# Patient Record
Sex: FEMALE | Race: WHITE | Employment: OTHER | URBAN - METROPOLITAN AREA
[De-identification: names, ages, dates, MRNs, and addresses within clinical notes are randomized per-mention and may not be internally consistent; named-entity substitution may affect disease eponyms.]

---

## 2017-02-09 PROCEDURE — 82746 ASSAY OF FOLIC ACID SERUM: CPT | Performed by: OTHER

## 2017-02-09 PROCEDURE — 82607 VITAMIN B-12: CPT | Performed by: OTHER

## 2017-02-09 PROCEDURE — 36415 COLL VENOUS BLD VENIPUNCTURE: CPT | Performed by: OTHER

## 2017-05-23 PROBLEM — K58.1 IRRITABLE BOWEL SYNDROME WITH CONSTIPATION: Status: ACTIVE | Noted: 2017-05-23

## 2017-08-25 PROBLEM — M54.16 LUMBAR RADICULITIS: Status: ACTIVE | Noted: 2017-08-25

## 2017-08-25 PROBLEM — M43.16 SPONDYLOLISTHESIS OF LUMBAR REGION: Status: ACTIVE | Noted: 2017-08-25

## 2017-08-25 PROBLEM — M48.061 LUMBAR SPINAL STENOSIS: Status: ACTIVE | Noted: 2017-08-25

## 2017-10-24 PROBLEM — M53.3 COCCYX PAIN: Status: ACTIVE | Noted: 2017-10-24

## 2017-10-24 PROBLEM — T14.90XA TRAUMA: Status: ACTIVE | Noted: 2017-10-24

## 2017-12-19 PROCEDURE — 87077 CULTURE AEROBIC IDENTIFY: CPT | Performed by: UROLOGY

## 2017-12-19 PROCEDURE — 87086 URINE CULTURE/COLONY COUNT: CPT | Performed by: UROLOGY

## 2017-12-20 PROCEDURE — 82607 VITAMIN B-12: CPT | Performed by: INTERNAL MEDICINE

## 2017-12-29 PROBLEM — G31.84 MCI (MILD COGNITIVE IMPAIRMENT) WITH MEMORY LOSS: Status: ACTIVE | Noted: 2017-12-29

## 2017-12-29 PROCEDURE — 83921 ORGANIC ACID SINGLE QUANT: CPT | Performed by: INTERNAL MEDICINE

## 2017-12-29 PROCEDURE — 36415 COLL VENOUS BLD VENIPUNCTURE: CPT | Performed by: INTERNAL MEDICINE

## 2018-02-05 PROCEDURE — 82607 VITAMIN B-12: CPT | Performed by: INTERNAL MEDICINE

## 2018-02-05 PROCEDURE — 36415 COLL VENOUS BLD VENIPUNCTURE: CPT | Performed by: INTERNAL MEDICINE

## 2018-02-12 PROBLEM — E53.8 B12 DEFICIENCY: Status: ACTIVE | Noted: 2018-02-12

## 2018-08-01 PROCEDURE — 86235 NUCLEAR ANTIGEN ANTIBODY: CPT | Performed by: OTHER

## 2018-08-01 PROCEDURE — 36415 COLL VENOUS BLD VENIPUNCTURE: CPT | Performed by: OTHER

## 2018-08-20 PROCEDURE — 86160 COMPLEMENT ANTIGEN: CPT | Performed by: INTERNAL MEDICINE

## 2018-11-29 NOTE — LETTER
[facility] 4NW-A  [address] [address]  [phone]    Blood Transfusion Consent    In the course of your treatment, it may become necessary to administer a transfusion of blood or blood components. This form provides basic information concerning this procedure and, if signed by you, authorizes its administration. By signing this form, you agree that all of your questions about the administration of blood or blood products have been answered by the ordering medical professional or designee.    Description of Procedure  Blood is introduced into one of your veins, commonly in the arm, using a sterilized disposable needle. The amount of blood transfused, and whether the transfusion will be of blood or blood components is a judgement the physician will make based on your particular needs.    Risks  The transfusion is a common procedure of low risk.  MINOR AND TEMPORARY REACTIONS ARE NOT UNCOMMON, including a slight bruise, swelling or local reaction in the area where the needle pierces your skin, or a nonserious reaction to the transfused material itself, including headache, fever or mild skin reaction, such as rash.  Serious reactions are possible, though very unlikely, and include severe allergic reaction (shock) and destruction (hemolysis) of transfused blood cells.  Infectious diseases which are known to be transmitted by blood transfusion include certain types of viral Hepatitis(liver infection from a virus), Human Immunodeficiency Virus (HIV-1,2) infection, a viral infection known to cause Acquired Immunodeficiency Syndrome (AIDS), as well as certain other bacterial, viral, and parasitic diseases. While a minimal risk of acquiring an infectious disease from transfused blood exists, in accordance with the Federal and State law, all due care has been taken in donor selection and testing to avoid transmission of disease.    Alternatives  If loss of blood poses serious threats during your  Left detailed message on patient's home phone regarding information below, patient instructed to return call with her decision of which GYN she would like. treatment, THERE IS NO EFFECTIVE ALTERNATIVE TO BLOOD TRANSFUSION. However, if you have any further questions on this matter, your provider will fully explain the alternatives to you if it has not already been done.    I, ______________________________, have read/had read to me the above. I understand the matters bearing on the decision whether or not to authorize a transfusion of blood or blood components. I have no questions which have not been answered to my full satisfaction. I hereby consent to such transfusion as my physician may deem necessary or advisable in the course of my treatment.    ______________________________________________                    ___________________________  (Signature of Patient or Responsible party in case of minor,                 (Printed Name of Patient or incompetent, or unconscious patient)              Responsible Party)    ___________________________               _____________________  (Relationship to Patient if not self)                                    (Date and Time)    __________________________                                                           ______________________              (Signature of Witness)               (Printed Name of Witness)     Language line ()    Telephone/Verbal/Video Consent    __________________________                     ____________________  (Signature of 2nd Witness           (Printed Name of 2nd  Telephone/Verbal/Video Consent)           Witness)    Patient Name: Isabella Addison     : 1940                 Printed: 2024     Medical Record #: [id number]      Rev: 2023

## 2019-01-04 PROBLEM — T14.90XA TRAUMA: Status: RESOLVED | Noted: 2017-10-24 | Resolved: 2019-01-04

## 2019-01-04 PROBLEM — R73.01 IFG (IMPAIRED FASTING GLUCOSE): Status: ACTIVE | Noted: 2019-01-04

## 2019-01-04 PROBLEM — G47.33 OSA (OBSTRUCTIVE SLEEP APNEA): Status: ACTIVE | Noted: 2019-01-04

## 2019-01-04 PROBLEM — M53.3 COCCYX PAIN: Status: RESOLVED | Noted: 2017-10-24 | Resolved: 2019-01-04

## 2019-01-04 PROBLEM — M43.16 SPONDYLOLISTHESIS OF LUMBAR REGION: Status: RESOLVED | Noted: 2017-08-25 | Resolved: 2019-01-04

## 2019-12-01 ENCOUNTER — HOSPITAL ENCOUNTER (EMERGENCY)
Facility: HOSPITAL | Age: 79
Discharge: HOME | End: 2019-12-01
Attending: EMERGENCY MEDICINE
Payer: MEDICARE

## 2019-12-01 VITALS
HEART RATE: 72 BPM | DIASTOLIC BLOOD PRESSURE: 93 MMHG | RESPIRATION RATE: 16 BRPM | HEIGHT: 64 IN | WEIGHT: 132 LBS | BODY MASS INDEX: 22.53 KG/M2 | TEMPERATURE: 98 F | OXYGEN SATURATION: 98 % | SYSTOLIC BLOOD PRESSURE: 164 MMHG

## 2019-12-01 DIAGNOSIS — H10.212 CHEMICAL CONJUNCTIVITIS OF LEFT EYE: Primary | ICD-10-CM

## 2019-12-01 PROCEDURE — 99282 EMERGENCY DEPT VISIT SF MDM: CPT

## 2019-12-01 NOTE — ED PROVIDER NOTES
Patient Seen in: [facility] Emergency Department      History   Patient presents with:   Eye Visual Problem (opthalmic)    Stated Complaint: accidentally put Frontline vet meds in eye, flushed eye for 15 mins    HPI    49-year-old woman who has mild • LUMBAR EPIDURAL N/A 7/19/2018    Performed by Hyacinth Turner MD at [address] [place] [address] N/A 12/28/2017    Performed by Hyacinth Turner MD at [address] [place] [address] N/A 9/12/2017    Performed Skin: No surrounding erythema of the periorbital area        MDM         MSDS            Disposition and Plan     Clinical Impression:  Chemical conjunctivitis of left eye  (primary encounter diagnosis)    Disposition:  Discharge  12/1/2019  6:10 pm    Andrew Lockhart

## 2019-12-02 NOTE — ED INITIAL ASSESSMENT (HPI)
Patient reports she mistook frontline plus dog medication for her eye drop, getting medication into left eye. Tearing and irritation observed to left eye. Reports vision feels normal. Flushed out eye at home pta.

## 2019-12-23 PROBLEM — J06.9 ACUTE URI: Status: ACTIVE | Noted: 2019-12-23

## 2019-12-24 ENCOUNTER — APPOINTMENT (OUTPATIENT)
Dept: GENERAL RADIOLOGY | Facility: HOSPITAL | Age: 79
DRG: 194 | End: 2019-12-24
Attending: EMERGENCY MEDICINE
Payer: MEDICARE

## 2019-12-24 ENCOUNTER — HOSPITAL ENCOUNTER (INPATIENT)
Facility: HOSPITAL | Age: 79
LOS: 3 days | Discharge: NURSING, REHAB OR LONG-TERM CARE | DRG: 194 | End: 2019-12-30
Attending: EMERGENCY MEDICINE | Admitting: HOSPITALIST
Payer: MEDICARE

## 2019-12-24 ENCOUNTER — APPOINTMENT (OUTPATIENT)
Dept: CT IMAGING | Facility: HOSPITAL | Age: 79
DRG: 194 | End: 2019-12-24
Attending: EMERGENCY MEDICINE
Payer: MEDICARE

## 2019-12-24 DIAGNOSIS — R77.8 ELEVATED TROPONIN: Primary | ICD-10-CM

## 2019-12-24 DIAGNOSIS — R40.0 SOMNOLENCE: ICD-10-CM

## 2019-12-24 DIAGNOSIS — J18.9 COMMUNITY ACQUIRED PNEUMONIA OF RIGHT UPPER LOBE OF LUNG: ICD-10-CM

## 2019-12-24 PROBLEM — R79.89 ELEVATED TROPONIN: Status: ACTIVE | Noted: 2019-12-24

## 2019-12-24 LAB
ADENOVIRUS PCR:: NEGATIVE
ALBUMIN SERPL-MCNC: 2.8 G/DL (ref 3.4–5)
ALBUMIN/GLOB SERPL: 0.7 {RATIO} (ref 1–2)
ALP LIVER SERPL-CCNC: 64 U/L (ref 55–142)
ALT SERPL-CCNC: 13 U/L (ref 13–56)
ANION GAP SERPL CALC-SCNC: 6 MMOL/L (ref 0–18)
APTT PPP: 27.6 SECONDS (ref 25.4–36.1)
AST SERPL-CCNC: 13 U/L (ref 15–37)
B PERT DNA SPEC QL NAA+PROBE: NEGATIVE
BASOPHILS # BLD AUTO: 0.02 X10(3) UL (ref 0–0.2)
BASOPHILS NFR BLD AUTO: 0.3 %
BILIRUB SERPL-MCNC: 0.5 MG/DL (ref 0.1–2)
BILIRUB UR QL STRIP.AUTO: NEGATIVE
BUN BLD-MCNC: 10 MG/DL (ref 7–18)
BUN/CREAT SERPL: 11.5 (ref 10–20)
C PNEUM DNA SPEC QL NAA+PROBE: NEGATIVE
CALCIUM BLD-MCNC: 8.5 MG/DL (ref 8.5–10.1)
CHLORIDE SERPL-SCNC: 106 MMOL/L (ref 98–112)
CO2 SERPL-SCNC: 28 MMOL/L (ref 21–32)
COLOR UR AUTO: YELLOW
CORONAVIRUS 229E PCR:: NEGATIVE
CORONAVIRUS HKU1 PCR:: NEGATIVE
CORONAVIRUS NL63 PCR:: NEGATIVE
CORONAVIRUS OC43 PCR:: NEGATIVE
CREAT BLD-MCNC: 0.87 MG/DL (ref 0.55–1.02)
DEPRECATED RDW RBC AUTO: 43.6 FL (ref 35.1–46.3)
DIGOXIN SERPL-MCNC: 1.78 NG/ML (ref 0.8–2)
EOSINOPHIL # BLD AUTO: 0.06 X10(3) UL (ref 0–0.7)
EOSINOPHIL NFR BLD AUTO: 0.9 %
ERYTHROCYTE [DISTWIDTH] IN BLOOD BY AUTOMATED COUNT: 14.3 % (ref 11–15)
FLUAV RNA SPEC QL NAA+PROBE: NEGATIVE
FLUBV RNA SPEC QL NAA+PROBE: NEGATIVE
GLOBULIN PLAS-MCNC: 4.2 G/DL (ref 2.8–4.4)
GLUCOSE BLD-MCNC: 121 MG/DL (ref 70–99)
GLUCOSE UR STRIP.AUTO-MCNC: NEGATIVE MG/DL
HCT VFR BLD AUTO: 33.4 % (ref 35–48)
HGB BLD-MCNC: 10.4 G/DL (ref 12–16)
IMM GRANULOCYTES # BLD AUTO: 0.02 X10(3) UL (ref 0–1)
IMM GRANULOCYTES NFR BLD: 0.3 %
INR BLD: 1.04 (ref 0.9–1.1)
KETONES UR STRIP.AUTO-MCNC: NEGATIVE MG/DL
LACTATE SERPL-SCNC: 1.2 MMOL/L (ref 0.4–2)
LEUKOCYTE ESTERASE UR QL STRIP.AUTO: NEGATIVE
LYMPHOCYTES # BLD AUTO: 1.18 X10(3) UL (ref 1–4)
LYMPHOCYTES NFR BLD AUTO: 18.4 %
M PROTEIN MFR SERPL ELPH: 7 G/DL (ref 6.4–8.2)
MCH RBC QN AUTO: 26.1 PG (ref 26–34)
MCHC RBC AUTO-ENTMCNC: 31.1 G/DL (ref 31–37)
MCV RBC AUTO: 83.9 FL (ref 80–100)
METAPNEUMOVIRUS PCR:: NEGATIVE
MONOCYTES # BLD AUTO: 1.07 X10(3) UL (ref 0.1–1)
MONOCYTES NFR BLD AUTO: 16.7 %
MYCOPLASMA PNEUMONIA PCR:: NEGATIVE
NEUTROPHILS # BLD AUTO: 4.06 X10 (3) UL (ref 1.5–7.7)
NEUTROPHILS # BLD AUTO: 4.06 X10(3) UL (ref 1.5–7.7)
NEUTROPHILS NFR BLD AUTO: 63.4 %
NITRITE UR QL STRIP.AUTO: NEGATIVE
OSMOLALITY SERPL CALC.SUM OF ELEC: 290 MOSM/KG (ref 275–295)
PARAINFLUENZA 1 PCR:: NEGATIVE
PARAINFLUENZA 2 PCR:: NEGATIVE
PARAINFLUENZA 3 PCR:: NEGATIVE
PARAINFLUENZA 4 PCR:: NEGATIVE
PH UR STRIP.AUTO: 6 [PH] (ref 4.5–8)
PLATELET # BLD AUTO: 160 10(3)UL (ref 150–450)
POTASSIUM SERPL-SCNC: 3.5 MMOL/L (ref 3.5–5.1)
PROT UR STRIP.AUTO-MCNC: NEGATIVE MG/DL
PSA SERPL DL<=0.01 NG/ML-MCNC: 14 SECONDS (ref 12.5–14.7)
RBC # BLD AUTO: 3.98 X10(6)UL (ref 3.8–5.3)
RBC UR QL AUTO: NEGATIVE
RHINOVIRUS/ENTERO PCR:: NEGATIVE
RSV RNA SPEC QL NAA+PROBE: NEGATIVE
SODIUM SERPL-SCNC: 140 MMOL/L (ref 136–145)
SP GR UR STRIP.AUTO: 1.01 (ref 1–1.03)
TROPONIN I SERPL-MCNC: 0.05 NG/ML (ref ?–0.04)
TROPONIN I SERPL-MCNC: 0.27 NG/ML (ref ?–0.04)
UROBILINOGEN UR STRIP.AUTO-MCNC: <2 MG/DL
WBC # BLD AUTO: 6.4 X10(3) UL (ref 4–11)

## 2019-12-24 PROCEDURE — 81003 URINALYSIS AUTO W/O SCOPE: CPT | Performed by: EMERGENCY MEDICINE

## 2019-12-24 PROCEDURE — 87633 RESP VIRUS 12-25 TARGETS: CPT | Performed by: EMERGENCY MEDICINE

## 2019-12-24 PROCEDURE — 85025 COMPLETE CBC W/AUTO DIFF WBC: CPT | Performed by: EMERGENCY MEDICINE

## 2019-12-24 PROCEDURE — S0077 INJECTION, CLINDAMYCIN PHOSP: HCPCS | Performed by: EMERGENCY MEDICINE

## 2019-12-24 PROCEDURE — 87999 UNLISTED MICROBIOLOGY PX: CPT

## 2019-12-24 PROCEDURE — 87581 M.PNEUMON DNA AMP PROBE: CPT | Performed by: EMERGENCY MEDICINE

## 2019-12-24 PROCEDURE — 93010 ELECTROCARDIOGRAM REPORT: CPT

## 2019-12-24 PROCEDURE — 71045 X-RAY EXAM CHEST 1 VIEW: CPT | Performed by: EMERGENCY MEDICINE

## 2019-12-24 PROCEDURE — 87486 CHLMYD PNEUM DNA AMP PROBE: CPT | Performed by: EMERGENCY MEDICINE

## 2019-12-24 PROCEDURE — 70450 CT HEAD/BRAIN W/O DYE: CPT | Performed by: EMERGENCY MEDICINE

## 2019-12-24 PROCEDURE — 96375 TX/PRO/DX INJ NEW DRUG ADDON: CPT

## 2019-12-24 PROCEDURE — 80162 ASSAY OF DIGOXIN TOTAL: CPT | Performed by: EMERGENCY MEDICINE

## 2019-12-24 PROCEDURE — 80053 COMPREHEN METABOLIC PANEL: CPT | Performed by: EMERGENCY MEDICINE

## 2019-12-24 PROCEDURE — 36415 COLL VENOUS BLD VENIPUNCTURE: CPT

## 2019-12-24 PROCEDURE — 96374 THER/PROPH/DIAG INJ IV PUSH: CPT

## 2019-12-24 PROCEDURE — 83605 ASSAY OF LACTIC ACID: CPT | Performed by: EMERGENCY MEDICINE

## 2019-12-24 PROCEDURE — 99285 EMERGENCY DEPT VISIT HI MDM: CPT

## 2019-12-24 PROCEDURE — 85730 THROMBOPLASTIN TIME PARTIAL: CPT | Performed by: EMERGENCY MEDICINE

## 2019-12-24 PROCEDURE — 84484 ASSAY OF TROPONIN QUANT: CPT | Performed by: EMERGENCY MEDICINE

## 2019-12-24 PROCEDURE — 87040 BLOOD CULTURE FOR BACTERIA: CPT | Performed by: EMERGENCY MEDICINE

## 2019-12-24 PROCEDURE — 85610 PROTHROMBIN TIME: CPT | Performed by: EMERGENCY MEDICINE

## 2019-12-24 PROCEDURE — 87798 DETECT AGENT NOS DNA AMP: CPT | Performed by: EMERGENCY MEDICINE

## 2019-12-24 PROCEDURE — 93005 ELECTROCARDIOGRAM TRACING: CPT

## 2019-12-24 RX ORDER — NALOXONE HYDROCHLORIDE 0.4 MG/ML
0.4 INJECTION, SOLUTION INTRAMUSCULAR; INTRAVENOUS; SUBCUTANEOUS ONCE
Status: COMPLETED | OUTPATIENT
Start: 2019-12-24 | End: 2019-12-24

## 2019-12-24 RX ORDER — NALOXONE HYDROCHLORIDE 0.4 MG/ML
INJECTION, SOLUTION INTRAMUSCULAR; INTRAVENOUS; SUBCUTANEOUS
Status: DISPENSED
Start: 2019-12-24 | End: 2019-12-25

## 2019-12-24 RX ORDER — ONDANSETRON 2 MG/ML
4 INJECTION INTRAMUSCULAR; INTRAVENOUS EVERY 4 HOURS PRN
Status: DISCONTINUED | OUTPATIENT
Start: 2019-12-24 | End: 2019-12-30

## 2019-12-24 RX ORDER — CLINDAMYCIN PHOSPHATE 600 MG/50ML
600 INJECTION INTRAVENOUS EVERY 8 HOURS
Status: DISCONTINUED | OUTPATIENT
Start: 2019-12-24 | End: 2019-12-25 | Stop reason: SDUPTHER

## 2019-12-24 NOTE — ED INITIAL ASSESSMENT (HPI)
Patient presents via EMS for progressive weakness and being slower to respond than normal per family. Patient was recently treated for pneumonia and today per EMS was febrile 100.4.  Patient states today just feels tired; no shortness of breath or other com

## 2019-12-24 NOTE — ED PROVIDER NOTES
Patient Seen in: [facility] Emergency Department      History   Patient presents with:  Fatigue  Altered Mental Status    Stated Complaint: weak and lethargic today    HPI    Patient has become increasingly lethargic and weak today.   Snoring for muc stereotactic core biopsy   • BENIGN BIOPSY RIGHT  ?     stereotactic core biopsy   • COLONOSCOPY  11/2008    RECHECK 5 YRS   • ENDOSCOPIC ULTRASOUND (EUS) N/A 5/17/2016    Performed by Doretha Washington MD at [facility] ENDOSCOPY   • ESOPHAGOGASTRODUODENOSCOPY, COLO Pulse 86   Temp 98.6 °F (37 °C) (Oral)   Resp 18   Wt 62.4 kg   SpO2 94%   BMI 23.60 kg/m²         Physical Exam    General: Elderly, sleeping soundly, snoring  Head and neck: Normocephalic, atraumatic, pupils equal round and reactive to light, oropharynx ACTIVATED - Normal   PROCALCITONIN - Normal    Narrative:     Resulted by: [id number]: CTNI,    AMMONIA, PLASMA - Normal   LACTIC ACID, PLASMA - Normal   RESPIRATORY PANEL FLU EXPANDED - Normal   CBC WITH DIFFERENTIAL WITH PLATELET    Narrative:      The follow dyspneic only appears somnolent. Case was discussed with Dr. Myrtle Moreno who recommends admission for serial enzymes but recommends against nitro or heparin given the lack of symptoms at this point.   Patient was also found to have the opacity on x-ray and w

## 2019-12-24 NOTE — ED NOTES
Spoke with daughter who states pt has been declining over the past week states it mostly started with a nonproductive cough.

## 2019-12-25 ENCOUNTER — APPOINTMENT (OUTPATIENT)
Dept: CT IMAGING | Facility: HOSPITAL | Age: 79
DRG: 194 | End: 2019-12-25
Attending: INTERNAL MEDICINE
Payer: MEDICARE

## 2019-12-25 LAB
AMMONIA PLAS-MCNC: 30 UMOL/L (ref 11–32)
ANION GAP SERPL CALC-SCNC: 5 MMOL/L (ref 0–18)
BASOPHILS # BLD AUTO: 0.02 X10(3) UL (ref 0–0.2)
BASOPHILS NFR BLD AUTO: 0.3 %
BUN BLD-MCNC: 8 MG/DL (ref 7–18)
BUN/CREAT SERPL: 12.9 (ref 10–20)
CALCIUM BLD-MCNC: 8.5 MG/DL (ref 8.5–10.1)
CHLORIDE SERPL-SCNC: 109 MMOL/L (ref 98–112)
CO2 SERPL-SCNC: 28 MMOL/L (ref 21–32)
CREAT BLD-MCNC: 0.62 MG/DL (ref 0.55–1.02)
DEPRECATED RDW RBC AUTO: 44.1 FL (ref 35.1–46.3)
EOSINOPHIL # BLD AUTO: 0.1 X10(3) UL (ref 0–0.7)
EOSINOPHIL NFR BLD AUTO: 1.7 %
ERYTHROCYTE [DISTWIDTH] IN BLOOD BY AUTOMATED COUNT: 14.4 % (ref 11–15)
GLUCOSE BLD-MCNC: 121 MG/DL (ref 70–99)
HAV IGM SER QL: 2.1 MG/DL (ref 1.6–2.6)
HCT VFR BLD AUTO: 30.3 % (ref 35–48)
HGB BLD-MCNC: 9.4 G/DL (ref 12–16)
IMM GRANULOCYTES # BLD AUTO: 0.02 X10(3) UL (ref 0–1)
IMM GRANULOCYTES NFR BLD: 0.3 %
LACTATE SERPL-SCNC: 0.7 MMOL/L (ref 0.4–2)
LYMPHOCYTES # BLD AUTO: 0.92 X10(3) UL (ref 1–4)
LYMPHOCYTES NFR BLD AUTO: 15.3 %
MCH RBC QN AUTO: 26.2 PG (ref 26–34)
MCHC RBC AUTO-ENTMCNC: 31 G/DL (ref 31–37)
MCV RBC AUTO: 84.4 FL (ref 80–100)
MONOCYTES # BLD AUTO: 0.84 X10(3) UL (ref 0.1–1)
MONOCYTES NFR BLD AUTO: 13.9 %
NEUTROPHILS # BLD AUTO: 4.13 X10 (3) UL (ref 1.5–7.7)
NEUTROPHILS # BLD AUTO: 4.13 X10(3) UL (ref 1.5–7.7)
NEUTROPHILS NFR BLD AUTO: 68.5 %
OSMOLALITY SERPL CALC.SUM OF ELEC: 294 MOSM/KG (ref 275–295)
PLATELET # BLD AUTO: 160 10(3)UL (ref 150–450)
POTASSIUM SERPL-SCNC: 3.8 MMOL/L (ref 3.5–5.1)
POTASSIUM SERPL-SCNC: 4.3 MMOL/L (ref 3.5–5.1)
PROCALCITONIN SERPL-MCNC: 0.07 NG/ML
RBC # BLD AUTO: 3.59 X10(6)UL (ref 3.8–5.3)
SODIUM SERPL-SCNC: 142 MMOL/L (ref 136–145)
TROPONIN I SERPL-MCNC: 0.6 NG/ML (ref ?–0.04)
TROPONIN I SERPL-MCNC: 0.7 NG/ML (ref ?–0.04)
WBC # BLD AUTO: 6 X10(3) UL (ref 4–11)

## 2019-12-25 PROCEDURE — 82140 ASSAY OF AMMONIA: CPT | Performed by: INTERNAL MEDICINE

## 2019-12-25 PROCEDURE — 83605 ASSAY OF LACTIC ACID: CPT | Performed by: INTERNAL MEDICINE

## 2019-12-25 PROCEDURE — 83735 ASSAY OF MAGNESIUM: CPT | Performed by: INTERNAL MEDICINE

## 2019-12-25 PROCEDURE — 84145 PROCALCITONIN (PCT): CPT | Performed by: INTERNAL MEDICINE

## 2019-12-25 PROCEDURE — 84132 ASSAY OF SERUM POTASSIUM: CPT | Performed by: INTERNAL MEDICINE

## 2019-12-25 PROCEDURE — S0077 INJECTION, CLINDAMYCIN PHOSP: HCPCS | Performed by: INTERNAL MEDICINE

## 2019-12-25 PROCEDURE — 71250 CT THORAX DX C-: CPT | Performed by: INTERNAL MEDICINE

## 2019-12-25 PROCEDURE — 80048 BASIC METABOLIC PNL TOTAL CA: CPT | Performed by: INTERNAL MEDICINE

## 2019-12-25 PROCEDURE — 84484 ASSAY OF TROPONIN QUANT: CPT | Performed by: INTERNAL MEDICINE

## 2019-12-25 PROCEDURE — 85025 COMPLETE CBC W/AUTO DIFF WBC: CPT | Performed by: INTERNAL MEDICINE

## 2019-12-25 PROCEDURE — S0077 INJECTION, CLINDAMYCIN PHOSP: HCPCS | Performed by: EMERGENCY MEDICINE

## 2019-12-25 RX ORDER — RALOXIFENE HYDROCHLORIDE 60 MG/1
60 TABLET, FILM COATED ORAL
Status: DISCONTINUED | OUTPATIENT
Start: 2019-12-25 | End: 2019-12-30

## 2019-12-25 RX ORDER — DULOXETIN HYDROCHLORIDE 30 MG/1
120 CAPSULE, DELAYED RELEASE ORAL EVERY MORNING
Status: DISCONTINUED | OUTPATIENT
Start: 2019-12-25 | End: 2019-12-30

## 2019-12-25 RX ORDER — HEPARIN SODIUM 5000 [USP'U]/ML
5000 INJECTION, SOLUTION INTRAVENOUS; SUBCUTANEOUS EVERY 8 HOURS SCHEDULED
Status: DISCONTINUED | OUTPATIENT
Start: 2019-12-25 | End: 2019-12-30

## 2019-12-25 RX ORDER — ATORVASTATIN CALCIUM 40 MG/1
40 TABLET, FILM COATED ORAL NIGHTLY
Status: DISCONTINUED | OUTPATIENT
Start: 2019-12-25 | End: 2019-12-30

## 2019-12-25 RX ORDER — POTASSIUM CHLORIDE 20 MEQ/1
40 TABLET, EXTENDED RELEASE ORAL EVERY 4 HOURS
Status: COMPLETED | OUTPATIENT
Start: 2019-12-25 | End: 2019-12-25

## 2019-12-25 RX ORDER — DIGOXIN 250 MCG
250 TABLET ORAL EVERY OTHER DAY
Status: DISCONTINUED | OUTPATIENT
Start: 2019-12-26 | End: 2019-12-30

## 2019-12-25 RX ORDER — BUSPIRONE HYDROCHLORIDE 5 MG/1
10 TABLET ORAL NIGHTLY
Status: DISCONTINUED | OUTPATIENT
Start: 2019-12-25 | End: 2019-12-30

## 2019-12-25 RX ORDER — DULOXETIN HYDROCHLORIDE 30 MG/1
60 CAPSULE, DELAYED RELEASE ORAL DAILY
Status: DISCONTINUED | OUTPATIENT
Start: 2019-12-25 | End: 2019-12-25

## 2019-12-25 RX ORDER — ESOMEPRAZOLE MAGNESIUM 20 MG/1
20 TABLET, DELAYED RELEASE ORAL DAILY
Status: DISCONTINUED | OUTPATIENT
Start: 2019-12-25 | End: 2019-12-25 | Stop reason: ALTCHOICE

## 2019-12-25 RX ORDER — PANTOPRAZOLE SODIUM 20 MG/1
20 TABLET, DELAYED RELEASE ORAL
Status: DISCONTINUED | OUTPATIENT
Start: 2019-12-25 | End: 2019-12-30

## 2019-12-25 RX ORDER — LORAZEPAM 0.5 MG/1
0.25 TABLET ORAL DAILY
Status: DISCONTINUED | OUTPATIENT
Start: 2019-12-25 | End: 2019-12-30

## 2019-12-25 RX ORDER — RIVASTIGMINE 13.3 MG/24H
1 PATCH, EXTENDED RELEASE TRANSDERMAL DAILY
Status: DISCONTINUED | OUTPATIENT
Start: 2019-12-25 | End: 2019-12-30

## 2019-12-25 RX ORDER — DIGOXIN 125 MCG
125 TABLET ORAL EVERY OTHER DAY
Status: DISCONTINUED | OUTPATIENT
Start: 2019-12-25 | End: 2019-12-30

## 2019-12-25 RX ORDER — DULOXETIN HYDROCHLORIDE 30 MG/1
60 CAPSULE, DELAYED RELEASE ORAL EVERY EVENING
Status: DISCONTINUED | OUTPATIENT
Start: 2019-12-25 | End: 2019-12-30

## 2019-12-25 RX ORDER — SODIUM CHLORIDE 9 MG/ML
INJECTION, SOLUTION INTRAVENOUS CONTINUOUS
Status: DISCONTINUED | OUTPATIENT
Start: 2019-12-25 | End: 2019-12-30

## 2019-12-25 RX ORDER — CLINDAMYCIN PHOSPHATE 600 MG/50ML
600 INJECTION INTRAVENOUS EVERY 8 HOURS
Status: DISCONTINUED | OUTPATIENT
Start: 2019-12-25 | End: 2019-12-25

## 2019-12-25 RX ORDER — MEMANTINE HYDROCHLORIDE 10 MG/1
10 TABLET ORAL 2 TIMES DAILY
Status: DISCONTINUED | OUTPATIENT
Start: 2019-12-25 | End: 2019-12-30

## 2019-12-25 RX ORDER — BUSPIRONE HYDROCHLORIDE 10 MG/1
20 TABLET ORAL DAILY
Status: DISCONTINUED | OUTPATIENT
Start: 2019-12-25 | End: 2019-12-30

## 2019-12-25 RX ORDER — AMLODIPINE BESYLATE 5 MG/1
5 TABLET ORAL
Status: DISCONTINUED | OUTPATIENT
Start: 2019-12-25 | End: 2019-12-30

## 2019-12-25 RX ORDER — CODEINE PHOSPHATE AND GUAIFENESIN 10; 100 MG/5ML; MG/5ML
5 SOLUTION ORAL EVERY 4 HOURS PRN
Status: DISCONTINUED | OUTPATIENT
Start: 2019-12-25 | End: 2019-12-30

## 2019-12-25 NOTE — H&P
.  CC: Patient presents with:  Fatigue  Altered Mental Status       PCP: Cal Iglesias MD    History of Present Illness: Patient is a 78year old female with PMH sig for anxiety, GERD, HTN who presented with weakness, lethargy.  Unable to arouse at home Performed by Jac Ibrahim MD at [facility] ENDOSCOPY   • ESOPHAGOGASTRODUODENOSCOPY, COLONOSCOPY, POSSIBLE BIOPSY, POSSIBLE POLYPECTOMY 13193, 69148 N/A 1/8/2014    Performed by Sheliah Dandy, MD at [address] PATCH ON THE SKIN DAILY, Disp: 90 patch, Rfl: 1  amLODIPine Besylate 5 MG Oral Tab, Take 1 tablet (5 mg total) by mouth once daily. , Disp: 90 tablet, Rfl: 3  atorvastatin 40 MG Oral Tab, TAKE 1 TABLET NIGHTLY, Disp: 90 tablet, Rfl: 3  Esomepraz stated age  [de-identified]- NCAT, anicteric sclera, MMM, OP clear. Hoarse voice  Lymph- no cervical LAD  CV- RRR no murmurs.  No EDUARDO  Lungs- CTAB, good respiratory effort  Abd- soft, ntnd, no organomegaly, BS+  Derm- no rashes  MSK- good muscle strength and tone, no microvascular ischemic changes. Old lacunar infarction in the left basal ganglia and subinsular cortex. No acute intracranial hemorrhage or midline shift. No mass effect. SINUSES:           Small air-fluid level in the left maxillary sinus.   Mucosal th unrevealing. Dig level, ammonia okay  - trop elevated (see below) but unlikely to be related to presenting sx directly and pt without other cardiac sx  - concern for PNA on xray although labs okay- pulm following and CT chest ordered.  Currently abx held  -

## 2019-12-25 NOTE — PROGRESS NOTES
[facility] Dosing Service  Antibiotic Dosing    Karli Holley is a 78year old female for whom pharmacy is dosing Clindamycin for treatment of  pneumonia.      Allergies: is allergic to ciprofloxacin; radiology contrast iodinated dyes; amoxil; biax

## 2019-12-25 NOTE — CONSULTS
[facility] Cardiology  Consultation Note      Adrienne Trevino Patient Status:  Observation    1940 MRN [id number]   [facility] 2NE-A Attending Tatianna Rodriguez MD   Hosp Day # 0 PCP María Elena Starks MD     Reason for consu QAM  Heparin Sodium (Porcine) 5000 UNIT/ML injection 5,000 Units, 5,000 Units, Subcutaneous, Q8H NICHO  ondansetron HCl (ZOFRAN) injection 4 mg, 4 mg, Intravenous, Q4H PRN  influenza vaccine (PF)(FLUZONE HD) high dose for 65 yrs & older inj 0.5ml, 0.5 mL, In 8/28/2017    Performed by Nahed Higginbotham MD at [address] [facility] [address] ARM Left 10/6/2014    Performed by Rick Upton MD at [address] [facility]   • OTHER LEE murmurs/rubs/gallops are appreciated; PMI is non-displaced; there is no evidence of a sternal heave  Lungs: Clear to auscultation bilaterally; no accessory muscle use is noted  Abdomen: Soft, non-tender; bowel sounds are normoactive; no hepatosplenomegaly

## 2019-12-25 NOTE — CONSULTS
Pulmonary Consult       NAME: Olivia Horner - ROOM: 1420/4097-N - MRN: [id number] - Age: 78year old - :  1940    Date of Admission: 2019  3:19 PM  Admission Diagnosis: Somnolence [R40.0]  Elevated troponin [R79.89]    History of Presen Performed by Mel Go MD at [address] [place] [address] N/A 12/28/2017    Performed by Mel Go MD at [address] [place] [address] N/A 9/12/2017    Performed by Mel Go MD at [place] [address] mouth once daily. , Disp: 90 tablet, Rfl: 3, 12/24/2019 at am  BusPIRone HCl 10 MG Oral Tab, Take 10 mg by mouth. 20 mg qam and 10 mg qhs , Disp: , Rfl: , 12/24/2019 at am  Vitamin B-12 1000 MCG Oral Tab, Take 1,000 mcg by mouth daily. , Disp: , Rfl: , 12/24 Comment:Cant remember  Pcn [Penicillins]       HIVES  Sulfa Antibiotics       HIVES    Social History    Socioeconomic History      Marital status:       Spouse name: Not on file      Number of children: Not on file      Years of education: Not on f Scheduled Medication:  • amLODIPine Besylate  5 mg Oral Daily   • atorvastatin  40 mg Oral Nightly   • busPIRone HCl  20 mg Oral Daily   • digoxin  125 mcg Oral QOD   • LORazepam  0.25 mg Oral Daily   • Raloxifene HCl  60 mg Oral Daily   • rivastig distress, appears stated age. Head:  Normocephalic, without obvious abnormality, atraumatic. Eyes:  Conjunctivae/corneas clear. Neck: Supple, symmetrical, trachea midline, no adenopathy,  no carotid bruit and no JVD.    Back:   Non tender   Lungs:   C W/ DIFFERENTIAL    Collection Time: 12/24/19  3:38 PM   Result Value Ref Range    WBC 6.4 4.0 - 11.0 x10(3) uL    RBC 3.98 3.80 - 5.30 x10(6)uL    HGB 10.4 (L) 12.0 - 16.0 g/dL    HCT 33.4 (L) 35.0 - 48.0 %    .0 150.0 - 450.0 10(3)uL    MCV 83.9 80 Coronavirus Oc43 PCR: Negative Negative    Metapneumovirus PCR: Negative Negative    Rhinovirus/Entero PCR: Negative Negative    Influenza A PCR: Negative Negative    Influenza B PCR: Negative Negative    Parainfluenza 1 PCR: Negative Negative    Parainlfu 145 mmol/L    Potassium 3.8 3.5 - 5.1 mmol/L    Chloride 109 98 - 112 mmol/L    CO2 28.0 21.0 - 32.0 mmol/L    Anion Gap 5 0 - 18 mmol/L    BUN 8 7 - 18 mg/dL    Creatinine 0.62 0.55 - 1.02 mg/dL    BUN/CREA Ratio 12.9 10.0 - 20.0    Calcium, Total 8.5 8.5 Appears to be better now.    -work up per IM  3. Elevated troponin  -per cards   4. Dementias   -per IM  5. Full code confirmed with    6.  Dispo  -will follow                  Peter Holder  12/25/2019

## 2019-12-25 NOTE — PLAN OF CARE
Pt admitted to floor from ED with elevated troponins. Pt accompanied by  who helped with admission navigator. Pt alert and oriented to self, place (knew she was in the hospital could not remember the name), and situation. Did not know the date.  Per

## 2019-12-25 NOTE — PLAN OF CARE
Pt and VS stable this shift. Denies CP/SOB up BR with asst khris well. Incontinent of bladder. Pt alert to self. Confused. Sats maintained in 90's on RA. POC updated with  and dtr at bedside.  Rounded with [facility] Cards MD. Down for CT chest per pulm MD AB

## 2019-12-26 ENCOUNTER — APPOINTMENT (OUTPATIENT)
Dept: CV DIAGNOSTICS | Facility: HOSPITAL | Age: 79
DRG: 194 | End: 2019-12-26
Attending: INTERNAL MEDICINE
Payer: MEDICARE

## 2019-12-26 ENCOUNTER — APPOINTMENT (OUTPATIENT)
Dept: MRI IMAGING | Facility: HOSPITAL | Age: 79
DRG: 194 | End: 2019-12-26
Attending: HOSPITALIST
Payer: MEDICARE

## 2019-12-26 LAB
ATRIAL RATE: 82 BPM
P AXIS: 67 DEGREES
P-R INTERVAL: 244 MS
PROCALCITONIN SERPL-MCNC: 0.07 NG/ML
Q-T INTERVAL: 350 MS
QRS DURATION: 88 MS
QTC CALCULATION (BEZET): 408 MS
R AXIS: 9 DEGREES
T AXIS: 88 DEGREES
VENTRICULAR RATE: 82 BPM

## 2019-12-26 PROCEDURE — 70551 MRI BRAIN STEM W/O DYE: CPT | Performed by: HOSPITALIST

## 2019-12-26 PROCEDURE — 93306 TTE W/DOPPLER COMPLETE: CPT | Performed by: INTERNAL MEDICINE

## 2019-12-26 PROCEDURE — 96372 THER/PROPH/DIAG INJ SC/IM: CPT

## 2019-12-26 PROCEDURE — S0077 INJECTION, CLINDAMYCIN PHOSP: HCPCS | Performed by: INTERNAL MEDICINE

## 2019-12-26 RX ORDER — CLINDAMYCIN PHOSPHATE 600 MG/50ML
600 INJECTION INTRAVENOUS EVERY 8 HOURS
Status: DISCONTINUED | OUTPATIENT
Start: 2019-12-26 | End: 2019-12-30

## 2019-12-26 RX ORDER — LEVOFLOXACIN 5 MG/ML
750 INJECTION, SOLUTION INTRAVENOUS EVERY 24 HOURS
Status: DISCONTINUED | OUTPATIENT
Start: 2019-12-26 | End: 2019-12-30

## 2019-12-26 NOTE — PROGRESS NOTES
[facility] Hospitalist note    PCP: Danyelle Craven MD    Chief Complaint:  F/u AMS    SUBJECTIVE:  Feels okay. Was still pretty somnolent this morning but then later on was much more alert. Still some cough.    Pt fell last night when she got out of bed on • digoxin  125 mcg Oral QOD   • LORazepam  0.25 mg Oral Daily   • Raloxifene HCl  60 mg Oral Daily   • rivastigmine  1 patch Transdermal Daily   • Memantine HCl  10 mg Oral BID   • Pantoprazole Sodium  20 mg Oral QAM AC   • busPIRone HCl  10 mg Oral Nigh

## 2019-12-26 NOTE — PHYSICAL THERAPY NOTE
Pt order received and chart reviewed. Pt is here with elevated troponin. Plan for MRI and echo this AM. Will follow up this PM as schedule allows otherwise will be seen tomorrow.

## 2019-12-26 NOTE — PLAN OF CARE
12/26/2019    Pt confused. VSS. Pt to go for MRI but was postponed for the morning of 12/26. Spoke with MRI tech and agreed that pt would not be able to lie still for the duration of the MRI d/t her AMS. Pt has Ativan due in the am. Will give prior to MRI.

## 2019-12-26 NOTE — PROGRESS NOTES
Pulmonary Progress Note     Assessment / Plan:  1. Abnormal CT Chest - RUL infiltrate with air-bronchograms c/w pneumonia, though no leukocytosis and initial PCT negative.  does state she has a cough.   - call out to primary regarding multiple abx cl

## 2019-12-26 NOTE — PLAN OF CARE
Assumed pt care around 0730. Pt denies CP, SOB, dizziness, or lightheadedness. Pt alert to self, drowsy but easily arousable, forgetful. Per pt  pt has short term memory loss at baseline, but says it is worse now.  Call light within reach, bed alarm

## 2019-12-26 NOTE — PROGRESS NOTES
[facility] Cardiology Progress Note        Juvenal Esteban Patient Status:  Observation    1940 MRN [id number]   [facility] 2NE-A Attending Omari Elizabeth MD   Hosp Day # 0 PCP Michael Ag MD     Subject deficits. Neck: supple. JVP normal  Cardiac: Regular rhythm, S1, S2 normal,  rub or gallop. AoEM. Lungs: CTA  Abdomen: Soft, non-tender. Extremities: No edema  Neurologic: no focal deficits  Skin: Warm and dry.      Telemetry: [facility]    EKG:      Echo:

## 2019-12-27 PROBLEM — J18.9 PNA (PNEUMONIA): Status: ACTIVE | Noted: 2019-12-27

## 2019-12-27 PROCEDURE — 97162 PT EVAL MOD COMPLEX 30 MIN: CPT

## 2019-12-27 PROCEDURE — S0077 INJECTION, CLINDAMYCIN PHOSP: HCPCS | Performed by: INTERNAL MEDICINE

## 2019-12-27 PROCEDURE — 36415 COLL VENOUS BLD VENIPUNCTURE: CPT

## 2019-12-27 PROCEDURE — 97116 GAIT TRAINING THERAPY: CPT

## 2019-12-27 NOTE — PLAN OF CARE
Patient sitting up in chair, denies CP, SOB and dizziness. Patient alert to self only, forgetful and confused at times.  NSR on cardiac monitor; lung diminished bilaterally, RA, no cough noted; edema noted to BLE; patient ambulates with x1 assist and walker

## 2019-12-27 NOTE — PROGRESS NOTES
[facility] Hospitalist Progress Note     PCP: Shirley Waldron MD    SUBJECTIVE:  No CP, SOB, or N/V. Pt continues to have mild confusion presently.       OBJECTIVE:  Temp:  [97.7 °F (36.5 °C)-98.9 °F (37.2 °C)] 98.4 °F (36.9 °C)  Pulse:  [80-97] 87  Resp:  [1 Oral Daily   • Raloxifene HCl  60 mg Oral Daily   • rivastigmine  1 patch Transdermal Daily   • Memantine HCl  10 mg Oral BID   • Pantoprazole Sodium  20 mg Oral QAM AC   • busPIRone HCl  10 mg Oral Nightly   • digoxin  250 mcg Oral QOD   • DULoxetine HCl care:  35 minutes    Thank Andrew Mcmullen [facility] Hospitalist  Pager: [phone]  Answering Service: [phone]

## 2019-12-27 NOTE — PHYSICAL THERAPY NOTE
PHYSICAL THERAPY EVALUATION - INPATIENT     Room Number: 2606/2606-A  Evaluation Date: 12/27/2019  Type of Evaluation: Initial  Physician Order: PT Eval and Treat    Presenting Problem: weakness/AMS, pneumonia  Reason for Therapy: Mobility Dysfunctio • HYSTERECTOMY     • LUMBAR EPIDURAL N/A 7/19/2018    Performed by Ricky Adorno MD at [address] [place] [address] N/A 12/28/2017    Performed by Ricky Adorno MD at [address] [place] [address] N/A 9/ disoriented to situation  · Memory:  impaired working memory, decreased recall of recent events, decreased long term memory and decreased short term memory  · Following Commands:  follows one step commands with increased time and follows one step commands Score (AM-PAC Scale): 40.78   CMS Modifier (G-Code): CK    FUNCTIONAL ABILITY STATUS  Gait Assessment   Gait Assistance:  Moderate assistance  Distance (ft): 100  Assistive Device: Rolling walker  Pattern: (difficulty with initiation and navigation) decreased insight into impairments. Functional outcome measures completed include AMPAC. Based on this evaluation, patient's clinical presentation is evolving and overall the evaluation complexity is considered moderate.  These impairments and comorbidities

## 2019-12-27 NOTE — PROGRESS NOTES
[facility] Cardiology Progress Note        Víctor Ham Patient Status:  Observation    1940 MRN [id number]   [facility] 2NE-A Attending Mk Mazariegos MD   Hosp Day # 0 PCP Daksha Jett MD     Subject (108-142)/(60-95) 129/95    General: No apparent distress  HEENT: No focal deficits. Neck: supple. JVP normal  Cardiac: Regular rhythm, S1, S2 normal,  rub or gallop. AoEM. Lungs: CTA  Abdomen: Soft, non-tender.    Extremities: No edema  Neurologic: no f

## 2019-12-27 NOTE — CM/SW NOTE
SW spoke w/the pt's family member Esposito Boards regarding pt. Provided them w/a ADRIAN list. They are not certain this is what they want at this time. Pt in bathroom. Will follow up regarding options.

## 2019-12-27 NOTE — PLAN OF CARE
Patient alert to self only, very forgetful. Daughter at bedside. Bed alarm on. SR on tele. Lungs diminished. Denies any pain. Voids. Up with standby assist. Continue IV abx. Patient resting in bed, will continue to monitor.

## 2019-12-28 LAB
ANION GAP SERPL CALC-SCNC: 3 MMOL/L (ref 0–18)
BASOPHILS # BLD AUTO: 0.02 X10(3) UL (ref 0–0.2)
BASOPHILS NFR BLD AUTO: 0.4 %
BUN BLD-MCNC: 8 MG/DL (ref 7–18)
BUN/CREAT SERPL: 11 (ref 10–20)
CALCIUM BLD-MCNC: 9.1 MG/DL (ref 8.5–10.1)
CHLORIDE SERPL-SCNC: 108 MMOL/L (ref 98–112)
CO2 SERPL-SCNC: 30 MMOL/L (ref 21–32)
CREAT BLD-MCNC: 0.73 MG/DL (ref 0.55–1.02)
DEPRECATED RDW RBC AUTO: 44.4 FL (ref 35.1–46.3)
EOSINOPHIL # BLD AUTO: 0.21 X10(3) UL (ref 0–0.7)
EOSINOPHIL NFR BLD AUTO: 3.9 %
ERYTHROCYTE [DISTWIDTH] IN BLOOD BY AUTOMATED COUNT: 14.1 % (ref 11–15)
GLUCOSE BLD-MCNC: 111 MG/DL (ref 70–99)
HCT VFR BLD AUTO: 30.6 % (ref 35–48)
HGB BLD-MCNC: 9.3 G/DL (ref 12–16)
IMM GRANULOCYTES # BLD AUTO: 0.08 X10(3) UL (ref 0–1)
IMM GRANULOCYTES NFR BLD: 1.5 %
LYMPHOCYTES # BLD AUTO: 1.59 X10(3) UL (ref 1–4)
LYMPHOCYTES NFR BLD AUTO: 29.2 %
MCH RBC QN AUTO: 26 PG (ref 26–34)
MCHC RBC AUTO-ENTMCNC: 30.4 G/DL (ref 31–37)
MCV RBC AUTO: 85.5 FL (ref 80–100)
MONOCYTES # BLD AUTO: 0.54 X10(3) UL (ref 0.1–1)
MONOCYTES NFR BLD AUTO: 9.9 %
NEUTROPHILS # BLD AUTO: 3.01 X10 (3) UL (ref 1.5–7.7)
NEUTROPHILS # BLD AUTO: 3.01 X10(3) UL (ref 1.5–7.7)
NEUTROPHILS NFR BLD AUTO: 55.1 %
OSMOLALITY SERPL CALC.SUM OF ELEC: 291 MOSM/KG (ref 275–295)
PLATELET # BLD AUTO: 218 10(3)UL (ref 150–450)
POTASSIUM SERPL-SCNC: 3.6 MMOL/L (ref 3.5–5.1)
POTASSIUM SERPL-SCNC: 4.2 MMOL/L (ref 3.5–5.1)
RBC # BLD AUTO: 3.58 X10(6)UL (ref 3.8–5.3)
SODIUM SERPL-SCNC: 141 MMOL/L (ref 136–145)
WBC # BLD AUTO: 5.5 X10(3) UL (ref 4–11)

## 2019-12-28 PROCEDURE — S0077 INJECTION, CLINDAMYCIN PHOSP: HCPCS | Performed by: INTERNAL MEDICINE

## 2019-12-28 PROCEDURE — 80048 BASIC METABOLIC PNL TOTAL CA: CPT | Performed by: HOSPITALIST

## 2019-12-28 PROCEDURE — 85025 COMPLETE CBC W/AUTO DIFF WBC: CPT | Performed by: HOSPITALIST

## 2019-12-28 PROCEDURE — 84132 ASSAY OF SERUM POTASSIUM: CPT | Performed by: HOSPITALIST

## 2019-12-28 RX ORDER — POTASSIUM CHLORIDE 20 MEQ/1
40 TABLET, EXTENDED RELEASE ORAL EVERY 4 HOURS
Status: COMPLETED | OUTPATIENT
Start: 2019-12-28 | End: 2019-12-28

## 2019-12-28 NOTE — PROGRESS NOTES
[facility] Hospitalist Progress Note     PCP: eRbeca Higginbotham MD    SUBJECTIVE:  Per RN, pt was awake during night with agitation but now is very drowsy and sleeping. States she is very tired.       OBJECTIVE:  Temp:  [97.6 °F (36.4 °C)-98.6 °F (37 °C)] 97.7 Transdermal Daily   • Memantine HCl  10 mg Oral BID   • Pantoprazole Sodium  20 mg Oral QAM AC   • busPIRone HCl  10 mg Oral Nightly   • digoxin  250 mcg Oral QOD   • DULoxetine HCl  60 mg Oral QPM   • DULoxetine HCl  120 mg Oral QAM   • Heparin Sodium (Po were discussed with patient and/or family by bedside.     Total Time spent with patient and coordinating care:  25 minutes    Thank Gus White [facility] Hospitalist  Pager: [phone]  Answering Service: [phone]

## 2019-12-28 NOTE — PLAN OF CARE
Assumed care of pt. At [address]. Pt. Sitting up in bed comfortably. Pleasantly confused. AO to self only. Disoriented to time, situation, and place r/t hx of alzheimer's. Needs frequent reorientation. Bed and Chair alarm to prevent falls. HIGH FALL RISK.   Sa

## 2019-12-28 NOTE — PLAN OF CARE
Confused -baseline  dependent to staff w/ activities of daily living   Incontinent keep clean and dry  purewick use while patient is in bed  Fall precautions patient does not learn how to use call light  Bed and chair alarms in use  frquent checks  Spo2 st

## 2019-12-28 NOTE — PLAN OF CARE
Increase activity as tolerated  Problem: Impaired Functional Mobility  Goal: Achieve highest/safest level of mobility/gait  Description  Interventions:  - Assess patient's functional ability and stability  - Promote increasing activity/tolerance for mobili

## 2019-12-28 NOTE — PROGRESS NOTES
[facility] [address] [place] [address] Cardiology Progress Note        Raul Edgar Patient Status:  Observation    1940 MRN [id number]   [facility] 2NE-A Attending Kisha Hdz MD   Hosp Day # 1 PCP Daisy Son MD     Subject °F (36.4 °C)-98.6 °F (37 °C)] 98 °F (36.7 °C)  Pulse:  [] 74  Resp:  [16-20] 16  BP: (120-147)/(66-94) 131/73    General: No apparent distress  HEENT: No focal deficits. Neck: supple.  JVP normal  Cardiac: Regular rhythm, S1, S2 normal,  rub or villa

## 2019-12-28 NOTE — PROGRESS NOTES
[address] [facility] Patient Status:  Inpatient    1940 MRN [id number]   [facility] 2NE-A Attending Pierce [place],    Hosp Day # 1 PCP Rebeca Higginbotham MD     SUBJECTIVE: Cough is improving. Pt denies SOB.       O vaccine (PF)(FLUZONE HD) high dose for 65 yrs & older inj 0.5ml, 0.5 mL, Intramuscular, Prior to discharge      Physical Exam:                          General: alert, cooperative, in NAD                          HEENT: oropharynx clear without erythema or

## 2019-12-29 LAB
ANION GAP SERPL CALC-SCNC: 5 MMOL/L (ref 0–18)
BASOPHILS # BLD AUTO: 0.08 X10(3) UL (ref 0–0.2)
BASOPHILS NFR BLD AUTO: 1.2 %
BUN BLD-MCNC: 8 MG/DL (ref 7–18)
BUN/CREAT SERPL: 10.4 (ref 10–20)
CALCIUM BLD-MCNC: 8.9 MG/DL (ref 8.5–10.1)
CHLORIDE SERPL-SCNC: 107 MMOL/L (ref 98–112)
CO2 SERPL-SCNC: 27 MMOL/L (ref 21–32)
CREAT BLD-MCNC: 0.77 MG/DL (ref 0.55–1.02)
DEPRECATED RDW RBC AUTO: 43.4 FL (ref 35.1–46.3)
EOSINOPHIL # BLD AUTO: 0.22 X10(3) UL (ref 0–0.7)
EOSINOPHIL NFR BLD AUTO: 3.4 %
ERYTHROCYTE [DISTWIDTH] IN BLOOD BY AUTOMATED COUNT: 14.4 % (ref 11–15)
GLUCOSE BLD-MCNC: 120 MG/DL (ref 70–99)
HCT VFR BLD AUTO: 31.4 % (ref 35–48)
HGB BLD-MCNC: 9.9 G/DL (ref 12–16)
IMM GRANULOCYTES # BLD AUTO: 0.18 X10(3) UL (ref 0–1)
IMM GRANULOCYTES NFR BLD: 2.8 %
LYMPHOCYTES # BLD AUTO: 2.35 X10(3) UL (ref 1–4)
LYMPHOCYTES NFR BLD AUTO: 36.4 %
MCH RBC QN AUTO: 26.1 PG (ref 26–34)
MCHC RBC AUTO-ENTMCNC: 31.5 G/DL (ref 31–37)
MCV RBC AUTO: 82.6 FL (ref 80–100)
MONOCYTES # BLD AUTO: 0.5 X10(3) UL (ref 0.1–1)
MONOCYTES NFR BLD AUTO: 7.7 %
NEUTROPHILS # BLD AUTO: 3.13 X10 (3) UL (ref 1.5–7.7)
NEUTROPHILS # BLD AUTO: 3.13 X10(3) UL (ref 1.5–7.7)
NEUTROPHILS NFR BLD AUTO: 48.5 %
OSMOLALITY SERPL CALC.SUM OF ELEC: 288 MOSM/KG (ref 275–295)
PLATELET # BLD AUTO: 236 10(3)UL (ref 150–450)
POTASSIUM SERPL-SCNC: 4.1 MMOL/L (ref 3.5–5.1)
RBC # BLD AUTO: 3.8 X10(6)UL (ref 3.8–5.3)
SODIUM SERPL-SCNC: 139 MMOL/L (ref 136–145)
WBC # BLD AUTO: 6.5 X10(3) UL (ref 4–11)

## 2019-12-29 PROCEDURE — 85025 COMPLETE CBC W/AUTO DIFF WBC: CPT | Performed by: HOSPITALIST

## 2019-12-29 PROCEDURE — 80048 BASIC METABOLIC PNL TOTAL CA: CPT | Performed by: HOSPITALIST

## 2019-12-29 PROCEDURE — 97535 SELF CARE MNGMENT TRAINING: CPT

## 2019-12-29 PROCEDURE — S0077 INJECTION, CLINDAMYCIN PHOSP: HCPCS | Performed by: INTERNAL MEDICINE

## 2019-12-29 PROCEDURE — 97166 OT EVAL MOD COMPLEX 45 MIN: CPT

## 2019-12-29 NOTE — PROGRESS NOTES
[id number] [facility] Patient Status:  Inpatient    1940 MRN [id number]   [facility] 2NE-A Attending Arely Brown, DO   Hosp Day # 2 PCP Radha Riley MD     SUBJECTIVE: Pt denies respiratory complaints.      OBJE influenza vaccine (PF)(FLUZONE HD) high dose for 65 yrs & older inj 0.5ml, 0.5 mL, Intramuscular, Prior to discharge      Physical Exam:                          General: alert, cooperative, in NAD                          HEENT: oropharynx clear without e

## 2019-12-29 NOTE — CM/SW NOTE
Pts  was provided with a list of SARs. He will try to tour a couple tonight and have some choices by tomorrow AM. He is aware the pt may be ready to dc tomorrow. / to remain available for support and/or discharge planning.

## 2019-12-29 NOTE — PROGRESS NOTES
[facility] Hospitalist Progress Note     PCP: Shilpi Quan MD    SUBJECTIVE:  No CP, SOB, or N/V. Has sundowning at night.     OBJECTIVE:  Temp:  [97.6 °F (36.4 °C)-98 °F (36.7 °C)] 97.8 °F (36.6 °C)  Pulse:  [70-87] 80  Resp:  [15-18] 16  BP: (103-151)/(6 Besylate  5 mg Oral Daily   • atorvastatin  40 mg Oral Nightly   • busPIRone HCl  20 mg Oral Daily   • digoxin  125 mcg Oral QOD   • LORazepam  0.25 mg Oral Daily   • Raloxifene HCl  60 mg Oral Daily   • rivastigmine  1 patch Transdermal Daily   • Memantin generalized anxiety- on buspirone, ativan     8) Anemia - hgb was slowly downtrending. Will repeat cbc now      sq heparin, SCDs  Dispo: ADRIAN planning. Patient's  states that no ADRIAN list was ever provided to them.   They are waiting for a list.    Q

## 2019-12-29 NOTE — OCCUPATIONAL THERAPY NOTE
OCCUPATIONAL THERAPY EVALUATION - INPATIENT     Room Number: 2606/2606-A  Evaluation Date: 12/29/2019  Type of Evaluation: Initial  Presenting Problem: weakness, fever, AMS, elevated troponin, falls    Physician Order: IP Consult to Occupational Therapy  R POSSIBLE POLYPECTOMY [id number], [id number] N/A 1/8/2014    Performed by Justin Thompson MD at [id number]   • HYSTERECTOMY     • LUMBAR EPIDURAL N/A 7/19/2018    Performed by Mel Go MD at [address] [place] [address] 1 fell    VISION  Current Vision: wears glasses only for reading    PERCEPTION  Left Right Discrimination:   intact    SENSATION  Light touch:  intact    Communication: Pt able to communicate needs and wants    Behavioral/Emotional/Social: Pt relaxed and  RW management, mod cueing ), toileting (standard height toilet, use of grab bar, pt did own pericare and clothing manage with min assist for balance), grooming (CGA for hand hygiene in stand), LB Dress (don/doffed socks seated with figure 4 method at sup l Complexity  Occupational Profile/Medical History MODERATE - Expanded review of history including review of medical or therapy record   Specific performance deficits impacting engagement in ADL/IADL MODERATE  3 - 5 performance deficits   Client Assessment/P

## 2019-12-29 NOTE — PLAN OF CARE
Deny pain  deny dyspnea  increase activity iv antibiotics  Fall precautions  dependent to staff w/ adls  Problem: RESPIRATORY - ADULT  Goal: Achieves optimal ventilation and oxygenation  Description  INTERVENTIONS:  - Assess for changes in respiratory stat

## 2019-12-29 NOTE — PLAN OF CARE
Assumed care of pt. At [address]. Pt. Sitting up in bed comfortably with  in the room. Pleasantly confused. AO to self only. Disoriented to time, situation, and place r/t hx of alzheimer's. Needs frequent reorientation.  Bed and Chair alarm to prevent f in mentation and behavior  - Position to facilitate oxygenation and minimize respiratory effort  - Oxygen supplementation based on oxygen saturation or ABGs  - Provide Smoking Cessation handout, if applicable  - Encourage broncho-pulmonary hygiene includin

## 2019-12-30 VITALS
TEMPERATURE: 97 F | RESPIRATION RATE: 16 BRPM | WEIGHT: 137.5 LBS | DIASTOLIC BLOOD PRESSURE: 74 MMHG | BODY MASS INDEX: 24 KG/M2 | OXYGEN SATURATION: 96 % | HEART RATE: 78 BPM | SYSTOLIC BLOOD PRESSURE: 131 MMHG

## 2019-12-30 LAB
ANION GAP SERPL CALC-SCNC: 5 MMOL/L (ref 0–18)
BASOPHILS # BLD AUTO: 0.03 X10(3) UL (ref 0–0.2)
BASOPHILS NFR BLD AUTO: 0.5 %
BUN BLD-MCNC: 10 MG/DL (ref 7–18)
BUN/CREAT SERPL: 11.6 (ref 10–20)
CALCIUM BLD-MCNC: 9 MG/DL (ref 8.5–10.1)
CHLORIDE SERPL-SCNC: 106 MMOL/L (ref 98–112)
CO2 SERPL-SCNC: 28 MMOL/L (ref 21–32)
CREAT BLD-MCNC: 0.86 MG/DL (ref 0.55–1.02)
DEPRECATED RDW RBC AUTO: 44.5 FL (ref 35.1–46.3)
EOSINOPHIL # BLD AUTO: 0.11 X10(3) UL (ref 0–0.7)
EOSINOPHIL NFR BLD AUTO: 1.8 %
ERYTHROCYTE [DISTWIDTH] IN BLOOD BY AUTOMATED COUNT: 14.2 % (ref 11–15)
GLUCOSE BLD-MCNC: 161 MG/DL (ref 70–99)
HCT VFR BLD AUTO: 32.4 % (ref 35–48)
HGB BLD-MCNC: 9.7 G/DL (ref 12–16)
IMM GRANULOCYTES # BLD AUTO: 0.23 X10(3) UL (ref 0–1)
IMM GRANULOCYTES NFR BLD: 3.8 %
LYMPHOCYTES # BLD AUTO: 1.35 X10(3) UL (ref 1–4)
LYMPHOCYTES NFR BLD AUTO: 22.2 %
MCH RBC QN AUTO: 25.7 PG (ref 26–34)
MCHC RBC AUTO-ENTMCNC: 29.9 G/DL (ref 31–37)
MCV RBC AUTO: 85.7 FL (ref 80–100)
MONOCYTES # BLD AUTO: 0.29 X10(3) UL (ref 0.1–1)
MONOCYTES NFR BLD AUTO: 4.8 %
NEUTROPHILS # BLD AUTO: 4.08 X10 (3) UL (ref 1.5–7.7)
NEUTROPHILS # BLD AUTO: 4.08 X10(3) UL (ref 1.5–7.7)
NEUTROPHILS NFR BLD AUTO: 66.9 %
OSMOLALITY SERPL CALC.SUM OF ELEC: 291 MOSM/KG (ref 275–295)
PLATELET # BLD AUTO: 245 10(3)UL (ref 150–450)
POTASSIUM SERPL-SCNC: 4.3 MMOL/L (ref 3.5–5.1)
RBC # BLD AUTO: 3.78 X10(6)UL (ref 3.8–5.3)
SODIUM SERPL-SCNC: 139 MMOL/L (ref 136–145)
WBC # BLD AUTO: 6.1 X10(3) UL (ref 4–11)

## 2019-12-30 PROCEDURE — 85025 COMPLETE CBC W/AUTO DIFF WBC: CPT | Performed by: HOSPITALIST

## 2019-12-30 PROCEDURE — S0077 INJECTION, CLINDAMYCIN PHOSP: HCPCS | Performed by: INTERNAL MEDICINE

## 2019-12-30 PROCEDURE — 80048 BASIC METABOLIC PNL TOTAL CA: CPT | Performed by: HOSPITALIST

## 2019-12-30 RX ORDER — MEMANTINE HYDROCHLORIDE 28 MG/1
28 CAPSULE, EXTENDED RELEASE ORAL 2 TIMES DAILY
Qty: 90 CAPSULE | Refills: 3 | Status: SHIPPED | COMMUNITY
Start: 2019-12-30 | End: 2019-12-31

## 2019-12-30 RX ORDER — CLINDAMYCIN HYDROCHLORIDE 300 MG/1
600 CAPSULE ORAL 3 TIMES DAILY
Qty: 12 CAPSULE | Refills: 0 | Status: SHIPPED | OUTPATIENT
Start: 2019-12-30 | End: 2020-01-01

## 2019-12-30 NOTE — PLAN OF CARE
Received patient, alert, confused and [place] with bilateral hearing aids on. Denied pain, denied SOB. Assisted with toileting, voiding without complaints. Discussed POC. Due meds given. Safety measures reinforced, call light within reach, bed alarm on.  Needs signs/symptoms of CO2 retention  Outcome: Progressing     Problem: Impaired Activities of Daily Living  Goal: Achieve highest/safest level of independence in self care  Description  Interventions:  - Assess ability and encourage patient to participate in A

## 2019-12-30 NOTE — CM/SW NOTE
Case discussed in rounds, Pt is accepted to   [address] [place] [address] for ADRIAN, SW will coordinate transfer at d/c.    SW met with pt and her family at bedside to confirm d/c to [address] [place] [address] today, pt has been assigned to room 110. Discussed transport options.  Pt's  pl

## 2019-12-30 NOTE — PLAN OF CARE
Alert, oriented to self. Calm, cooperative. Up with contact guard assist. NSR-ST on cardiac monitor, HR up to 110's. Pt is on digoxin. Adequate saturation on RA. Lung sounds clear, diminished. Denies sob, chest discomfort, n/v.  Denies pain. Voiding wi and oxygenation  Description  INTERVENTIONS:  - Assess for changes in respiratory status  - Assess for changes in mentation and behavior  - Position to facilitate oxygenation and minimize respiratory effort  - Oxygen supplementation based on oxygen saturat

## 2019-12-30 NOTE — DISCHARGE PLANNING
Discharge     Report given to RN at [facility]  S printed for [facility]. Patient cleared for discharge by all services. Discharge education and handout provided to patient and patient's . Medications reviewed. All questions answered.  Patient a

## 2019-12-30 NOTE — PLAN OF CARE
Problem: Patient/Family Goals  Goal: Patient/Family Long Term Goal  Description  Patient's Long Term Goal: \"Get rid of pneumonia\"    Interventions:  - Take prescribed medications, go to follow up appointments  - See additional Care Plan goals for speci

## 2019-12-30 NOTE — CM/SW NOTE
Got a call this morning from bedside RN, that family has chosen [facility] for ADRIAN choice. DON requested. [facility] referral sent now. Will follow.      [facility] 61 [phone]     Yasemin Bonilla, RN, MSN, APN, CTL   Clinical Trans

## 2019-12-30 NOTE — PROGRESS NOTES
[address] [facility] Patient Status:  Inpatient    1940 MRN [id number]   [facility] 2NE-A Attending Jose Cordova, DO   Hosp Day # 3 PCP Galina Gtz MD     SUBJECTIVE: No complaints today.      OBJECTIVE:  BP 14 Intravenous, Q4H PRN  •  influenza vaccine (PF)(FLUZONE HD) high dose for 65 yrs & older inj 0.5ml, 0.5 mL, Intramuscular, Prior to discharge      Physical Exam:                          General: alert, cooperative, in NAD                          HEENT: o MD

## 2019-12-30 NOTE — DISCHARGE SUMMARY
General Medicine Discharge Summary     Patient ID:  Jemma Redman  78year old  1/19/1940    Admit date: 12/24/2019    Discharge date and time: 12/30/19    Attending Physician: Jose Cordova DO and delirium seen in the hospital  -high risk of falls with her memory impairment, recent fall history as well as pna/generalized weakness.  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 Dictated by: Marsha Matos MD on 12/25/2019 at 14:34     Approved by: Marsha Matos MD on 12/25/2019 at 14:41          Ct Brain Or Head (69646)    Result Date: 12/24/2019  PROCEDURE:  CT BRAIN OR HEAD (47444)  COMPARISON:  None.   IND Brain (cpt=70551)    Result Date: 12/26/2019  PROCEDURE:  MRI BRAIN (CPT=70551)  COMPARISON:  [facility] , CT, CT BRAIN OR HEAD (75148), 12/24/2019, 17:17.   INDICATIONS:  Possible stroke  TECHNIQUE:  MRI of the brain was performed with multi-planar T1, T2-weig HISTORY: (As transcribed by Technologist)   Weak and lethargic today. FINDINGS:  There is a wedge-shaped airspace opacity within the inferior aspect of the right upper lobe, concerning for pneumonia. The remainder of the lungs are clear.   Cardiomediast tabs QAM & 1 Q stewart     BIOTIN OR  Take  by mouth daily. LORazepam (ATIVAN) 0.5 MG Oral Tab  Take 0.25 mg by mouth daily.      Cholecalciferol (VITAMIN D3) 2000 UNIT Oral Cap  2 CAPSULE DAILY    Loteprednol Etabonate 0.2 % Ophthalmic Suspension  1 drop as

## 2019-12-31 ENCOUNTER — INITIAL APN SNF VISIT (OUTPATIENT)
Dept: INTERNAL MEDICINE CLINIC | Age: 79
End: 2019-12-31

## 2019-12-31 VITALS
SYSTOLIC BLOOD PRESSURE: 151 MMHG | OXYGEN SATURATION: 95 % | TEMPERATURE: 99 F | DIASTOLIC BLOOD PRESSURE: 86 MMHG | RESPIRATION RATE: 16 BRPM | HEART RATE: 72 BPM

## 2019-12-31 DIAGNOSIS — G47.33 OSA (OBSTRUCTIVE SLEEP APNEA): ICD-10-CM

## 2019-12-31 DIAGNOSIS — N39.0 RECURRENT UTI: ICD-10-CM

## 2019-12-31 DIAGNOSIS — K21.00 GASTROESOPHAGEAL REFLUX DISEASE WITH ESOPHAGITIS: ICD-10-CM

## 2019-12-31 DIAGNOSIS — M81.0 AGE-RELATED OSTEOPOROSIS WITHOUT CURRENT PATHOLOGICAL FRACTURE: ICD-10-CM

## 2019-12-31 DIAGNOSIS — B37.0 ORAL CANDIDIASIS: ICD-10-CM

## 2019-12-31 DIAGNOSIS — I10 ESSENTIAL HYPERTENSION: ICD-10-CM

## 2019-12-31 DIAGNOSIS — F41.9 ANXIETY AND DEPRESSION: ICD-10-CM

## 2019-12-31 DIAGNOSIS — E78.00 PURE HYPERCHOLESTEROLEMIA: ICD-10-CM

## 2019-12-31 DIAGNOSIS — D64.9 ANEMIA, UNSPECIFIED TYPE: ICD-10-CM

## 2019-12-31 DIAGNOSIS — I47.1 PAROXYSMAL SUPRAVENTRICULAR TACHYCARDIA (HCC): ICD-10-CM

## 2019-12-31 DIAGNOSIS — R53.1 WEAKNESS GENERALIZED: ICD-10-CM

## 2019-12-31 DIAGNOSIS — E53.8 B12 DEFICIENCY: ICD-10-CM

## 2019-12-31 DIAGNOSIS — R73.01 IFG (IMPAIRED FASTING GLUCOSE): ICD-10-CM

## 2019-12-31 DIAGNOSIS — E03.9 ACQUIRED HYPOTHYROIDISM: ICD-10-CM

## 2019-12-31 DIAGNOSIS — E55.9 VITAMIN D DEFICIENCY: ICD-10-CM

## 2019-12-31 DIAGNOSIS — M48.062 SPINAL STENOSIS OF LUMBAR REGION WITH NEUROGENIC CLAUDICATION: ICD-10-CM

## 2019-12-31 DIAGNOSIS — F03.90 DEMENTIA WITHOUT BEHAVIORAL DISTURBANCE, UNSPECIFIED DEMENTIA TYPE (HCC): ICD-10-CM

## 2019-12-31 DIAGNOSIS — Z78.9 IMPAIRED MOBILITY AND ADLS: ICD-10-CM

## 2019-12-31 DIAGNOSIS — Z74.09 IMPAIRED MOBILITY AND ADLS: ICD-10-CM

## 2019-12-31 DIAGNOSIS — J18.9 PNEUMONIA, UNSPECIFIED ORGANISM: Primary | ICD-10-CM

## 2019-12-31 DIAGNOSIS — F32.A ANXIETY AND DEPRESSION: ICD-10-CM

## 2019-12-31 DIAGNOSIS — K58.1 IRRITABLE BOWEL SYNDROME WITH CONSTIPATION: ICD-10-CM

## 2019-12-31 PROCEDURE — 99310 SBSQ NF CARE HIGH MDM 45: CPT | Performed by: NURSE PRACTITIONER

## 2019-12-31 RX ORDER — OMEPRAZOLE 20 MG/1
20 CAPSULE, DELAYED RELEASE ORAL
COMMUNITY
End: 2020-09-14 | Stop reason: ALTCHOICE

## 2019-12-31 RX ORDER — MEMANTINE HYDROCHLORIDE 10 MG/1
10 TABLET ORAL 2 TIMES DAILY
COMMUNITY
End: 2020-08-24

## 2019-12-31 NOTE — CM/SW NOTE
12/31/19 0900   Discharge disposition   Expected discharge disposition Skilled Nurs   Name of [address] [address]   Discharge transportation Private car

## 2019-12-31 NOTE — PROGRESS NOTES
Kd Sandoval  : 1940  Age 78year old  female patient is admitted to Facility: [address] [place] [address] for   ADRIAN after R[facility] [address] date:    19  Discharge date to ADRIAN:    19  ELOS:    12-14 days  Anticipate • COLONOSCOPY  11/2008    RECHECK 5 YRS   • ENDOSCOPIC ULTRASOUND (EUS) N/A 5/17/2016    Performed by Young Murrell MD at [facility] ENDOSCOPY   • ESOPHAGOGASTRODUODENOSCOPY, COLONOSCOPY, POSSIBLE BIOPSY, POSSIBLE POLYPECTOMY [address] [employer] [facility], [address] N/A 1/8/2014    Perf remember  Doxycycline Calcium         Comment:Cant remember  Pcn [Penicillins]       HIVES  Sulfa Antibiotics       HIVES    CODE STATUS:  Full Code    ADVANCED CARE PLANNING TEAM: None      CURRENT MEDICATIONS   Current Outpatient Medications   Medication [address] PL[address] 1 PATCH ON THE SKIN DAILY 90 patch 1   • Fluocinolone Acetonide (DERMOTIC) 0.01 % Otic Oil Place 5 drops in ear(s) 2 (two) times daily.  (Patient not taking: Reported on 12/31/2019 ) 1 Bottle 6       VITALS:  /86   Pulse 72   Temp 98.6 °F air  CARDIOVASCULAR: S1, S2 normal, RRR; no S3, no S4; , no click, no murmur  ABDOMEN:  normal active BS+, soft, nondistended; no organomegaly, no masses; no bruits; nontender, no guarding, no rebound tenderness.   :Deferred  LYMPHATIC:no lymphedema  MUSC HS  5. EF 55-60%    GERD/IBS  1. Esomeprazole changed to omeprazole 20 mg BID per local formulary  2. Monitor Mg prn  3. Colace 100 mg BID  4. Florajen 3 daily  5. Miralax 17 gm daily prn    IFG  1. CMP weekly    Anemia  1. CBC weekly  2.  Vitamin B 12 1,00

## 2020-01-09 PROBLEM — J06.9 ACUTE URI: Status: RESOLVED | Noted: 2019-12-23 | Resolved: 2020-01-09

## 2020-01-09 NOTE — CDS QUERY
Pneumonia Specificity  CLINICAL DOCUMENTATION CLARIFICATION FORM    Clinical information (provided below) indicates pneumonia.  For accurate ICD-10-CM code assignment to reflect severity of illness and risk of mortality,   PLEASE (X) ALL DIAGNOSES THAT APPL

## 2020-03-02 PROBLEM — D50.9 IRON DEFICIENCY ANEMIA, UNSPECIFIED IRON DEFICIENCY ANEMIA TYPE: Status: ACTIVE | Noted: 2020-03-02

## 2020-03-02 PROBLEM — K22.719 BARRETT'S ESOPHAGUS WITH DYSPLASIA: Status: RESOLVED | Noted: 2020-03-02 | Resolved: 2020-03-02

## 2020-03-02 PROBLEM — K22.70 BARRETT'S ESOPHAGUS DETERMINED BY ENDOSCOPY: Status: ACTIVE | Noted: 2020-03-02

## 2020-03-02 PROBLEM — R40.0 SOMNOLENCE: Status: RESOLVED | Noted: 2019-12-24 | Resolved: 2020-03-02

## 2020-03-02 PROBLEM — K22.719 BARRETT'S ESOPHAGUS WITH DYSPLASIA: Status: ACTIVE | Noted: 2020-03-02

## 2020-03-02 PROBLEM — R79.89 ELEVATED TROPONIN: Status: RESOLVED | Noted: 2019-12-24 | Resolved: 2020-03-02

## 2020-03-02 PROBLEM — R73.01 IFG (IMPAIRED FASTING GLUCOSE): Status: RESOLVED | Noted: 2019-01-04 | Resolved: 2020-03-02

## 2020-03-02 PROBLEM — J18.9 PNA (PNEUMONIA): Status: RESOLVED | Noted: 2019-12-27 | Resolved: 2020-03-02

## 2020-03-02 PROBLEM — M35.00 SICCA, UNSPECIFIED TYPE (HCC): Status: ACTIVE | Noted: 2020-03-02

## 2020-03-02 PROBLEM — M54.16 LUMBAR RADICULITIS: Status: RESOLVED | Noted: 2017-08-25 | Resolved: 2020-03-02

## 2020-03-02 PROBLEM — R77.8 ELEVATED TROPONIN: Status: RESOLVED | Noted: 2019-12-24 | Resolved: 2020-03-02

## 2020-03-02 PROBLEM — D69.6 THROMBOCYTOPENIA (HCC): Status: ACTIVE | Noted: 2020-03-02

## 2020-03-02 PROBLEM — D69.6 THROMBOCYTOPENIA: Status: ACTIVE | Noted: 2020-03-02

## 2020-09-14 PROBLEM — G31.84 MCI (MILD COGNITIVE IMPAIRMENT) WITH MEMORY LOSS: Status: RESOLVED | Noted: 2017-12-29 | Resolved: 2020-09-14

## 2020-09-14 PROBLEM — G30.1 LATE ONSET ALZHEIMER'S DISEASE WITHOUT BEHAVIORAL DISTURBANCE (HCC): Status: ACTIVE | Noted: 2020-09-14

## 2020-09-14 PROBLEM — F02.80 LATE ONSET ALZHEIMER'S DISEASE WITHOUT BEHAVIORAL DISTURBANCE (HCC): Status: ACTIVE | Noted: 2020-09-14

## 2020-10-20 PROBLEM — Z78.9 CLOSE CONTACT WITHIN LAST 14 DAYS WITH PATIENT WITH RESPIRATORY SYMPTOMS: Status: ACTIVE | Noted: 2020-10-20

## 2021-04-19 PROBLEM — Z00.00 ROUTINE GENERAL MEDICAL EXAMINATION AT A HEALTH CARE FACILITY: Status: ACTIVE | Noted: 2021-04-19

## 2021-05-26 PROBLEM — G89.29 CHRONIC RIGHT-SIDED LOW BACK PAIN WITH RIGHT-SIDED SCIATICA: Status: ACTIVE | Noted: 2021-05-26

## 2021-05-26 PROBLEM — M54.41 CHRONIC RIGHT-SIDED LOW BACK PAIN WITH RIGHT-SIDED SCIATICA: Status: ACTIVE | Noted: 2021-05-26

## 2021-07-19 ENCOUNTER — ANESTHESIA EVENT (OUTPATIENT)
Dept: ENDOSCOPY | Facility: HOSPITAL | Age: 81
End: 2021-07-19
Payer: MEDICARE

## 2021-07-19 ENCOUNTER — ANESTHESIA (OUTPATIENT)
Dept: ENDOSCOPY | Facility: HOSPITAL | Age: 81
End: 2021-07-19
Payer: MEDICARE

## 2021-07-19 ENCOUNTER — HOSPITAL ENCOUNTER (OUTPATIENT)
Facility: HOSPITAL | Age: 81
Setting detail: HOSPITAL OUTPATIENT SURGERY
Discharge: HOME | End: 2021-07-19
Attending: INTERNAL MEDICINE | Admitting: INTERNAL MEDICINE
Payer: MEDICARE

## 2021-07-19 VITALS
BODY MASS INDEX: 26.05 KG/M2 | DIASTOLIC BLOOD PRESSURE: 101 MMHG | SYSTOLIC BLOOD PRESSURE: 143 MMHG | WEIGHT: 147 LBS | HEIGHT: 63 IN | OXYGEN SATURATION: 97 % | HEART RATE: 70 BPM | RESPIRATION RATE: 14 BRPM

## 2021-07-19 DIAGNOSIS — K22.70 BARRETT'S ESOPHAGUS WITHOUT DYSPLASIA: ICD-10-CM

## 2021-07-19 DIAGNOSIS — D50.9 IRON DEFICIENCY ANEMIA, UNSPECIFIED IRON DEFICIENCY ANEMIA TYPE: ICD-10-CM

## 2021-07-19 PROCEDURE — 88312 SPECIAL STAINS GROUP 1: CPT | Performed by: INTERNAL MEDICINE

## 2021-07-19 PROCEDURE — 0D598ZZ DESTRUCTION OF DUODENUM, VIA NATURAL OR ARTIFICIAL OPENING ENDOSCOPIC: ICD-10-PCS | Performed by: INTERNAL MEDICINE

## 2021-07-19 PROCEDURE — 0DB68ZX EXCISION OF STOMACH, VIA NATURAL OR ARTIFICIAL OPENING ENDOSCOPIC, DIAGNOSTIC: ICD-10-PCS | Performed by: INTERNAL MEDICINE

## 2021-07-19 PROCEDURE — 88305 TISSUE EXAM BY PATHOLOGIST: CPT | Performed by: INTERNAL MEDICINE

## 2021-07-19 PROCEDURE — 0DJD8ZZ INSPECTION OF LOWER INTESTINAL TRACT, VIA NATURAL OR ARTIFICIAL OPENING ENDOSCOPIC: ICD-10-PCS | Performed by: INTERNAL MEDICINE

## 2021-07-19 PROCEDURE — 36415 COLL VENOUS BLD VENIPUNCTURE: CPT | Performed by: INTERNAL MEDICINE

## 2021-07-19 PROCEDURE — 0DB48ZX EXCISION OF ESOPHAGOGASTRIC JUNCTION, VIA NATURAL OR ARTIFICIAL OPENING ENDOSCOPIC, DIAGNOSTIC: ICD-10-PCS | Performed by: INTERNAL MEDICINE

## 2021-07-19 PROCEDURE — 0DB98ZX EXCISION OF DUODENUM, VIA NATURAL OR ARTIFICIAL OPENING ENDOSCOPIC, DIAGNOSTIC: ICD-10-PCS | Performed by: INTERNAL MEDICINE

## 2021-07-19 RX ORDER — SODIUM CHLORIDE, SODIUM LACTATE, POTASSIUM CHLORIDE, CALCIUM CHLORIDE 600; 310; 30; 20 MG/100ML; MG/100ML; MG/100ML; MG/100ML
INJECTION, SOLUTION INTRAVENOUS CONTINUOUS
Status: DISCONTINUED | OUTPATIENT
Start: 2021-07-19 | End: 2021-07-19

## 2021-07-19 RX ORDER — HYDROCODONE BITARTRATE AND ACETAMINOPHEN 5; 325 MG/1; MG/1
2 TABLET ORAL AS NEEDED
Status: DISCONTINUED | OUTPATIENT
Start: 2021-07-19 | End: 2021-07-19

## 2021-07-19 RX ORDER — PROCHLORPERAZINE EDISYLATE 5 MG/ML
5 INJECTION INTRAMUSCULAR; INTRAVENOUS ONCE AS NEEDED
Status: DISCONTINUED | OUTPATIENT
Start: 2021-07-19 | End: 2021-07-19

## 2021-07-19 RX ORDER — ONDANSETRON 2 MG/ML
4 INJECTION INTRAMUSCULAR; INTRAVENOUS ONCE AS NEEDED
Status: DISCONTINUED | OUTPATIENT
Start: 2021-07-19 | End: 2021-07-19

## 2021-07-19 RX ORDER — MORPHINE SULFATE 10 MG/ML
6 INJECTION, SOLUTION INTRAMUSCULAR; INTRAVENOUS EVERY 10 MIN PRN
Status: DISCONTINUED | OUTPATIENT
Start: 2021-07-19 | End: 2021-07-19

## 2021-07-19 RX ORDER — MORPHINE SULFATE 4 MG/ML
4 INJECTION, SOLUTION INTRAMUSCULAR; INTRAVENOUS EVERY 10 MIN PRN
Status: DISCONTINUED | OUTPATIENT
Start: 2021-07-19 | End: 2021-07-19

## 2021-07-19 RX ORDER — HYDROMORPHONE HYDROCHLORIDE 1 MG/ML
0.4 INJECTION, SOLUTION INTRAMUSCULAR; INTRAVENOUS; SUBCUTANEOUS EVERY 5 MIN PRN
Status: DISCONTINUED | OUTPATIENT
Start: 2021-07-19 | End: 2021-07-19

## 2021-07-19 RX ORDER — HYDROCODONE BITARTRATE AND ACETAMINOPHEN 5; 325 MG/1; MG/1
1 TABLET ORAL AS NEEDED
Status: DISCONTINUED | OUTPATIENT
Start: 2021-07-19 | End: 2021-07-19

## 2021-07-19 RX ORDER — MORPHINE SULFATE 4 MG/ML
2 INJECTION, SOLUTION INTRAMUSCULAR; INTRAVENOUS EVERY 10 MIN PRN
Status: DISCONTINUED | OUTPATIENT
Start: 2021-07-19 | End: 2021-07-19

## 2021-07-19 RX ORDER — HALOPERIDOL 5 MG/ML
0.25 INJECTION INTRAMUSCULAR ONCE AS NEEDED
Status: DISCONTINUED | OUTPATIENT
Start: 2021-07-19 | End: 2021-07-19

## 2021-07-19 RX ORDER — NALOXONE HYDROCHLORIDE 0.4 MG/ML
80 INJECTION, SOLUTION INTRAMUSCULAR; INTRAVENOUS; SUBCUTANEOUS AS NEEDED
Status: DISCONTINUED | OUTPATIENT
Start: 2021-07-19 | End: 2021-07-19

## 2021-07-19 RX ORDER — HYDROMORPHONE HYDROCHLORIDE 1 MG/ML
0.2 INJECTION, SOLUTION INTRAMUSCULAR; INTRAVENOUS; SUBCUTANEOUS EVERY 5 MIN PRN
Status: DISCONTINUED | OUTPATIENT
Start: 2021-07-19 | End: 2021-07-19

## 2021-07-19 RX ORDER — MELATONIN
325
COMMUNITY

## 2021-07-19 RX ORDER — HYDROMORPHONE HYDROCHLORIDE 1 MG/ML
0.6 INJECTION, SOLUTION INTRAMUSCULAR; INTRAVENOUS; SUBCUTANEOUS EVERY 5 MIN PRN
Status: DISCONTINUED | OUTPATIENT
Start: 2021-07-19 | End: 2021-07-19

## 2021-07-19 RX ORDER — LIDOCAINE HYDROCHLORIDE 10 MG/ML
INJECTION, SOLUTION EPIDURAL; INFILTRATION; INTRACAUDAL; PERINEURAL AS NEEDED
Status: DISCONTINUED | OUTPATIENT
Start: 2021-07-19 | End: 2021-07-19 | Stop reason: SURG

## 2021-07-19 RX ADMIN — SODIUM CHLORIDE, SODIUM LACTATE, POTASSIUM CHLORIDE, CALCIUM CHLORIDE: 600; 310; 30; 20 INJECTION, SOLUTION INTRAVENOUS at 11:26:00

## 2021-07-19 RX ADMIN — LIDOCAINE HYDROCHLORIDE 25 MG: 10 INJECTION, SOLUTION EPIDURAL; INFILTRATION; INTRACAUDAL; PERINEURAL at 11:29:00

## 2021-07-19 NOTE — H&P
History & Physical Examination    Patient Name: Nydia Castro  MRN: [id number]  [facility]: [id number]  YOB: 1940    Diagnosis: iron def anemia; history of Huerta's esophagus.      Present Illness: iron def anemia    ferrous sulfate 325 (63 F 1200  LORazepam (ATIVAN) 0.5 MG Oral Tab, Take 0.25 mg by mouth daily.  , Disp: , Rfl: , 7/17/2021 at 1200  Cholecalciferol (VITAMIN D3) 2000 UNIT Oral Cap, 2 CAPSULE DAILY, Disp: , Rfl: , 7/14/2021 at 1200  Flaxseed-Bernice Prim-Bilberry (TEARS AGAIN HYDRATE O BIOPSY LEFT  ?    stereotactic core biopsy   • BENIGN BIOPSY RIGHT  ?     stereotactic core biopsy   • COLONOSCOPY  11/2008    RECHECK 5 YRS   • COLONOSCOPY,BIOPSY  1/8/2014    Procedure: ESOPHAGOGASTRODUODENOSCOPY, COLONOSCOPY, POSSIBLE BIOPSY, POSSIBLE PO [address] [facility]     Family History   Problem Relation Age of Onset   • Heart Disorder Father    • Other (Other) Mother         ENCEPHALITIS   • Hypertension Brother    • Diabetes Brother      Social History    Tobacco Use      Smoking status:  Form

## 2021-07-19 NOTE — PROGRESS NOTES
Here are the biopsy/pathology findings from your recent EGD (Upper  Endoscopy): negative/normal.      If you need any further assistance, please feel free to call [phone]. Thank you for letting us care for you.      Sincerely,     Vielka Miguel MD  Du

## 2021-07-19 NOTE — OPERATIVE REPORT
ENDOSCOPY OPERATIVE REPORT  Patient Name: Wilbur Harrell  Medical Record #: [id number]  YOB: 1940  Date of Procedure: 7/19/2021    Preoperative Diagnosis: anemia with component of iron deficiency. History of Huerta's esophagus.    Posto procedures well.    Aronchick Bowel Prep:  Score:  1 = Excellent (>95% mucosa seen)   2 = Good (clear liquid up to 25% of mucosa, 90% mucosa seen)   3 = fair (semisolid stool not suctioned, but 90% mucosa seen)   4 = poor (semisolid stool not suctioned, <90

## 2021-07-19 NOTE — ANESTHESIA POSTPROCEDURE EVALUATION
Patient: Nydia Castro    Procedure Summary     Date: 07/19/21 Room / Location: [address] [place] [address] ENDOSCOPY 05 / [address] [place] [address] ENDOSCOPY    Anesthesia Start: 1869 Anesthesia Stop: 5340    Procedures:       ESOPHAGOGASTRODUODENOSCOPY (EGD) (N/A )      COLONOSCOPY (N/A ) Elvira

## 2021-07-19 NOTE — ANESTHESIA PREPROCEDURE EVALUATION
Anesthesia PreOp Note    HPI:     Karli Holley is a 78year old female who presents for preoperative consultation requested by: Duncan Victor MD    Date of Surgery: 7/19/2021    Procedure(s):  ESOPHAGOGASTRODUODENOSCOPY (EGD): COLONOSCOPY  COLONOSCO Date Noted: 07/23/2007      Other abnormal glucose         Date Noted: 07/23/2007      Esophageal reflux         Date Noted: 07/23/2007      Dysthymic disorder         Date Noted: 07/23/2007        Past Medical History:   Diagnosis Date   • ANXIETY PHAN'S   • OTHER SURGICAL HISTORY N/A 5/17/2016    Procedure: ENDOSCOPIC ULTRASOUND (EUS);   Surgeon: Margarita Holstein, MD;  Location: [address] [place] [address]   • PATIENT DOCUMENTED NOT TO HAVE EXPERIENCED ANY OF THE FOLLOWING EVENTS  1/8/2014    Procedure: Kadie Basket Tab EC, Take 1 tablet by mouth 2 (two) times daily before meals. (OTC), Disp: , Rfl: , 7/17/2021 at 1200  Methenamine Hippurate 1 g Oral Tab, Take 1 tablet (1 g total) by mouth daily. , Disp: 90 tablet, Rfl: 3, 7/17/2021 at 2100  RALOXIFENE HCL 60 MG Oral T [Penicillins]       HIVES  Sulfa Antibiotics       HIVES    Family History   Problem Relation Age of Onset   • Heart Disorder Father    • Other (Other) Mother         ENCEPHALITIS   • Hypertension Brother    • Diabetes Brother      Social History    Socioe reviewed.   Lab Results   Component Value Date    WBC 4.40 06/15/2021    RBC 4.11 06/15/2021    HGB 10.0 (L) 06/15/2021    HCT 33.1 (L) 06/15/2021    MCV 80.5 (L) 06/15/2021    MCH 24.3 (L) 06/15/2021    MCHC 30.2 (L) 06/15/2021    RDW 16.9 (H) 06/15/2021 attacks,  depression,       GI/Hepatic/Renal    (+) GERD,     Endo/Other - negative ROS   Abdominal                Anesthesia Plan:   ASA:  3  Plan:   General  Post-op Pain Management: IV analgesics and Oral pain medication  Plan Comments: I have discussed

## 2021-08-19 PROBLEM — R73.03 PREDIABETES: Status: ACTIVE | Noted: 2021-08-19

## 2022-02-18 PROBLEM — E11.9 WELL CONTROLLED DIABETES MELLITUS (HCC): Status: ACTIVE | Noted: 2022-02-18

## 2022-04-20 ENCOUNTER — HOSPITAL ENCOUNTER (INPATIENT)
Facility: HOSPITAL | Age: 82
LOS: 2 days | Discharge: HOME | End: 2022-04-22
Attending: EMERGENCY MEDICINE | Admitting: EMERGENCY MEDICINE
Payer: MEDICARE

## 2022-04-20 ENCOUNTER — APPOINTMENT (OUTPATIENT)
Dept: INTERVENTIONAL RADIOLOGY/VASCULAR | Facility: HOSPITAL | Age: 82
End: 2022-04-20
Attending: INTERNAL MEDICINE
Payer: MEDICARE

## 2022-04-20 ENCOUNTER — APPOINTMENT (OUTPATIENT)
Dept: GENERAL RADIOLOGY | Facility: HOSPITAL | Age: 82
End: 2022-04-20
Attending: EMERGENCY MEDICINE
Payer: MEDICARE

## 2022-04-20 DIAGNOSIS — I21.4 NSTEMI (NON-ST ELEVATED MYOCARDIAL INFARCTION) (HCC): Primary | ICD-10-CM

## 2022-04-20 LAB
ALBUMIN SERPL-MCNC: 3.5 G/DL (ref 3.4–5)
ALBUMIN/GLOB SERPL: 0.9 {RATIO} (ref 1–2)
ALP LIVER SERPL-CCNC: 67 U/L
ALT SERPL-CCNC: 15 U/L
ANION GAP SERPL CALC-SCNC: 4 MMOL/L (ref 0–18)
APTT PPP: 25.3 SECONDS (ref 23.3–35.6)
AST SERPL-CCNC: 18 U/L (ref 15–37)
BILIRUB SERPL-MCNC: 0.3 MG/DL (ref 0.1–2)
BUN BLD-MCNC: 18 MG/DL (ref 7–18)
CALCIUM BLD-MCNC: 9.5 MG/DL (ref 8.5–10.1)
CHLORIDE SERPL-SCNC: 110 MMOL/L (ref 98–112)
CHOLEST SERPL-MCNC: 116 MG/DL (ref ?–200)
CO2 SERPL-SCNC: 28 MMOL/L (ref 21–32)
CREAT BLD-MCNC: 1.05 MG/DL
GLOBULIN PLAS-MCNC: 3.7 G/DL (ref 2.8–4.4)
GLUCOSE BLD-MCNC: 125 MG/DL (ref 70–99)
GLUCOSE BLD-MCNC: 130 MG/DL (ref 70–99)
HDLC SERPL-MCNC: 45 MG/DL (ref 40–59)
LDLC SERPL CALC-MCNC: 47 MG/DL (ref ?–100)
NONHDLC SERPL-MCNC: 71 MG/DL (ref ?–130)
OSMOLALITY SERPL CALC.SUM OF ELEC: 298 MOSM/KG (ref 275–295)
POTASSIUM SERPL-SCNC: 3.7 MMOL/L (ref 3.5–5.1)
PROT SERPL-MCNC: 7.2 G/DL (ref 6.4–8.2)
SARS-COV-2 RNA RESP QL NAA+PROBE: NOT DETECTED
SODIUM SERPL-SCNC: 142 MMOL/L (ref 136–145)
TRIGL SERPL-MCNC: 141 MG/DL (ref 30–149)
TROPONIN I HIGH SENSITIVITY: 536 NG/L
VLDLC SERPL CALC-MCNC: 20 MG/DL (ref 0–30)

## 2022-04-20 PROCEDURE — 99285 EMERGENCY DEPT VISIT HI MDM: CPT

## 2022-04-20 PROCEDURE — 80061 LIPID PANEL: CPT | Performed by: EMERGENCY MEDICINE

## 2022-04-20 PROCEDURE — 71045 X-RAY EXAM CHEST 1 VIEW: CPT | Performed by: EMERGENCY MEDICINE

## 2022-04-20 PROCEDURE — 93005 ELECTROCARDIOGRAM TRACING: CPT

## 2022-04-20 PROCEDURE — 84484 ASSAY OF TROPONIN QUANT: CPT | Performed by: EMERGENCY MEDICINE

## 2022-04-20 PROCEDURE — 85730 THROMBOPLASTIN TIME PARTIAL: CPT | Performed by: EMERGENCY MEDICINE

## 2022-04-20 PROCEDURE — 82962 GLUCOSE BLOOD TEST: CPT

## 2022-04-20 PROCEDURE — 80053 COMPREHEN METABOLIC PANEL: CPT | Performed by: EMERGENCY MEDICINE

## 2022-04-20 PROCEDURE — 96374 THER/PROPH/DIAG INJ IV PUSH: CPT

## 2022-04-20 PROCEDURE — 93010 ELECTROCARDIOGRAM REPORT: CPT

## 2022-04-20 RX ORDER — ASPIRIN 81 MG/1
81 TABLET, CHEWABLE ORAL ONCE
Status: COMPLETED | OUTPATIENT
Start: 2022-04-20 | End: 2022-04-21

## 2022-04-20 RX ORDER — ATORVASTATIN CALCIUM 40 MG/1
40 TABLET, FILM COATED ORAL NIGHTLY
Status: DISCONTINUED | OUTPATIENT
Start: 2022-04-20 | End: 2022-04-22

## 2022-04-20 RX ORDER — ARIPIPRAZOLE 2 MG/1
2 TABLET ORAL NIGHTLY PRN
Status: DISCONTINUED | OUTPATIENT
Start: 2022-04-20 | End: 2022-04-22

## 2022-04-20 RX ORDER — HEPARIN SODIUM 5000 [USP'U]/ML
INJECTION, SOLUTION INTRAVENOUS; SUBCUTANEOUS
Status: COMPLETED
Start: 2022-04-20 | End: 2022-04-20

## 2022-04-20 RX ORDER — HEPARIN SODIUM 5000 [USP'U]/ML
60 INJECTION INTRAVENOUS; SUBCUTANEOUS ONCE
Status: COMPLETED | OUTPATIENT
Start: 2022-04-20 | End: 2022-04-20

## 2022-04-20 RX ORDER — PANTOPRAZOLE SODIUM 40 MG/1
40 TABLET, DELAYED RELEASE ORAL EVERY MORNING
Status: DISCONTINUED | OUTPATIENT
Start: 2022-04-21 | End: 2022-04-22

## 2022-04-20 RX ORDER — SENNOSIDES 8.6 MG
17.2 TABLET ORAL NIGHTLY PRN
Status: DISCONTINUED | OUTPATIENT
Start: 2022-04-20 | End: 2022-04-22

## 2022-04-20 RX ORDER — ARIPIPRAZOLE 2 MG/1
2 TABLET ORAL DAILY
Status: DISCONTINUED | OUTPATIENT
Start: 2022-04-21 | End: 2022-04-20

## 2022-04-20 RX ORDER — L.ACIDOPH/B.ANIMALIS/B.LONGUM 15B CELL
1 CAPSULE ORAL DAILY
Status: ON HOLD | COMMUNITY

## 2022-04-20 RX ORDER — METOCLOPRAMIDE HYDROCHLORIDE 5 MG/ML
5 INJECTION INTRAMUSCULAR; INTRAVENOUS EVERY 8 HOURS PRN
Status: DISCONTINUED | OUTPATIENT
Start: 2022-04-20 | End: 2022-04-22

## 2022-04-20 RX ORDER — PREDNISONE 50 MG/1
50 TABLET ORAL EVERY 6 HOURS
Status: COMPLETED | OUTPATIENT
Start: 2022-04-20 | End: 2022-04-21

## 2022-04-20 RX ORDER — RIVASTIGMINE 13.3 MG/24H
1 PATCH, EXTENDED RELEASE TRANSDERMAL DAILY
Status: DISCONTINUED | OUTPATIENT
Start: 2022-04-20 | End: 2022-04-20

## 2022-04-20 RX ORDER — ONDANSETRON 2 MG/ML
4 INJECTION INTRAMUSCULAR; INTRAVENOUS EVERY 6 HOURS PRN
Status: DISCONTINUED | OUTPATIENT
Start: 2022-04-20 | End: 2022-04-22

## 2022-04-20 RX ORDER — MEMANTINE HYDROCHLORIDE 10 MG/1
10 TABLET ORAL 2 TIMES DAILY
Refills: 5 | Status: DISCONTINUED | OUTPATIENT
Start: 2022-04-20 | End: 2022-04-22

## 2022-04-20 RX ORDER — ACETAMINOPHEN 325 MG/1
650 TABLET ORAL EVERY 6 HOURS PRN
Status: DISCONTINUED | OUTPATIENT
Start: 2022-04-20 | End: 2022-04-22

## 2022-04-20 RX ORDER — HEPARIN SODIUM AND DEXTROSE 10000; 5 [USP'U]/100ML; G/100ML
12 INJECTION INTRAVENOUS ONCE
Status: COMPLETED | OUTPATIENT
Start: 2022-04-20 | End: 2022-04-20

## 2022-04-20 RX ORDER — SODIUM CHLORIDE 9 MG/ML
INJECTION, SOLUTION INTRAVENOUS CONTINUOUS
Status: DISCONTINUED | OUTPATIENT
Start: 2022-04-20 | End: 2022-04-22

## 2022-04-20 RX ORDER — HEPARIN SODIUM AND DEXTROSE 10000; 5 [USP'U]/100ML; G/100ML
INJECTION INTRAVENOUS CONTINUOUS
Status: DISCONTINUED | OUTPATIENT
Start: 2022-04-20 | End: 2022-04-22

## 2022-04-20 RX ORDER — RIVASTIGMINE 13.3 MG/24H
1 PATCH, EXTENDED RELEASE TRANSDERMAL NIGHTLY
Status: DISCONTINUED | OUTPATIENT
Start: 2022-04-20 | End: 2022-04-22

## 2022-04-20 RX ORDER — BUSPIRONE HYDROCHLORIDE 15 MG/1
30 TABLET ORAL 2 TIMES DAILY
Status: DISCONTINUED | OUTPATIENT
Start: 2022-04-20 | End: 2022-04-22

## 2022-04-20 RX ORDER — SODIUM CHLORIDE 9 MG/ML
INJECTION, SOLUTION INTRAVENOUS
Status: COMPLETED | OUTPATIENT
Start: 2022-04-21 | End: 2022-04-21

## 2022-04-20 RX ORDER — LIDOCAINE HYDROCHLORIDE 10 MG/ML
INJECTION, SOLUTION EPIDURAL; INFILTRATION; INTRACAUDAL; PERINEURAL
Status: COMPLETED
Start: 2022-04-20 | End: 2022-04-20

## 2022-04-20 RX ORDER — MELATONIN
325
Status: DISCONTINUED | OUTPATIENT
Start: 2022-04-21 | End: 2022-04-22

## 2022-04-20 RX ORDER — POLYETHYLENE GLYCOL 3350 17 G/17G
17 POWDER, FOR SOLUTION ORAL DAILY PRN
Status: DISCONTINUED | OUTPATIENT
Start: 2022-04-20 | End: 2022-04-22

## 2022-04-20 RX ORDER — RIVASTIGMINE 13.3 MG/24H
1 PATCH, EXTENDED RELEASE TRANSDERMAL DAILY
Status: ON HOLD | COMMUNITY

## 2022-04-20 RX ORDER — AMLODIPINE BESYLATE 5 MG/1
5 TABLET ORAL DAILY
Status: DISCONTINUED | OUTPATIENT
Start: 2022-04-21 | End: 2022-04-21

## 2022-04-20 RX ORDER — ARIPIPRAZOLE 2 MG/1
2 TABLET ORAL NIGHTLY
Status: ON HOLD | COMMUNITY
Start: 2022-04-13

## 2022-04-20 RX ORDER — MORPHINE SULFATE 2 MG/ML
2 INJECTION, SOLUTION INTRAMUSCULAR; INTRAVENOUS EVERY 2 HOUR PRN
Status: DISCONTINUED | OUTPATIENT
Start: 2022-04-20 | End: 2022-04-22

## 2022-04-20 RX ORDER — BISACODYL 10 MG
10 SUPPOSITORY, RECTAL RECTAL
Status: DISCONTINUED | OUTPATIENT
Start: 2022-04-20 | End: 2022-04-22

## 2022-04-20 RX ORDER — LORAZEPAM 0.5 MG/1
0.25 TABLET ORAL DAILY
Status: DISCONTINUED | OUTPATIENT
Start: 2022-04-20 | End: 2022-04-22

## 2022-04-20 RX ORDER — MORPHINE SULFATE 2 MG/ML
1 INJECTION, SOLUTION INTRAMUSCULAR; INTRAVENOUS EVERY 2 HOUR PRN
Status: DISCONTINUED | OUTPATIENT
Start: 2022-04-20 | End: 2022-04-22

## 2022-04-20 RX ORDER — DIPHENHYDRAMINE HCL 50 MG
50 CAPSULE ORAL ONCE
Status: COMPLETED | OUTPATIENT
Start: 2022-04-20 | End: 2022-04-21

## 2022-04-20 RX ORDER — MORPHINE SULFATE 4 MG/ML
4 INJECTION, SOLUTION INTRAMUSCULAR; INTRAVENOUS EVERY 2 HOUR PRN
Status: DISCONTINUED | OUTPATIENT
Start: 2022-04-20 | End: 2022-04-22

## 2022-04-20 RX ORDER — DULOXETIN HYDROCHLORIDE 30 MG/1
60 CAPSULE, DELAYED RELEASE ORAL 2 TIMES DAILY
Status: DISCONTINUED | OUTPATIENT
Start: 2022-04-20 | End: 2022-04-22

## 2022-04-20 RX ORDER — AMLODIPINE BESYLATE 5 MG/1
5 TABLET ORAL DAILY
COMMUNITY
End: 2022-04-22

## 2022-04-20 NOTE — ED QUICK NOTES
Orders for admission, patient is aware of plan and ready to go upstairs. Any questions, please call ED RN Maryanne at extension [phone].      Patient Covid vaccination status: Fully vaccinated     COVID Test Ordered in ED: Rapid SARS-CoV-2 by PCR    COVID Suspicion at Admission: N/A    Running Infusions:  Heparin gtt @ 9mL/hr    Mental Status/LOC at time of transport: A&Ox2 (baseline)    Other pertinent information:   CIWA score: N/A   NIH score:  N/A

## 2022-04-20 NOTE — ED INITIAL ASSESSMENT (HPI)
Patient bib ems for CP    STEMI on EKG PTA, was given 324 baby ASA, no nitroglycerin d/t low BP per EMS    Hx of dementia, HLD, HTN    Per family, patient was c/o LUE pain 30-40 min PTA and was diaphoretic    Patient currently denies any cp at this time, RR even/NL, speaking in full clear sentences

## 2022-04-21 ENCOUNTER — APPOINTMENT (OUTPATIENT)
Dept: INTERVENTIONAL RADIOLOGY/VASCULAR | Facility: HOSPITAL | Age: 82
End: 2022-04-21
Attending: INTERNAL MEDICINE
Payer: MEDICARE

## 2022-04-21 ENCOUNTER — APPOINTMENT (OUTPATIENT)
Dept: CV DIAGNOSTICS | Facility: HOSPITAL | Age: 82
End: 2022-04-21
Attending: INTERNAL MEDICINE
Payer: MEDICARE

## 2022-04-21 LAB
ANION GAP SERPL CALC-SCNC: 4 MMOL/L (ref 0–18)
APTT PPP: 59.8 SECONDS (ref 23.3–35.6)
APTT PPP: 97.8 SECONDS (ref 23.3–35.6)
ATRIAL RATE: 82 BPM
BASOPHILS # BLD AUTO: 0.01 X10(3) UL (ref 0–0.2)
BASOPHILS NFR BLD AUTO: 0.2 %
BILIRUB UR QL STRIP.AUTO: NEGATIVE
BUN BLD-MCNC: 16 MG/DL (ref 7–18)
CALCIUM BLD-MCNC: 8.7 MG/DL (ref 8.5–10.1)
CHLORIDE SERPL-SCNC: 113 MMOL/L (ref 98–112)
CLARITY UR REFRACT.AUTO: CLEAR
CO2 SERPL-SCNC: 25 MMOL/L (ref 21–32)
COLOR UR AUTO: YELLOW
CREAT BLD-MCNC: 0.84 MG/DL
EOSINOPHIL # BLD AUTO: 0 X10(3) UL (ref 0–0.7)
EOSINOPHIL NFR BLD AUTO: 0 %
ERYTHROCYTE [DISTWIDTH] IN BLOOD BY AUTOMATED COUNT: 14.3 %
GLUCOSE BLD-MCNC: 195 MG/DL (ref 70–99)
GLUCOSE UR STRIP.AUTO-MCNC: NEGATIVE MG/DL
HCT VFR BLD AUTO: 36.7 %
HGB BLD-MCNC: 11.8 G/DL
IMM GRANULOCYTES # BLD AUTO: 0.03 X10(3) UL (ref 0–1)
IMM GRANULOCYTES NFR BLD: 0.5 %
INR BLD: 1.1 (ref 0.8–1.2)
KETONES UR STRIP.AUTO-MCNC: NEGATIVE MG/DL
LEUKOCYTE ESTERASE UR QL STRIP.AUTO: NEGATIVE
LYMPHOCYTES # BLD AUTO: 0.85 X10(3) UL (ref 1–4)
LYMPHOCYTES NFR BLD AUTO: 14.9 %
MCH RBC QN AUTO: 27.8 PG (ref 26–34)
MCHC RBC AUTO-ENTMCNC: 32.2 G/DL (ref 31–37)
MCV RBC AUTO: 86.4 FL
MONOCYTES # BLD AUTO: 0.08 X10(3) UL (ref 0.1–1)
MONOCYTES NFR BLD AUTO: 1.4 %
NEUTROPHILS # BLD AUTO: 4.75 X10 (3) UL (ref 1.5–7.7)
NEUTROPHILS # BLD AUTO: 4.75 X10(3) UL (ref 1.5–7.7)
NEUTROPHILS NFR BLD AUTO: 83 %
NITRITE UR QL STRIP.AUTO: POSITIVE
NT-PROBNP SERPL-MCNC: 4885 PG/ML (ref ?–450)
OSMOLALITY SERPL CALC.SUM OF ELEC: 301 MOSM/KG (ref 275–295)
P AXIS: 62 DEGREES
P-R INTERVAL: 226 MS
PH UR STRIP.AUTO: 6 [PH] (ref 5–8)
PLATELET # BLD AUTO: 164 10(3)UL (ref 150–450)
POTASSIUM SERPL-SCNC: 3.8 MMOL/L (ref 3.5–5.1)
PROT UR STRIP.AUTO-MCNC: 30 MG/DL
PROTHROMBIN TIME: 14.3 SECONDS (ref 11.6–14.8)
Q-T INTERVAL: 424 MS
QRS DURATION: 82 MS
QTC CALCULATION (BEZET): 495 MS
R AXIS: -4 DEGREES
RBC # BLD AUTO: 4.25 X10(6)UL
RBC #/AREA URNS AUTO: >10 /HPF
RBC UR QL AUTO: NEGATIVE
SODIUM SERPL-SCNC: 142 MMOL/L (ref 136–145)
SP GR UR STRIP.AUTO: 1.02 (ref 1–1.03)
T AXIS: 144 DEGREES
TROPONIN I HIGH SENSITIVITY: 417 NG/L
UROBILINOGEN UR STRIP.AUTO-MCNC: <2 MG/DL
VENTRICULAR RATE: 82 BPM
WBC # BLD AUTO: 5.7 X10(3) UL (ref 4–11)

## 2022-04-21 PROCEDURE — 85347 COAGULATION TIME ACTIVATED: CPT

## 2022-04-21 PROCEDURE — 85730 THROMBOPLASTIN TIME PARTIAL: CPT | Performed by: INTERNAL MEDICINE

## 2022-04-21 PROCEDURE — 4A023N7 MEASUREMENT OF CARDIAC SAMPLING AND PRESSURE, LEFT HEART, PERCUTANEOUS APPROACH: ICD-10-PCS | Performed by: INTERNAL MEDICINE

## 2022-04-21 PROCEDURE — 85610 PROTHROMBIN TIME: CPT | Performed by: HOSPITALIST

## 2022-04-21 PROCEDURE — 93010 ELECTROCARDIOGRAM REPORT: CPT | Performed by: INTERNAL MEDICINE

## 2022-04-21 PROCEDURE — 99153 MOD SED SAME PHYS/QHP EA: CPT | Performed by: INTERNAL MEDICINE

## 2022-04-21 PROCEDURE — 3E073GC INTRODUCTION OF OTHER THERAPEUTIC SUBSTANCE INTO CORONARY ARTERY, PERCUTANEOUS APPROACH: ICD-10-PCS | Performed by: INTERNAL MEDICINE

## 2022-04-21 PROCEDURE — 93458 L HRT ARTERY/VENTRICLE ANGIO: CPT | Performed by: INTERNAL MEDICINE

## 2022-04-21 PROCEDURE — 93306 TTE W/DOPPLER COMPLETE: CPT | Performed by: INTERNAL MEDICINE

## 2022-04-21 PROCEDURE — 81001 URINALYSIS AUTO W/SCOPE: CPT | Performed by: HOSPITALIST

## 2022-04-21 PROCEDURE — 027034Z DILATION OF CORONARY ARTERY, ONE ARTERY WITH DRUG-ELUTING INTRALUMINAL DEVICE, PERCUTANEOUS APPROACH: ICD-10-PCS | Performed by: INTERNAL MEDICINE

## 2022-04-21 PROCEDURE — 83880 ASSAY OF NATRIURETIC PEPTIDE: CPT | Performed by: HOSPITALIST

## 2022-04-21 PROCEDURE — 85025 COMPLETE CBC W/AUTO DIFF WBC: CPT | Performed by: HOSPITALIST

## 2022-04-21 PROCEDURE — 84484 ASSAY OF TROPONIN QUANT: CPT | Performed by: HOSPITALIST

## 2022-04-21 PROCEDURE — 93005 ELECTROCARDIOGRAM TRACING: CPT

## 2022-04-21 PROCEDURE — 99152 MOD SED SAME PHYS/QHP 5/>YRS: CPT | Performed by: INTERNAL MEDICINE

## 2022-04-21 PROCEDURE — 80048 BASIC METABOLIC PNL TOTAL CA: CPT | Performed by: HOSPITALIST

## 2022-04-21 PROCEDURE — B215YZZ FLUOROSCOPY OF LEFT HEART USING OTHER CONTRAST: ICD-10-PCS | Performed by: INTERNAL MEDICINE

## 2022-04-21 PROCEDURE — B211YZZ FLUOROSCOPY OF MULTIPLE CORONARY ARTERIES USING OTHER CONTRAST: ICD-10-PCS | Performed by: INTERNAL MEDICINE

## 2022-04-21 RX ORDER — NITROGLYCERIN 20 MG/100ML
INJECTION INTRAVENOUS
Status: COMPLETED
Start: 2022-04-21 | End: 2022-04-21

## 2022-04-21 RX ORDER — ASPIRIN 81 MG/1
81 TABLET ORAL DAILY
Status: DISCONTINUED | OUTPATIENT
Start: 2022-04-22 | End: 2022-04-22

## 2022-04-21 RX ORDER — POTASSIUM CHLORIDE 20 MEQ/1
40 TABLET, EXTENDED RELEASE ORAL ONCE
Status: COMPLETED | OUTPATIENT
Start: 2022-04-21 | End: 2022-04-21

## 2022-04-21 RX ORDER — AMIODARONE HYDROCHLORIDE 100 MG/1
100 TABLET ORAL DAILY
COMMUNITY
End: 2022-04-22

## 2022-04-21 RX ORDER — ASPIRIN 81 MG/1
81 TABLET ORAL DAILY
Status: ON HOLD | COMMUNITY

## 2022-04-21 RX ORDER — LIDOCAINE HYDROCHLORIDE 10 MG/ML
INJECTION, SOLUTION EPIDURAL; INFILTRATION; INTRACAUDAL; PERINEURAL
Status: COMPLETED
Start: 2022-04-21 | End: 2022-04-21

## 2022-04-21 RX ORDER — HEPARIN SODIUM 5000 [USP'U]/ML
INJECTION, SOLUTION INTRAVENOUS; SUBCUTANEOUS
Status: COMPLETED
Start: 2022-04-21 | End: 2022-04-21

## 2022-04-21 RX ORDER — SODIUM CHLORIDE 9 MG/ML
INJECTION, SOLUTION INTRAVENOUS CONTINUOUS
Status: ACTIVE | OUTPATIENT
Start: 2022-04-21 | End: 2022-04-21

## 2022-04-21 RX ORDER — OLANZAPINE 5 MG/1
5 TABLET ORAL ONCE
Status: COMPLETED | OUTPATIENT
Start: 2022-04-21 | End: 2022-04-22

## 2022-04-21 RX ORDER — VERAPAMIL HYDROCHLORIDE 2.5 MG/ML
INJECTION, SOLUTION INTRAVENOUS
Status: COMPLETED
Start: 2022-04-21 | End: 2022-04-21

## 2022-04-21 NOTE — ED PROVIDER NOTES
Patient Seen in: [facility] 2ne-a      History   Patient presents with:  Chest Pain Angina    Stated Complaint: NSTEMI (non-ST elevated myocardial infarction) ([address]    Subjective:   HPI    History obtained from family and EMS, patient has a history dementia is under providing any meaningful history. She is an 78-year-old woman here for evaluation of chest pain.  comments that about half hour prior to arrival she complained of chest pain in her left chest and shoulder. Seemed unwell. He called 911. There EKG showed a STEMI and code STEMI was activated. On arrival here she states that she is pain/pressure free. No history of coronary artery disease. She does have a history of hypertension. Objective:   No pertinent past medical history. No pertinent past surgical history. No pertinent social history. Review of Systems    Positive for stated complaint: NSTEMI (non-ST elevated myocardial infarction) [facility])  Other systems are as noted in HPI. Constitutional and vital signs reviewed. All other systems reviewed and negative except as noted above. Physical Exam     ED Triage Vitals [04/20/22 1636]   /84   Pulse 80   Resp 20   Temp    Temp src    SpO2 93 %   O2 Device Nasal cannula       Current:/89   Pulse 110   Resp 22   SpO2 99%         Physical Exam      Constitutional: Awake, alert, age appearing, non-toxic  Head: Normocephalic and atraumatic. Eyes: EOM are normal. Pupils are equal, round, and reactive to light. Neck: Normal range of motion. Neck supple. No JVD present. Cardiovascular: Normal rate and regular rhythm. Normal peripheral perfusion with good color. Pulmonary/Chest: Normal effort. No accessory muscle use. No clubbing, no cyanosis. Abdominal: Soft. There is no tenderness. There is no guarding. Musculoskeletal: No swelling, deformity or ecchymosis.     Neurological: Oriented to person and place and situation but not time. Moves all 4 extremities appropriately, no cranial nerve deficits. Skin: Skin is warm and dry. Psychiatric: Normal mood and affect. Thought content normal.       ED Course     Labs Reviewed   COMP METABOLIC PANEL (14) - Abnormal; Notable for the following components:       Result Value    Glucose 130 (*)     Creatinine 1.05 (*)     Calculated Osmolality 298 (*)     GFR, Non- 49 (*)     GFR, -American 56 (*)     A/G Ratio 0.9 (*)     All other components within normal limits   TROPONIN I HIGH SENSITIVITY - Abnormal; Notable for the following components:    Troponin I (High Sensitivity) 536 (*)     All other components within normal limits   POCT GLUCOSE - Abnormal; Notable for the following components:    POC Glucose 125 (*)     All other components within normal limits   LIPID PANEL - Normal   PTT, ACTIVATED - Normal   RAPID SARS-COV-2 BY PCR - Normal   URINALYSIS, ROUTINE   RAINBOW DRAW LAVENDER   RAINBOW DRAW LIGHT GREEN   RAINBOW DRAW BLUE   RAINBOW DRAW GOLD     EKG    Rate, intervals and axes as noted on EKG Report. Rate: 82  Rhythm: Sinus Rhythm  Reading: Sinus rhythm with no significantly new ST changes. T wave changes in the precordial leads are new. Patient was seen in [facility] upon arrival.  Dr. Wenceslao Roland had already been paged and was at bedside moments later. EMS EKG was consistent with an anterior STEMI, however her repeat EKG is improved and the patient no longer has any discomfort. As such, STEMI alert was canceled by Dr. Wenceslao Roland. A repeat troponin came back over 500, Dr. Wenceslao Roland did come and reevaluate the patient, please see his note for further details. Heparin drip. Cardiac telemetry. [facility]        Patient here with chest discomfort, elevated troponin, abnormal EKG. Presently chest pain-free. Begin mid to cardiac telemetry for likely angiogram in the morning.   Please see Dr. Rodriguez Close note for further details. Patient will be admitted primarily to the [facility] hospitalist.  Care has been discussed with the admitting physician. A total of 45 minutes of critical care time (exclusive of billable procedures) was administered to manage the patient's critical lab values and cardiovascular instability due to her NSTEMI, elevate troponin requiring heparin drip. This involved direct patient intervention, complex decision making, and/or extensive discussions with the patient, family, and clinical staff. Admission disposition: 4/20/2022  5:23 PM                                        Disposition and Plan     Clinical Impression:  NSTEMI (non-ST elevated myocardial infarction) ([facility]  (primary encounter diagnosis)     Disposition:  Admit  4/20/2022  5:23 pm    Follow-up:  No follow-up provider specified.         Medications Prescribed:  Current Discharge Medication List                          Hospital Problems             Present on Admission  Date Reviewed: 2/18/2022          ICD-10-CM Noted POA    * (Principal) NSTEMI (non-ST elevated myocardial infarction) ([facility] I21.4 4/20/2022 Unknown

## 2022-04-21 NOTE — DIETARY NOTE
Clinical Nutrition    Dietitian consult received per cardiac rehab standing order. Pt to be educated by cardiac rehab staff and encouraged to attend outpatient classes taught by GARRETT. RD available PRN.     Alfredo Corado MS, RD, LDN  Clinical Dietitian  Pager #: [phone]

## 2022-04-21 NOTE — PLAN OF CARE
Patient has dementia and is very forgetful. Patient was alert to her birthdate and her husbands name, Tigre. RA, lungs diminished, NSR1, pvcs. Positive bowel sounds, no edema. Heparin gtt at 7ml/hr. 0.9ns at 20ml/hr. Echo-EF 30-35%. Meds given with water. K replaced. Benadryl and prednisone given and heparin turned off ON CALL to cath lab per Jonathon Bruner (cath lab charge).  and daughter at bedside. Angiogram today with Dr Sade Can. Xience Stent placed to the proximal LAD via right radial. TR band intact. Vitals and fluids per protocol. EKG done. Tolerated well. TR band off. Ate dinner.  and daughter at bedside.    Problem: Patient/Family Goals  Goal: Patient/Family Long Term Goal  Description: Patient's Long Term Goal: stay healthy at home    Interventions:  -take medications as prescribed  -attend follow up appointments as recommended  -diet and exercise as advised  -report new or worsening symptoms to physician       - See additional Care Plan goals for specific interventions  Outcome: Progressing  Goal: Patient/Family Short Term Goal  Description: Patient's Short Term Goal: identify cause of chest pain    Interventions:   - cardiology consult  -monitor on tele  -angiogram  - See additional Care Plan goals for specific interventions  Outcome: Progressing

## 2022-04-21 NOTE — PROCEDURES
[address] Location: Cath Lab    [facility] [id number] MRN [id number]   Admission Date 4/20/2022 Procedure Date 4/21/2022   Attending Physician Kassy Jernigan MD Procedure Physician Marvin Monroy MD         CARDIAC CATHETERIZATION/PERCUTANEOUS CORONARY INTERVENTION REPORT     PREOPERATIVE DIAGNOSIS:  CP, NSTEMI  POSTOPERATIVE DIAGNOSIS:  same as above. PROCEDURE PERFORMED:  left heart catheterization, left ventriculogram, selective coronary angiography, PCI to the proximal LAD      PROCEDURE:  The patient was brought to the cardiac catheterization lab in the fasting state. Informed consent was obtained. Moderate sedation was employed using a total of IV Versed 0mg and IV fentanyl 100mcg. I directly observed the patient from 1220 to , for a total of 45 minutes, and an independent trained observer was present and assisted in the monitoring of the patient's level of consciousness and physiological status, watching the heart rate, blood pressure, oximetry, and rhythm, in addition to total moderation time. ACCESS/CATHETER PLACEMENT:   The right radial area was prepped and draped in a sterile manner and anesthetized with 2% lidocaine. The right radial artery was accessed, and a 6-Palestinian, 11 cm sheath was placed. Left and right selective coronary angiography was performed using 6F Tiger4.0 and JR4 catheters, respectively. A pigtail catheter was used to cross the aortic valve, measure left ventricular pressure and perform a 30 degree ROBISON ventriculogram.  The catheter was pulled across the valve to assess for aortic stenosis. At the conclusion of the study, the right radial artery hemostasis was performed using a radial band. FINDINGS:      1. Left heart catheterization:    Left ventricle: 110/11 mmHg  Aorta: 110/62/83 mmHg  Left ventriculogram demonstrated a LV ejection fraction of 30-35% with moderate diffuse hypokinesis more prominently noted anterior (severe).    No significant mitral regurgitation. There was no aortic stenosis upon pullback of the catheter. 2.  Selective coronary angiography:      Left main artery: The left main artery is a medium caliber, trifurcating vessel without significant angiographic disease. LAD:  The left anterior descending artery is a medium caliber vessel that wraps around the apex and gives rise to a medium mid diagonal branch. The proximal LAD demonstrates a hazy, eccentric 90% stenosis. Beyond the focal lesion, the mid LAD demonstrates moderate 40-50% non-obstructive disease. LCx: The left circumflex artery is a small caliber vessel without significant angiographic disease. The ramus intermedius is a medium size, vessel that demonstrates a proximal 50% mildly eccentric stenosis. RCA:  The right coronary artery is a medium size dominant vessel that gives rise to a medium rPDA and two small distal rPL branches. Mild diffuse disease present. INTERVENTIONAL PROCEDURE: Heparin was used for systemic anticoagulation, confirmed therapeutic by ACT measurement. The LM artery was engaged with a 6F EBU3.0 guide catheter and a Runthrough wire was advanced to the distal LAD. After pre-dilating with a 2.0x12mm balloon, a 2.5x12mm stent would not cross the lesion. After further pre-dilation with a 2.5x12mm balloon, the 2.5x12mm Skypoint SAMARIA crossed and was deployed, followed by post-dilation with a 2.75x8mm NC balloon. Completion angiography demonstrated a good angiographic result with 0% residual stenosis, 40-50% post lesion non-obstructive disease was left alone. TIMI3 distal flow without vessel dissection/perforation. The procedure was tolerated well. MEDICATIONS:  See nursing record. COMPLICATIONS:  No major complications were observed during this visit to the catheterization lab. IMPRESSION:    1. Left heart catheterization: LVEDP 11mmHg, no aortic stenosis.   LVEF 30-35% with moderate-severe hypokinesis, particularly anteriorly. No significant mitral regurgitation. 2.  Coronary angiography:  right dominant system  - LM: no significant angiographic disease  - LAD: 90% hazy, eccentric proximal stenosis; 40-50% mid non-obstructive disease  - LCx: 50% proximal ramus disease  - RCA: mild diffuse disease  3. Percutaneous coronary intervention:  Successful PCI to the proximal LAD with a 2.5x12mm Skypoint SAMARIA, post-dilated to 2.75mm. RECOMMENDATIONS: DAPT (asa/ticagrelor) for a minimum of 12 months in setting of ACS presentation.

## 2022-04-21 NOTE — PLAN OF CARE
Patient alert, oriented to self. Impulsive, bed alarm on. O2 sat > 90% on RA. NSR/sinus tach w 1st degree AVB and occasional PVCs on tele. VSS. Has denied chest pain since arrival to floor. Heparin gtt, IVF infusing per orders. Plan for angiogram today. Consent signed with . Groins prepped. NPO after midnight. Premedicating for radiology contrast iodinated dye allergy per orders. Patient and patient's family updated on plan of care. Problem: Patient/Family Goals  Goal: Patient/Family Long Term Goal  Description: Patient's Long Term Goal: stay healthy at home    Interventions:  -take medications as prescribed  -attend follow up appointments as recommended  -diet and exercise as advised  -report new or worsening symptoms to physician       - See additional Care Plan goals for specific interventions  4/21/2022 0501 by Khris Kearney RN  Outcome: Progressing  4/21/2022 0500 by Khris Kearney RN  Outcome: Progressing  Goal: Patient/Family Short Term Goal  Description: Patient's Short Term Goal: identify cause of chest pain    Interventions:   - cardiology consult  -monitor on tele  -angiogram  - See additional Care Plan goals for specific interventions  4/21/2022 0501 by Khris Kearney RN  Outcome: Progressing  4/21/2022 0500 by Khris Kearney RN  Outcome: Progressing     Problem: CARDIOVASCULAR - ADULT  Goal: Maintains optimal cardiac output and hemodynamic stability  Description: INTERVENTIONS:  - Monitor vital signs, rhythm, and trends  - Monitor for bleeding, hypotension and signs of decreased cardiac output  - Evaluate effectiveness of vasoactive medications to optimize hemodynamic stability  - Monitor arterial and/or venous puncture sites for bleeding and/or hematoma  - Assess quality of pulses, skin color and temperature  - Assess for signs of decreased coronary artery perfusion - ex.  Angina  - Evaluate fluid balance, assess for edema, trend weights  4/21/2022 0501 by Reese Brown Anell Brunner, RN  Outcome: Progressing  4/21/2022 0500 by Sarah Gallagher RN  Outcome: Progressing  Goal: Absence of cardiac arrhythmias or at baseline  Description: INTERVENTIONS:  - Continuous cardiac monitoring, monitor vital signs, obtain 12 lead EKG if indicated  - Evaluate effectiveness of antiarrhythmic and heart rate control medications as ordered  - Initiate emergency measures for life threatening arrhythmias  - Monitor electrolytes and administer replacement therapy as ordered  4/21/2022 0501 by Sarah Gallagher RN  Outcome: Progressing  4/21/2022 0500 by Sarah Gallagher RN  Outcome: Progressing

## 2022-04-22 VITALS
TEMPERATURE: 97 F | RESPIRATION RATE: 19 BRPM | BODY MASS INDEX: 27 KG/M2 | OXYGEN SATURATION: 98 % | SYSTOLIC BLOOD PRESSURE: 119 MMHG | WEIGHT: 156.75 LBS | HEART RATE: 95 BPM | DIASTOLIC BLOOD PRESSURE: 75 MMHG

## 2022-04-22 LAB
ANION GAP SERPL CALC-SCNC: 7 MMOL/L (ref 0–18)
APTT PPP: 24 SECONDS (ref 23.3–35.6)
ATRIAL RATE: 88 BPM
BUN BLD-MCNC: 17 MG/DL (ref 7–18)
CALCIUM BLD-MCNC: 9.3 MG/DL (ref 8.5–10.1)
CHLORIDE SERPL-SCNC: 115 MMOL/L (ref 98–112)
CO2 SERPL-SCNC: 22 MMOL/L (ref 21–32)
CREAT BLD-MCNC: 0.84 MG/DL
ERYTHROCYTE [DISTWIDTH] IN BLOOD BY AUTOMATED COUNT: 14.5 %
GLUCOSE BLD-MCNC: 138 MG/DL (ref 70–99)
HCT VFR BLD AUTO: 35.2 %
HGB BLD-MCNC: 11.4 G/DL
INR BLD: 1.03 (ref 0.8–1.2)
MCH RBC QN AUTO: 27.7 PG (ref 26–34)
MCHC RBC AUTO-ENTMCNC: 32.4 G/DL (ref 31–37)
MCV RBC AUTO: 85.4 FL
OSMOLALITY SERPL CALC.SUM OF ELEC: 302 MOSM/KG (ref 275–295)
P AXIS: 74 DEGREES
P-R INTERVAL: 212 MS
PLATELET # BLD AUTO: 168 10(3)UL (ref 150–450)
POTASSIUM SERPL-SCNC: 3.5 MMOL/L (ref 3.5–5.1)
POTASSIUM SERPL-SCNC: 3.5 MMOL/L (ref 3.5–5.1)
PROTHROMBIN TIME: 13.5 SECONDS (ref 11.6–14.8)
Q-T INTERVAL: 390 MS
QRS DURATION: 86 MS
QTC CALCULATION (BEZET): 471 MS
R AXIS: 14 DEGREES
RBC # BLD AUTO: 4.12 X10(6)UL
SODIUM SERPL-SCNC: 144 MMOL/L (ref 136–145)
T AXIS: 98 DEGREES
VENTRICULAR RATE: 88 BPM
WBC # BLD AUTO: 9.3 X10(3) UL (ref 4–11)

## 2022-04-22 PROCEDURE — 87086 URINE CULTURE/COLONY COUNT: CPT | Performed by: HOSPITALIST

## 2022-04-22 PROCEDURE — 85610 PROTHROMBIN TIME: CPT | Performed by: HOSPITALIST

## 2022-04-22 PROCEDURE — 85027 COMPLETE CBC AUTOMATED: CPT | Performed by: INTERNAL MEDICINE

## 2022-04-22 PROCEDURE — 80048 BASIC METABOLIC PNL TOTAL CA: CPT | Performed by: INTERNAL MEDICINE

## 2022-04-22 PROCEDURE — 84132 ASSAY OF SERUM POTASSIUM: CPT | Performed by: INTERNAL MEDICINE

## 2022-04-22 PROCEDURE — 97162 PT EVAL MOD COMPLEX 30 MIN: CPT

## 2022-04-22 PROCEDURE — 85730 THROMBOPLASTIN TIME PARTIAL: CPT | Performed by: HOSPITALIST

## 2022-04-22 PROCEDURE — 97116 GAIT TRAINING THERAPY: CPT

## 2022-04-22 RX ORDER — METOPROLOL SUCCINATE 25 MG/1
12.5 TABLET, EXTENDED RELEASE ORAL
Qty: 45 TABLET | Refills: 3 | Status: ON HOLD | OUTPATIENT
Start: 2022-04-23

## 2022-04-22 RX ORDER — POTASSIUM CHLORIDE 20 MEQ/1
40 TABLET, EXTENDED RELEASE ORAL EVERY 4 HOURS
Status: DISPENSED | OUTPATIENT
Start: 2022-04-22 | End: 2022-04-22

## 2022-04-22 NOTE — CM/SW NOTE
SW spoke to Ligia Shay is $493 per month and Lucina Push will be $100 per month. SUNIL notified Duly NP. RN updated also.      TODD Sellers, [facility]   / Discharge Planner  [id number]

## 2022-04-22 NOTE — CM/SW NOTE
SW spoke with pharmacy. Pt's secondary insurance is not contracted with [facility], so the cost for Jefe Nolasco is $484 and Romi Mcclure is $100. However, pt is entitled to the free 30 day supply for both. SW attempted to call pt's [facility] to check the cost, but because [facility] pulled the prescriptions, Isabell is unable to verify. RN updated.      TODD Kraus, [facility]   / Discharge Planner  [id number]

## 2022-04-22 NOTE — CM/SW NOTE
SW had an extensive conversation with daughter, Lord Salgado, over the phone in regards to discharge plans. Explained that it is recommended that pt has [address], but if pt goes back to [place], home health is not able to be arranged due to a signing physician needing to be in [place]. Daughter understanding, SUNIL also explained this to spouse, explained that it would be beneficial for pt to stay in [place] for post hospitalization follow ups and a visiting nurse for continuity of care. Spouse seemed open to this, but is still wanting to go back to [place]. SUNIL sent [facility] referrals in [facility]. SUNIL notified [facility] liaison of this, and also explained the above. Pt is discharging to daughter's house in [facility]: [address]. Family is currently unsure on how long they will be staying in [place] for. RN updated. [facility] can accept and follow in community.     TODD Kraus, [facility]   / Discharge Planner  [id number]

## 2022-04-22 NOTE — PLAN OF CARE
RN SHIFT NOTE    Assumed care of pt at 0700. Pt denies pain at this time. Patient is alert and oriented to self only. (Reminders and reorientation completed). Patient is NSR on tele, S1 and S2 present and pt denies cardiac symptoms. Bilateral lung sounds are clear. Abdomen is soft and round. Last BM on 4/21. Non- pitting edema in BLE and BUE. Tolerating medications and care needs have been met. POC: Discharge home     Problem: Patient/Family Goals  Goal: Patient/Family Long Term Goal  Description: Patient's Long Term Goal: stay healthy at home    Interventions:  -take medications as prescribed  -attend follow up appointments as recommended  -diet and exercise as advised  -report new or worsening symptoms to physician       - See additional Care Plan goals for specific interventions  Outcome: Progressing  Goal: Patient/Family Short Term Goal  Description: Patient's Short Term Goal: identify cause of chest pain    Interventions:   - cardiology consult  -monitor on tele  -angiogram  - See additional Care Plan goals for specific interventions  Outcome: Progressing     Problem: CARDIOVASCULAR - ADULT  Goal: Maintains optimal cardiac output and hemodynamic stability  Description: INTERVENTIONS:  - Monitor vital signs, rhythm, and trends  - Monitor for bleeding, hypotension and signs of decreased cardiac output  - Evaluate effectiveness of vasoactive medications to optimize hemodynamic stability  - Monitor arterial and/or venous puncture sites for bleeding and/or hematoma  - Assess quality of pulses, skin color and temperature  - Assess for signs of decreased coronary artery perfusion - ex.  Angina  - Evaluate fluid balance, assess for edema, trend weights  Outcome: Progressing  Goal: Absence of cardiac arrhythmias or at baseline  Description: INTERVENTIONS:  - Continuous cardiac monitoring, monitor vital signs, obtain 12 lead EKG if indicated  - Evaluate effectiveness of antiarrhythmic and heart rate control medications as ordered  - Initiate emergency measures for life threatening arrhythmias  - Monitor electrolytes and administer replacement therapy as ordered  Outcome: Progressing

## 2022-04-22 NOTE — HOME CARE LIAISON
Received referral via [facility] for Home Health services. Spoke w/ patient's spouse, Jannie Adorno and daughter Selena Brownlee and provided with list of [facility] providers from [address] [place] [address], patient choice is [address]. Agency reserved in [address] [place] [address] and contact information placed on AVS.Financial interest disclosure provided to patient.  Notified Cassandra Hough

## 2022-04-22 NOTE — CM/SW NOTE
04/22/22 1100   CM/SW Referral Data   Referral Source Social Work (self-referral)   Reason for Referral Discharge planning;Psychosocial assessment   Informant Spouse/Significant Other   Pertinent Medical Hx   Does patient have an established PCP? Yes   Patient Info   Patient's Current Mental Status at Time of Assessment Confused or unable to complete assessment;Memory Impairments   Patient's [address]   Patient lives with Spouse/Significant other   Patient Status Prior to Admission   Independent with ADLs and Mobility No   Pt. requires assistance with Housework;Driving; Ambulating;Medications; Finances     SW met with pt and spouse at bedside to discuss discharge planning. Pt is a 79 y/o female who was admitted for NSTEMI. The pt is currently not alert and oriented. Pt has a diagnosis of dementia. SW spoke with spouse to receive information regarding pt's care needs. Pt and spouse live in a town in [place], probably about 3 hours or so away. Pt's daughter, Roger Canales, lives in the area, and the pt's sister, Jenni Vang, resides in [place]. Spouse stated that all of pt's doctors are in [place] through [facility]. Pt does not have any medical doctors in [place]. Spouse stated that pt does well when they are at home and when pt is in a familiar environment. Spouse states that when pt works on the yard, pt is in her recliner chair with the TV on. Spouse confirms that they will go back to [place] at some point and that pt does well in the car. Pt is physically mobile around their home. SW encouraged that pt should not be by herself given the confusion at hospital discharge and explained that PT is also recommending intermittent supervision. SW also did expalin that PT is recommending home health, but because pt does not have any medical doctors in [place], [place] is unable to secure home health. Spouse expressed understanding. Pt is going to be getting all of her medications through [facility].  Spouse is very hopeful that pt can discharge today. RN updated of the above conversation.     TODD Soliz, [facility]   / Discharge Planner  [id number]

## 2022-04-22 NOTE — PLAN OF CARE
Assumed care of patient at 1. Pt Alert and oriented to self. Hx of dementia. O2 sats maintained on room air. NSR/ST with 1st degree AVB on tele. Last BM 4/20. Voiding without difficulty. Pt denies any pain. Pt up with standby assist. Pt updated on plan of care. Care needs met. Bed in lowest position, Call light within reach. Bed alarm on.  and daughter at bedside,  spending the night. Right radial site is clean/dry/intact with mild bruising. Site is soft, no hematoma. Video monitoring in place. Pt thinks she is babysitting somewhere and \"needs to check on the kids. \" Attempting to get out of bed. PRN Abilify given for agitation.  asking for a \"sleeping pill\" for her. Dr. Behzad Sood ordered one time dose of Zyprexa. Pt attempting to urinate multiple times with no output. Bladder scan showed 408 mL. Straight catheterization performed with 400 mL out. Urine culture sent per orders.        Problem: Patient/Family Goals  Goal: Patient/Family Long Term Goal  Description: Patient's Long Term Goal: stay healthy at home    Interventions:  -take medications as prescribed  -attend follow up appointments as recommended  -diet and exercise as advised  -report new or worsening symptoms to physician       - See additional Care Plan goals for specific interventions  Outcome: Progressing  Goal: Patient/Family Short Term Goal  Description: Patient's Short Term Goal: identify cause of chest pain    Interventions:   - cardiology consult  -monitor on tele  -angiogram  - See additional Care Plan goals for specific interventions  Outcome: Progressing     Problem: CARDIOVASCULAR - ADULT  Goal: Maintains optimal cardiac output and hemodynamic stability  Description: INTERVENTIONS:  - Monitor vital signs, rhythm, and trends  - Monitor for bleeding, hypotension and signs of decreased cardiac output  - Evaluate effectiveness of vasoactive medications to optimize hemodynamic stability  - Monitor arterial and/or venous puncture sites for bleeding and/or hematoma  - Assess quality of pulses, skin color and temperature  - Assess for signs of decreased coronary artery perfusion - ex.  Angina  - Evaluate fluid balance, assess for edema, trend weights  Outcome: Progressing  Goal: Absence of cardiac arrhythmias or at baseline  Description: INTERVENTIONS:  - Continuous cardiac monitoring, monitor vital signs, obtain 12 lead EKG if indicated  - Evaluate effectiveness of antiarrhythmic and heart rate control medications as ordered  - Initiate emergency measures for life threatening arrhythmias  - Monitor electrolytes and administer replacement therapy as ordered  Outcome: Progressing

## 2022-04-22 NOTE — DISCHARGE PLANNING
NURSING DISCHARGE NOTE    Discharged Home via Wheelchair. Accompanied by RN  Belongings Taken by patient/family. AVS printed and given to patient's . Education completed and questions answered. IV and tele removed. Medications from [address] [place] [address] given to patient's . Patient was discharged with no complaints.

## 2022-04-22 NOTE — CARDIAC REHAB
CAD education completed. Stent card given. Patient unable to attend cardiac rehab at this time due to dementia and also [place] out of [place].

## 2022-04-25 LAB — ISTAT ACTIVATED CLOTTING TIME: 309 SECONDS (ref 74–137)

## 2022-04-28 ENCOUNTER — APPOINTMENT (OUTPATIENT)
Dept: GENERAL RADIOLOGY | Facility: HOSPITAL | Age: 82
End: 2022-04-28
Attending: EMERGENCY MEDICINE
Payer: MEDICARE

## 2022-04-28 ENCOUNTER — APPOINTMENT (OUTPATIENT)
Dept: CT IMAGING | Facility: HOSPITAL | Age: 82
End: 2022-04-28
Attending: EMERGENCY MEDICINE
Payer: MEDICARE

## 2022-04-28 ENCOUNTER — HOSPITAL ENCOUNTER (INPATIENT)
Facility: HOSPITAL | Age: 82
LOS: 3 days | Discharge: NURSING, REHAB OR LONG-TERM CARE | End: 2022-05-03
Attending: EMERGENCY MEDICINE | Admitting: HOSPITALIST
Payer: MEDICARE

## 2022-04-28 ENCOUNTER — TELEPHONE (OUTPATIENT)
Dept: OTHER | Facility: HOSPITAL | Age: 82
End: 2022-04-28

## 2022-04-28 DIAGNOSIS — R53.1 WEAKNESS GENERALIZED: ICD-10-CM

## 2022-04-28 DIAGNOSIS — I49.8 BRADYARRHYTHMIA: Primary | ICD-10-CM

## 2022-04-28 LAB
ALBUMIN SERPL-MCNC: 3 G/DL (ref 3.4–5)
ALBUMIN/GLOB SERPL: 0.7 {RATIO} (ref 1–2)
ALP LIVER SERPL-CCNC: 60 U/L
ALT SERPL-CCNC: 16 U/L
ANION GAP SERPL CALC-SCNC: 5 MMOL/L (ref 0–18)
AST SERPL-CCNC: 14 U/L (ref 15–37)
ATRIAL RATE: 92 BPM
ATRIAL RATE: 93 BPM
BASOPHILS # BLD AUTO: 0.02 X10(3) UL (ref 0–0.2)
BASOPHILS NFR BLD AUTO: 0.2 %
BILIRUB SERPL-MCNC: 0.9 MG/DL (ref 0.1–2)
BILIRUB UR QL STRIP.AUTO: NEGATIVE
BUN BLD-MCNC: 17 MG/DL (ref 7–18)
CALCIUM BLD-MCNC: 8.8 MG/DL (ref 8.5–10.1)
CHLORIDE SERPL-SCNC: 111 MMOL/L (ref 98–112)
CO2 SERPL-SCNC: 23 MMOL/L (ref 21–32)
COLOR UR AUTO: YELLOW
CREAT BLD-MCNC: 1.11 MG/DL
EOSINOPHIL # BLD AUTO: 0.03 X10(3) UL (ref 0–0.7)
EOSINOPHIL NFR BLD AUTO: 0.3 %
ERYTHROCYTE [DISTWIDTH] IN BLOOD BY AUTOMATED COUNT: 14.9 %
GLOBULIN PLAS-MCNC: 4.1 G/DL (ref 2.8–4.4)
GLUCOSE BLD-MCNC: 156 MG/DL (ref 70–99)
GLUCOSE UR STRIP.AUTO-MCNC: NEGATIVE MG/DL
HCT VFR BLD AUTO: 37.6 %
HGB BLD-MCNC: 12.1 G/DL
IMM GRANULOCYTES # BLD AUTO: 0.05 X10(3) UL (ref 0–1)
IMM GRANULOCYTES NFR BLD: 0.6 %
KETONES UR STRIP.AUTO-MCNC: NEGATIVE MG/DL
LYMPHOCYTES # BLD AUTO: 0.62 X10(3) UL (ref 1–4)
LYMPHOCYTES NFR BLD AUTO: 6.9 %
MCH RBC QN AUTO: 28.1 PG (ref 26–34)
MCHC RBC AUTO-ENTMCNC: 32.2 G/DL (ref 31–37)
MCV RBC AUTO: 87.2 FL
MONOCYTES # BLD AUTO: 0.9 X10(3) UL (ref 0.1–1)
MONOCYTES NFR BLD AUTO: 10.1 %
NEUTROPHILS # BLD AUTO: 7.32 X10 (3) UL (ref 1.5–7.7)
NEUTROPHILS # BLD AUTO: 7.32 X10(3) UL (ref 1.5–7.7)
NEUTROPHILS NFR BLD AUTO: 81.9 %
NITRITE UR QL STRIP.AUTO: NEGATIVE
OSMOLALITY SERPL CALC.SUM OF ELEC: 293 MOSM/KG (ref 275–295)
P AXIS: 53 DEGREES
P AXIS: 70 DEGREES
P-R INTERVAL: 210 MS
P-R INTERVAL: 218 MS
PH UR STRIP.AUTO: 8 [PH] (ref 5–8)
PLATELET # BLD AUTO: 150 10(3)UL (ref 150–450)
POTASSIUM SERPL-SCNC: 3.7 MMOL/L (ref 3.5–5.1)
PROT SERPL-MCNC: 7.1 G/DL (ref 6.4–8.2)
PROT UR STRIP.AUTO-MCNC: 100 MG/DL
Q-T INTERVAL: 358 MS
Q-T INTERVAL: 412 MS
QRS DURATION: 86 MS
QRS DURATION: 86 MS
QTC CALCULATION (BEZET): 442 MS
QTC CALCULATION (BEZET): 512 MS
R AXIS: -27 DEGREES
R AXIS: -29 DEGREES
RBC # BLD AUTO: 4.31 X10(6)UL
RBC UR QL AUTO: NEGATIVE
SARS-COV-2 RNA RESP QL NAA+PROBE: NOT DETECTED
SODIUM SERPL-SCNC: 139 MMOL/L (ref 136–145)
SP GR UR STRIP.AUTO: 1.02 (ref 1–1.03)
T AXIS: 102 DEGREES
T AXIS: 86 DEGREES
TROPONIN I HIGH SENSITIVITY: 39 NG/L
UROBILINOGEN UR STRIP.AUTO-MCNC: <2 MG/DL
VENTRICULAR RATE: 92 BPM
VENTRICULAR RATE: 93 BPM
WBC # BLD AUTO: 8.9 X10(3) UL (ref 4–11)
WBC #/AREA URNS AUTO: >50 /HPF
WBC CLUMPS UR QL AUTO: PRESENT /HPF

## 2022-04-28 PROCEDURE — 99285 EMERGENCY DEPT VISIT HI MDM: CPT

## 2022-04-28 PROCEDURE — 71045 X-RAY EXAM CHEST 1 VIEW: CPT | Performed by: EMERGENCY MEDICINE

## 2022-04-28 PROCEDURE — 87086 URINE CULTURE/COLONY COUNT: CPT | Performed by: EMERGENCY MEDICINE

## 2022-04-28 PROCEDURE — 96361 HYDRATE IV INFUSION ADD-ON: CPT

## 2022-04-28 PROCEDURE — 81001 URINALYSIS AUTO W/SCOPE: CPT | Performed by: EMERGENCY MEDICINE

## 2022-04-28 PROCEDURE — 84484 ASSAY OF TROPONIN QUANT: CPT | Performed by: EMERGENCY MEDICINE

## 2022-04-28 PROCEDURE — 80053 COMPREHEN METABOLIC PANEL: CPT | Performed by: EMERGENCY MEDICINE

## 2022-04-28 PROCEDURE — 70450 CT HEAD/BRAIN W/O DYE: CPT | Performed by: EMERGENCY MEDICINE

## 2022-04-28 PROCEDURE — 93005 ELECTROCARDIOGRAM TRACING: CPT

## 2022-04-28 PROCEDURE — 85025 COMPLETE CBC W/AUTO DIFF WBC: CPT | Performed by: EMERGENCY MEDICINE

## 2022-04-28 PROCEDURE — 96360 HYDRATION IV INFUSION INIT: CPT

## 2022-04-28 PROCEDURE — 93010 ELECTROCARDIOGRAM REPORT: CPT

## 2022-04-28 RX ORDER — PANTOPRAZOLE SODIUM 20 MG/1
40 TABLET, DELAYED RELEASE ORAL
Status: DISCONTINUED | OUTPATIENT
Start: 2022-04-29 | End: 2022-05-03

## 2022-04-28 RX ORDER — ASPIRIN 81 MG/1
81 TABLET ORAL DAILY
Status: DISCONTINUED | OUTPATIENT
Start: 2022-04-29 | End: 2022-05-03

## 2022-04-28 RX ORDER — BUSPIRONE HYDROCHLORIDE 15 MG/1
30 TABLET ORAL 2 TIMES DAILY
Status: DISCONTINUED | OUTPATIENT
Start: 2022-04-28 | End: 2022-05-03

## 2022-04-28 RX ORDER — SODIUM CHLORIDE 9 MG/ML
INJECTION, SOLUTION INTRAVENOUS CONTINUOUS
Status: DISCONTINUED | OUTPATIENT
Start: 2022-04-28 | End: 2022-05-03

## 2022-04-28 RX ORDER — DULOXETIN HYDROCHLORIDE 30 MG/1
60 CAPSULE, DELAYED RELEASE ORAL 2 TIMES DAILY
Status: DISCONTINUED | OUTPATIENT
Start: 2022-04-28 | End: 2022-05-03

## 2022-04-28 RX ORDER — METOCLOPRAMIDE HYDROCHLORIDE 5 MG/ML
10 INJECTION INTRAMUSCULAR; INTRAVENOUS EVERY 8 HOURS PRN
Status: DISCONTINUED | OUTPATIENT
Start: 2022-04-28 | End: 2022-04-28

## 2022-04-28 RX ORDER — RALOXIFENE HYDROCHLORIDE 60 MG/1
60 TABLET, FILM COATED ORAL DAILY
Status: DISCONTINUED | OUTPATIENT
Start: 2022-04-29 | End: 2022-05-03

## 2022-04-28 RX ORDER — ONDANSETRON 2 MG/ML
4 INJECTION INTRAMUSCULAR; INTRAVENOUS EVERY 6 HOURS PRN
Status: DISCONTINUED | OUTPATIENT
Start: 2022-04-28 | End: 2022-05-03

## 2022-04-28 RX ORDER — HEPARIN SODIUM 5000 [USP'U]/ML
5000 INJECTION, SOLUTION INTRAVENOUS; SUBCUTANEOUS EVERY 8 HOURS SCHEDULED
Status: DISCONTINUED | OUTPATIENT
Start: 2022-04-28 | End: 2022-04-30

## 2022-04-28 RX ORDER — LEVOFLOXACIN 5 MG/ML
750 INJECTION, SOLUTION INTRAVENOUS
Status: COMPLETED | OUTPATIENT
Start: 2022-04-28 | End: 2022-04-30

## 2022-04-28 RX ORDER — RIVASTIGMINE 13.3 MG/24H
1 PATCH, EXTENDED RELEASE TRANSDERMAL NIGHTLY
Status: DISCONTINUED | OUTPATIENT
Start: 2022-04-28 | End: 2022-05-03

## 2022-04-28 RX ORDER — MEMANTINE HYDROCHLORIDE 5 MG/1
10 TABLET ORAL 2 TIMES DAILY
Status: DISCONTINUED | OUTPATIENT
Start: 2022-04-29 | End: 2022-05-03

## 2022-04-28 RX ORDER — ATORVASTATIN CALCIUM 40 MG/1
40 TABLET, FILM COATED ORAL NIGHTLY
Status: DISCONTINUED | OUTPATIENT
Start: 2022-04-28 | End: 2022-05-03

## 2022-04-28 RX ORDER — ACETAMINOPHEN 325 MG/1
650 TABLET ORAL EVERY 6 HOURS PRN
Status: DISCONTINUED | OUTPATIENT
Start: 2022-04-28 | End: 2022-05-03

## 2022-04-28 RX ORDER — OLANZAPINE 5 MG/1
5 TABLET ORAL NIGHTLY PRN
Status: DISCONTINUED | OUTPATIENT
Start: 2022-04-28 | End: 2022-05-03

## 2022-04-28 NOTE — PROGRESS NOTES
NURSING ADMISSION NOTE      Patient admitted via Cart  Oriented to room. Safety precautions initiated. Bed in low position. Call light in reach. Patient oriented to room. Call light in reach. Menu in room. Tele monitor on, VS taken. Head to toe completed. Admission Navigators complete including PTA medications. Skin assessment completed with JOAQUIN Fritz. Patient needs met by staff.

## 2022-04-28 NOTE — ED INITIAL ASSESSMENT (HPI)
PT ARRIVED TO ED VIA EMS FROM HOME FOR C/O WEAKNESS THIS MORNING. PER FAMILY, PT WAS UNABLE TO GET OUT OF BED THIS MORNING WHEN CARE TAKER CAME BY TO TRY  GET HER UP. PT DID HAVE MI WITH STENT PLACEMENT LAST WEEK.

## 2022-04-28 NOTE — PROGRESS NOTES
Called patient again to complete AMI follow up.  answered the phone and said that they are currently in the Emergency Department at [facility] for patient,  with numbness and weakness. I told the  that we will complete the call at another time.

## 2022-04-28 NOTE — ED QUICK NOTES
Orders for admission, patient is aware of plan and ready to go upstairs. Any questions, please call ED RN Miguel Angel Rivas  at extension [phone] . Vaccinated? Yes  Type of COVID test sent: Rapid SARS  COVID Suspicion level: Low        Titratable drug(s) infusing:  Rate:     LOC at time of transport: Alert     Other pertinent information: Per ERMD, he will put antibiotics in for pt.  Pt incontinent     CIWA score= N/A  NIH score= N/A

## 2022-04-29 ENCOUNTER — APPOINTMENT (OUTPATIENT)
Dept: GENERAL RADIOLOGY | Facility: HOSPITAL | Age: 82
End: 2022-04-29
Attending: INTERNAL MEDICINE
Payer: MEDICARE

## 2022-04-29 LAB
ANION GAP SERPL CALC-SCNC: 6 MMOL/L (ref 0–18)
BASOPHILS # BLD AUTO: 0.01 X10(3) UL (ref 0–0.2)
BASOPHILS NFR BLD AUTO: 0.1 %
BUN BLD-MCNC: 14 MG/DL (ref 7–18)
CALCIUM BLD-MCNC: 8.6 MG/DL (ref 8.5–10.1)
CHLORIDE SERPL-SCNC: 113 MMOL/L (ref 98–112)
CO2 SERPL-SCNC: 21 MMOL/L (ref 21–32)
CREAT BLD-MCNC: 0.92 MG/DL
EOSINOPHIL # BLD AUTO: 0.01 X10(3) UL (ref 0–0.7)
EOSINOPHIL NFR BLD AUTO: 0.1 %
ERYTHROCYTE [DISTWIDTH] IN BLOOD BY AUTOMATED COUNT: 14.9 %
GLUCOSE BLD-MCNC: 147 MG/DL (ref 70–99)
HCT VFR BLD AUTO: 35.7 %
HGB BLD-MCNC: 11.4 G/DL
IMM GRANULOCYTES # BLD AUTO: 0.06 X10(3) UL (ref 0–1)
IMM GRANULOCYTES NFR BLD: 0.7 %
LYMPHOCYTES # BLD AUTO: 0.36 X10(3) UL (ref 1–4)
LYMPHOCYTES NFR BLD AUTO: 4.2 %
MAGNESIUM SERPL-MCNC: 1.9 MG/DL (ref 1.6–2.6)
MCH RBC QN AUTO: 27.9 PG (ref 26–34)
MCHC RBC AUTO-ENTMCNC: 31.9 G/DL (ref 31–37)
MCV RBC AUTO: 87.3 FL
MONOCYTES # BLD AUTO: 1.02 X10(3) UL (ref 0.1–1)
MONOCYTES NFR BLD AUTO: 11.9 %
NEUTROPHILS # BLD AUTO: 7.13 X10 (3) UL (ref 1.5–7.7)
NEUTROPHILS # BLD AUTO: 7.13 X10(3) UL (ref 1.5–7.7)
NEUTROPHILS NFR BLD AUTO: 83 %
OSMOLALITY SERPL CALC.SUM OF ELEC: 293 MOSM/KG (ref 275–295)
PLATELET # BLD AUTO: 141 10(3)UL (ref 150–450)
POTASSIUM SERPL-SCNC: 3.4 MMOL/L (ref 3.5–5.1)
POTASSIUM SERPL-SCNC: 4.6 MMOL/L (ref 3.5–5.1)
RBC # BLD AUTO: 4.09 X10(6)UL
SODIUM SERPL-SCNC: 140 MMOL/L (ref 136–145)
WBC # BLD AUTO: 8.6 X10(3) UL (ref 4–11)

## 2022-04-29 PROCEDURE — 85025 COMPLETE CBC W/AUTO DIFF WBC: CPT | Performed by: HOSPITALIST

## 2022-04-29 PROCEDURE — 83735 ASSAY OF MAGNESIUM: CPT | Performed by: INTERNAL MEDICINE

## 2022-04-29 PROCEDURE — 80048 BASIC METABOLIC PNL TOTAL CA: CPT | Performed by: HOSPITALIST

## 2022-04-29 PROCEDURE — 97535 SELF CARE MNGMENT TRAINING: CPT

## 2022-04-29 PROCEDURE — 97161 PT EVAL LOW COMPLEX 20 MIN: CPT

## 2022-04-29 PROCEDURE — 84132 ASSAY OF SERUM POTASSIUM: CPT | Performed by: HOSPITALIST

## 2022-04-29 PROCEDURE — 71045 X-RAY EXAM CHEST 1 VIEW: CPT | Performed by: INTERNAL MEDICINE

## 2022-04-29 PROCEDURE — 97530 THERAPEUTIC ACTIVITIES: CPT

## 2022-04-29 PROCEDURE — 97116 GAIT TRAINING THERAPY: CPT

## 2022-04-29 PROCEDURE — 97165 OT EVAL LOW COMPLEX 30 MIN: CPT

## 2022-04-29 RX ORDER — POTASSIUM CHLORIDE 20 MEQ/1
40 TABLET, EXTENDED RELEASE ORAL EVERY 4 HOURS
Status: COMPLETED | OUTPATIENT
Start: 2022-04-29 | End: 2022-04-29

## 2022-04-29 RX ORDER — FUROSEMIDE 20 MG/1
20 TABLET ORAL DAILY
Status: DISCONTINUED | OUTPATIENT
Start: 2022-04-29 | End: 2022-05-03

## 2022-04-29 NOTE — PROGRESS NOTES
[facility] Pharmacy Note:  Renal Adjustment for levofloxacin [facility])    Isha Jones is a 78year old patient who has been prescribed levofloxacin (LEVAQUIN) 750 mg every 24 hrs. The estimated creatinine clearance is 32.6 mL/min (A) (based on SCr of 1.11 mg/dL (H)). The dose has been adjusted to levofloxacin (LEVAQUIN) 750 mg every 48 hrs per hospital renal dose adjustment protocol for treatment of UTI. Pharmacy will follow and adjust dose as warranted for additional renal function changes.     Thank you,    Francisco Verduzco, PharmD  4/28/2022  8:39 PM

## 2022-04-29 NOTE — PROGRESS NOTES
Assumed care of patient at [address] with family at bedside. Pt is alert to self only, confused. Pt did not void overnight, was due to void by 2300, straight cath with 375cc. Denies pain. HR increasing overnight. Up to 120. O2 needs also increasing. Pt from room air sating % to requiring 3L O2 with sats 92%. Improving this morning and now sating 95%. MD made aware of pt updates, CXR ordered. PTA metoprolol ordered and given. Resting comfortably in bed. POC: IV abx.  PTOT to see

## 2022-04-29 NOTE — CM/SW NOTE
SW met with pt's spouse and daughter at bedside to discuss readmission and discharge planning. Pt is familiar to this SW from a previous hospital admission. PT is now recommending [facility]. Daughter and spouse are agreeable to the recommendation; hopeful [address]. SW explained that will need a back up choice just in case [address] is unavailable. Initiated [facility] referrals in [facility]. PASRR uploaded. Will need to present choice list once available.  &  to remain available and supportive for discharge planning needs.     TODD Gunter, [facility]   / Discharge Planner  [id number]

## 2022-04-30 LAB
ANION GAP SERPL CALC-SCNC: 5 MMOL/L (ref 0–18)
BASOPHILS # BLD AUTO: 0.02 X10(3) UL (ref 0–0.2)
BASOPHILS NFR BLD AUTO: 0.4 %
BUN BLD-MCNC: 18 MG/DL (ref 7–18)
CALCIUM BLD-MCNC: 9.2 MG/DL (ref 8.5–10.1)
CHLORIDE SERPL-SCNC: 114 MMOL/L (ref 98–112)
CO2 SERPL-SCNC: 23 MMOL/L (ref 21–32)
CREAT BLD-MCNC: 1.05 MG/DL
EOSINOPHIL # BLD AUTO: 0.12 X10(3) UL (ref 0–0.7)
EOSINOPHIL NFR BLD AUTO: 2.3 %
ERYTHROCYTE [DISTWIDTH] IN BLOOD BY AUTOMATED COUNT: 14.9 %
GLUCOSE BLD-MCNC: 139 MG/DL (ref 70–99)
GLUCOSE BLD-MCNC: 141 MG/DL (ref 70–99)
HCT VFR BLD AUTO: 34.7 %
HGB BLD-MCNC: 10.9 G/DL
IMM GRANULOCYTES # BLD AUTO: 0.02 X10(3) UL (ref 0–1)
IMM GRANULOCYTES NFR BLD: 0.4 %
LYMPHOCYTES # BLD AUTO: 0.92 X10(3) UL (ref 1–4)
LYMPHOCYTES NFR BLD AUTO: 17.3 %
MCH RBC QN AUTO: 27.7 PG (ref 26–34)
MCHC RBC AUTO-ENTMCNC: 31.4 G/DL (ref 31–37)
MCV RBC AUTO: 88.3 FL
MONOCYTES # BLD AUTO: 0.74 X10(3) UL (ref 0.1–1)
MONOCYTES NFR BLD AUTO: 13.9 %
NEUTROPHILS # BLD AUTO: 3.49 X10 (3) UL (ref 1.5–7.7)
NEUTROPHILS # BLD AUTO: 3.49 X10(3) UL (ref 1.5–7.7)
NEUTROPHILS NFR BLD AUTO: 65.7 %
OSMOLALITY SERPL CALC.SUM OF ELEC: 298 MOSM/KG (ref 275–295)
PLATELET # BLD AUTO: 155 10(3)UL (ref 150–450)
POTASSIUM SERPL-SCNC: 3.9 MMOL/L (ref 3.5–5.1)
RBC # BLD AUTO: 3.93 X10(6)UL
SODIUM SERPL-SCNC: 142 MMOL/L (ref 136–145)
WBC # BLD AUTO: 5.3 X10(3) UL (ref 4–11)

## 2022-04-30 PROCEDURE — 85025 COMPLETE CBC W/AUTO DIFF WBC: CPT | Performed by: HOSPITALIST

## 2022-04-30 PROCEDURE — 82962 GLUCOSE BLOOD TEST: CPT

## 2022-04-30 PROCEDURE — 80048 BASIC METABOLIC PNL TOTAL CA: CPT | Performed by: HOSPITALIST

## 2022-04-30 RX ORDER — HEPARIN SODIUM 5000 [USP'U]/ML
5000 INJECTION, SOLUTION INTRAVENOUS; SUBCUTANEOUS EVERY 12 HOURS SCHEDULED
Status: DISCONTINUED | OUTPATIENT
Start: 2022-05-01 | End: 2022-05-03

## 2022-04-30 NOTE — CONSULTS
[facility]    PATIENT'S NAME: Maryjo Asher   ATTENDING PHYSICIAN: Joselyn Rose MD   CONSULTING PHYSICIAN: Kristofer Riley M.D. PATIENT ACCOUNT#:   [de-identified]    LOCATION:  [address] [place] [address]  MEDICAL RECORD #:   [id number]       YOB: 1940  ADMISSION DATE:       04/28/2022      CONSULT DATE:  04/29/2022    REPORT OF CONSULTATION    #####EDITING MAY BE REQUIRED#####     HISTORY OF PRESENT ILLNESS:  This is an 78-year-old patient whom we are asked to see for bradycardia. Patient is not able to contribute significantly to her history because of advanced dementia. Her history is obtained from the patient's daughter, Arnaud Centeno, and  at the bedside. Her cardiac history dates back to April 20, 2022, when she had chest pain and a non-STEMI. On April 21, she had a cath that showed an EF of 30% to 35% and a 90% proximal LAD stenosis. She had a SAMARIA to the LAD. Residual mid-LAD disease and 50% circumflex were treated medically. She has returned because she is feeling very fatigued after her discharge from the hospital.  We do not get fainting or syncopal episodes. Today, after coming back from the bathroom at [phone], she developed what appears to be an atrial tachycardia with variable block and at times heart rates down to the 30s. She has a history of SVT, treated with digoxin in the past.  Her EF was 30% to 35%. Prior to this month, she has never had heart disease. No MI, congestive heart failure, or angina. She denies shortness of breath or chest pain. Cardiac risk factors include tobacco which she quit at 38. She has hypertension, hyperlipidemia, and hyperglycemia. PAST MEDICAL HISTORY:  CAD outlined above. Dementia/depression/anxiety. Fractured wrist.  Complete hysterectomy and bladder suspension. Hard of hearing. LASIK surgery. IBS. Sleep apnea. Huerta esophagus. Recurrent UTIs.     MEDICATIONS:  Current medications include aspirin, atorvastatin, BuSpar, Cymbalta, Lasix, heparin, Levaquin, Namenda, Protonix, Evista, Entresto, Exelon, Brilinta. Metoprolol 12.5 was held. ALLERGIES:  Cipro, penicillin, contrast, amoxicillin, Biaxin, cefuroxime, doxycycline, and sulfa. SOCIAL HISTORY:  Patient is , with 2 healthy children. Alcohol and caffeine are rare. Prior to this admission, she did not walk with a walker. FAMILY HISTORY:  Her father  from a presumed MI at 58. REVIEW OF SYSTEMS:  Obtained and was negative including no history of thyroid disease. PHYSICAL EXAMINATION:    GENERAL:  Elderly lady who suffers from the obvious ravages of dementia. VITAL SIGNS:  Blood pressure is 97/65, pulse is 89. HEENT:  Head is normocephalic. Eyes are nonicteric. Oral mucosa is moist.  NECK:  No JVD or carotid bruits. Thyroid is small. LUNGS:  Clear. HEART:  Regular rate and rhythm without significant murmurs. ABDOMEN:  Soft and benign. EXTREMITIES:  No edema or gross musculoskeletal defects. NEUROLOGIC:  She has mumbling speech, not much interaction and what is, is inappropriate. PSYCHIATRIC:  Noncombative. LABORATORY DATA:  Creatinine is normal.  CBC is unremarkable. Her EKG from yesterday shows sinus rhythm, nonspecific ST wave changes. Chest x-ray has cardiomegaly, possible interstitial edema. ASSESSMENT:    1. Atrial tachycardia with concealed conduction and variable block. 2.   Coronary artery disease, status post SAMARIA to the LAD and non-STEMI 2022.  3.   Ischemic cardiomyopathy, EF 30% to 35%, echo 2022.  4.   Hypertension. 5.   Hyperlipidemia. 6.   Advanced dementia. PLAN:    1. Continue tele. 2.   Observe off beta-blockers. 3.   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 The nature of implant and risks including infection, bleeding, collapsed lung were reviewed with the daughter and  at the bedside.   4.   Case discussed with Dr. Dre Cornejo.     Dictated By Jah Snyder M.D.  d: 04/29/2022 16:59:45  t: 04/29/2022 17:37:31  [facility] [id number]  MON/

## 2022-04-30 NOTE — PLAN OF CARE
Problem: Patient/Family Goals  Goal: Patient/Family Long Term Goal  Description: Patient's Long Term Goal:  Go back home    Interventions:  - See additional Care Plan goals for specific interventions  Outcome: Progressing  Goal: Patient/Family Short Term Goal  Description: Patient's Short Term Goal: be less lethargic, start to urinate on own    Interventions:   - IV ABX  -PT/OT  -Reorientation  -Family at bedside    - See additional Care Plan goals for specific interventions  Outcome: Progressing     Problem: CARDIOVASCULAR - ADULT  Goal: Maintains optimal cardiac output and hemodynamic stability  Description: INTERVENTIONS:  - Monitor vital signs, rhythm, and trends  - Monitor for bleeding, hypotension and signs of decreased cardiac output  - Evaluate effectiveness of vasoactive medications to optimize hemodynamic stability  - Monitor arterial and/or venous puncture sites for bleeding and/or hematoma  - Assess quality of pulses, skin color and temperature  - Assess for signs of decreased coronary artery perfusion - ex.  Angina  - Evaluate fluid balance, assess for edema, trend weights  Outcome: Progressing  Goal: Absence of cardiac arrhythmias or at baseline  Description: INTERVENTIONS:  - Continuous cardiac monitoring, monitor vital signs, obtain 12 lead EKG if indicated  - Evaluate effectiveness of antiarrhythmic and heart rate control medications as ordered  - Initiate emergency measures for life threatening arrhythmias  - Monitor electrolytes and administer replacement therapy as ordered  Outcome: Progressing     Problem: MUSCULOSKELETAL - ADULT  Goal: Return mobility to safest level of function  Description: INTERVENTIONS:  - Assess patient stability and activity tolerance for standing, transferring and ambulating w/ or w/o assistive devices  - Assist with transfers and ambulation using safe patient handling equipment as needed  - Ensure adequate protection for wounds/incisions during mobilization  - Obtain PT/OT consults as needed  - Advance activity as appropriate  - Communicate ordered activity level and limitations with patient/family  Outcome: Progressing  Goal: Maintain proper alignment of affected body part  Description: INTERVENTIONS:  - Support and protect limb and body alignment per provider's orders  - Instruct and reinforce with patient and family use of appropriate assistive device and precautions (e.g. spinal or hip dislocation precautions)  Outcome: Progressing     Problem: NEUROLOGICAL - ADULT  Goal: Achieves stable or improved neurological status  Description: INTERVENTIONS  - Assess for and report changes in neurological status  - Initiate measures to prevent increased intracranial pressure  - Maintain blood pressure and fluid volume within ordered parameters to optimize cerebral perfusion and minimize risk of hemorrhage  - Monitor temperature, glucose, and sodium. Initiate appropriate interventions as ordered  Outcome: Progressing     Problem: HEMATOLOGIC - ADULT  Goal: Free from bleeding injury  Description: (Example usage: patient with low platelets)  INTERVENTIONS:  - Avoid intramuscular injections, enemas and rectal medication administration  - Ensure safe mobilization of patient  - Hold pressure on venipuncture sites to achieve adequate hemostasis  - Assess for signs and symptoms of internal bleeding  - Monitor lab trends  - Patient is to report abnormal signs of bleeding to staff  - Avoid use of toothpicks and dental floss  - Use electric shaver for shaving  - Use soft bristle tooth brush  - Limit straining and forceful nose blowing  Outcome: Progressing     Problem: SAFETY ADULT - FALL  Goal: Free from fall injury  Description: INTERVENTIONS:  - Assess pt frequently for physical needs  - Identify cognitive and physical deficits and behaviors that affect risk of falls.   - [place] fall precautions as indicated by assessment.  - Educate pt/family on patient safety including physical limitations  - Instruct pt to call for assistance with activity based on assessment  - Modify environment to reduce risk of injury  - Provide assistive devices as appropriate  - Consider OT/PT consult to assist with strengthening/mobility  - Encourage toileting schedule  Outcome: Progressing

## 2022-05-01 LAB
ANION GAP SERPL CALC-SCNC: 4 MMOL/L (ref 0–18)
BASOPHILS # BLD AUTO: 0.04 X10(3) UL (ref 0–0.2)
BASOPHILS NFR BLD AUTO: 0.9 %
BUN BLD-MCNC: 24 MG/DL (ref 7–18)
CALCIUM BLD-MCNC: 9.1 MG/DL (ref 8.5–10.1)
CHLORIDE SERPL-SCNC: 112 MMOL/L (ref 98–112)
CO2 SERPL-SCNC: 23 MMOL/L (ref 21–32)
CREAT BLD-MCNC: 1.04 MG/DL
EOSINOPHIL # BLD AUTO: 0.16 X10(3) UL (ref 0–0.7)
EOSINOPHIL NFR BLD AUTO: 3.5 %
ERYTHROCYTE [DISTWIDTH] IN BLOOD BY AUTOMATED COUNT: 14.9 %
GLUCOSE BLD-MCNC: 122 MG/DL (ref 70–99)
HCT VFR BLD AUTO: 39.6 %
HGB BLD-MCNC: 11.7 G/DL
IMM GRANULOCYTES # BLD AUTO: 0.02 X10(3) UL (ref 0–1)
IMM GRANULOCYTES NFR BLD: 0.4 %
LYMPHOCYTES # BLD AUTO: 0.98 X10(3) UL (ref 1–4)
LYMPHOCYTES NFR BLD AUTO: 21.6 %
MCH RBC QN AUTO: 27.3 PG (ref 26–34)
MCHC RBC AUTO-ENTMCNC: 29.5 G/DL (ref 31–37)
MCV RBC AUTO: 92.3 FL
MONOCYTES # BLD AUTO: 0.69 X10(3) UL (ref 0.1–1)
MONOCYTES NFR BLD AUTO: 15.2 %
NEUTROPHILS # BLD AUTO: 2.64 X10 (3) UL (ref 1.5–7.7)
NEUTROPHILS # BLD AUTO: 2.64 X10(3) UL (ref 1.5–7.7)
NEUTROPHILS NFR BLD AUTO: 58.4 %
OSMOLALITY SERPL CALC.SUM OF ELEC: 293 MOSM/KG (ref 275–295)
PLATELET # BLD AUTO: 173 10(3)UL (ref 150–450)
POTASSIUM SERPL-SCNC: 4.2 MMOL/L (ref 3.5–5.1)
RBC # BLD AUTO: 4.29 X10(6)UL
SODIUM SERPL-SCNC: 139 MMOL/L (ref 136–145)
WBC # BLD AUTO: 4.5 X10(3) UL (ref 4–11)

## 2022-05-01 PROCEDURE — 85025 COMPLETE CBC W/AUTO DIFF WBC: CPT | Performed by: HOSPITALIST

## 2022-05-01 PROCEDURE — 80048 BASIC METABOLIC PNL TOTAL CA: CPT | Performed by: HOSPITALIST

## 2022-05-01 NOTE — PLAN OF CARE
Alert to self (baseline), pleasantly confused. Tolerating room air, o2 sats in 90s. Normal sinus rhythm with PVCs, sinus tachycardia with exertion (rates up to 110s) on tele monitor. Briefed for incontinence. Denies pain. Up with x1/walker. Fall precautions and frequent rounding in place.  Daily weights and iv abx ordered    Problem: Patient/Family Goals  Goal: Patient/Family Short Term Goal  Description: Patient's Short Term Goal: Discharge    Interventions:   - IV abx for uti  - tele monitoring  - daily weights  - fall precautions  - See additional Care Plan goals for specific interventions  Outcome: Progressing

## 2022-05-02 LAB
ANION GAP SERPL CALC-SCNC: 8 MMOL/L (ref 0–18)
BASOPHILS # BLD AUTO: 0.03 X10(3) UL (ref 0–0.2)
BASOPHILS NFR BLD AUTO: 0.7 %
BUN BLD-MCNC: 21 MG/DL (ref 7–18)
CALCIUM BLD-MCNC: 9.5 MG/DL (ref 8.5–10.1)
CHLORIDE SERPL-SCNC: 112 MMOL/L (ref 98–112)
CO2 SERPL-SCNC: 24 MMOL/L (ref 21–32)
CREAT BLD-MCNC: 1.04 MG/DL
EOSINOPHIL # BLD AUTO: 0.13 X10(3) UL (ref 0–0.7)
EOSINOPHIL NFR BLD AUTO: 2.9 %
ERYTHROCYTE [DISTWIDTH] IN BLOOD BY AUTOMATED COUNT: 14.8 %
GLUCOSE BLD-MCNC: 137 MG/DL (ref 70–99)
HCT VFR BLD AUTO: 37.2 %
HGB BLD-MCNC: 11.2 G/DL
IMM GRANULOCYTES # BLD AUTO: 0.01 X10(3) UL (ref 0–1)
IMM GRANULOCYTES NFR BLD: 0.2 %
LYMPHOCYTES # BLD AUTO: 1.23 X10(3) UL (ref 1–4)
LYMPHOCYTES NFR BLD AUTO: 27 %
MCH RBC QN AUTO: 26.9 PG (ref 26–34)
MCHC RBC AUTO-ENTMCNC: 30.1 G/DL (ref 31–37)
MCV RBC AUTO: 89.2 FL
MONOCYTES # BLD AUTO: 0.44 X10(3) UL (ref 0.1–1)
MONOCYTES NFR BLD AUTO: 9.6 %
NEUTROPHILS # BLD AUTO: 2.72 X10 (3) UL (ref 1.5–7.7)
NEUTROPHILS # BLD AUTO: 2.72 X10(3) UL (ref 1.5–7.7)
NEUTROPHILS NFR BLD AUTO: 59.6 %
OSMOLALITY SERPL CALC.SUM OF ELEC: 303 MOSM/KG (ref 275–295)
PLATELET # BLD AUTO: 175 10(3)UL (ref 150–450)
POTASSIUM SERPL-SCNC: 3.6 MMOL/L (ref 3.5–5.1)
POTASSIUM SERPL-SCNC: 5.1 MMOL/L (ref 3.5–5.1)
RBC # BLD AUTO: 4.17 X10(6)UL
SODIUM SERPL-SCNC: 144 MMOL/L (ref 136–145)
WBC # BLD AUTO: 4.6 X10(3) UL (ref 4–11)

## 2022-05-02 PROCEDURE — 80048 BASIC METABOLIC PNL TOTAL CA: CPT | Performed by: HOSPITALIST

## 2022-05-02 PROCEDURE — 85025 COMPLETE CBC W/AUTO DIFF WBC: CPT | Performed by: HOSPITALIST

## 2022-05-02 PROCEDURE — 97116 GAIT TRAINING THERAPY: CPT

## 2022-05-02 PROCEDURE — 84132 ASSAY OF SERUM POTASSIUM: CPT | Performed by: INTERNAL MEDICINE

## 2022-05-02 PROCEDURE — 97110 THERAPEUTIC EXERCISES: CPT

## 2022-05-02 RX ORDER — POTASSIUM CHLORIDE 20 MEQ/1
40 TABLET, EXTENDED RELEASE ORAL EVERY 4 HOURS
Status: COMPLETED | OUTPATIENT
Start: 2022-05-02 | End: 2022-05-02

## 2022-05-02 NOTE — PHYSICAL THERAPY NOTE
Attempted to see pt for PT this am. Pt just returned to bed after feeling lightheaded in the bathroom with the nursing staff.  Will see pt in pm.

## 2022-05-02 NOTE — PLAN OF CARE
Problem: Patient/Family Goals  Goal: Patient/Family Short Term Goal  Description: Patient's Short Term Goal: Discharge    Interventions:   - IV abx for uti  - tele monitoring  - daily weights  - fall precautions  - See additional Care Plan goals for specific interventions  Outcome: Progressing     Problem: CARDIOVASCULAR - ADULT  Goal: Maintains optimal cardiac output and hemodynamic stability  Description: INTERVENTIONS:  - Monitor vital signs, rhythm, and trends  - Monitor for bleeding, hypotension and signs of decreased cardiac output  - Evaluate effectiveness of vasoactive medications to optimize hemodynamic stability  - Monitor arterial and/or venous puncture sites for bleeding and/or hematoma  - Assess quality of pulses, skin color and temperature  - Assess for signs of decreased coronary artery perfusion - ex.  Angina  - Evaluate fluid balance, assess for edema, trend weights  Outcome: Progressing  Goal: Absence of cardiac arrhythmias or at baseline  Description: INTERVENTIONS:  - Continuous cardiac monitoring, monitor vital signs, obtain 12 lead EKG if indicated  - Evaluate effectiveness of antiarrhythmic and heart rate control medications as ordered  - Initiate emergency measures for life threatening arrhythmias  - Monitor electrolytes and administer replacement therapy as ordered  Outcome: Progressing     Problem: MUSCULOSKELETAL - ADULT  Goal: Return mobility to safest level of function  Description: INTERVENTIONS:  - Assess patient stability and activity tolerance for standing, transferring and ambulating w/ or w/o assistive devices  - Assist with transfers and ambulation using safe patient handling equipment as needed  - Ensure adequate protection for wounds/incisions during mobilization  - Obtain PT/OT consults as needed  - Advance activity as appropriate  - Communicate ordered activity level and limitations with patient/family  Outcome: Progressing  Goal: Maintain proper alignment of affected body part  Description: INTERVENTIONS:  - Support and protect limb and body alignment per provider's orders  - Instruct and reinforce with patient and family use of appropriate assistive device and precautions (e.g. spinal or hip dislocation precautions)  Outcome: Progressing     Problem: NEUROLOGICAL - ADULT  Goal: Achieves stable or improved neurological status  Description: INTERVENTIONS  - Assess for and report changes in neurological status  - Initiate measures to prevent increased intracranial pressure  - Maintain blood pressure and fluid volume within ordered parameters to optimize cerebral perfusion and minimize risk of hemorrhage  - Monitor temperature, glucose, and sodium. Initiate appropriate interventions as ordered  Outcome: Progressing  Received sitting in chair. Fall precautions continued. Pt A&Ox1, cooperative and follows commands. Bun 21, creat 1.04, k+ 3.6 today and replaced as per protocol. Holding BB as ordered. Pt NSR, HR 85 on tele. Pt denies any dizziness or any discomfort at this time. Will continue to monitor. Labs and meds as ordered. Up to chair tid for meals. Continue holding BB as ordered; fall precautions as per protocol. Dc planning to [facility].

## 2022-05-02 NOTE — CM/SW NOTE
Met with patient's daughter @ bedside to follow up with [facility] referrals sent in aidin. Although family indicated choice of the [facility], patient not accepted because \"needs greater than what facility can provide.'  Explained perhaps eval done too soon upon admission, prior to the effects of iv abx for uti.   Therapy had tried to meet with patient, requested that they return today about 1330

## 2022-05-02 NOTE — PLAN OF CARE
Received patient at 1. Patient Alert and oriented to self, hx of dementia. NSR with PVCs on tele. O2 saturation 93% on RA. No C/O chest pain or SOB. Patient voiding with no issue. This evening, patient ambulated to bathroom with tech and had an episode of hyperventilation. HR drop to 30s as it was happening. Patient recovered quickly and HR was then up to 120s. VSS. Patient was laid in bed afterwards and was asymptomatic. Bed is locked and in low position. Call light and personal items within reach. All needs met at this time, continuously monitoring tele. Problem: Patient/Family Goals  Goal: Patient/Family Short Term Goal  Description: Patient's Short Term Goal: Discharge    Interventions:   - IV abx for uti  - tele monitoring  - daily weights  - fall precautions  - See additional Care Plan goals for specific interventions  Outcome: Progressing     Problem: CARDIOVASCULAR - ADULT  Goal: Maintains optimal cardiac output and hemodynamic stability  Description: INTERVENTIONS:  - Monitor vital signs, rhythm, and trends  - Monitor for bleeding, hypotension and signs of decreased cardiac output  - Evaluate effectiveness of vasoactive medications to optimize hemodynamic stability  - Monitor arterial and/or venous puncture sites for bleeding and/or hematoma  - Assess quality of pulses, skin color and temperature  - Assess for signs of decreased coronary artery perfusion - ex.  Angina  - Evaluate fluid balance, assess for edema, trend weights  Outcome: Progressing  Goal: Absence of cardiac arrhythmias or at baseline  Description: INTERVENTIONS:  - Continuous cardiac monitoring, monitor vital signs, obtain 12 lead EKG if indicated  - Evaluate effectiveness of antiarrhythmic and heart rate control medications as ordered  - Initiate emergency measures for life threatening arrhythmias  - Monitor electrolytes and administer replacement therapy as ordered  Outcome: Progressing     Problem: MUSCULOSKELETAL - ADULT  Goal: Return mobility to safest level of function  Description: INTERVENTIONS:  - Assess patient stability and activity tolerance for standing, transferring and ambulating w/ or w/o assistive devices  - Assist with transfers and ambulation using safe patient handling equipment as needed  - Ensure adequate protection for wounds/incisions during mobilization  - Obtain PT/OT consults as needed  - Advance activity as appropriate  - Communicate ordered activity level and limitations with patient/family  Outcome: Progressing  Goal: Maintain proper alignment of affected body part  Description: INTERVENTIONS:  - Support and protect limb and body alignment per provider's orders  - Instruct and reinforce with patient and family use of appropriate assistive device and precautions (e.g. spinal or hip dislocation precautions)  Outcome: Progressing     Problem: NEUROLOGICAL - ADULT  Goal: Achieves stable or improved neurological status  Description: INTERVENTIONS  - Assess for and report changes in neurological status  - Initiate measures to prevent increased intracranial pressure  - Maintain blood pressure and fluid volume within ordered parameters to optimize cerebral perfusion and minimize risk of hemorrhage  - Monitor temperature, glucose, and sodium.  Initiate appropriate interventions as ordered  Outcome: Progressing     Problem: HEMATOLOGIC - ADULT  Goal: Free from bleeding injury  Description: (Example usage: patient with low platelets)  INTERVENTIONS:  - Avoid intramuscular injections, enemas and rectal medication administration  - Ensure safe mobilization of patient  - Hold pressure on venipuncture sites to achieve adequate hemostasis  - Assess for signs and symptoms of internal bleeding  - Monitor lab trends  - Patient is to report abnormal signs of bleeding to staff  - Avoid use of toothpicks and dental floss  - Use electric shaver for shaving  - Use soft bristle tooth brush  - Limit straining and forceful nose blowing  Outcome: Progressing

## 2022-05-02 NOTE — CM/SW NOTE
Received call from Jerica @ [facility], they will be able to accept patient--to update daughter and spouse--message left for daughter; contacted spouse by cell--appreciative of the update

## 2022-05-03 VITALS
HEART RATE: 101 BPM | TEMPERATURE: 97 F | WEIGHT: 154.75 LBS | SYSTOLIC BLOOD PRESSURE: 98 MMHG | RESPIRATION RATE: 18 BRPM | BODY MASS INDEX: 27 KG/M2 | DIASTOLIC BLOOD PRESSURE: 66 MMHG | OXYGEN SATURATION: 89 %

## 2022-05-03 LAB
ANION GAP SERPL CALC-SCNC: 5 MMOL/L (ref 0–18)
BASOPHILS # BLD AUTO: 0.04 X10(3) UL (ref 0–0.2)
BASOPHILS NFR BLD AUTO: 0.7 %
BUN BLD-MCNC: 22 MG/DL (ref 7–18)
CALCIUM BLD-MCNC: 9.8 MG/DL (ref 8.5–10.1)
CHLORIDE SERPL-SCNC: 112 MMOL/L (ref 98–112)
CO2 SERPL-SCNC: 25 MMOL/L (ref 21–32)
CREAT BLD-MCNC: 1.04 MG/DL
EOSINOPHIL # BLD AUTO: 0.24 X10(3) UL (ref 0–0.7)
EOSINOPHIL NFR BLD AUTO: 4.3 %
ERYTHROCYTE [DISTWIDTH] IN BLOOD BY AUTOMATED COUNT: 15 %
GLUCOSE BLD-MCNC: 131 MG/DL (ref 70–99)
HCT VFR BLD AUTO: 35.7 %
HGB BLD-MCNC: 11.4 G/DL
IMM GRANULOCYTES # BLD AUTO: 0.03 X10(3) UL (ref 0–1)
IMM GRANULOCYTES NFR BLD: 0.5 %
LYMPHOCYTES # BLD AUTO: 1.47 X10(3) UL (ref 1–4)
LYMPHOCYTES NFR BLD AUTO: 26.3 %
MCH RBC QN AUTO: 27.9 PG (ref 26–34)
MCHC RBC AUTO-ENTMCNC: 31.9 G/DL (ref 31–37)
MCV RBC AUTO: 87.5 FL
MONOCYTES # BLD AUTO: 0.54 X10(3) UL (ref 0.1–1)
MONOCYTES NFR BLD AUTO: 9.7 %
NEUTROPHILS # BLD AUTO: 3.27 X10 (3) UL (ref 1.5–7.7)
NEUTROPHILS # BLD AUTO: 3.27 X10(3) UL (ref 1.5–7.7)
NEUTROPHILS NFR BLD AUTO: 58.5 %
OSMOLALITY SERPL CALC.SUM OF ELEC: 299 MOSM/KG (ref 275–295)
PLATELET # BLD AUTO: 178 10(3)UL (ref 150–450)
POTASSIUM SERPL-SCNC: 4.3 MMOL/L (ref 3.5–5.1)
RBC # BLD AUTO: 4.08 X10(6)UL
SODIUM SERPL-SCNC: 142 MMOL/L (ref 136–145)
WBC # BLD AUTO: 5.6 X10(3) UL (ref 4–11)

## 2022-05-03 PROCEDURE — 80048 BASIC METABOLIC PNL TOTAL CA: CPT | Performed by: HOSPITALIST

## 2022-05-03 PROCEDURE — 97116 GAIT TRAINING THERAPY: CPT

## 2022-05-03 PROCEDURE — 97110 THERAPEUTIC EXERCISES: CPT

## 2022-05-03 PROCEDURE — 85025 COMPLETE CBC W/AUTO DIFF WBC: CPT | Performed by: HOSPITALIST

## 2022-05-03 RX ORDER — FUROSEMIDE 20 MG/1
20 TABLET ORAL DAILY
Qty: 30 TABLET | Refills: 1 | Status: SHIPPED | OUTPATIENT
Start: 2022-05-04

## 2022-05-03 NOTE — PLAN OF CARE
Received patient at 1. Patient Alert to self. NSR on tele,no citlalli yet. O2 saturation 98% on RA. No C/O chest pain or SOB. Patient voiding with no issue. Activity SBA with walker. Bed is locked and in low position. Call light and personal items within reach. All needs met at this time    Problem: Patient/Family Goals  Goal: Patient/Family Short Term Goal  Description: Patient's Short Term Goal: Discharge    Interventions:   - IV abx for uti  - tele monitoring  - daily weights  - fall precautions  - See additional Care Plan goals for specific interventions  Outcome: Progressing     Problem: CARDIOVASCULAR - ADULT  Goal: Maintains optimal cardiac output and hemodynamic stability  Description: INTERVENTIONS:  - Monitor vital signs, rhythm, and trends  - Monitor for bleeding, hypotension and signs of decreased cardiac output  - Evaluate effectiveness of vasoactive medications to optimize hemodynamic stability  - Monitor arterial and/or venous puncture sites for bleeding and/or hematoma  - Assess quality of pulses, skin color and temperature  - Assess for signs of decreased coronary artery perfusion - ex.  Angina  - Evaluate fluid balance, assess for edema, trend weights  Outcome: Progressing  Goal: Absence of cardiac arrhythmias or at baseline  Description: INTERVENTIONS:  - Continuous cardiac monitoring, monitor vital signs, obtain 12 lead EKG if indicated  - Evaluate effectiveness of antiarrhythmic and heart rate control medications as ordered  - Initiate emergency measures for life threatening arrhythmias  - Monitor electrolytes and administer replacement therapy as ordered  Outcome: Progressing     Problem: MUSCULOSKELETAL - ADULT  Goal: Return mobility to safest level of function  Description: INTERVENTIONS:  - Assess patient stability and activity tolerance for standing, transferring and ambulating w/ or w/o assistive devices  - Assist with transfers and ambulation using safe patient handling equipment as needed  - Ensure adequate protection for wounds/incisions during mobilization  - Obtain PT/OT consults as needed  - Advance activity as appropriate  - Communicate ordered activity level and limitations with patient/family  Outcome: Progressing  Goal: Maintain proper alignment of affected body part  Description: INTERVENTIONS:  - Support and protect limb and body alignment per provider's orders  - Instruct and reinforce with patient and family use of appropriate assistive device and precautions (e.g. spinal or hip dislocation precautions)  Outcome: Progressing     Problem: NEUROLOGICAL - ADULT  Goal: Achieves stable or improved neurological status  Description: INTERVENTIONS  - Assess for and report changes in neurological status  - Initiate measures to prevent increased intracranial pressure  - Maintain blood pressure and fluid volume within ordered parameters to optimize cerebral perfusion and minimize risk of hemorrhage  - Monitor temperature, glucose, and sodium.  Initiate appropriate interventions as ordered  Outcome: Progressing

## 2022-05-03 NOTE — CM/SW NOTE
Per nurse, [facility]spoke with sang in admissions, able to accept patient today. Ms Yari Horan has been assigned to room 220; [place] [facility]. Nurse to call report to .   [facility] arranged for [phone]  [facility] FOR [facility] form completed)

## 2022-05-04 ENCOUNTER — NURSE ONLY (OUTPATIENT)
Dept: LAB | Age: 82
End: 2022-05-04
Attending: FAMILY MEDICINE
Payer: MEDICARE

## 2022-05-04 DIAGNOSIS — Z11.52 ENCOUNTER FOR SCREENING FOR COVID-19: Primary | ICD-10-CM

## 2022-05-05 LAB — SARS-COV-2 RNA RESP QL NAA+PROBE: NOT DETECTED

## 2022-05-06 ENCOUNTER — NURSE ONLY (OUTPATIENT)
Dept: LAB | Age: 82
End: 2022-05-06
Attending: FAMILY MEDICINE
Payer: MEDICARE

## 2022-05-06 DIAGNOSIS — I49.8 NODAL RHYTHM DISORDER: Primary | ICD-10-CM

## 2022-05-06 LAB
ALBUMIN SERPL-MCNC: 3 G/DL (ref 3.4–5)
ALBUMIN/GLOB SERPL: 0.9 {RATIO} (ref 1–2)
ALP LIVER SERPL-CCNC: 66 U/L
ALT SERPL-CCNC: 21 U/L
ANION GAP SERPL CALC-SCNC: 10 MMOL/L (ref 0–18)
AST SERPL-CCNC: 10 U/L (ref 15–37)
BASOPHILS # BLD AUTO: 0.03 X10(3) UL (ref 0–0.2)
BASOPHILS NFR BLD AUTO: 0.6 %
BILIRUB SERPL-MCNC: 0.4 MG/DL (ref 0.1–2)
BUN BLD-MCNC: 28 MG/DL (ref 7–18)
CALCIUM BLD-MCNC: 9.2 MG/DL (ref 8.5–10.1)
CHLORIDE SERPL-SCNC: 109 MMOL/L (ref 98–112)
CO2 SERPL-SCNC: 26 MMOL/L (ref 21–32)
CREAT BLD-MCNC: 1.3 MG/DL
EOSINOPHIL # BLD AUTO: 0.19 X10(3) UL (ref 0–0.7)
EOSINOPHIL NFR BLD AUTO: 3.5 %
ERYTHROCYTE [DISTWIDTH] IN BLOOD BY AUTOMATED COUNT: 15 %
FASTING STATUS PATIENT QL REPORTED: YES
GLOBULIN PLAS-MCNC: 3.3 G/DL (ref 2.8–4.4)
GLUCOSE BLD-MCNC: 122 MG/DL (ref 70–99)
HCT VFR BLD AUTO: 37.1 %
HGB BLD-MCNC: 11.5 G/DL
IMM GRANULOCYTES # BLD AUTO: 0.02 X10(3) UL (ref 0–1)
IMM GRANULOCYTES NFR BLD: 0.4 %
LYMPHOCYTES # BLD AUTO: 1.25 X10(3) UL (ref 1–4)
LYMPHOCYTES NFR BLD AUTO: 23.1 %
MCH RBC QN AUTO: 27.3 PG (ref 26–34)
MCHC RBC AUTO-ENTMCNC: 31 G/DL (ref 31–37)
MCV RBC AUTO: 88.1 FL
MONOCYTES # BLD AUTO: 0.66 X10(3) UL (ref 0.1–1)
MONOCYTES NFR BLD AUTO: 12.2 %
NEUTROPHILS # BLD AUTO: 3.27 X10 (3) UL (ref 1.5–7.7)
NEUTROPHILS # BLD AUTO: 3.27 X10(3) UL (ref 1.5–7.7)
NEUTROPHILS NFR BLD AUTO: 60.2 %
NT-PROBNP SERPL-MCNC: 1255 PG/ML (ref ?–450)
OSMOLALITY SERPL CALC.SUM OF ELEC: 307 MOSM/KG (ref 275–295)
PLATELET # BLD AUTO: 217 10(3)UL (ref 150–450)
POTASSIUM SERPL-SCNC: 3.9 MMOL/L (ref 3.5–5.1)
PROT SERPL-MCNC: 6.3 G/DL (ref 6.4–8.2)
RBC # BLD AUTO: 4.21 X10(6)UL
SODIUM SERPL-SCNC: 145 MMOL/L (ref 136–145)
WBC # BLD AUTO: 5.4 X10(3) UL (ref 4–11)

## 2022-05-06 PROCEDURE — 85025 COMPLETE CBC W/AUTO DIFF WBC: CPT

## 2022-05-06 PROCEDURE — 83880 ASSAY OF NATRIURETIC PEPTIDE: CPT

## 2022-05-06 PROCEDURE — 80053 COMPREHEN METABOLIC PANEL: CPT

## 2022-05-09 ENCOUNTER — SNF VISIT (OUTPATIENT)
Dept: INTERNAL MEDICINE CLINIC | Age: 82
End: 2022-05-09

## 2022-05-09 ENCOUNTER — NURSE ONLY (OUTPATIENT)
Dept: LAB | Age: 82
End: 2022-05-09
Attending: FAMILY MEDICINE
Payer: MEDICARE

## 2022-05-09 DIAGNOSIS — I49.8 NODAL RHYTHM DISORDER: Primary | ICD-10-CM

## 2022-05-09 LAB
ALBUMIN SERPL-MCNC: 2.9 G/DL (ref 3.4–5)
ALBUMIN/GLOB SERPL: 0.9 {RATIO} (ref 1–2)
ALP LIVER SERPL-CCNC: 61 U/L
ALT SERPL-CCNC: 15 U/L
ANION GAP SERPL CALC-SCNC: 7 MMOL/L (ref 0–18)
AST SERPL-CCNC: 14 U/L (ref 15–37)
BASOPHILS # BLD AUTO: 0.02 X10(3) UL (ref 0–0.2)
BASOPHILS NFR BLD AUTO: 0.4 %
BILIRUB SERPL-MCNC: 0.4 MG/DL (ref 0.1–2)
BUN BLD-MCNC: 22 MG/DL (ref 7–18)
CALCIUM BLD-MCNC: 8.9 MG/DL (ref 8.5–10.1)
CHLORIDE SERPL-SCNC: 109 MMOL/L (ref 98–112)
CO2 SERPL-SCNC: 27 MMOL/L (ref 21–32)
CREAT BLD-MCNC: 1.19 MG/DL
EOSINOPHIL # BLD AUTO: 0.23 X10(3) UL (ref 0–0.7)
EOSINOPHIL NFR BLD AUTO: 4.9 %
ERYTHROCYTE [DISTWIDTH] IN BLOOD BY AUTOMATED COUNT: 15 %
FASTING STATUS PATIENT QL REPORTED: YES
GLOBULIN PLAS-MCNC: 3.2 G/DL (ref 2.8–4.4)
GLUCOSE BLD-MCNC: 111 MG/DL (ref 70–99)
HCT VFR BLD AUTO: 35.2 %
HGB BLD-MCNC: 10.6 G/DL
IMM GRANULOCYTES # BLD AUTO: 0.02 X10(3) UL (ref 0–1)
IMM GRANULOCYTES NFR BLD: 0.4 %
LYMPHOCYTES # BLD AUTO: 1.71 X10(3) UL (ref 1–4)
LYMPHOCYTES NFR BLD AUTO: 36.7 %
MCH RBC QN AUTO: 27.5 PG (ref 26–34)
MCHC RBC AUTO-ENTMCNC: 30.1 G/DL (ref 31–37)
MCV RBC AUTO: 91.2 FL
MONOCYTES # BLD AUTO: 0.43 X10(3) UL (ref 0.1–1)
MONOCYTES NFR BLD AUTO: 9.2 %
NEUTROPHILS # BLD AUTO: 2.25 X10 (3) UL (ref 1.5–7.7)
NEUTROPHILS # BLD AUTO: 2.25 X10(3) UL (ref 1.5–7.7)
NEUTROPHILS NFR BLD AUTO: 48.4 %
NT-PROBNP SERPL-MCNC: 1702 PG/ML (ref ?–450)
OSMOLALITY SERPL CALC.SUM OF ELEC: 300 MOSM/KG (ref 275–295)
PLATELET # BLD AUTO: 217 10(3)UL (ref 150–450)
POTASSIUM SERPL-SCNC: 4 MMOL/L (ref 3.5–5.1)
PROT SERPL-MCNC: 6.1 G/DL (ref 6.4–8.2)
RBC # BLD AUTO: 3.86 X10(6)UL
SODIUM SERPL-SCNC: 143 MMOL/L (ref 136–145)
WBC # BLD AUTO: 4.7 X10(3) UL (ref 4–11)

## 2022-05-09 PROCEDURE — 99309 SBSQ NF CARE MODERATE MDM 30: CPT | Performed by: NURSE PRACTITIONER

## 2022-05-09 PROCEDURE — 85025 COMPLETE CBC W/AUTO DIFF WBC: CPT

## 2022-05-09 PROCEDURE — 1111F DSCHRG MED/CURRENT MED MERGE: CPT | Performed by: NURSE PRACTITIONER

## 2022-05-09 PROCEDURE — 80053 COMPREHEN METABOLIC PANEL: CPT

## 2022-05-09 PROCEDURE — 83880 ASSAY OF NATRIURETIC PEPTIDE: CPT

## 2022-05-10 VITALS
HEART RATE: 69 BPM | OXYGEN SATURATION: 97 % | BODY MASS INDEX: 26 KG/M2 | TEMPERATURE: 98 F | DIASTOLIC BLOOD PRESSURE: 84 MMHG | WEIGHT: 152 LBS | SYSTOLIC BLOOD PRESSURE: 131 MMHG | RESPIRATION RATE: 18 BRPM

## 2022-05-11 PROCEDURE — 87086 URINE CULTURE/COLONY COUNT: CPT

## 2022-05-11 PROCEDURE — 81001 URINALYSIS AUTO W/SCOPE: CPT

## 2022-05-11 PROCEDURE — 87077 CULTURE AEROBIC IDENTIFY: CPT

## 2022-05-12 ENCOUNTER — NURSE ONLY (OUTPATIENT)
Dept: LAB | Age: 82
End: 2022-05-12
Attending: FAMILY MEDICINE
Payer: MEDICARE

## 2022-05-12 ENCOUNTER — SNF VISIT (OUTPATIENT)
Dept: INTERNAL MEDICINE CLINIC | Age: 82
End: 2022-05-12

## 2022-05-12 VITALS
SYSTOLIC BLOOD PRESSURE: 117 MMHG | RESPIRATION RATE: 18 BRPM | OXYGEN SATURATION: 98 % | WEIGHT: 154.19 LBS | HEART RATE: 83 BPM | BODY MASS INDEX: 26 KG/M2 | DIASTOLIC BLOOD PRESSURE: 70 MMHG | TEMPERATURE: 98 F

## 2022-05-12 DIAGNOSIS — I49.8 NODAL RHYTHM DISORDER: Primary | ICD-10-CM

## 2022-05-12 LAB
ALBUMIN SERPL-MCNC: 3 G/DL (ref 3.4–5)
ALBUMIN/GLOB SERPL: 0.9 {RATIO} (ref 1–2)
ALP LIVER SERPL-CCNC: 56 U/L
ALT SERPL-CCNC: 14 U/L
ANION GAP SERPL CALC-SCNC: 6 MMOL/L (ref 0–18)
AST SERPL-CCNC: 6 U/L (ref 15–37)
BASOPHILS # BLD AUTO: 0.02 X10(3) UL (ref 0–0.2)
BASOPHILS NFR BLD AUTO: 0.4 %
BILIRUB SERPL-MCNC: 0.4 MG/DL (ref 0.1–2)
BILIRUB UR QL STRIP.AUTO: NEGATIVE
BUN BLD-MCNC: 19 MG/DL (ref 7–18)
CALCIUM BLD-MCNC: 9.1 MG/DL (ref 8.5–10.1)
CHLORIDE SERPL-SCNC: 110 MMOL/L (ref 98–112)
CO2 SERPL-SCNC: 27 MMOL/L (ref 21–32)
COLOR UR AUTO: YELLOW
CREAT BLD-MCNC: 1.13 MG/DL
EOSINOPHIL # BLD AUTO: 0.19 X10(3) UL (ref 0–0.7)
EOSINOPHIL NFR BLD AUTO: 3.7 %
ERYTHROCYTE [DISTWIDTH] IN BLOOD BY AUTOMATED COUNT: 14.8 %
FASTING STATUS PATIENT QL REPORTED: YES
GLOBULIN PLAS-MCNC: 3.5 G/DL (ref 2.8–4.4)
GLUCOSE BLD-MCNC: 115 MG/DL (ref 70–99)
GLUCOSE UR STRIP.AUTO-MCNC: NEGATIVE MG/DL
HCT VFR BLD AUTO: 33.8 %
HGB BLD-MCNC: 10.5 G/DL
HYALINE CASTS #/AREA URNS AUTO: PRESENT /LPF
IMM GRANULOCYTES # BLD AUTO: 0.03 X10(3) UL (ref 0–1)
IMM GRANULOCYTES NFR BLD: 0.6 %
KETONES UR STRIP.AUTO-MCNC: NEGATIVE MG/DL
LYMPHOCYTES # BLD AUTO: 1.49 X10(3) UL (ref 1–4)
LYMPHOCYTES NFR BLD AUTO: 28.8 %
MCH RBC QN AUTO: 27.7 PG (ref 26–34)
MCHC RBC AUTO-ENTMCNC: 31.1 G/DL (ref 31–37)
MCV RBC AUTO: 89.2 FL
MONOCYTES # BLD AUTO: 0.45 X10(3) UL (ref 0.1–1)
MONOCYTES NFR BLD AUTO: 8.7 %
NEUTROPHILS # BLD AUTO: 2.99 X10 (3) UL (ref 1.5–7.7)
NEUTROPHILS # BLD AUTO: 2.99 X10(3) UL (ref 1.5–7.7)
NEUTROPHILS NFR BLD AUTO: 57.8 %
NITRITE UR QL STRIP.AUTO: NEGATIVE
OSMOLALITY SERPL CALC.SUM OF ELEC: 299 MOSM/KG (ref 275–295)
PH UR STRIP.AUTO: 6 [PH] (ref 5–8)
PLATELET # BLD AUTO: 203 10(3)UL (ref 150–450)
POTASSIUM SERPL-SCNC: 3.8 MMOL/L (ref 3.5–5.1)
PROT SERPL-MCNC: 6.5 G/DL (ref 6.4–8.2)
PROT UR STRIP.AUTO-MCNC: NEGATIVE MG/DL
RBC # BLD AUTO: 3.79 X10(6)UL
RBC UR QL AUTO: NEGATIVE
SODIUM SERPL-SCNC: 143 MMOL/L (ref 136–145)
SP GR UR STRIP.AUTO: 1.01 (ref 1–1.03)
UROBILINOGEN UR STRIP.AUTO-MCNC: <2 MG/DL
WBC # BLD AUTO: 5.2 X10(3) UL (ref 4–11)
WBC #/AREA URNS AUTO: >50 /HPF

## 2022-05-12 PROCEDURE — 80053 COMPREHEN METABOLIC PANEL: CPT

## 2022-05-12 PROCEDURE — 85025 COMPLETE CBC W/AUTO DIFF WBC: CPT

## 2022-05-12 PROCEDURE — 99309 SBSQ NF CARE MODERATE MDM 30: CPT | Performed by: NURSE PRACTITIONER

## 2022-05-12 PROCEDURE — 1111F DSCHRG MED/CURRENT MED MERGE: CPT | Performed by: NURSE PRACTITIONER

## 2022-05-18 ENCOUNTER — SNF DISCHARGE (OUTPATIENT)
Dept: INTERNAL MEDICINE CLINIC | Age: 82
End: 2022-05-18

## 2022-05-18 VITALS
OXYGEN SATURATION: 94 % | TEMPERATURE: 98 F | WEIGHT: 150 LBS | SYSTOLIC BLOOD PRESSURE: 127 MMHG | HEART RATE: 73 BPM | DIASTOLIC BLOOD PRESSURE: 78 MMHG | BODY MASS INDEX: 26 KG/M2 | RESPIRATION RATE: 20 BRPM

## 2022-05-18 DIAGNOSIS — K58.1 IRRITABLE BOWEL SYNDROME WITH CONSTIPATION: ICD-10-CM

## 2022-05-18 DIAGNOSIS — R53.1 WEAKNESS GENERALIZED: ICD-10-CM

## 2022-05-18 DIAGNOSIS — G30.1 LATE ONSET ALZHEIMER'S DISEASE WITHOUT BEHAVIORAL DISTURBANCE (HCC): ICD-10-CM

## 2022-05-18 DIAGNOSIS — I10 ESSENTIAL HYPERTENSION: Primary | ICD-10-CM

## 2022-05-18 DIAGNOSIS — F02.80 LATE ONSET ALZHEIMER'S DISEASE WITHOUT BEHAVIORAL DISTURBANCE (HCC): ICD-10-CM

## 2022-05-18 DIAGNOSIS — K21.00 GASTROESOPHAGEAL REFLUX DISEASE WITH ESOPHAGITIS WITHOUT HEMORRHAGE: ICD-10-CM

## 2022-05-18 DIAGNOSIS — I21.4 NSTEMI (NON-ST ELEVATED MYOCARDIAL INFARCTION) (HCC): ICD-10-CM

## 2022-05-18 PROCEDURE — 99316 NF DSCHRG MGMT 30 MIN+: CPT | Performed by: NURSE PRACTITIONER

## 2022-05-18 PROCEDURE — 1111F DSCHRG MED/CURRENT MED MERGE: CPT | Performed by: NURSE PRACTITIONER

## 2022-05-19 ENCOUNTER — NURSE ONLY (OUTPATIENT)
Dept: LAB | Age: 82
End: 2022-05-19
Attending: NURSE PRACTITIONER
Payer: MEDICARE

## 2022-05-19 DIAGNOSIS — I49.8 NODAL RHYTHM DISORDER: Primary | ICD-10-CM

## 2022-05-19 LAB
ALBUMIN SERPL-MCNC: 3 G/DL (ref 3.4–5)
ALBUMIN/GLOB SERPL: 0.9 {RATIO} (ref 1–2)
ALP LIVER SERPL-CCNC: 55 U/L
ALT SERPL-CCNC: 13 U/L
ANION GAP SERPL CALC-SCNC: 6 MMOL/L (ref 0–18)
AST SERPL-CCNC: 16 U/L (ref 15–37)
BASOPHILS # BLD AUTO: 0.02 X10(3) UL (ref 0–0.2)
BASOPHILS NFR BLD AUTO: 0.6 %
BILIRUB SERPL-MCNC: 0.4 MG/DL (ref 0.1–2)
BUN BLD-MCNC: 21 MG/DL (ref 7–18)
CALCIUM BLD-MCNC: 8.8 MG/DL (ref 8.5–10.1)
CHLORIDE SERPL-SCNC: 111 MMOL/L (ref 98–112)
CO2 SERPL-SCNC: 27 MMOL/L (ref 21–32)
CREAT BLD-MCNC: 0.98 MG/DL
EOSINOPHIL # BLD AUTO: 0.17 X10(3) UL (ref 0–0.7)
EOSINOPHIL NFR BLD AUTO: 5 %
ERYTHROCYTE [DISTWIDTH] IN BLOOD BY AUTOMATED COUNT: 15.6 %
FASTING STATUS PATIENT QL REPORTED: YES
GLOBULIN PLAS-MCNC: 3.3 G/DL (ref 2.8–4.4)
GLUCOSE BLD-MCNC: 114 MG/DL (ref 70–99)
HCT VFR BLD AUTO: 31.9 %
HGB BLD-MCNC: 9.9 G/DL
IMM GRANULOCYTES # BLD AUTO: 0.01 X10(3) UL (ref 0–1)
IMM GRANULOCYTES NFR BLD: 0.3 %
LYMPHOCYTES # BLD AUTO: 1.37 X10(3) UL (ref 1–4)
LYMPHOCYTES NFR BLD AUTO: 40.5 %
MCH RBC QN AUTO: 27.8 PG (ref 26–34)
MCHC RBC AUTO-ENTMCNC: 31 G/DL (ref 31–37)
MCV RBC AUTO: 89.6 FL
MONOCYTES # BLD AUTO: 0.42 X10(3) UL (ref 0.1–1)
MONOCYTES NFR BLD AUTO: 12.4 %
NEUTROPHILS # BLD AUTO: 1.39 X10 (3) UL (ref 1.5–7.7)
NEUTROPHILS # BLD AUTO: 1.39 X10(3) UL (ref 1.5–7.7)
NEUTROPHILS NFR BLD AUTO: 41.2 %
OSMOLALITY SERPL CALC.SUM OF ELEC: 302 MOSM/KG (ref 275–295)
PLATELET # BLD AUTO: 155 10(3)UL (ref 150–450)
POTASSIUM SERPL-SCNC: 3.3 MMOL/L (ref 3.5–5.1)
PROT SERPL-MCNC: 6.3 G/DL (ref 6.4–8.2)
RBC # BLD AUTO: 3.56 X10(6)UL
SODIUM SERPL-SCNC: 144 MMOL/L (ref 136–145)
WBC # BLD AUTO: 3.4 X10(3) UL (ref 4–11)

## 2022-05-19 PROCEDURE — 80053 COMPREHEN METABOLIC PANEL: CPT

## 2022-05-19 PROCEDURE — 85025 COMPLETE CBC W/AUTO DIFF WBC: CPT

## 2022-09-12 ENCOUNTER — ANESTHESIA (OUTPATIENT)
Dept: EMERGENCY DEPT | Facility: HOSPITAL | Age: 82
DRG: 481 | End: 2022-09-12
Payer: MEDICARE

## 2022-09-12 ENCOUNTER — APPOINTMENT (OUTPATIENT)
Dept: CT IMAGING | Facility: HOSPITAL | Age: 82
DRG: 481 | End: 2022-09-12
Attending: EMERGENCY MEDICINE
Payer: MEDICARE

## 2022-09-12 ENCOUNTER — HOSPITAL ENCOUNTER (INPATIENT)
Facility: HOSPITAL | Age: 82
LOS: 4 days | Discharge: NURSING, REHAB OR LONG-TERM CARE | DRG: 481 | End: 2022-09-16
Attending: EMERGENCY MEDICINE | Admitting: HOSPITALIST
Payer: MEDICARE

## 2022-09-12 ENCOUNTER — APPOINTMENT (OUTPATIENT)
Dept: GENERAL RADIOLOGY | Facility: HOSPITAL | Age: 82
DRG: 481 | End: 2022-09-12
Attending: EMERGENCY MEDICINE
Payer: MEDICARE

## 2022-09-12 ENCOUNTER — APPOINTMENT (OUTPATIENT)
Dept: GENERAL RADIOLOGY | Facility: HOSPITAL | Age: 82
DRG: 481 | End: 2022-09-12
Payer: MEDICARE

## 2022-09-12 ENCOUNTER — ANESTHESIA EVENT (OUTPATIENT)
Dept: EMERGENCY DEPT | Facility: HOSPITAL | Age: 82
DRG: 481 | End: 2022-09-12
Payer: MEDICARE

## 2022-09-12 DIAGNOSIS — G30.1 LATE ONSET ALZHEIMER'S DISEASE WITHOUT BEHAVIORAL DISTURBANCE (HCC): ICD-10-CM

## 2022-09-12 DIAGNOSIS — F02.80 LATE ONSET ALZHEIMER'S DISEASE WITHOUT BEHAVIORAL DISTURBANCE (HCC): ICD-10-CM

## 2022-09-12 DIAGNOSIS — S72.141A CLOSED DISPLACED INTERTROCHANTERIC FRACTURE OF RIGHT FEMUR, INITIAL ENCOUNTER (HCC): Primary | ICD-10-CM

## 2022-09-12 DIAGNOSIS — R77.8 ELEVATED TROPONIN: ICD-10-CM

## 2022-09-12 DIAGNOSIS — D64.9 ANEMIA, UNSPECIFIED TYPE: ICD-10-CM

## 2022-09-12 DIAGNOSIS — E11.9 WELL CONTROLLED DIABETES MELLITUS (HCC): ICD-10-CM

## 2022-09-12 DIAGNOSIS — I10 ESSENTIAL HYPERTENSION: ICD-10-CM

## 2022-09-12 PROBLEM — R79.89 ELEVATED TROPONIN: Status: ACTIVE | Noted: 2022-09-12

## 2022-09-12 LAB
ALBUMIN SERPL-MCNC: 3 G/DL (ref 3.4–5)
ALBUMIN/GLOB SERPL: 1 {RATIO} (ref 1–2)
ALP LIVER SERPL-CCNC: 59 U/L
ALT SERPL-CCNC: 13 U/L
ANION GAP SERPL CALC-SCNC: 4 MMOL/L (ref 0–18)
ANTIBODY SCREEN: NEGATIVE
AST SERPL-CCNC: 7 U/L (ref 15–37)
ATRIAL RATE: 73 BPM
BASOPHILS # BLD AUTO: 0.02 X10(3) UL (ref 0–0.2)
BASOPHILS NFR BLD AUTO: 0.5 %
BILIRUB SERPL-MCNC: 0.3 MG/DL (ref 0.1–2)
BUN BLD-MCNC: 18 MG/DL (ref 7–18)
CALCIUM BLD-MCNC: 8.6 MG/DL (ref 8.5–10.1)
CHLORIDE SERPL-SCNC: 115 MMOL/L (ref 98–112)
CHOLEST SERPL-MCNC: 80 MG/DL (ref ?–200)
CO2 SERPL-SCNC: 25 MMOL/L (ref 21–32)
CREAT BLD-MCNC: 0.98 MG/DL
EOSINOPHIL # BLD AUTO: 0.12 X10(3) UL (ref 0–0.7)
EOSINOPHIL NFR BLD AUTO: 2.8 %
ERYTHROCYTE [DISTWIDTH] IN BLOOD BY AUTOMATED COUNT: 14.3 %
GFR SERPLBLD BASED ON 1.73 SQ M-ARVRAT: 57 ML/MIN/1.73M2 (ref 60–?)
GLOBULIN PLAS-MCNC: 3 G/DL (ref 2.8–4.4)
GLUCOSE BLD-MCNC: 127 MG/DL (ref 70–99)
HCT VFR BLD AUTO: 28.1 %
HDLC SERPL-MCNC: 43 MG/DL (ref 40–59)
HGB BLD-MCNC: 8.3 G/DL
IMM GRANULOCYTES # BLD AUTO: 0.03 X10(3) UL (ref 0–1)
IMM GRANULOCYTES NFR BLD: 0.7 %
LDLC SERPL CALC-MCNC: 20 MG/DL (ref ?–100)
LYMPHOCYTES # BLD AUTO: 1.14 X10(3) UL (ref 1–4)
LYMPHOCYTES NFR BLD AUTO: 26.5 %
MAGNESIUM SERPL-MCNC: 2.1 MG/DL (ref 1.6–2.6)
MCH RBC QN AUTO: 27.2 PG (ref 26–34)
MCHC RBC AUTO-ENTMCNC: 29.5 G/DL (ref 31–37)
MCV RBC AUTO: 92.1 FL
MONOCYTES # BLD AUTO: 0.29 X10(3) UL (ref 0.1–1)
MONOCYTES NFR BLD AUTO: 6.7 %
NEUTROPHILS # BLD AUTO: 2.71 X10 (3) UL (ref 1.5–7.7)
NEUTROPHILS # BLD AUTO: 2.71 X10(3) UL (ref 1.5–7.7)
NEUTROPHILS NFR BLD AUTO: 62.8 %
NONHDLC SERPL-MCNC: 37 MG/DL (ref ?–130)
NT-PROBNP SERPL-MCNC: 456 PG/ML (ref ?–450)
OSMOLALITY SERPL CALC.SUM OF ELEC: 301 MOSM/KG (ref 275–295)
P AXIS: 78 DEGREES
P-R INTERVAL: 228 MS
PLATELET # BLD AUTO: 200 10(3)UL (ref 150–450)
POTASSIUM SERPL-SCNC: 3.7 MMOL/L (ref 3.5–5.1)
PROT SERPL-MCNC: 6 G/DL (ref 6.4–8.2)
Q-T INTERVAL: 506 MS
QRS DURATION: 80 MS
QTC CALCULATION (BEZET): 557 MS
R AXIS: 4 DEGREES
RBC # BLD AUTO: 3.05 X10(6)UL
RH BLOOD TYPE: NEGATIVE
SARS-COV-2 RNA RESP QL NAA+PROBE: NOT DETECTED
SODIUM SERPL-SCNC: 144 MMOL/L (ref 136–145)
T AXIS: 92 DEGREES
TRIGL SERPL-MCNC: 79 MG/DL (ref 30–149)
TROPONIN I HIGH SENSITIVITY: 129 NG/L
TROPONIN I HIGH SENSITIVITY: 154 NG/L
VENTRICULAR RATE: 73 BPM
VLDLC SERPL CALC-MCNC: 10 MG/DL (ref 0–30)
WBC # BLD AUTO: 4.3 X10(3) UL (ref 4–11)

## 2022-09-12 PROCEDURE — 96374 THER/PROPH/DIAG INJ IV PUSH: CPT

## 2022-09-12 PROCEDURE — 99285 EMERGENCY DEPT VISIT HI MDM: CPT

## 2022-09-12 PROCEDURE — 80053 COMPREHEN METABOLIC PANEL: CPT

## 2022-09-12 PROCEDURE — 86901 BLOOD TYPING SEROLOGIC RH(D): CPT | Performed by: NURSE PRACTITIONER

## 2022-09-12 PROCEDURE — 86850 RBC ANTIBODY SCREEN: CPT | Performed by: NURSE PRACTITIONER

## 2022-09-12 PROCEDURE — 84484 ASSAY OF TROPONIN QUANT: CPT | Performed by: EMERGENCY MEDICINE

## 2022-09-12 PROCEDURE — 85025 COMPLETE CBC W/AUTO DIFF WBC: CPT | Performed by: EMERGENCY MEDICINE

## 2022-09-12 PROCEDURE — 76942 ECHO GUIDE FOR BIOPSY: CPT | Performed by: ANESTHESIOLOGY

## 2022-09-12 PROCEDURE — 83880 ASSAY OF NATRIURETIC PEPTIDE: CPT | Performed by: HOSPITALIST

## 2022-09-12 PROCEDURE — 85025 COMPLETE CBC W/AUTO DIFF WBC: CPT

## 2022-09-12 PROCEDURE — 93010 ELECTROCARDIOGRAM REPORT: CPT | Performed by: INTERNAL MEDICINE

## 2022-09-12 PROCEDURE — 93010 ELECTROCARDIOGRAM REPORT: CPT

## 2022-09-12 PROCEDURE — 86900 BLOOD TYPING SEROLOGIC ABO: CPT | Performed by: NURSE PRACTITIONER

## 2022-09-12 PROCEDURE — 93005 ELECTROCARDIOGRAM TRACING: CPT

## 2022-09-12 PROCEDURE — 76942 ECHO GUIDE FOR BIOPSY: CPT

## 2022-09-12 PROCEDURE — 73502 X-RAY EXAM HIP UNI 2-3 VIEWS: CPT

## 2022-09-12 PROCEDURE — 84484 ASSAY OF TROPONIN QUANT: CPT | Performed by: NURSE PRACTITIONER

## 2022-09-12 PROCEDURE — 83735 ASSAY OF MAGNESIUM: CPT | Performed by: HOSPITALIST

## 2022-09-12 PROCEDURE — 80061 LIPID PANEL: CPT | Performed by: NURSE PRACTITIONER

## 2022-09-12 PROCEDURE — 96375 TX/PRO/DX INJ NEW DRUG ADDON: CPT

## 2022-09-12 PROCEDURE — 70450 CT HEAD/BRAIN W/O DYE: CPT | Performed by: EMERGENCY MEDICINE

## 2022-09-12 PROCEDURE — 64447 NJX AA&/STRD FEMORAL NRV IMG: CPT

## 2022-09-12 PROCEDURE — 72125 CT NECK SPINE W/O DYE: CPT | Performed by: EMERGENCY MEDICINE

## 2022-09-12 PROCEDURE — 80053 COMPREHEN METABOLIC PANEL: CPT | Performed by: EMERGENCY MEDICINE

## 2022-09-12 PROCEDURE — 73552 X-RAY EXAM OF FEMUR 2/>: CPT | Performed by: EMERGENCY MEDICINE

## 2022-09-12 PROCEDURE — 86920 COMPATIBILITY TEST SPIN: CPT

## 2022-09-12 RX ORDER — HYDROMORPHONE HYDROCHLORIDE 1 MG/ML
0.5 INJECTION, SOLUTION INTRAMUSCULAR; INTRAVENOUS; SUBCUTANEOUS EVERY 30 MIN PRN
Status: ACTIVE | OUTPATIENT
Start: 2022-09-12 | End: 2022-09-12

## 2022-09-12 RX ORDER — SENNOSIDES 8.6 MG
17.2 TABLET ORAL NIGHTLY PRN
Status: DISCONTINUED | OUTPATIENT
Start: 2022-09-12 | End: 2022-09-16

## 2022-09-12 RX ORDER — AMLODIPINE BESYLATE 10 MG/1
5 TABLET ORAL DAILY
COMMUNITY
End: 2022-09-16

## 2022-09-12 RX ORDER — POLYETHYLENE GLYCOL 3350 17 G/17G
17 POWDER, FOR SOLUTION ORAL DAILY PRN
Status: DISCONTINUED | OUTPATIENT
Start: 2022-09-12 | End: 2022-09-16

## 2022-09-12 RX ORDER — BISACODYL 10 MG
10 SUPPOSITORY, RECTAL RECTAL
Status: DISCONTINUED | OUTPATIENT
Start: 2022-09-12 | End: 2022-09-16

## 2022-09-12 RX ORDER — BUSPIRONE HYDROCHLORIDE 15 MG/1
30 TABLET ORAL 2 TIMES DAILY
Status: DISCONTINUED | OUTPATIENT
Start: 2022-09-12 | End: 2022-09-16

## 2022-09-12 RX ORDER — MEMANTINE HYDROCHLORIDE 10 MG/1
10 TABLET ORAL 2 TIMES DAILY
Status: DISCONTINUED | OUTPATIENT
Start: 2022-09-12 | End: 2022-09-16

## 2022-09-12 RX ORDER — ONDANSETRON 2 MG/ML
4 INJECTION INTRAMUSCULAR; INTRAVENOUS EVERY 6 HOURS PRN
Status: DISCONTINUED | OUTPATIENT
Start: 2022-09-12 | End: 2022-09-16

## 2022-09-12 RX ORDER — MELATONIN
1000 DAILY
Status: DISCONTINUED | OUTPATIENT
Start: 2022-09-12 | End: 2022-09-16

## 2022-09-12 RX ORDER — MELATONIN
325
Status: DISCONTINUED | OUTPATIENT
Start: 2022-09-13 | End: 2022-09-14

## 2022-09-12 RX ORDER — DULOXETIN HYDROCHLORIDE 30 MG/1
60 CAPSULE, DELAYED RELEASE ORAL 2 TIMES DAILY
Status: DISCONTINUED | OUTPATIENT
Start: 2022-09-12 | End: 2022-09-16

## 2022-09-12 RX ORDER — RIVASTIGMINE 13.3 MG/24H
1 PATCH, EXTENDED RELEASE TRANSDERMAL DAILY
Status: DISCONTINUED | OUTPATIENT
Start: 2022-09-12 | End: 2022-09-16

## 2022-09-12 RX ORDER — ATORVASTATIN CALCIUM 40 MG/1
40 TABLET, FILM COATED ORAL NIGHTLY
Status: DISCONTINUED | OUTPATIENT
Start: 2022-09-12 | End: 2022-09-16

## 2022-09-12 RX ORDER — SODIUM PHOSPHATE, DIBASIC AND SODIUM PHOSPHATE, MONOBASIC 7; 19 G/133ML; G/133ML
1 ENEMA RECTAL ONCE AS NEEDED
Status: DISCONTINUED | OUTPATIENT
Start: 2022-09-12 | End: 2022-09-16

## 2022-09-12 RX ORDER — ONDANSETRON 2 MG/ML
4 INJECTION INTRAMUSCULAR; INTRAVENOUS EVERY 4 HOURS PRN
Status: DISCONTINUED | OUTPATIENT
Start: 2022-09-12 | End: 2022-09-12

## 2022-09-12 RX ORDER — PANTOPRAZOLE SODIUM 20 MG/1
40 TABLET, DELAYED RELEASE ORAL
Status: DISCONTINUED | OUTPATIENT
Start: 2022-09-13 | End: 2022-09-16

## 2022-09-12 RX ORDER — RALOXIFENE HYDROCHLORIDE 60 MG/1
60 TABLET, FILM COATED ORAL DAILY
Status: DISCONTINUED | OUTPATIENT
Start: 2022-09-12 | End: 2022-09-16

## 2022-09-12 RX ORDER — HEPARIN SODIUM 5000 [USP'U]/ML
5000 INJECTION, SOLUTION INTRAVENOUS; SUBCUTANEOUS EVERY 8 HOURS SCHEDULED
Status: DISCONTINUED | OUTPATIENT
Start: 2022-09-12 | End: 2022-09-16

## 2022-09-12 RX ORDER — OLANZAPINE 5 MG/1
5 TABLET ORAL NIGHTLY
Status: ON HOLD | COMMUNITY
End: 2022-09-16

## 2022-09-12 RX ORDER — LIDOCAINE HYDROCHLORIDE 10 MG/ML
2 INJECTION, SOLUTION EPIDURAL; INFILTRATION; INTRACAUDAL; PERINEURAL AS NEEDED
Status: DISCONTINUED | OUTPATIENT
Start: 2022-09-12 | End: 2022-09-16

## 2022-09-12 RX ORDER — HYDROMORPHONE HYDROCHLORIDE 1 MG/ML
0.5 INJECTION, SOLUTION INTRAMUSCULAR; INTRAVENOUS; SUBCUTANEOUS EVERY 30 MIN PRN
Status: COMPLETED | OUTPATIENT
Start: 2022-09-12 | End: 2022-09-13

## 2022-09-12 RX ORDER — SODIUM CHLORIDE 9 MG/ML
10 INJECTION INTRAVENOUS AS NEEDED
Status: DISCONTINUED | OUTPATIENT
Start: 2022-09-12 | End: 2022-09-16

## 2022-09-12 RX ORDER — ROPIVACAINE HYDROCHLORIDE 5 MG/ML
30 INJECTION, SOLUTION EPIDURAL; INFILTRATION; PERINEURAL AS NEEDED
Status: DISCONTINUED | OUTPATIENT
Start: 2022-09-12 | End: 2022-09-16

## 2022-09-12 RX ORDER — METOCLOPRAMIDE HYDROCHLORIDE 5 MG/ML
5 INJECTION INTRAMUSCULAR; INTRAVENOUS EVERY 8 HOURS PRN
Status: DISCONTINUED | OUTPATIENT
Start: 2022-09-12 | End: 2022-09-16

## 2022-09-12 RX ORDER — ACETAMINOPHEN 500 MG
500 TABLET ORAL EVERY 4 HOURS PRN
Status: DISCONTINUED | OUTPATIENT
Start: 2022-09-12 | End: 2022-09-16

## 2022-09-12 RX ORDER — FUROSEMIDE 20 MG/1
20 TABLET ORAL DAILY
Status: DISCONTINUED | OUTPATIENT
Start: 2022-09-12 | End: 2022-09-16

## 2022-09-12 RX ORDER — CHOLECALCIFEROL (VITAMIN D3) 125 MCG
2000 CAPSULE ORAL DAILY
Status: DISCONTINUED | OUTPATIENT
Start: 2022-09-12 | End: 2022-09-16

## 2022-09-12 RX ORDER — ONDANSETRON 2 MG/ML
4 INJECTION INTRAMUSCULAR; INTRAVENOUS ONCE
Status: COMPLETED | OUTPATIENT
Start: 2022-09-12 | End: 2022-09-12

## 2022-09-12 NOTE — ED INITIAL ASSESSMENT (HPI)
Pt via EMS for unwitnessed fall from home, tripped over feet, EMS states pt fell down about 6 stairs. Denies LOC, no dizziness prior. Pt on blood thinners. Positive deformity on R hip. 60mcgs Fentanyl given per EMS.

## 2022-09-12 NOTE — ANESTHESIA PROCEDURE NOTES
Regional Block  Performed by: Anna Rose MD  Authorized by: Anna Rose MD       General Information and Staff    Start Time:  9/12/2022 1:50 PM  End Time:  9/12/2022 2:02 PM  Anesthesiologist:  Anna Rose MD  Performed by: Anesthesiologist  Patient Location:  ED      Site Identification: real time ultrasound guided and image stored and retrievable    Block site/laterality marked before start: site marked  Reason for Block: procedure for pain and at request of ED Physician    Preanesthetic Checklist: 2 patient identifers, IV checked, risks and benefits discussed, monitors and equipment checked, pre-op evaluation, timeout performed, anesthesia consent, sterile technique used, no prohibitive neurological deficits and no local skin infection at insertion site      Procedure Details    Patient Position:  Supine  Prep: ChloraPrep    Monitoring:  Cardiac monitor, continuous pulse ox and blood pressure cuff  Block Type:  Femoral  Laterality:  Right  Injection Technique:  Single-shot    Needle    Needle Type:  Short-bevel and echogenic  Needle Length:  100 mm  Needle Localization:  Ultrasound guidance, nerve stimulator and anatomical landmarks  Reason for Ultrasound Use: appropriate spread of the medication was noted in real time and no ultrasound evidence of intravascular and/or intraneural injection    Nerve Stimulator: 0.5 amps  Muscle Twitch Response: quadriceps response      Assessment    Injection Assessment:  Good spread noted, negative resistance, negative aspiration for heme, incremental injection, low pressure, local visualized surrounding nerve on ultrasound and no pain on injection  Heart Rate Change: No    - Patient tolerated block procedure well without evidence of immediate block related complications. Medications      Additional Comments    Consent obtained for procedure.     The risks and benefits of regional anesthesia have been explained to the patient as indicated on the anesthesia consent form, which has been signed. Understanding has been demonstrated of intrinsic risks including failed block, neuropraxia, hematoma, anesthetic toxicity and other risks as described. All questions were answered. A desire to proceed with regional anesthesia for post operative analgesia has been demonstrated with an understanding of risks of adverse outcomes. Suffered fall with fractured hip. Discussed with Dr. Karen Jones. In ER room uneventful femoral nerve block performed. 1%lidocaine 1 cc to skin. Ropivacaine 0.5% 30 ccs, incremental injection. Patient showed improvement in comfort after injection.

## 2022-09-12 NOTE — PROGRESS NOTES
Right femoral nerve block performed for analgesia for hip fracture. Patient shows improvement. Note, patient's daughter requested spinal anesthesia for any surgery, because of worries of worsening dementia. Informed daughter spinal anesthesia has no effect on dementia or preventing post operative cognitive dysfunction. To assess when surgery is scheduled by OR team.    Manuela Proctor.  Reynold Reyez, [address] [place] [address] [phone]  long range page

## 2022-09-12 NOTE — ED PROVIDER NOTES
The patient was seen and examined. Note reviewed and agreed. I provided a substantive portion of the care of this patient. I personally performed the Medical Decision Making for this encounter. Patient and family were interviewed and examined. Right lower extremity is shortened and externally rotated and x-rays confirm a comminuted intertrochanteric fracture of the right hip. Preoperative studies were ordered. The patient is unable to recall why she fell. Troponin is modestly elevated although EKG is unremarkable and the patient has a recent history of coronary disease so will require cardiology clearance. Consult placed for inpatient notification. Hospitalist and orthopedics will be notified. Anesthesia has been contacted to provide femoral block. Family and patient updated regarding plan of care.

## 2022-09-12 NOTE — ED QUICK NOTES
Orders for admission, patient is aware of plan and ready to go upstairs. Any questions, please call ED RN OFELIA at extension [phone]. Patient Covid vaccination status: Fully vaccinated     COVID Test Ordered in ED: Rapid SARS-CoV-2 by PCR    COVID Suspicion at Admission: Low clinical suspicion for COVID    Running Infusions:  None    Mental Status/LOC at time of transport: A&O X3    Other pertinent information: femoral nerve block, family at bedside.    CIWA score: N/A   NIH score:  N/A

## 2022-09-13 ENCOUNTER — APPOINTMENT (OUTPATIENT)
Dept: GENERAL RADIOLOGY | Facility: HOSPITAL | Age: 82
DRG: 481 | End: 2022-09-13
Attending: ORTHOPAEDIC SURGERY
Payer: MEDICARE

## 2022-09-13 ENCOUNTER — ANESTHESIA EVENT (OUTPATIENT)
Dept: SURGERY | Facility: HOSPITAL | Age: 82
DRG: 481 | End: 2022-09-13
Payer: MEDICARE

## 2022-09-13 ENCOUNTER — ANESTHESIA (OUTPATIENT)
Dept: SURGERY | Facility: HOSPITAL | Age: 82
DRG: 481 | End: 2022-09-13
Payer: MEDICARE

## 2022-09-13 LAB
ATRIAL RATE: 86 BPM
BASOPHILS # BLD AUTO: 0.01 X10(3) UL (ref 0–0.2)
BASOPHILS NFR BLD AUTO: 0.2 %
BILIRUB UR QL STRIP.AUTO: NEGATIVE
COLOR UR AUTO: YELLOW
EOSINOPHIL # BLD AUTO: 0.03 X10(3) UL (ref 0–0.7)
EOSINOPHIL NFR BLD AUTO: 0.6 %
ERYTHROCYTE [DISTWIDTH] IN BLOOD BY AUTOMATED COUNT: 14.6 %
GLUCOSE UR STRIP.AUTO-MCNC: >=500 MG/DL
HCT VFR BLD AUTO: 25.6 %
HGB BLD-MCNC: 7.5 G/DL
IMM GRANULOCYTES # BLD AUTO: 0.02 X10(3) UL (ref 0–1)
IMM GRANULOCYTES NFR BLD: 0.4 %
LYMPHOCYTES # BLD AUTO: 0.94 X10(3) UL (ref 1–4)
LYMPHOCYTES NFR BLD AUTO: 17.9 %
MCH RBC QN AUTO: 27.5 PG (ref 26–34)
MCHC RBC AUTO-ENTMCNC: 29.3 G/DL (ref 31–37)
MCV RBC AUTO: 93.8 FL
MONOCYTES # BLD AUTO: 0.48 X10(3) UL (ref 0.1–1)
MONOCYTES NFR BLD AUTO: 9.1 %
MRSA NASAL: NEGATIVE
NEUTROPHILS # BLD AUTO: 3.78 X10 (3) UL (ref 1.5–7.7)
NEUTROPHILS # BLD AUTO: 3.78 X10(3) UL (ref 1.5–7.7)
NEUTROPHILS NFR BLD AUTO: 71.8 %
NITRITE UR QL STRIP.AUTO: NEGATIVE
P AXIS: 60 DEGREES
P-R INTERVAL: 204 MS
PH UR STRIP.AUTO: 5 [PH] (ref 5–8)
PLATELET # BLD AUTO: 197 10(3)UL (ref 150–450)
PROT UR STRIP.AUTO-MCNC: 30 MG/DL
Q-T INTERVAL: 396 MS
QRS DURATION: 76 MS
QTC CALCULATION (BEZET): 473 MS
R AXIS: -7 DEGREES
RBC # BLD AUTO: 2.73 X10(6)UL
RBC UR QL AUTO: NEGATIVE
SP GR UR STRIP.AUTO: 1.02 (ref 1–1.03)
STAPH A BY PCR: NEGATIVE
T AXIS: 87 DEGREES
TROPONIN I HIGH SENSITIVITY: 108 NG/L
UROBILINOGEN UR STRIP.AUTO-MCNC: <2 MG/DL
VENTRICULAR RATE: 86 BPM
WBC # BLD AUTO: 5.3 X10(3) UL (ref 4–11)

## 2022-09-13 PROCEDURE — 87086 URINE CULTURE/COLONY COUNT: CPT | Performed by: HOSPITALIST

## 2022-09-13 PROCEDURE — 87077 CULTURE AEROBIC IDENTIFY: CPT | Performed by: HOSPITALIST

## 2022-09-13 PROCEDURE — 30233N1 TRANSFUSION OF NONAUTOLOGOUS RED BLOOD CELLS INTO PERIPHERAL VEIN, PERCUTANEOUS APPROACH: ICD-10-PCS | Performed by: HOSPITALIST

## 2022-09-13 PROCEDURE — 76000 FLUOROSCOPY <1 HR PHYS/QHP: CPT | Performed by: ORTHOPAEDIC SURGERY

## 2022-09-13 PROCEDURE — 73552 X-RAY EXAM OF FEMUR 2/>: CPT | Performed by: ORTHOPAEDIC SURGERY

## 2022-09-13 PROCEDURE — 87641 MR-STAPH DNA AMP PROBE: CPT | Performed by: EMERGENCY MEDICINE

## 2022-09-13 PROCEDURE — 87640 STAPH A DNA AMP PROBE: CPT | Performed by: EMERGENCY MEDICINE

## 2022-09-13 PROCEDURE — 81001 URINALYSIS AUTO W/SCOPE: CPT | Performed by: HOSPITALIST

## 2022-09-13 PROCEDURE — 36430 TRANSFUSION BLD/BLD COMPNT: CPT

## 2022-09-13 PROCEDURE — 85025 COMPLETE CBC W/AUTO DIFF WBC: CPT | Performed by: HOSPITALIST

## 2022-09-13 PROCEDURE — 3E0T3BZ INTRODUCTION OF ANESTHETIC AGENT INTO PERIPHERAL NERVES AND PLEXI, PERCUTANEOUS APPROACH: ICD-10-PCS | Performed by: ORTHOPAEDIC SURGERY

## 2022-09-13 PROCEDURE — 83550 IRON BINDING TEST: CPT | Performed by: INTERNAL MEDICINE

## 2022-09-13 PROCEDURE — 82728 ASSAY OF FERRITIN: CPT | Performed by: INTERNAL MEDICINE

## 2022-09-13 PROCEDURE — 0QS606Z REPOSITION RIGHT UPPER FEMUR WITH INTRAMEDULLARY INTERNAL FIXATION DEVICE, OPEN APPROACH: ICD-10-PCS | Performed by: ORTHOPAEDIC SURGERY

## 2022-09-13 PROCEDURE — 87186 SC STD MICRODIL/AGAR DIL: CPT | Performed by: HOSPITALIST

## 2022-09-13 PROCEDURE — 84484 ASSAY OF TROPONIN QUANT: CPT | Performed by: HOSPITALIST

## 2022-09-13 PROCEDURE — 83540 ASSAY OF IRON: CPT | Performed by: INTERNAL MEDICINE

## 2022-09-13 DEVICE — 10MM/130 DEG TI CANN TFNA 360MM/RIGHT - STERILE
Type: IMPLANTABLE DEVICE | Site: HIP | Status: FUNCTIONAL
Brand: TFN-ADVANCE

## 2022-09-13 DEVICE — 5.0MM TI LOCKING SCREW W/T25 STARDRIVE 38MM F/IM NAIL-STER: Type: IMPLANTABLE DEVICE | Site: HIP | Status: FUNCTIONAL

## 2022-09-13 DEVICE — 5.0MM TI LOCKING SCREW W/T25 STARDRIVE 40MM F/IM NAIL-STER: Type: IMPLANTABLE DEVICE | Site: HIP | Status: FUNCTIONAL

## 2022-09-13 DEVICE — TFNA FENESTRATED SCREW 100MM - STERILE
Type: IMPLANTABLE DEVICE | Site: HIP | Status: FUNCTIONAL
Brand: TFN-ADVANCE

## 2022-09-13 RX ORDER — CEFAZOLIN SODIUM 1 G/3ML
INJECTION, POWDER, FOR SOLUTION INTRAMUSCULAR; INTRAVENOUS AS NEEDED
Status: DISCONTINUED | OUTPATIENT
Start: 2022-09-13 | End: 2022-09-13 | Stop reason: SURG

## 2022-09-13 RX ORDER — SODIUM CHLORIDE 9 MG/ML
INJECTION, SOLUTION INTRAVENOUS ONCE
Status: COMPLETED | OUTPATIENT
Start: 2022-09-13 | End: 2022-09-13

## 2022-09-13 RX ORDER — HYDROMORPHONE HYDROCHLORIDE 1 MG/ML
0.2 INJECTION, SOLUTION INTRAMUSCULAR; INTRAVENOUS; SUBCUTANEOUS EVERY 5 MIN PRN
Status: DISCONTINUED | OUTPATIENT
Start: 2022-09-13 | End: 2022-09-13 | Stop reason: HOSPADM

## 2022-09-13 RX ORDER — SODIUM CHLORIDE 9 MG/ML
INJECTION, SOLUTION INTRAVENOUS CONTINUOUS
Status: DISCONTINUED | OUTPATIENT
Start: 2022-09-13 | End: 2022-09-16

## 2022-09-13 RX ORDER — HYDROMORPHONE HYDROCHLORIDE 1 MG/ML
0.4 INJECTION, SOLUTION INTRAMUSCULAR; INTRAVENOUS; SUBCUTANEOUS EVERY 5 MIN PRN
Status: DISCONTINUED | OUTPATIENT
Start: 2022-09-13 | End: 2022-09-13 | Stop reason: HOSPADM

## 2022-09-13 RX ORDER — NALOXONE HYDROCHLORIDE 0.4 MG/ML
80 INJECTION, SOLUTION INTRAMUSCULAR; INTRAVENOUS; SUBCUTANEOUS AS NEEDED
Status: DISCONTINUED | OUTPATIENT
Start: 2022-09-13 | End: 2022-09-13 | Stop reason: HOSPADM

## 2022-09-13 RX ORDER — ONDANSETRON 2 MG/ML
4 INJECTION INTRAMUSCULAR; INTRAVENOUS EVERY 6 HOURS PRN
Status: DISCONTINUED | OUTPATIENT
Start: 2022-09-13 | End: 2022-09-16

## 2022-09-13 RX ORDER — DOXEPIN HYDROCHLORIDE 50 MG/1
1 CAPSULE ORAL DAILY
Status: DISCONTINUED | OUTPATIENT
Start: 2022-09-14 | End: 2022-09-16

## 2022-09-13 RX ORDER — BISACODYL 10 MG
10 SUPPOSITORY, RECTAL RECTAL
Status: DISCONTINUED | OUTPATIENT
Start: 2022-09-13 | End: 2022-09-16

## 2022-09-13 RX ORDER — ASPIRIN 81 MG/1
81 TABLET, CHEWABLE ORAL DAILY
Status: DISCONTINUED | OUTPATIENT
Start: 2022-09-13 | End: 2022-09-16

## 2022-09-13 RX ORDER — FUROSEMIDE 10 MG/ML
20 INJECTION INTRAMUSCULAR; INTRAVENOUS ONCE
Status: COMPLETED | OUTPATIENT
Start: 2022-09-13 | End: 2022-09-13

## 2022-09-13 RX ORDER — SODIUM CHLORIDE, SODIUM LACTATE, POTASSIUM CHLORIDE, CALCIUM CHLORIDE 600; 310; 30; 20 MG/100ML; MG/100ML; MG/100ML; MG/100ML
INJECTION, SOLUTION INTRAVENOUS CONTINUOUS
Status: DISCONTINUED | OUTPATIENT
Start: 2022-09-13 | End: 2022-09-13 | Stop reason: HOSPADM

## 2022-09-13 RX ORDER — TRANEXAMIC ACID 10 MG/ML
INJECTION, SOLUTION INTRAVENOUS AS NEEDED
Status: DISCONTINUED | OUTPATIENT
Start: 2022-09-13 | End: 2022-09-13 | Stop reason: SURG

## 2022-09-13 RX ORDER — BUPIVACAINE HYDROCHLORIDE 2.5 MG/ML
INJECTION, SOLUTION EPIDURAL; INFILTRATION; INTRACAUDAL AS NEEDED
Status: DISCONTINUED | OUTPATIENT
Start: 2022-09-13 | End: 2022-09-13 | Stop reason: SURG

## 2022-09-13 RX ORDER — HYDROMORPHONE HYDROCHLORIDE 1 MG/ML
1.2 INJECTION, SOLUTION INTRAMUSCULAR; INTRAVENOUS; SUBCUTANEOUS EVERY 2 HOUR PRN
Status: DISCONTINUED | OUTPATIENT
Start: 2022-09-13 | End: 2022-09-16

## 2022-09-13 RX ORDER — HYDROMORPHONE HYDROCHLORIDE 1 MG/ML
0.8 INJECTION, SOLUTION INTRAMUSCULAR; INTRAVENOUS; SUBCUTANEOUS EVERY 2 HOUR PRN
Status: DISCONTINUED | OUTPATIENT
Start: 2022-09-13 | End: 2022-09-16

## 2022-09-13 RX ORDER — SENNOSIDES 8.6 MG
17.2 TABLET ORAL NIGHTLY
Status: DISCONTINUED | OUTPATIENT
Start: 2022-09-13 | End: 2022-09-16

## 2022-09-13 RX ORDER — HYDROMORPHONE HYDROCHLORIDE 1 MG/ML
0.6 INJECTION, SOLUTION INTRAMUSCULAR; INTRAVENOUS; SUBCUTANEOUS EVERY 5 MIN PRN
Status: DISCONTINUED | OUTPATIENT
Start: 2022-09-13 | End: 2022-09-13 | Stop reason: HOSPADM

## 2022-09-13 RX ORDER — HYDROMORPHONE HYDROCHLORIDE 1 MG/ML
0.4 INJECTION, SOLUTION INTRAMUSCULAR; INTRAVENOUS; SUBCUTANEOUS EVERY 2 HOUR PRN
Status: DISCONTINUED | OUTPATIENT
Start: 2022-09-13 | End: 2022-09-16

## 2022-09-13 RX ORDER — ONDANSETRON 2 MG/ML
4 INJECTION INTRAMUSCULAR; INTRAVENOUS EVERY 6 HOURS PRN
Status: DISCONTINUED | OUTPATIENT
Start: 2022-09-13 | End: 2022-09-13 | Stop reason: HOSPADM

## 2022-09-13 RX ORDER — DOCUSATE SODIUM 100 MG/1
100 CAPSULE, LIQUID FILLED ORAL 2 TIMES DAILY
Status: DISCONTINUED | OUTPATIENT
Start: 2022-09-13 | End: 2022-09-16

## 2022-09-13 RX ADMIN — TRANEXAMIC ACID 1000 MG: 10 INJECTION, SOLUTION INTRAVENOUS at 17:35:00

## 2022-09-13 RX ADMIN — BUPIVACAINE HYDROCHLORIDE 30 ML: 2.5 INJECTION, SOLUTION EPIDURAL; INFILTRATION; INTRACAUDAL at 17:34:00

## 2022-09-13 RX ADMIN — CEFAZOLIN SODIUM 2 G: 1 INJECTION, POWDER, FOR SOLUTION INTRAMUSCULAR; INTRAVENOUS at 17:28:00

## 2022-09-13 RX ADMIN — SODIUM CHLORIDE: 9 INJECTION, SOLUTION INTRAVENOUS at 17:27:00

## 2022-09-13 NOTE — PLAN OF CARE
1 unit of PRBC given to pt pre op per Dr orders. No reaction noted. Lasix 20 mg given post infusion.

## 2022-09-13 NOTE — PHYSICAL THERAPY NOTE
Physical therapy consult requested. Chart reviewed. Pt admitted following a fall down the stairs, diagnosed with R hip displaced intertrochanteric femur fracture. Pt on bedrest with plans for surgery today. Will hold physical therapy evaluation until after surgery.

## 2022-09-13 NOTE — ANESTHESIA PROCEDURE NOTES
Airway  Date/Time: 9/13/2022 5:30 PM  Urgency: elective    Airway not difficult    General Information and Staff    Patient location during procedure: OR  Anesthesiologist: Camilla Mcdowell MD  Performed: anesthesiologist     Indications and Patient Condition  Indications for airway management: anesthesia  Spontaneous ventilation: present  Sedation level: deep  Preoxygenated: yes  Patient position: sniffing  Mask difficulty assessment: 1 - vent by mask    Final Airway Details  Final airway type: supraglottic airway      Successful airway: classic  Size 3      Number of attempts at approach: 1  Ventilation between attempts: none  Number of other approaches attempted: 0

## 2022-09-13 NOTE — PLAN OF CARE
NURSING ADMISSION NOTE      Patient admitted via Cart  Oriented to room. Safety precautions initiated. Bed in low position. Call light in reach.     Admission navigator and med rec complete with patient, patient's spouse and daughter  C/o 5/10 RLE pain; some relief with dilaudid PRN  NPO after midnight

## 2022-09-13 NOTE — PLAN OF CARE
PT ALERT/DISORIENTED,  AT BEDSIDE, COOPERATIVE, 96% ON RA, SR, VOIDING PER PURE WICK, C/O OF RT LEG PAIN, GIVEN DILAUDID WITH RELIEF, BP 83/54, HR 95, NOTIFIED DR. RASHEED, IVF STARTED, NPO SINCE MIDNIGHT, PLAN FOR OR TODAY, BED ALARM ON, LABS IN AM, INSTRUCTED PT ON POC    Problem: PAIN - ADULT  Goal: Verbalizes/displays adequate comfort level or patient's stated pain goal  Description: INTERVENTIONS:  - Encourage pt to monitor pain and request assistance  - Assess pain using appropriate pain scale  - Administer analgesics based on type and severity of pain and evaluate response  - Implement non-pharmacological measures as appropriate and evaluate response  - Consider cultural and social influences on pain and pain management  - Manage/alleviate anxiety  - Utilize distraction and/or relaxation techniques  - Monitor for opioid side effects  - Notify MD/LIP if interventions unsuccessful or patient reports new pain  - Anticipate increased pain with activity and pre-medicate as appropriate  Outcome: Progressing     Problem: SAFETY ADULT - FALL  Goal: Free from fall injury  Description: INTERVENTIONS:  - Assess pt frequently for physical needs  - Identify cognitive and physical deficits and behaviors that affect risk of falls.   - [place] fall precautions as indicated by assessment.  - Educate pt/family on patient safety including physical limitations  - Instruct pt to call for assistance with activity based on assessment  - Modify environment to reduce risk of injury  - Provide assistive devices as appropriate  - Consider OT/PT consult to assist with strengthening/mobility  - Encourage toileting schedule  Outcome: Progressing

## 2022-09-13 NOTE — PROGRESS NOTES
22 1253   Over the last 2 weeks, how often have you been bothered by any of the following problems? Little interest or pleasure in doing things 1   Feeling down, depressed, or hopeless 1   Trouble falling or staying asleep, or sleeping too much 3  (too much per )   Feeling tired or having little energy 1   Poor appetite or overeating 0   Feeling bad about yourself - or that you are a failure or have let yourself or your family down 0   Trouble concentrating on things, such as reading the newspaper or watching television 2   Moving or speaking so slowly that other people could have noticed. Or the opposite - being so fidgety or restless that you have been moving around a lot more than usual 0   Thoughts that you would be better off dead, or of hurting yourself in some way 0   PHQ-9 TOTAL SCORE 8   If you checked off any problems, how difficult have these problems made it for you to do your work, take care of things at home, or get along with other people? Very difficult   [facility] Resource Referral Counselor Note    Isaac Higuera Patient Status:  Inpatient    1940 MRN [id number]   [facility] 7NE-A Attending Carleen Clifton MD   Hosp Day # 1 PCP Jodie Burton MD       S(subjective) The patients  answered the questions. He said, she sees a neurologist and psychiatrist for medication management. He feels she sleeps too much at home and the depression has increased in the past month. He denies any suicidal thoughts. O(objective) The patient was alert and smiled during all of the visit. She answered most questions with yes or no. She did thank me for checking on her. A(assessment) The phq9 was completed.     P(plan) The patient will follow up with her own neurologist and psychiatrist.      Kd Polk RN  2022  1:03 PM

## 2022-09-13 NOTE — PLAN OF CARE
Assumed care at 0730. A&O x 4. Tele-NSR. Pt reported severe pain from hip fracture. Given IV pain meds. 1 unit of PRBC given pre op. No reactions noted. To surgery via cart.   to waiting room

## 2022-09-14 LAB
BLOOD TYPE BARCODE: 9500
DEPRECATED HBV CORE AB SER IA-ACNC: 15.6 NG/ML
HCT VFR BLD AUTO: 23.3 %
HGB BLD-MCNC: 6.6 G/DL
HGB BLD-MCNC: 7 G/DL
HGB BLD-MCNC: 8.1 G/DL
IRON SATN MFR SERPL: 4 %
IRON SERPL-MCNC: 11 UG/DL
TIBC SERPL-MCNC: 311 UG/DL (ref 240–450)
TRANSFERRIN SERPL-MCNC: 209 MG/DL (ref 200–360)

## 2022-09-14 PROCEDURE — 97530 THERAPEUTIC ACTIVITIES: CPT

## 2022-09-14 PROCEDURE — 97161 PT EVAL LOW COMPLEX 20 MIN: CPT

## 2022-09-14 PROCEDURE — 85014 HEMATOCRIT: CPT | Performed by: ORTHOPAEDIC SURGERY

## 2022-09-14 PROCEDURE — 85018 HEMOGLOBIN: CPT | Performed by: INTERNAL MEDICINE

## 2022-09-14 PROCEDURE — 85018 HEMOGLOBIN: CPT | Performed by: ORTHOPAEDIC SURGERY

## 2022-09-14 PROCEDURE — 97166 OT EVAL MOD COMPLEX 45 MIN: CPT

## 2022-09-14 PROCEDURE — 36430 TRANSFUSION BLD/BLD COMPNT: CPT

## 2022-09-14 RX ORDER — SODIUM CHLORIDE 9 MG/ML
INJECTION, SOLUTION INTRAVENOUS ONCE
Status: COMPLETED | OUTPATIENT
Start: 2022-09-14 | End: 2022-09-14

## 2022-09-14 RX ORDER — FUROSEMIDE 10 MG/ML
20 INJECTION INTRAMUSCULAR; INTRAVENOUS ONCE
Status: COMPLETED | OUTPATIENT
Start: 2022-09-14 | End: 2022-09-14

## 2022-09-14 NOTE — CM/SW NOTE
Department  notified of request for june [facility] referrals started. Assigned CM/SW to follow up with pt/family on further discharge planning.      Evaristo Agate  [facility]

## 2022-09-14 NOTE — OPERATIVE REPORT
OPERATIVE REPORT    Facility:  [facility]    Patient Name:  Nico Byrnes    Age/Gender:  78year old female  :  1940    MRN:  [id number]    Date of Operation:  2022    Preoperative Diagnosis:  RIGHT INTERTROCHANTERIC FEMUR FRACTURE    Postoperative Diagnosis:  RIGHT INTERTROCHANTERIC FEMUR FRACTURE    Procedure Performed:  RIGHT FEMUR [facility] NAILING    Surgeon:  Sohan Granado M.D. Assistant:  PA: Mireya Goodrich PA-C    Anesthesia:  GENERAL    Estimated Blood Loss:  150 mL    Drain:  NONE    Complications:  NONE    Implants:   1. Synthes TFNA long cephalomedullary nail,  degree, 10 mm x 360 mm   2. Lag screw 10.5 mm x 100 mm    Indications for Procedure:  Nico Byrnes is a 78year old female with history of dementia and congestive heart failure who suffered a fall from standing yesterday morning and suffered a right intertrochanteric femur fracture. She was admitted to medical service and cardiology was consulted. Medical clearance was obtained and surgery was scheduled after extensive discussion with family regarding risks and benefits of operative and non-operative treatment options. Description of Procedure: The patient was taken to the operating room after the correct surgical site was marked in the preop holding area. The patient was placed under anesthesia and placed in a supine position on the [facility] orthopaedic table. Preoperative antibiotics were given as well as pre-operative tranexamic acid. All bony prominences were padded. A reduction was obtained on the orthopaedic table verified by fluoroscopy. The right hip was prepped and draped in usual sterile surgical fashion. An incision was made proximal to the greater trochanter and a guidewire was used to locate the tip of the trochanter. An accessory anterolateral incision was used to place a bone hook around the medial aspect of the intertrochanteric fracture to key in fracture reduction. Once appropriate reduction and guide pin placement was confirmed on intra-operative flouroscopy (AP and lateral views), the guide pin was advanced into the intramedullary canal. The opening reamer was used to open the proximal femur. A guide wire was passed through the proximal opening distally and seated into an appropriate position at the level of the superior pole of the patella without concern for possible anterior cortical breach on the lateral view. Sequential reamers were then passed starting with an 8.5 mm end-cutting reamer and then upsized to 11 mm for passage of a planned 10 mm nail. Appropriate chatter was noted at the diaphysis with good fill for longer working arm stabilization of the proximal fracture fragments. A long nail was selected given the relatively instability of the fracture pattern and in order to protect the entire bone. The nail was passed using imaging and seated to an appropriate depth as visualized on intra-operative flouroscopy. A guidepin was placed into the femoral head using the insertion handle guide and verified with flouroscopy (AP and lateral views). Measurements were taken from the guidewire and an appropraitely sized lag screw was placed after reaming. Position of the screw was verified on AP and lateral views. The locking screw was placed into the top of the nail and placed into compression mode. The handle was then used to apply additional compression across the intertrochanteric fracture fragment with good apposition of the medial calcar visualized on intraoperative fluoroscopy. Next using perfect [place] technique, 2 distal interlock screws were placed in the oblique and distalmost static holes. The depth was measured and appropriate length screws placed and confirmed length and position with fluoroscopy. The insertion handle removed. Fluoroscopy verified reduction and position of the implants.   Wounds were irrigated and closed with 0 vicryl, 2-0 monocryl and staples. Sterile dressing was placed to all incisions. No drain used. The surgical assistant was present for the entire procedure and assisted with the approach, exposure, definitive surgery and closure. The patient left the operating room in stable condition. There were no complications.     mobilize with PT, WBAT on operative extremity with use of protective walker at all times  pain controlled with meds  ECASA 81mg BID for VTE ppx for 6 weeks, can restart Brillanta POD 1 if clear from cardiology standpoint  24 hours post-operative prophyllactic antibiotics    Austin Brown MD

## 2022-09-14 NOTE — ANESTHESIA POSTPROCEDURE EVALUATION
[id number] [facility] Patient Status:  Inpatient   Age/Gender 78year old female MRN [id number]   [facility] SURGERY Attending Crys Marin MD   Hosp Day # 1 PCP Ghulam Randall MD       Anesthesia Post-op Note    HIP OPEN REDUCTION INTERNAL FIXATION nailing  right    Procedure Summary     Date: 09/13/22 Room / Location: [facility] MAIN OR 12 / 1404 [facility] MAIN OR    Anesthesia Start: 5129 Anesthesia Stop:     Procedure: HIP OPEN REDUCTION INTERNAL FIXATION nailing  right (Right ) Diagnosis:       Hip fracture, right ([address]      (Hip fracture, right ([address] Gennette Slider)    Surgeons: Joesph Patel MD Anesthesiologist: Ayaz Cabral MD    Anesthesia Type: general ASA Status: 3          Anesthesia Type: general    Vitals Value Taken Time   /89 09/13/22 1935   Temp 98.6 09/13/22 1936   Pulse 87 09/13/22 1936   Resp 20 09/13/22 1936   SpO2 98 09/13/22 1936   Vitals shown include unvalidated device data.     Patient Location: PACU    Anesthesia Type: general    Airway Patency: patent    Postop Pain Control: adequate    Mental Status: mildly sedated but able to meaningfully participate in the post-anesthesia evaluation    Nausea/Vomiting: none    Cardiopulmonary/Hydration status: stable euvolemic    Postop vital signs: stable    Dental Exam: Unchanged from Preop

## 2022-09-14 NOTE — PLAN OF CARE
A&O x self, pts baseline per spouse. VSS on 1L O2 per nc. . Denies pain. WBAT RLE. Decreased sensation to R leg d/t nerve block. Bilateral SCDs. Incontinent, external cath and brief in place. Aquacel drsg x4 C/D/I. Ice packs applied. IV vanco post op. IVF infusing as ordered. Reviewed POC with pts spouse who agrees. Bed alarm on w/bed in lowest position. Pt reminded to use call light. Will continue to monitor. PT/OT to see.

## 2022-09-14 NOTE — PLAN OF CARE
Patient is alert and oriented x 1. On 2L per nasal canula. IVF infusing at 10 ml/hr. Voiding per external cath. Incision covered with aquacel dressing - clean, dry, intact. Patient rates pain at 5/10 at most, sensation to RLE is intact. Ice gel applied. Abductor pillow, SCDs, Spanda  on. Hip precautions maintained. Worked with Physical Therapy dangled at side of bed. IS and ankle pumps encouraged, call light within reach and encouraged to call for assistance. All safety precautions and alarms in place.  at bedside.

## 2022-09-14 NOTE — PLAN OF CARE
NURSING ADMISSION NOTE      Patient admitted via bed from PACU, spouse at bedside. Oriented to room. Safety precautions initiated. Bed in lowest position. Call light within reach. Patient comfortable and vital signs stable.

## 2022-09-14 NOTE — BRIEF OP NOTE
Pre-Operative Diagnosis: Hip fracture, right ([facility]) [S72.001A]     Post-Operative Diagnosis: Hip fracture, right ([facility]) [S72.001A]      Procedure Performed:   RIGHT HIP CEPHALLOMEDULLARY NAIL    Surgeon(s) and Role:     Antonio Carrasco MD - Primary    Assistant(s):  PA:  Marci Banegas PA-C     Surgical Findings: See detailed operative report     Specimen: None     Estimated Blood Loss: Blood Output: 150 mL (9/13/2022  7:14 PM)    Nikia Schaefer MD  9/13/2022  7:49 PM

## 2022-09-15 LAB
ANION GAP SERPL CALC-SCNC: 6 MMOL/L (ref 0–18)
BASOPHILS # BLD AUTO: 0.03 X10(3) UL (ref 0–0.2)
BASOPHILS NFR BLD AUTO: 0.5 %
BUN BLD-MCNC: 13 MG/DL (ref 7–18)
CALCIUM BLD-MCNC: 8.6 MG/DL (ref 8.5–10.1)
CHLORIDE SERPL-SCNC: 110 MMOL/L (ref 98–112)
CO2 SERPL-SCNC: 24 MMOL/L (ref 21–32)
CREAT BLD-MCNC: 0.67 MG/DL
EOSINOPHIL # BLD AUTO: 0.07 X10(3) UL (ref 0–0.7)
EOSINOPHIL NFR BLD AUTO: 1.1 %
ERYTHROCYTE [DISTWIDTH] IN BLOOD BY AUTOMATED COUNT: 14.4 %
GFR SERPLBLD BASED ON 1.73 SQ M-ARVRAT: 86 ML/MIN/1.73M2 (ref 60–?)
GLUCOSE BLD-MCNC: 103 MG/DL (ref 70–99)
HCT VFR BLD AUTO: 27.6 %
HGB BLD-MCNC: 8.6 G/DL
IMM GRANULOCYTES # BLD AUTO: 0.05 X10(3) UL (ref 0–1)
IMM GRANULOCYTES NFR BLD: 0.8 %
LYMPHOCYTES # BLD AUTO: 0.85 X10(3) UL (ref 1–4)
LYMPHOCYTES NFR BLD AUTO: 12.9 %
MCH RBC QN AUTO: 28.3 PG (ref 26–34)
MCHC RBC AUTO-ENTMCNC: 31.2 G/DL (ref 31–37)
MCV RBC AUTO: 90.8 FL
MONOCYTES # BLD AUTO: 0.68 X10(3) UL (ref 0.1–1)
MONOCYTES NFR BLD AUTO: 10.3 %
NEUTROPHILS # BLD AUTO: 4.92 X10 (3) UL (ref 1.5–7.7)
NEUTROPHILS # BLD AUTO: 4.92 X10(3) UL (ref 1.5–7.7)
NEUTROPHILS NFR BLD AUTO: 74.4 %
OSMOLALITY SERPL CALC.SUM OF ELEC: 290 MOSM/KG (ref 275–295)
PLATELET # BLD AUTO: 160 10(3)UL (ref 150–450)
POTASSIUM SERPL-SCNC: 3 MMOL/L (ref 3.5–5.1)
POTASSIUM SERPL-SCNC: 3.9 MMOL/L (ref 3.5–5.1)
RBC # BLD AUTO: 3.04 X10(6)UL
SODIUM SERPL-SCNC: 140 MMOL/L (ref 136–145)
WBC # BLD AUTO: 6.6 X10(3) UL (ref 4–11)

## 2022-09-15 PROCEDURE — 97530 THERAPEUTIC ACTIVITIES: CPT

## 2022-09-15 PROCEDURE — 85025 COMPLETE CBC W/AUTO DIFF WBC: CPT | Performed by: INTERNAL MEDICINE

## 2022-09-15 PROCEDURE — 97110 THERAPEUTIC EXERCISES: CPT

## 2022-09-15 PROCEDURE — 84132 ASSAY OF SERUM POTASSIUM: CPT | Performed by: INTERNAL MEDICINE

## 2022-09-15 PROCEDURE — 80048 BASIC METABOLIC PNL TOTAL CA: CPT | Performed by: INTERNAL MEDICINE

## 2022-09-15 RX ORDER — POTASSIUM CHLORIDE 1.5 G/1.77G
40 POWDER, FOR SOLUTION ORAL EVERY 4 HOURS
Status: COMPLETED | OUTPATIENT
Start: 2022-09-15 | End: 2022-09-15

## 2022-09-15 NOTE — PLAN OF CARE
Patient is alert and oriented x 1. On room air. Voiding per external cath - UO ok. Incision covered with aquacel dressing - dry, intact with scant drainage. Patient not in pain, sensation to RLE is intact. Ice gel applied. SCDs, Spanda  on. Hip precautions maintained. IS and ankle pumps encouraged, call light within reach and encouraged to call for assistance. All safety precautions and alarms in place. Bed alarm on.

## 2022-09-15 NOTE — CDS QUERY
Lab Results Associated with Blood Loss  CLINICAL DOCUMENTATION CLARIFICATION FORM    Dear Provider:  Clinical information (provided below) indicates blood loss requiring a blood transfusion. For accurate ICD-10-CM code assignment to reflect severity of illness and risk of mortality,  BASED ON YOUR CLINICAL JUDGMENT, PLEASE SELECT   THE MOST APPROPRIATE DIAGNOSIS:    [   ] Acute Blood Loss Anemia secondary to Acute Femur Fracture   [   ] Acute Blood Loss Anemia secondary to Acute Femur Fracture on Chronic Anemia   [   ] Chronic Anemia only  [   ] Other (please specify):   [   ] Clinically unable to determine       Documentation from the Medical Record:     Risk Factor: Acute Femur Fracture, on chronic brilinta and aspirin, ORIF    Clinical Indicators:   ___ 5/19 Hgb 11.0 (from previous episode of care in [facility])  ___ 9/12 Hgb 8.3, in ED w/ acute femur fracture  ___ 9/13 Hgb 7.5 (cardiology recommends pre-op transfusion followed by Lasix)  ___ 9/13 ORIF,  mL   ___ 9/14 Hgb 7.0     Treatment:    1 unit PRBC given pre-op      For questions regarding this query, please contact Clinical Documentation :   Chin Wright  [address], Cell# [phone]         Thank you!                                                                     THIS FORM IS A PERMANENT PART OF THE MEDICAL RECORD

## 2022-09-15 NOTE — PLAN OF CARE
Patient is alert and oriented x 1. On room air Voiding per external cath. Incision covered with aquacel dressing - clean, dry, intact. Patient not in pain, sensation to RLE is intact. Ice gel applied. SCDs, Spanda  on. Hip precautions maintained. Patient asleep and calm,  was at bedside till 2300. IS and ankle pumps encouraged, call light within reach and encouraged to call for assistance. All safety precautions and alarms in place. Bed alarm on.

## 2022-09-15 NOTE — PROGRESS NOTES
09/15/22 1528   Clinical Encounter Type   Visited With Patient and family together;Health care provider   Routine Visit   (Responded to AD consult)   Family Spiritual Encounters   Family Coping Accepting  (The spouse was present. Listened to the patient's story through her spouse. Acknowledged her situatuin/condition.)   Family Participation in [address] During Treatment Consistently   Taxonomy   Intended Effects Promote a sense of peace   Methods Encourage self reflection;Encourage sharing of feelings;Encouraging spiritual/Advent practices; Offer spiritual/Advent support; Offer emotional support   Interventions Acknowledge current situation; Active listening;Explain  role; Identify supportive relationship(s); Share words of hope and inspiration    provided emotional and spiritual support. Per spouse, patient is going through [facility]. \"   Also, notified the RN that the Advance Direct[facility]) paper work cannot be completed because patient is not decisional. Spiritual Care support can be requested via an Epic consult or, for immediate needs, at pager [phone].

## 2022-09-16 VITALS
BODY MASS INDEX: 25.12 KG/M2 | HEART RATE: 91 BPM | WEIGHT: 147.13 LBS | OXYGEN SATURATION: 93 % | TEMPERATURE: 98 F | SYSTOLIC BLOOD PRESSURE: 116 MMHG | RESPIRATION RATE: 16 BRPM | DIASTOLIC BLOOD PRESSURE: 74 MMHG | HEIGHT: 64 IN

## 2022-09-16 LAB
ANION GAP SERPL CALC-SCNC: 6 MMOL/L (ref 0–18)
BASOPHILS # BLD AUTO: 0.02 X10(3) UL (ref 0–0.2)
BASOPHILS NFR BLD AUTO: 0.3 %
BLOOD TYPE BARCODE: 9500
BUN BLD-MCNC: 16 MG/DL (ref 7–18)
CALCIUM BLD-MCNC: 8.8 MG/DL (ref 8.5–10.1)
CHLORIDE SERPL-SCNC: 111 MMOL/L (ref 98–112)
CO2 SERPL-SCNC: 25 MMOL/L (ref 21–32)
CREAT BLD-MCNC: 0.74 MG/DL
EOSINOPHIL # BLD AUTO: 0.06 X10(3) UL (ref 0–0.7)
EOSINOPHIL NFR BLD AUTO: 1 %
ERYTHROCYTE [DISTWIDTH] IN BLOOD BY AUTOMATED COUNT: 14.5 %
GFR SERPLBLD BASED ON 1.73 SQ M-ARVRAT: 80 ML/MIN/1.73M2 (ref 60–?)
GLUCOSE BLD-MCNC: 105 MG/DL (ref 70–99)
HCT VFR BLD AUTO: 27.3 %
HGB BLD-MCNC: 8.3 G/DL
IMM GRANULOCYTES # BLD AUTO: 0.05 X10(3) UL (ref 0–1)
IMM GRANULOCYTES NFR BLD: 0.8 %
LYMPHOCYTES # BLD AUTO: 0.79 X10(3) UL (ref 1–4)
LYMPHOCYTES NFR BLD AUTO: 13.3 %
MCH RBC QN AUTO: 28.1 PG (ref 26–34)
MCHC RBC AUTO-ENTMCNC: 30.4 G/DL (ref 31–37)
MCV RBC AUTO: 92.5 FL
MONOCYTES # BLD AUTO: 0.58 X10(3) UL (ref 0.1–1)
MONOCYTES NFR BLD AUTO: 9.8 %
NEUTROPHILS # BLD AUTO: 4.42 X10 (3) UL (ref 1.5–7.7)
NEUTROPHILS # BLD AUTO: 4.42 X10(3) UL (ref 1.5–7.7)
NEUTROPHILS NFR BLD AUTO: 74.8 %
OSMOLALITY SERPL CALC.SUM OF ELEC: 296 MOSM/KG (ref 275–295)
PLATELET # BLD AUTO: 174 10(3)UL (ref 150–450)
POTASSIUM SERPL-SCNC: 3.1 MMOL/L (ref 3.5–5.1)
RBC # BLD AUTO: 2.95 X10(6)UL
SODIUM SERPL-SCNC: 142 MMOL/L (ref 136–145)
WBC # BLD AUTO: 5.9 X10(3) UL (ref 4–11)

## 2022-09-16 PROCEDURE — 85025 COMPLETE CBC W/AUTO DIFF WBC: CPT | Performed by: INTERNAL MEDICINE

## 2022-09-16 PROCEDURE — 80048 BASIC METABOLIC PNL TOTAL CA: CPT | Performed by: INTERNAL MEDICINE

## 2022-09-16 RX ORDER — ACETAMINOPHEN 500 MG
500 TABLET ORAL EVERY 4 HOURS PRN
Refills: 0 | Status: SHIPPED | COMMUNITY
Start: 2022-09-16

## 2022-09-16 RX ORDER — CEFDINIR 300 MG/1
300 CAPSULE ORAL 2 TIMES DAILY
Qty: 10 CAPSULE | Refills: 0 | Status: SHIPPED | OUTPATIENT
Start: 2022-09-16 | End: 2022-09-21

## 2022-09-16 RX ORDER — ASPIRIN 81 MG/1
81 TABLET ORAL 2 TIMES DAILY
Refills: 0 | Status: SHIPPED | COMMUNITY
Start: 2022-09-16

## 2022-09-16 RX ORDER — OLANZAPINE 5 MG/1
5 TABLET ORAL NIGHTLY PRN
Refills: 0 | Status: SHIPPED | COMMUNITY
Start: 2022-09-16

## 2022-09-16 RX ORDER — POTASSIUM CHLORIDE 20 MEQ/1
40 TABLET, EXTENDED RELEASE ORAL EVERY 4 HOURS
Status: COMPLETED | OUTPATIENT
Start: 2022-09-16 | End: 2022-09-16

## 2022-09-16 NOTE — PROGRESS NOTES
Ambulance set up for 1530 to [facility] at [facility] [phone]   PCS  Form on chart & AVS form completed- to be printed out.

## 2022-09-16 NOTE — PLAN OF CARE
A&O x self at baseline. VSS on 2L O2 per nc. . Denies pain while resting in bed. Tylenol PRN for pain. Up to chair with total lift. WBAT RLE. Voids freely, incontinent with external cath in place. Small BM overnight. Bilateral SCDs. Aquacel dressings C/D/I. Ice packs applied. Spouse updated on POC. Bed alarm on w/bed in lowest position. Pt reminded to use call light. Will continue to monitor.

## 2022-09-16 NOTE — PLAN OF CARE
Pt alert to self. Hx dementia. On room air. Tele and  monitoring maintained. Scds to BLE. Tolerating diet with fair appetite. Had small BM today. Incontinent of both bowel and bladder. Brief in place. Pain managed with oral medications. Pt reminded to \"call; don't fall\". Bed/chair alarms on. Up to chair using total lift equipment. Right hip aquacel silver drsg x4 with small amount old drainage noted. Tolerating IV atbx as ordered. Pt ready for dc to [facility] today. Pt's spouse, Jamal Hercules, at bedside and updated with plan of care. Had a long discussion with pt's daughter, Mini Alonso via telephone updating her with plan of care as well.

## 2022-09-22 ENCOUNTER — INITIAL APN SNF VISIT (OUTPATIENT)
Dept: INTERNAL MEDICINE CLINIC | Age: 82
End: 2022-09-22

## 2022-09-22 DIAGNOSIS — K58.1 IRRITABLE BOWEL SYNDROME WITH CONSTIPATION: ICD-10-CM

## 2022-09-22 DIAGNOSIS — I10 ESSENTIAL HYPERTENSION: ICD-10-CM

## 2022-09-22 DIAGNOSIS — R53.1 WEAKNESS GENERALIZED: ICD-10-CM

## 2022-09-22 DIAGNOSIS — R10.30 LOWER ABDOMINAL PAIN: ICD-10-CM

## 2022-09-22 DIAGNOSIS — G30.1 LATE ONSET ALZHEIMER'S DEMENTIA WITH BEHAVIORAL DISTURBANCE (HCC): ICD-10-CM

## 2022-09-22 DIAGNOSIS — Z87.81 S/P RIGHT HIP FRACTURE: Primary | ICD-10-CM

## 2022-09-22 DIAGNOSIS — F02.81 LATE ONSET ALZHEIMER'S DEMENTIA WITH BEHAVIORAL DISTURBANCE (HCC): ICD-10-CM

## 2022-09-22 DIAGNOSIS — Z87.81 STATUS POST OPEN REDUCTION AND INTERNAL FIXATION (ORIF) OF FRACTURE: ICD-10-CM

## 2022-09-22 DIAGNOSIS — Z98.890 STATUS POST OPEN REDUCTION AND INTERNAL FIXATION (ORIF) OF FRACTURE: ICD-10-CM

## 2022-09-22 DIAGNOSIS — M81.0 AGE-RELATED OSTEOPOROSIS WITHOUT CURRENT PATHOLOGICAL FRACTURE: ICD-10-CM

## 2022-09-22 DIAGNOSIS — F34.1 DYSTHYMIC DISORDER: ICD-10-CM

## 2022-09-22 DIAGNOSIS — D50.9 IRON DEFICIENCY ANEMIA, UNSPECIFIED IRON DEFICIENCY ANEMIA TYPE: ICD-10-CM

## 2022-09-22 DIAGNOSIS — K21.00 GASTROESOPHAGEAL REFLUX DISEASE WITH ESOPHAGITIS WITHOUT HEMORRHAGE: ICD-10-CM

## 2022-09-22 PROCEDURE — 99310 SBSQ NF CARE HIGH MDM 45: CPT | Performed by: NURSE PRACTITIONER

## 2022-09-22 PROCEDURE — 1124F ACP DISCUSS-NO DSCNMKR DOCD: CPT | Performed by: NURSE PRACTITIONER

## 2022-09-22 PROCEDURE — 1111F DSCHRG MED/CURRENT MED MERGE: CPT | Performed by: NURSE PRACTITIONER

## 2022-09-22 RX ORDER — TRAMADOL HYDROCHLORIDE 50 MG/1
50 TABLET ORAL EVERY 12 HOURS PRN
Qty: 30 TABLET | Refills: 0 | Status: SHIPPED | OUTPATIENT
Start: 2022-09-22

## 2022-09-23 VITALS
TEMPERATURE: 98 F | WEIGHT: 154.81 LBS | SYSTOLIC BLOOD PRESSURE: 137 MMHG | BODY MASS INDEX: 27 KG/M2 | DIASTOLIC BLOOD PRESSURE: 80 MMHG | RESPIRATION RATE: 16 BRPM | OXYGEN SATURATION: 100 % | HEART RATE: 87 BPM

## 2022-09-23 RX ORDER — POLYETHYLENE GLYCOL 3350 17 G/17G
17 POWDER, FOR SOLUTION ORAL DAILY PRN
COMMUNITY

## 2022-09-23 RX ORDER — SENNOSIDES 8.6 MG
8.6 TABLET ORAL DAILY
COMMUNITY

## 2022-09-23 RX ORDER — DOCUSATE SODIUM 100 MG/1
100 CAPSULE, LIQUID FILLED ORAL 2 TIMES DAILY
COMMUNITY

## 2022-09-24 ENCOUNTER — NURSE ONLY (OUTPATIENT)
Dept: LAB | Age: 82
End: 2022-09-24
Attending: FAMILY MEDICINE

## 2022-09-24 DIAGNOSIS — Z00.00 EXAMINATION: Primary | ICD-10-CM

## 2022-09-24 PROCEDURE — 82272 OCCULT BLD FECES 1-3 TESTS: CPT

## 2022-09-28 ENCOUNTER — SNF VISIT (OUTPATIENT)
Dept: INTERNAL MEDICINE CLINIC | Age: 82
End: 2022-09-28

## 2022-09-28 VITALS
OXYGEN SATURATION: 95 % | RESPIRATION RATE: 18 BRPM | DIASTOLIC BLOOD PRESSURE: 79 MMHG | BODY MASS INDEX: 27 KG/M2 | TEMPERATURE: 99 F | HEART RATE: 89 BPM | WEIGHT: 154.81 LBS | SYSTOLIC BLOOD PRESSURE: 139 MMHG

## 2022-09-28 DIAGNOSIS — R19.5 FECAL OCCULT BLOOD TEST POSITIVE: ICD-10-CM

## 2022-09-28 DIAGNOSIS — Z87.81 STATUS POST OPEN REDUCTION AND INTERNAL FIXATION (ORIF) OF FRACTURE: ICD-10-CM

## 2022-09-28 DIAGNOSIS — I10 ESSENTIAL HYPERTENSION: ICD-10-CM

## 2022-09-28 DIAGNOSIS — Z98.890 STATUS POST OPEN REDUCTION AND INTERNAL FIXATION (ORIF) OF FRACTURE: ICD-10-CM

## 2022-09-28 DIAGNOSIS — K58.1 IRRITABLE BOWEL SYNDROME WITH CONSTIPATION: ICD-10-CM

## 2022-09-28 DIAGNOSIS — F02.818 LATE ONSET ALZHEIMER'S DEMENTIA WITH BEHAVIORAL DISTURBANCE (HCC): ICD-10-CM

## 2022-09-28 DIAGNOSIS — K21.00 GASTROESOPHAGEAL REFLUX DISEASE WITH ESOPHAGITIS WITHOUT HEMORRHAGE: ICD-10-CM

## 2022-09-28 DIAGNOSIS — Z87.81 S/P RIGHT HIP FRACTURE: Primary | ICD-10-CM

## 2022-09-28 DIAGNOSIS — G30.1 LATE ONSET ALZHEIMER'S DEMENTIA WITH BEHAVIORAL DISTURBANCE (HCC): ICD-10-CM

## 2022-09-28 DIAGNOSIS — R53.1 WEAKNESS GENERALIZED: ICD-10-CM

## 2022-09-28 PROCEDURE — 1111F DSCHRG MED/CURRENT MED MERGE: CPT | Performed by: NURSE PRACTITIONER

## 2022-09-28 PROCEDURE — 99309 SBSQ NF CARE MODERATE MDM 30: CPT | Performed by: NURSE PRACTITIONER

## 2022-10-06 ENCOUNTER — SNF VISIT (OUTPATIENT)
Dept: INTERNAL MEDICINE CLINIC | Age: 82
End: 2022-10-06

## 2022-10-06 VITALS
DIASTOLIC BLOOD PRESSURE: 86 MMHG | HEART RATE: 88 BPM | RESPIRATION RATE: 16 BRPM | BODY MASS INDEX: 26 KG/M2 | OXYGEN SATURATION: 96 % | WEIGHT: 149.19 LBS | TEMPERATURE: 98 F | SYSTOLIC BLOOD PRESSURE: 136 MMHG

## 2022-10-06 DIAGNOSIS — R53.1 WEAKNESS GENERALIZED: ICD-10-CM

## 2022-10-06 DIAGNOSIS — Z78.9 DECREASED ACTIVITIES OF DAILY LIVING (ADL): ICD-10-CM

## 2022-10-06 DIAGNOSIS — F02.818 LATE ONSET ALZHEIMER'S DEMENTIA WITH BEHAVIORAL DISTURBANCE (HCC): ICD-10-CM

## 2022-10-06 DIAGNOSIS — Z87.81 STATUS POST OPEN REDUCTION AND INTERNAL FIXATION (ORIF) OF FRACTURE: ICD-10-CM

## 2022-10-06 DIAGNOSIS — G30.1 LATE ONSET ALZHEIMER'S DEMENTIA WITH BEHAVIORAL DISTURBANCE (HCC): ICD-10-CM

## 2022-10-06 DIAGNOSIS — D50.0 IRON DEFICIENCY ANEMIA DUE TO CHRONIC BLOOD LOSS: ICD-10-CM

## 2022-10-06 DIAGNOSIS — I10 ESSENTIAL HYPERTENSION: ICD-10-CM

## 2022-10-06 DIAGNOSIS — Z87.81 S/P RIGHT HIP FRACTURE: Primary | ICD-10-CM

## 2022-10-06 DIAGNOSIS — Z98.890 STATUS POST OPEN REDUCTION AND INTERNAL FIXATION (ORIF) OF FRACTURE: ICD-10-CM

## 2022-10-06 PROCEDURE — 99309 SBSQ NF CARE MODERATE MDM 30: CPT | Performed by: NURSE PRACTITIONER

## 2022-10-06 PROCEDURE — 1111F DSCHRG MED/CURRENT MED MERGE: CPT | Performed by: NURSE PRACTITIONER

## 2022-10-08 ENCOUNTER — LAB REQUISITION (OUTPATIENT)
Dept: LAB | Facility: HOSPITAL | Age: 82
End: 2022-10-08
Attending: FAMILY MEDICINE
Payer: MEDICARE

## 2022-10-08 DIAGNOSIS — D64.9 ANEMIA, UNSPECIFIED: ICD-10-CM

## 2022-10-08 LAB
BASOPHILS # BLD AUTO: 0.03 X10(3) UL (ref 0–0.2)
BASOPHILS NFR BLD AUTO: 0.7 %
EOSINOPHIL # BLD AUTO: 0.23 X10(3) UL (ref 0–0.7)
EOSINOPHIL NFR BLD AUTO: 5.4 %
ERYTHROCYTE [DISTWIDTH] IN BLOOD BY AUTOMATED COUNT: 17.2 %
HCT VFR BLD AUTO: 27.1 %
HGB BLD-MCNC: 7.8 G/DL
IMM GRANULOCYTES # BLD AUTO: 0.02 X10(3) UL (ref 0–1)
IMM GRANULOCYTES NFR BLD: 0.5 %
LYMPHOCYTES # BLD AUTO: 1.26 X10(3) UL (ref 1–4)
LYMPHOCYTES NFR BLD AUTO: 29.4 %
MCH RBC QN AUTO: 27.7 PG (ref 26–34)
MCHC RBC AUTO-ENTMCNC: 28.8 G/DL (ref 31–37)
MCV RBC AUTO: 96.1 FL
MONOCYTES # BLD AUTO: 0.39 X10(3) UL (ref 0.1–1)
MONOCYTES NFR BLD AUTO: 9.1 %
NEUTROPHILS # BLD AUTO: 2.36 X10 (3) UL (ref 1.5–7.7)
NEUTROPHILS # BLD AUTO: 2.36 X10(3) UL (ref 1.5–7.7)
NEUTROPHILS NFR BLD AUTO: 54.9 %
PLATELET # BLD AUTO: 191 10(3)UL (ref 150–450)
RBC # BLD AUTO: 2.82 X10(6)UL
WBC # BLD AUTO: 4.3 X10(3) UL (ref 4–11)

## 2022-10-08 PROCEDURE — 85025 COMPLETE CBC W/AUTO DIFF WBC: CPT | Performed by: FAMILY MEDICINE

## 2022-10-09 ENCOUNTER — LAB REQUISITION (OUTPATIENT)
Dept: LAB | Facility: HOSPITAL | Age: 82
End: 2022-10-09
Attending: FAMILY MEDICINE
Payer: MEDICARE

## 2022-10-09 DIAGNOSIS — M62.81 MUSCLE WEAKNESS (GENERALIZED): ICD-10-CM

## 2022-10-09 LAB
ALBUMIN SERPL-MCNC: 2.6 G/DL (ref 3.4–5)
ALBUMIN/GLOB SERPL: 0.8 {RATIO} (ref 1–2)
ALP LIVER SERPL-CCNC: 119 U/L
ALT SERPL-CCNC: 11 U/L
ANION GAP SERPL CALC-SCNC: 5 MMOL/L (ref 0–18)
AST SERPL-CCNC: 16 U/L (ref 15–37)
BASOPHILS # BLD AUTO: 0.02 X10(3) UL (ref 0–0.2)
BASOPHILS NFR BLD AUTO: 0.4 %
BILIRUB SERPL-MCNC: 0.3 MG/DL (ref 0.1–2)
BUN BLD-MCNC: 16 MG/DL (ref 7–18)
CALCIUM BLD-MCNC: 9 MG/DL (ref 8.5–10.1)
CHLORIDE SERPL-SCNC: 108 MMOL/L (ref 98–112)
CO2 SERPL-SCNC: 30 MMOL/L (ref 21–32)
CREAT BLD-MCNC: 0.84 MG/DL
EOSINOPHIL # BLD AUTO: 0.17 X10(3) UL (ref 0–0.7)
EOSINOPHIL NFR BLD AUTO: 3.3 %
ERYTHROCYTE [DISTWIDTH] IN BLOOD BY AUTOMATED COUNT: 17 %
GFR SERPLBLD BASED ON 1.73 SQ M-ARVRAT: 68 ML/MIN/1.73M2 (ref 60–?)
GLOBULIN PLAS-MCNC: 3.3 G/DL (ref 2.8–4.4)
GLUCOSE BLD-MCNC: 110 MG/DL (ref 70–99)
HCT VFR BLD AUTO: 28.1 %
HGB BLD-MCNC: 8.1 G/DL
IMM GRANULOCYTES # BLD AUTO: 0.02 X10(3) UL (ref 0–1)
IMM GRANULOCYTES NFR BLD: 0.4 %
LYMPHOCYTES # BLD AUTO: 1.13 X10(3) UL (ref 1–4)
LYMPHOCYTES NFR BLD AUTO: 22.1 %
MCH RBC QN AUTO: 27.8 PG (ref 26–34)
MCHC RBC AUTO-ENTMCNC: 28.8 G/DL (ref 31–37)
MCV RBC AUTO: 96.6 FL
MONOCYTES # BLD AUTO: 0.35 X10(3) UL (ref 0.1–1)
MONOCYTES NFR BLD AUTO: 6.8 %
NEUTROPHILS # BLD AUTO: 3.42 X10 (3) UL (ref 1.5–7.7)
NEUTROPHILS # BLD AUTO: 3.42 X10(3) UL (ref 1.5–7.7)
NEUTROPHILS NFR BLD AUTO: 67 %
OSMOLALITY SERPL CALC.SUM OF ELEC: 298 MOSM/KG (ref 275–295)
PLATELET # BLD AUTO: 211 10(3)UL (ref 150–450)
POTASSIUM SERPL-SCNC: 3.2 MMOL/L (ref 3.5–5.1)
PROT SERPL-MCNC: 5.9 G/DL (ref 6.4–8.2)
RBC # BLD AUTO: 2.91 X10(6)UL
SODIUM SERPL-SCNC: 143 MMOL/L (ref 136–145)
WBC # BLD AUTO: 5.1 X10(3) UL (ref 4–11)

## 2022-10-09 PROCEDURE — 85025 COMPLETE CBC W/AUTO DIFF WBC: CPT | Performed by: FAMILY MEDICINE

## 2022-10-09 PROCEDURE — 80053 COMPREHEN METABOLIC PANEL: CPT | Performed by: FAMILY MEDICINE

## 2022-10-11 ENCOUNTER — SNF VISIT (OUTPATIENT)
Dept: INTERNAL MEDICINE CLINIC | Age: 82
End: 2022-10-11

## 2022-10-11 VITALS
OXYGEN SATURATION: 99 % | RESPIRATION RATE: 14 BRPM | DIASTOLIC BLOOD PRESSURE: 94 MMHG | SYSTOLIC BLOOD PRESSURE: 140 MMHG | TEMPERATURE: 98 F | HEART RATE: 99 BPM

## 2022-10-11 DIAGNOSIS — Z87.81 STATUS POST OPEN REDUCTION AND INTERNAL FIXATION (ORIF) OF FRACTURE: ICD-10-CM

## 2022-10-11 DIAGNOSIS — R60.0 LOCALIZED EDEMA: ICD-10-CM

## 2022-10-11 DIAGNOSIS — F40.298 FEAR OF FALLING: ICD-10-CM

## 2022-10-11 DIAGNOSIS — F41.9 ANXIETY: ICD-10-CM

## 2022-10-11 DIAGNOSIS — Z87.81 S/P RIGHT HIP FRACTURE: Primary | ICD-10-CM

## 2022-10-11 DIAGNOSIS — Z98.890 STATUS POST OPEN REDUCTION AND INTERNAL FIXATION (ORIF) OF FRACTURE: ICD-10-CM

## 2022-10-11 PROCEDURE — 99309 SBSQ NF CARE MODERATE MDM 30: CPT | Performed by: NURSE PRACTITIONER

## 2022-10-11 PROCEDURE — 1111F DSCHRG MED/CURRENT MED MERGE: CPT | Performed by: NURSE PRACTITIONER

## 2022-10-11 RX ORDER — ALPRAZOLAM 0.25 MG/1
0.25 TABLET ORAL
COMMUNITY
Start: 2022-10-12 | End: 2022-10-16

## 2022-10-12 ENCOUNTER — LAB REQUISITION (OUTPATIENT)
Dept: LAB | Facility: HOSPITAL | Age: 82
End: 2022-10-12
Attending: FAMILY MEDICINE
Payer: MEDICARE

## 2022-10-12 DIAGNOSIS — S72.141D DISPLACED INTERTROCHANTERIC FRACTURE OF RIGHT FEMUR, SUBSEQUENT ENCOUNTER FOR CLOSED FRACTURE WITH ROUTINE HEALING: ICD-10-CM

## 2022-10-12 LAB
ALBUMIN SERPL-MCNC: 2.6 G/DL (ref 3.4–5)
ALBUMIN/GLOB SERPL: 0.8 {RATIO} (ref 1–2)
ALP LIVER SERPL-CCNC: 110 U/L
ALT SERPL-CCNC: 10 U/L
ANION GAP SERPL CALC-SCNC: 6 MMOL/L (ref 0–18)
AST SERPL-CCNC: 12 U/L (ref 15–37)
BASOPHILS # BLD AUTO: 0.02 X10(3) UL (ref 0–0.2)
BASOPHILS NFR BLD AUTO: 0.5 %
BILIRUB SERPL-MCNC: 0.2 MG/DL (ref 0.1–2)
BUN BLD-MCNC: 15 MG/DL (ref 7–18)
CALCIUM BLD-MCNC: 9.2 MG/DL (ref 8.5–10.1)
CHLORIDE SERPL-SCNC: 111 MMOL/L (ref 98–112)
CO2 SERPL-SCNC: 28 MMOL/L (ref 21–32)
CREAT BLD-MCNC: 0.69 MG/DL
EOSINOPHIL # BLD AUTO: 0.2 X10(3) UL (ref 0–0.7)
EOSINOPHIL NFR BLD AUTO: 4.6 %
ERYTHROCYTE [DISTWIDTH] IN BLOOD BY AUTOMATED COUNT: 17.3 %
GFR SERPLBLD BASED ON 1.73 SQ M-ARVRAT: 86 ML/MIN/1.73M2 (ref 60–?)
GLOBULIN PLAS-MCNC: 3.4 G/DL (ref 2.8–4.4)
GLUCOSE BLD-MCNC: 96 MG/DL (ref 70–99)
HCT VFR BLD AUTO: 30.1 %
HGB BLD-MCNC: 8.6 G/DL
IMM GRANULOCYTES # BLD AUTO: 0.02 X10(3) UL (ref 0–1)
IMM GRANULOCYTES NFR BLD: 0.5 %
LYMPHOCYTES # BLD AUTO: 0.93 X10(3) UL (ref 1–4)
LYMPHOCYTES NFR BLD AUTO: 21.5 %
MCH RBC QN AUTO: 28 PG (ref 26–34)
MCHC RBC AUTO-ENTMCNC: 28.6 G/DL (ref 31–37)
MCV RBC AUTO: 98 FL
MONOCYTES # BLD AUTO: 0.41 X10(3) UL (ref 0.1–1)
MONOCYTES NFR BLD AUTO: 9.5 %
NEUTROPHILS # BLD AUTO: 2.75 X10 (3) UL (ref 1.5–7.7)
NEUTROPHILS # BLD AUTO: 2.75 X10(3) UL (ref 1.5–7.7)
NEUTROPHILS NFR BLD AUTO: 63.4 %
OSMOLALITY SERPL CALC.SUM OF ELEC: 301 MOSM/KG (ref 275–295)
PLATELET # BLD AUTO: 221 10(3)UL (ref 150–450)
POTASSIUM SERPL-SCNC: 3.7 MMOL/L (ref 3.5–5.1)
PROT SERPL-MCNC: 6 G/DL (ref 6.4–8.2)
RBC # BLD AUTO: 3.07 X10(6)UL
SODIUM SERPL-SCNC: 145 MMOL/L (ref 136–145)
WBC # BLD AUTO: 4.3 X10(3) UL (ref 4–11)

## 2022-10-12 PROCEDURE — 80053 COMPREHEN METABOLIC PANEL: CPT | Performed by: FAMILY MEDICINE

## 2022-10-12 PROCEDURE — 85025 COMPLETE CBC W/AUTO DIFF WBC: CPT | Performed by: FAMILY MEDICINE

## 2022-10-13 ENCOUNTER — LAB REQUISITION (OUTPATIENT)
Dept: LAB | Facility: HOSPITAL | Age: 82
End: 2022-10-13
Attending: NURSE PRACTITIONER
Payer: MEDICARE

## 2022-10-13 DIAGNOSIS — I10 ESSENTIAL (PRIMARY) HYPERTENSION: ICD-10-CM

## 2022-10-13 LAB
ALBUMIN SERPL-MCNC: 2.6 G/DL (ref 3.4–5)
ALBUMIN/GLOB SERPL: 0.8 {RATIO} (ref 1–2)
ALP LIVER SERPL-CCNC: 107 U/L
ALT SERPL-CCNC: 11 U/L
ANION GAP SERPL CALC-SCNC: 7 MMOL/L (ref 0–18)
AST SERPL-CCNC: 11 U/L (ref 15–37)
BASOPHILS # BLD AUTO: 0.02 X10(3) UL (ref 0–0.2)
BASOPHILS NFR BLD AUTO: 0.6 %
BILIRUB SERPL-MCNC: 0.3 MG/DL (ref 0.1–2)
BUN BLD-MCNC: 11 MG/DL (ref 7–18)
CALCIUM BLD-MCNC: 9 MG/DL (ref 8.5–10.1)
CHLORIDE SERPL-SCNC: 111 MMOL/L (ref 98–112)
CO2 SERPL-SCNC: 27 MMOL/L (ref 21–32)
CREAT BLD-MCNC: 0.63 MG/DL
EOSINOPHIL # BLD AUTO: 0.18 X10(3) UL (ref 0–0.7)
EOSINOPHIL NFR BLD AUTO: 5.3 %
ERYTHROCYTE [DISTWIDTH] IN BLOOD BY AUTOMATED COUNT: 17.5 %
GFR SERPLBLD BASED ON 1.73 SQ M-ARVRAT: 87 ML/MIN/1.73M2 (ref 60–?)
GLOBULIN PLAS-MCNC: 3.4 G/DL (ref 2.8–4.4)
GLUCOSE BLD-MCNC: 90 MG/DL (ref 70–99)
HCT VFR BLD AUTO: 28.6 %
HGB BLD-MCNC: 8.4 G/DL
IMM GRANULOCYTES # BLD AUTO: 0.01 X10(3) UL (ref 0–1)
IMM GRANULOCYTES NFR BLD: 0.3 %
LYMPHOCYTES # BLD AUTO: 0.91 X10(3) UL (ref 1–4)
LYMPHOCYTES NFR BLD AUTO: 26.8 %
MCH RBC QN AUTO: 28 PG (ref 26–34)
MCHC RBC AUTO-ENTMCNC: 29.4 G/DL (ref 31–37)
MCV RBC AUTO: 95.3 FL
MONOCYTES # BLD AUTO: 0.3 X10(3) UL (ref 0.1–1)
MONOCYTES NFR BLD AUTO: 8.8 %
NEUTROPHILS # BLD AUTO: 1.97 X10 (3) UL (ref 1.5–7.7)
NEUTROPHILS # BLD AUTO: 1.97 X10(3) UL (ref 1.5–7.7)
NEUTROPHILS NFR BLD AUTO: 58.2 %
OSMOLALITY SERPL CALC.SUM OF ELEC: 299 MOSM/KG (ref 275–295)
PLATELET # BLD AUTO: 209 10(3)UL (ref 150–450)
POTASSIUM SERPL-SCNC: 3.4 MMOL/L (ref 3.5–5.1)
PROT SERPL-MCNC: 6 G/DL (ref 6.4–8.2)
RBC # BLD AUTO: 3 X10(6)UL
SODIUM SERPL-SCNC: 145 MMOL/L (ref 136–145)
WBC # BLD AUTO: 3.4 X10(3) UL (ref 4–11)

## 2022-10-13 PROCEDURE — 85025 COMPLETE CBC W/AUTO DIFF WBC: CPT | Performed by: NURSE PRACTITIONER

## 2022-10-13 PROCEDURE — 80053 COMPREHEN METABOLIC PANEL: CPT | Performed by: NURSE PRACTITIONER

## 2022-10-14 ENCOUNTER — LAB REQUISITION (OUTPATIENT)
Dept: LAB | Facility: HOSPITAL | Age: 82
End: 2022-10-14
Attending: FAMILY MEDICINE
Payer: MEDICARE

## 2022-10-14 DIAGNOSIS — S72.141D DISPLACED INTERTROCHANTERIC FRACTURE OF RIGHT FEMUR, SUBSEQUENT ENCOUNTER FOR CLOSED FRACTURE WITH ROUTINE HEALING: ICD-10-CM

## 2022-10-14 LAB
ALBUMIN SERPL-MCNC: 2.6 G/DL (ref 3.4–5)
ALBUMIN/GLOB SERPL: 0.8 {RATIO} (ref 1–2)
ALP LIVER SERPL-CCNC: 108 U/L
ALT SERPL-CCNC: 10 U/L
ANION GAP SERPL CALC-SCNC: 3 MMOL/L (ref 0–18)
AST SERPL-CCNC: 16 U/L (ref 15–37)
BASOPHILS # BLD AUTO: 0.02 X10(3) UL (ref 0–0.2)
BASOPHILS NFR BLD AUTO: 0.6 %
BILIRUB SERPL-MCNC: 0.3 MG/DL (ref 0.1–2)
BUN BLD-MCNC: 16 MG/DL (ref 7–18)
CALCIUM BLD-MCNC: 9.1 MG/DL (ref 8.5–10.1)
CHLORIDE SERPL-SCNC: 108 MMOL/L (ref 98–112)
CO2 SERPL-SCNC: 31 MMOL/L (ref 21–32)
CREAT BLD-MCNC: 0.91 MG/DL
EOSINOPHIL # BLD AUTO: 0.21 X10(3) UL (ref 0–0.7)
EOSINOPHIL NFR BLD AUTO: 6.1 %
ERYTHROCYTE [DISTWIDTH] IN BLOOD BY AUTOMATED COUNT: 17.3 %
GFR SERPLBLD BASED ON 1.73 SQ M-ARVRAT: 62 ML/MIN/1.73M2 (ref 60–?)
GLOBULIN PLAS-MCNC: 3.4 G/DL (ref 2.8–4.4)
GLUCOSE BLD-MCNC: 96 MG/DL (ref 70–99)
HCT VFR BLD AUTO: 29.1 %
HGB BLD-MCNC: 8.5 G/DL
IMM GRANULOCYTES # BLD AUTO: 0.01 X10(3) UL (ref 0–1)
IMM GRANULOCYTES NFR BLD: 0.3 %
LYMPHOCYTES # BLD AUTO: 1.17 X10(3) UL (ref 1–4)
LYMPHOCYTES NFR BLD AUTO: 33.9 %
MCH RBC QN AUTO: 27.6 PG (ref 26–34)
MCHC RBC AUTO-ENTMCNC: 29.2 G/DL (ref 31–37)
MCV RBC AUTO: 94.5 FL
MONOCYTES # BLD AUTO: 0.36 X10(3) UL (ref 0.1–1)
MONOCYTES NFR BLD AUTO: 10.4 %
NEUTROPHILS # BLD AUTO: 1.68 X10 (3) UL (ref 1.5–7.7)
NEUTROPHILS # BLD AUTO: 1.68 X10(3) UL (ref 1.5–7.7)
NEUTROPHILS NFR BLD AUTO: 48.7 %
OSMOLALITY SERPL CALC.SUM OF ELEC: 295 MOSM/KG (ref 275–295)
PLATELET # BLD AUTO: 214 10(3)UL (ref 150–450)
POTASSIUM SERPL-SCNC: 3.7 MMOL/L (ref 3.5–5.1)
PROT SERPL-MCNC: 6 G/DL (ref 6.4–8.2)
RBC # BLD AUTO: 3.08 X10(6)UL
SODIUM SERPL-SCNC: 142 MMOL/L (ref 136–145)
WBC # BLD AUTO: 3.5 X10(3) UL (ref 4–11)

## 2022-10-14 PROCEDURE — 85025 COMPLETE CBC W/AUTO DIFF WBC: CPT | Performed by: FAMILY MEDICINE

## 2022-10-14 PROCEDURE — 80053 COMPREHEN METABOLIC PANEL: CPT | Performed by: FAMILY MEDICINE

## 2022-10-17 ENCOUNTER — LAB REQUISITION (OUTPATIENT)
Dept: LAB | Facility: HOSPITAL | Age: 82
End: 2022-10-17
Attending: FAMILY MEDICINE
Payer: MEDICARE

## 2022-10-17 DIAGNOSIS — M62.81 MUSCLE WEAKNESS (GENERALIZED): ICD-10-CM

## 2022-10-17 LAB
ANION GAP SERPL CALC-SCNC: 7 MMOL/L (ref 0–18)
BUN BLD-MCNC: 15 MG/DL (ref 7–18)
CALCIUM BLD-MCNC: 9 MG/DL (ref 8.5–10.1)
CHLORIDE SERPL-SCNC: 109 MMOL/L (ref 98–112)
CO2 SERPL-SCNC: 28 MMOL/L (ref 21–32)
CREAT BLD-MCNC: 0.76 MG/DL
GFR SERPLBLD BASED ON 1.73 SQ M-ARVRAT: 77 ML/MIN/1.73M2 (ref 60–?)
GLUCOSE BLD-MCNC: 110 MG/DL (ref 70–99)
OSMOLALITY SERPL CALC.SUM OF ELEC: 299 MOSM/KG (ref 275–295)
POTASSIUM SERPL-SCNC: 4 MMOL/L (ref 3.5–5.1)
SODIUM SERPL-SCNC: 144 MMOL/L (ref 136–145)

## 2022-10-17 PROCEDURE — 80048 BASIC METABOLIC PNL TOTAL CA: CPT | Performed by: FAMILY MEDICINE

## 2022-10-19 ENCOUNTER — SNF VISIT (OUTPATIENT)
Dept: INTERNAL MEDICINE CLINIC | Age: 82
End: 2022-10-19

## 2022-10-19 VITALS
DIASTOLIC BLOOD PRESSURE: 75 MMHG | RESPIRATION RATE: 18 BRPM | SYSTOLIC BLOOD PRESSURE: 146 MMHG | BODY MASS INDEX: 25 KG/M2 | WEIGHT: 148.19 LBS | TEMPERATURE: 98 F | HEART RATE: 84 BPM | OXYGEN SATURATION: 96 %

## 2022-10-19 DIAGNOSIS — R53.1 WEAKNESS GENERALIZED: ICD-10-CM

## 2022-10-19 DIAGNOSIS — G30.1 LATE ONSET ALZHEIMER'S DEMENTIA WITH BEHAVIORAL DISTURBANCE (HCC): ICD-10-CM

## 2022-10-19 DIAGNOSIS — F02.818 LATE ONSET ALZHEIMER'S DEMENTIA WITH BEHAVIORAL DISTURBANCE (HCC): ICD-10-CM

## 2022-10-19 DIAGNOSIS — Z87.81 S/P RIGHT HIP FRACTURE: Primary | ICD-10-CM

## 2022-10-19 DIAGNOSIS — D50.0 IRON DEFICIENCY ANEMIA DUE TO CHRONIC BLOOD LOSS: ICD-10-CM

## 2022-10-19 DIAGNOSIS — Z78.9 DECREASED ACTIVITIES OF DAILY LIVING (ADL): ICD-10-CM

## 2022-10-19 DIAGNOSIS — I10 ESSENTIAL HYPERTENSION: ICD-10-CM

## 2022-10-19 PROCEDURE — 99316 NF DSCHRG MGMT 30 MIN+: CPT | Performed by: NURSE PRACTITIONER

## 2022-10-20 ENCOUNTER — LAB REQUISITION (OUTPATIENT)
Dept: LAB | Facility: HOSPITAL | Age: 82
End: 2022-10-20
Attending: FAMILY MEDICINE
Payer: MEDICARE

## 2022-10-20 DIAGNOSIS — S72.141D DISPLACED INTERTROCHANTERIC FRACTURE OF RIGHT FEMUR, SUBSEQUENT ENCOUNTER FOR CLOSED FRACTURE WITH ROUTINE HEALING: ICD-10-CM

## 2022-10-20 LAB
ALBUMIN SERPL-MCNC: 2.6 G/DL (ref 3.4–5)
ALBUMIN/GLOB SERPL: 0.7 {RATIO} (ref 1–2)
ALP LIVER SERPL-CCNC: 92 U/L
ALT SERPL-CCNC: 11 U/L
ANION GAP SERPL CALC-SCNC: 4 MMOL/L (ref 0–18)
AST SERPL-CCNC: 12 U/L (ref 15–37)
BASOPHILS # BLD AUTO: 0.01 X10(3) UL (ref 0–0.2)
BASOPHILS NFR BLD AUTO: 0.3 %
BILIRUB SERPL-MCNC: 0.2 MG/DL (ref 0.1–2)
BUN BLD-MCNC: 16 MG/DL (ref 7–18)
CALCIUM BLD-MCNC: 9 MG/DL (ref 8.5–10.1)
CHLORIDE SERPL-SCNC: 111 MMOL/L (ref 98–112)
CO2 SERPL-SCNC: 28 MMOL/L (ref 21–32)
CREAT BLD-MCNC: 0.76 MG/DL
EOSINOPHIL # BLD AUTO: 0.18 X10(3) UL (ref 0–0.7)
EOSINOPHIL NFR BLD AUTO: 5.8 %
ERYTHROCYTE [DISTWIDTH] IN BLOOD BY AUTOMATED COUNT: 17.2 %
GFR SERPLBLD BASED ON 1.73 SQ M-ARVRAT: 77 ML/MIN/1.73M2 (ref 60–?)
GLOBULIN PLAS-MCNC: 3.5 G/DL (ref 2.8–4.4)
GLUCOSE BLD-MCNC: 97 MG/DL (ref 70–99)
HCT VFR BLD AUTO: 28.5 %
HGB BLD-MCNC: 8.4 G/DL
IMM GRANULOCYTES # BLD AUTO: 0.01 X10(3) UL (ref 0–1)
IMM GRANULOCYTES NFR BLD: 0.3 %
LYMPHOCYTES # BLD AUTO: 1.08 X10(3) UL (ref 1–4)
LYMPHOCYTES NFR BLD AUTO: 34.8 %
MCH RBC QN AUTO: 28.2 PG (ref 26–34)
MCHC RBC AUTO-ENTMCNC: 29.5 G/DL (ref 31–37)
MCV RBC AUTO: 95.6 FL
MONOCYTES # BLD AUTO: 0.42 X10(3) UL (ref 0.1–1)
MONOCYTES NFR BLD AUTO: 13.5 %
NEUTROPHILS # BLD AUTO: 1.4 X10 (3) UL (ref 1.5–7.7)
NEUTROPHILS # BLD AUTO: 1.4 X10(3) UL (ref 1.5–7.7)
NEUTROPHILS NFR BLD AUTO: 45.3 %
OSMOLALITY SERPL CALC.SUM OF ELEC: 297 MOSM/KG (ref 275–295)
PLATELET # BLD AUTO: 241 10(3)UL (ref 150–450)
POTASSIUM SERPL-SCNC: 3.9 MMOL/L (ref 3.5–5.1)
PROT SERPL-MCNC: 6.1 G/DL (ref 6.4–8.2)
RBC # BLD AUTO: 2.98 X10(6)UL
SODIUM SERPL-SCNC: 143 MMOL/L (ref 136–145)
WBC # BLD AUTO: 3.1 X10(3) UL (ref 4–11)

## 2022-10-20 PROCEDURE — 85025 COMPLETE CBC W/AUTO DIFF WBC: CPT | Performed by: FAMILY MEDICINE

## 2022-10-20 PROCEDURE — 80053 COMPREHEN METABOLIC PANEL: CPT | Performed by: FAMILY MEDICINE

## 2022-10-25 ENCOUNTER — SNF DISCHARGE (OUTPATIENT)
Dept: INTERNAL MEDICINE CLINIC | Age: 82
End: 2022-10-25

## 2022-10-25 VITALS
OXYGEN SATURATION: 94 % | BODY MASS INDEX: 25 KG/M2 | RESPIRATION RATE: 18 BRPM | TEMPERATURE: 98 F | SYSTOLIC BLOOD PRESSURE: 154 MMHG | HEART RATE: 83 BPM | WEIGHT: 146.38 LBS | DIASTOLIC BLOOD PRESSURE: 80 MMHG

## 2022-10-25 DIAGNOSIS — G30.1 LATE ONSET ALZHEIMER'S DEMENTIA WITH BEHAVIORAL DISTURBANCE (HCC): ICD-10-CM

## 2022-10-25 DIAGNOSIS — Z78.9 DECREASED ACTIVITIES OF DAILY LIVING (ADL): ICD-10-CM

## 2022-10-25 DIAGNOSIS — R53.1 WEAKNESS GENERALIZED: ICD-10-CM

## 2022-10-25 DIAGNOSIS — D50.0 IRON DEFICIENCY ANEMIA DUE TO CHRONIC BLOOD LOSS: ICD-10-CM

## 2022-10-25 DIAGNOSIS — I10 ESSENTIAL HYPERTENSION: ICD-10-CM

## 2022-10-25 DIAGNOSIS — F02.818 LATE ONSET ALZHEIMER'S DEMENTIA WITH BEHAVIORAL DISTURBANCE (HCC): ICD-10-CM

## 2022-10-25 DIAGNOSIS — Z87.81 S/P RIGHT HIP FRACTURE: Primary | ICD-10-CM

## 2022-10-25 PROCEDURE — 99316 NF DSCHRG MGMT 30 MIN+: CPT | Performed by: NURSE PRACTITIONER

## 2022-10-27 ENCOUNTER — LAB REQUISITION (OUTPATIENT)
Dept: LAB | Facility: HOSPITAL | Age: 82
End: 2022-10-27
Attending: FAMILY MEDICINE
Payer: MEDICARE

## 2022-10-27 DIAGNOSIS — S72.141D DISPLACED INTERTROCHANTERIC FRACTURE OF RIGHT FEMUR, SUBSEQUENT ENCOUNTER FOR CLOSED FRACTURE WITH ROUTINE HEALING: ICD-10-CM

## 2022-10-27 LAB
ALBUMIN SERPL-MCNC: 2.9 G/DL (ref 3.4–5)
ALBUMIN/GLOB SERPL: 0.9 {RATIO} (ref 1–2)
ALP LIVER SERPL-CCNC: 92 U/L
ALT SERPL-CCNC: 9 U/L
ANION GAP SERPL CALC-SCNC: 5 MMOL/L (ref 0–18)
AST SERPL-CCNC: 13 U/L (ref 15–37)
BASOPHILS # BLD AUTO: 0.02 X10(3) UL (ref 0–0.2)
BASOPHILS NFR BLD AUTO: 0.6 %
BILIRUB SERPL-MCNC: 0.2 MG/DL (ref 0.1–2)
BUN BLD-MCNC: 17 MG/DL (ref 7–18)
CALCIUM BLD-MCNC: 8.6 MG/DL (ref 8.5–10.1)
CHLORIDE SERPL-SCNC: 110 MMOL/L (ref 98–112)
CO2 SERPL-SCNC: 26 MMOL/L (ref 21–32)
CREAT BLD-MCNC: 0.77 MG/DL
EOSINOPHIL # BLD AUTO: 0.12 X10(3) UL (ref 0–0.7)
EOSINOPHIL NFR BLD AUTO: 3.6 %
ERYTHROCYTE [DISTWIDTH] IN BLOOD BY AUTOMATED COUNT: 17 %
GFR SERPLBLD BASED ON 1.73 SQ M-ARVRAT: 76 ML/MIN/1.73M2 (ref 60–?)
GLOBULIN PLAS-MCNC: 3.2 G/DL (ref 2.8–4.4)
GLUCOSE BLD-MCNC: 110 MG/DL (ref 70–99)
HCT VFR BLD AUTO: 28.2 %
HGB BLD-MCNC: 8.2 G/DL
IMM GRANULOCYTES # BLD AUTO: 0.02 X10(3) UL (ref 0–1)
IMM GRANULOCYTES NFR BLD: 0.6 %
LYMPHOCYTES # BLD AUTO: 0.9 X10(3) UL (ref 1–4)
LYMPHOCYTES NFR BLD AUTO: 27.3 %
MCH RBC QN AUTO: 27.7 PG (ref 26–34)
MCHC RBC AUTO-ENTMCNC: 29.1 G/DL (ref 31–37)
MCV RBC AUTO: 95.3 FL
MONOCYTES # BLD AUTO: 0.28 X10(3) UL (ref 0.1–1)
MONOCYTES NFR BLD AUTO: 8.5 %
NEUTROPHILS # BLD AUTO: 1.96 X10 (3) UL (ref 1.5–7.7)
NEUTROPHILS # BLD AUTO: 1.96 X10(3) UL (ref 1.5–7.7)
NEUTROPHILS NFR BLD AUTO: 59.4 %
OSMOLALITY SERPL CALC.SUM OF ELEC: 294 MOSM/KG (ref 275–295)
PLATELET # BLD AUTO: 221 10(3)UL (ref 150–450)
POTASSIUM SERPL-SCNC: 4 MMOL/L (ref 3.5–5.1)
PROT SERPL-MCNC: 6.1 G/DL (ref 6.4–8.2)
RBC # BLD AUTO: 2.96 X10(6)UL
SODIUM SERPL-SCNC: 141 MMOL/L (ref 136–145)
WBC # BLD AUTO: 3.3 X10(3) UL (ref 4–11)

## 2022-10-27 PROCEDURE — 80053 COMPREHEN METABOLIC PANEL: CPT | Performed by: FAMILY MEDICINE

## 2022-10-27 PROCEDURE — 85025 COMPLETE CBC W/AUTO DIFF WBC: CPT | Performed by: FAMILY MEDICINE

## 2022-11-11 ENCOUNTER — LAB REQUISITION (OUTPATIENT)
Dept: LAB | Facility: HOSPITAL | Age: 82
End: 2022-11-11
Payer: MEDICARE

## 2022-11-11 DIAGNOSIS — I11.0 HYPERTENSIVE HEART DISEASE WITH HEART FAILURE (HCC): ICD-10-CM

## 2022-11-11 LAB
ANION GAP SERPL CALC-SCNC: 8 MMOL/L (ref 0–18)
BUN BLD-MCNC: 19 MG/DL (ref 7–18)
CALCIUM BLD-MCNC: 9 MG/DL (ref 8.5–10.1)
CHLORIDE SERPL-SCNC: 111 MMOL/L (ref 98–112)
CO2 SERPL-SCNC: 26 MMOL/L (ref 21–32)
CREAT BLD-MCNC: 0.86 MG/DL
GFR SERPLBLD BASED ON 1.73 SQ M-ARVRAT: 67 ML/MIN/1.73M2 (ref 60–?)
GLUCOSE BLD-MCNC: 111 MG/DL (ref 70–99)
NT-PROBNP SERPL-MCNC: 314 PG/ML (ref ?–450)
OSMOLALITY SERPL CALC.SUM OF ELEC: 303 MOSM/KG (ref 275–295)
POTASSIUM SERPL-SCNC: 4.3 MMOL/L (ref 3.5–5.1)
SODIUM SERPL-SCNC: 145 MMOL/L (ref 136–145)

## 2022-11-11 PROCEDURE — 83880 ASSAY OF NATRIURETIC PEPTIDE: CPT

## 2022-11-11 PROCEDURE — 80048 BASIC METABOLIC PNL TOTAL CA: CPT

## 2022-11-21 PROCEDURE — 87186 SC STD MICRODIL/AGAR DIL: CPT | Performed by: HOSPITALIST

## 2022-11-21 PROCEDURE — 87086 URINE CULTURE/COLONY COUNT: CPT | Performed by: HOSPITALIST

## 2022-11-21 PROCEDURE — 87077 CULTURE AEROBIC IDENTIFY: CPT | Performed by: HOSPITALIST

## 2022-11-21 PROCEDURE — 81015 MICROSCOPIC EXAM OF URINE: CPT | Performed by: HOSPITALIST

## 2022-11-21 PROCEDURE — 81001 URINALYSIS AUTO W/SCOPE: CPT | Performed by: HOSPITALIST

## 2022-11-22 ENCOUNTER — LAB REQUISITION (OUTPATIENT)
Dept: LAB | Facility: HOSPITAL | Age: 82
End: 2022-11-22
Payer: MEDICARE

## 2022-11-22 DIAGNOSIS — N39.0 URINARY TRACT INFECTION, SITE NOT SPECIFIED: ICD-10-CM

## 2022-11-22 LAB
BILIRUB UR QL STRIP.AUTO: NEGATIVE
CLARITY UR REFRACT.AUTO: CLEAR
COLOR UR AUTO: YELLOW
GLUCOSE UR STRIP.AUTO-MCNC: 500 MG/DL
KETONES UR STRIP.AUTO-MCNC: NEGATIVE MG/DL
NITRITE UR QL STRIP.AUTO: POSITIVE
PH UR STRIP.AUTO: 6 [PH] (ref 5–8)
PROT UR STRIP.AUTO-MCNC: NEGATIVE MG/DL
SP GR UR STRIP.AUTO: 1.02 (ref 1–1.03)
UROBILINOGEN UR STRIP.AUTO-MCNC: 0.2 MG/DL
WBC #/AREA URNS AUTO: >50 /HPF
WBC #/AREA URNS AUTO: >50 /HPF
WBC CLUMPS UR QL AUTO: PRESENT /HPF
WBC CLUMPS UR QL AUTO: PRESENT /HPF

## 2022-12-08 ENCOUNTER — LAB ENCOUNTER (OUTPATIENT)
Dept: LAB | Age: 82
End: 2022-12-08
Attending: HOSPITALIST
Payer: MEDICARE

## 2023-01-11 ENCOUNTER — APPOINTMENT (OUTPATIENT)
Dept: GENERAL RADIOLOGY | Facility: HOSPITAL | Age: 83
End: 2023-01-11
Attending: EMERGENCY MEDICINE
Payer: MEDICARE

## 2023-01-11 ENCOUNTER — HOSPITAL ENCOUNTER (INPATIENT)
Facility: HOSPITAL | Age: 83
LOS: 1 days | Discharge: HOME | End: 2023-01-11
Attending: EMERGENCY MEDICINE | Admitting: INTERNAL MEDICINE
Payer: MEDICARE

## 2023-01-11 ENCOUNTER — APPOINTMENT (OUTPATIENT)
Dept: CV DIAGNOSTICS | Facility: HOSPITAL | Age: 83
End: 2023-01-11
Attending: INTERNAL MEDICINE
Payer: MEDICARE

## 2023-01-11 ENCOUNTER — APPOINTMENT (OUTPATIENT)
Dept: CT IMAGING | Facility: HOSPITAL | Age: 83
End: 2023-01-11
Attending: INTERNAL MEDICINE
Payer: MEDICARE

## 2023-01-11 ENCOUNTER — HOSPITAL ENCOUNTER (INPATIENT)
Facility: HOSPITAL | Age: 83
LOS: 1 days | Discharge: HOME HEALTH | End: 2023-01-11
Attending: EMERGENCY MEDICINE | Admitting: INTERNAL MEDICINE
Payer: MEDICARE

## 2023-01-11 VITALS
DIASTOLIC BLOOD PRESSURE: 83 MMHG | OXYGEN SATURATION: 95 % | RESPIRATION RATE: 18 BRPM | BODY MASS INDEX: 24 KG/M2 | SYSTOLIC BLOOD PRESSURE: 134 MMHG | WEIGHT: 142 LBS | TEMPERATURE: 99 F | HEART RATE: 90 BPM

## 2023-01-11 DIAGNOSIS — N39.0 URINARY TRACT INFECTION WITHOUT HEMATURIA, SITE UNSPECIFIED: ICD-10-CM

## 2023-01-11 DIAGNOSIS — R55 SYNCOPE AND COLLAPSE: Primary | ICD-10-CM

## 2023-01-11 LAB
ALBUMIN SERPL-MCNC: 3.3 G/DL (ref 3.4–5)
ALBUMIN/GLOB SERPL: 0.9 {RATIO} (ref 1–2)
ALP LIVER SERPL-CCNC: 93 U/L
ALT SERPL-CCNC: 11 U/L
ANION GAP SERPL CALC-SCNC: 4 MMOL/L (ref 0–18)
AST SERPL-CCNC: 10 U/L (ref 15–37)
ATRIAL RATE: 78 BPM
BASOPHILS # BLD AUTO: 0.02 X10(3) UL (ref 0–0.2)
BASOPHILS NFR BLD AUTO: 0.4 %
BILIRUB SERPL-MCNC: 0.2 MG/DL (ref 0.1–2)
BILIRUB UR QL STRIP.AUTO: NEGATIVE
BUN BLD-MCNC: 25 MG/DL (ref 7–18)
CALCIUM BLD-MCNC: 9.3 MG/DL (ref 8.5–10.1)
CHLORIDE SERPL-SCNC: 112 MMOL/L (ref 98–112)
CO2 SERPL-SCNC: 30 MMOL/L (ref 21–32)
COLOR UR AUTO: YELLOW
CREAT BLD-MCNC: 0.94 MG/DL
EOSINOPHIL # BLD AUTO: 0.14 X10(3) UL (ref 0–0.7)
EOSINOPHIL NFR BLD AUTO: 2.9 %
ERYTHROCYTE [DISTWIDTH] IN BLOOD BY AUTOMATED COUNT: 14 %
ETHANOL SERPL-MCNC: <3 MG/DL (ref ?–3)
GFR SERPLBLD BASED ON 1.73 SQ M-ARVRAT: 60 ML/MIN/1.73M2 (ref 60–?)
GLOBULIN PLAS-MCNC: 3.8 G/DL (ref 2.8–4.4)
GLUCOSE BLD-MCNC: 131 MG/DL (ref 70–99)
GLUCOSE UR STRIP.AUTO-MCNC: >=500 MG/DL
HCT VFR BLD AUTO: 34.3 %
HGB BLD-MCNC: 10.1 G/DL
IMM GRANULOCYTES # BLD AUTO: 0.02 X10(3) UL (ref 0–1)
IMM GRANULOCYTES NFR BLD: 0.4 %
KETONES UR STRIP.AUTO-MCNC: NEGATIVE MG/DL
LYMPHOCYTES # BLD AUTO: 1.44 X10(3) UL (ref 1–4)
LYMPHOCYTES NFR BLD AUTO: 30.1 %
MCH RBC QN AUTO: 26.9 PG (ref 26–34)
MCHC RBC AUTO-ENTMCNC: 29.4 G/DL (ref 31–37)
MCV RBC AUTO: 91.5 FL
MONOCYTES # BLD AUTO: 0.48 X10(3) UL (ref 0.1–1)
MONOCYTES NFR BLD AUTO: 10 %
NEUTROPHILS # BLD AUTO: 2.69 X10 (3) UL (ref 1.5–7.7)
NEUTROPHILS # BLD AUTO: 2.69 X10(3) UL (ref 1.5–7.7)
NEUTROPHILS NFR BLD AUTO: 56.2 %
NITRITE UR QL STRIP.AUTO: NEGATIVE
OSMOLALITY SERPL CALC.SUM OF ELEC: 308 MOSM/KG (ref 275–295)
P AXIS: 54 DEGREES
P-R INTERVAL: 232 MS
PH UR STRIP.AUTO: 5 [PH] (ref 5–8)
PLATELET # BLD AUTO: 182 10(3)UL (ref 150–450)
POTASSIUM SERPL-SCNC: 4.3 MMOL/L (ref 3.5–5.1)
PROT SERPL-MCNC: 7.1 G/DL (ref 6.4–8.2)
PROT UR STRIP.AUTO-MCNC: 100 MG/DL
Q-T INTERVAL: 402 MS
QRS DURATION: 78 MS
QTC CALCULATION (BEZET): 458 MS
R AXIS: -19 DEGREES
RBC # BLD AUTO: 3.75 X10(6)UL
RBC #/AREA URNS AUTO: >10 /HPF
SARS-COV-2 RNA RESP QL NAA+PROBE: NOT DETECTED
SODIUM SERPL-SCNC: 146 MMOL/L (ref 136–145)
SP GR UR STRIP.AUTO: 1.03 (ref 1–1.03)
T AXIS: 89 DEGREES
TROPONIN I HIGH SENSITIVITY: 17 NG/L
UROBILINOGEN UR STRIP.AUTO-MCNC: <2 MG/DL
VENTRICULAR RATE: 78 BPM
WBC # BLD AUTO: 4.8 X10(3) UL (ref 4–11)
WBC #/AREA URNS AUTO: >50 /HPF

## 2023-01-11 PROCEDURE — 87186 SC STD MICRODIL/AGAR DIL: CPT

## 2023-01-11 PROCEDURE — 82077 ASSAY SPEC XCP UR&BREATH IA: CPT | Performed by: EMERGENCY MEDICINE

## 2023-01-11 PROCEDURE — 87086 URINE CULTURE/COLONY COUNT: CPT | Performed by: EMERGENCY MEDICINE

## 2023-01-11 PROCEDURE — 71045 X-RAY EXAM CHEST 1 VIEW: CPT | Performed by: EMERGENCY MEDICINE

## 2023-01-11 PROCEDURE — 93005 ELECTROCARDIOGRAM TRACING: CPT

## 2023-01-11 PROCEDURE — 81001 URINALYSIS AUTO W/SCOPE: CPT | Performed by: EMERGENCY MEDICINE

## 2023-01-11 PROCEDURE — 87106 FUNGI IDENTIFICATION YEAST: CPT | Performed by: EMERGENCY MEDICINE

## 2023-01-11 PROCEDURE — 99285 EMERGENCY DEPT VISIT HI MDM: CPT

## 2023-01-11 PROCEDURE — 80053 COMPREHEN METABOLIC PANEL: CPT | Performed by: EMERGENCY MEDICINE

## 2023-01-11 PROCEDURE — 84484 ASSAY OF TROPONIN QUANT: CPT | Performed by: EMERGENCY MEDICINE

## 2023-01-11 PROCEDURE — 85025 COMPLETE CBC W/AUTO DIFF WBC: CPT | Performed by: EMERGENCY MEDICINE

## 2023-01-11 PROCEDURE — 87186 SC STD MICRODIL/AGAR DIL: CPT | Performed by: INTERNAL MEDICINE

## 2023-01-11 PROCEDURE — 93010 ELECTROCARDIOGRAM REPORT: CPT

## 2023-01-11 PROCEDURE — 70450 CT HEAD/BRAIN W/O DYE: CPT | Performed by: INTERNAL MEDICINE

## 2023-01-11 PROCEDURE — 36415 COLL VENOUS BLD VENIPUNCTURE: CPT

## 2023-01-11 RX ORDER — RIVASTIGMINE 13.3 MG/24H
1 PATCH, EXTENDED RELEASE TRANSDERMAL DAILY
Status: DISCONTINUED | OUTPATIENT
Start: 2023-01-11 | End: 2023-01-11

## 2023-01-11 RX ORDER — NITROFURANTOIN 25; 75 MG/1; MG/1
100 CAPSULE ORAL 2 TIMES DAILY
COMMUNITY

## 2023-01-11 RX ORDER — FLUCONAZOLE 200 MG/1
200 TABLET ORAL DAILY
COMMUNITY

## 2023-01-11 RX ORDER — PANTOPRAZOLE SODIUM 20 MG/1
20 TABLET, DELAYED RELEASE ORAL
Status: DISCONTINUED | OUTPATIENT
Start: 2023-01-11 | End: 2023-01-11

## 2023-01-11 RX ORDER — FUROSEMIDE 20 MG/1
30 TABLET ORAL DAILY
Status: DISCONTINUED | OUTPATIENT
Start: 2023-01-11 | End: 2023-01-11

## 2023-01-11 RX ORDER — FLUCONAZOLE 100 MG/1
200 TABLET ORAL DAILY
Status: DISCONTINUED | OUTPATIENT
Start: 2023-01-11 | End: 2023-01-11

## 2023-01-11 RX ORDER — POTASSIUM CHLORIDE 750 MG/1
10 TABLET, FILM COATED, EXTENDED RELEASE ORAL DAILY
COMMUNITY

## 2023-01-11 RX ORDER — RALOXIFENE HYDROCHLORIDE 60 MG/1
60 TABLET, FILM COATED ORAL DAILY
Status: DISCONTINUED | OUTPATIENT
Start: 2023-01-11 | End: 2023-01-11

## 2023-01-11 RX ORDER — METHENAMINE HIPPURATE 1000 MG/1
1 TABLET ORAL DAILY
Status: DISCONTINUED | OUTPATIENT
Start: 2023-01-11 | End: 2023-01-11

## 2023-01-11 RX ORDER — MEMANTINE HYDROCHLORIDE 10 MG/1
10 TABLET ORAL 2 TIMES DAILY
Status: DISCONTINUED | OUTPATIENT
Start: 2023-01-11 | End: 2023-01-11

## 2023-01-11 RX ORDER — MEMANTINE HYDROCHLORIDE 28 MG/1
28 CAPSULE, EXTENDED RELEASE ORAL DAILY
Status: DISCONTINUED | OUTPATIENT
Start: 2023-01-11 | End: 2023-01-11 | Stop reason: RX

## 2023-01-11 RX ORDER — OLANZAPINE 5 MG/1
5 TABLET ORAL NIGHTLY PRN
Status: DISCONTINUED | OUTPATIENT
Start: 2023-01-11 | End: 2023-01-11

## 2023-01-11 RX ORDER — HEPARIN SODIUM 5000 [USP'U]/ML
5000 INJECTION, SOLUTION INTRAVENOUS; SUBCUTANEOUS EVERY 12 HOURS SCHEDULED
Status: DISCONTINUED | OUTPATIENT
Start: 2023-01-11 | End: 2023-01-11

## 2023-01-11 RX ORDER — BUSPIRONE HYDROCHLORIDE 10 MG/1
30 TABLET ORAL 2 TIMES DAILY
Status: DISCONTINUED | OUTPATIENT
Start: 2023-01-11 | End: 2023-01-11

## 2023-01-11 RX ORDER — METHENAMINE HIPPURATE 1000 MG/1
1 TABLET ORAL DAILY
COMMUNITY

## 2023-01-11 RX ORDER — OLANZAPINE 5 MG/1
5 TABLET ORAL NIGHTLY PRN
COMMUNITY

## 2023-01-11 RX ORDER — ATORVASTATIN CALCIUM 40 MG/1
40 TABLET, FILM COATED ORAL NIGHTLY
Status: DISCONTINUED | OUTPATIENT
Start: 2023-01-11 | End: 2023-01-11

## 2023-01-11 RX ORDER — NITROFURANTOIN 25; 75 MG/1; MG/1
100 CAPSULE ORAL ONCE
Status: DISCONTINUED | OUTPATIENT
Start: 2023-01-11 | End: 2023-01-11

## 2023-01-11 RX ORDER — POLYETHYLENE GLYCOL 3350 17 G/17G
17 POWDER, FOR SOLUTION ORAL DAILY PRN
Status: DISCONTINUED | OUTPATIENT
Start: 2023-01-11 | End: 2023-01-11

## 2023-01-11 RX ORDER — DULOXETIN HYDROCHLORIDE 60 MG/1
60 CAPSULE, DELAYED RELEASE ORAL 2 TIMES DAILY
Status: DISCONTINUED | OUTPATIENT
Start: 2023-01-11 | End: 2023-01-11

## 2023-01-11 RX ORDER — NITROFURANTOIN 25; 75 MG/1; MG/1
100 CAPSULE ORAL 2 TIMES DAILY
Status: DISCONTINUED | OUTPATIENT
Start: 2023-01-11 | End: 2023-01-11

## 2023-01-11 RX ORDER — ASPIRIN 81 MG/1
81 TABLET ORAL DAILY
Status: DISCONTINUED | OUTPATIENT
Start: 2023-01-11 | End: 2023-01-11

## 2023-01-11 NOTE — CM/SW NOTE
Per chart review, pt may still be current with [facility] services. Message sent to Brendan Santos [facility] liaison, to inquire. DEJAN entered. Addendum: Pt current with [facility]. Family wishes to resume services. Notified agency of pt discharging today.      ARVIND BejaranoN, RN-BC    [id number]

## 2023-01-11 NOTE — ED INITIAL ASSESSMENT (HPI)
Episode of sudden loss of consciousness/asleep while eating. Verbally non responsive. Hx of dementia. No other neuro deficits noted.  Currently on meds for UTI

## 2023-01-11 NOTE — PROGRESS NOTES
NURSING ADMISSION NOTE      Patient admitted via Cart  Safety precautions initiated. Bed in low position. Call light in reach. Admission from ED.  & wife at bedside. A&Ox1. On tele running NSR.  RA. Incontinent. Per , uses walker. Cardiac diet. Plan for Echo.

## 2023-01-11 NOTE — ED QUICK NOTES
Orders for admission, patient is aware of plan and ready to go upstairs. Any questions, please call ED RN Valerio Robert  at extension [phone].      Vaccinated yes  Type of COVID test sent:rapid   COVID Suspicion level: Low      Titratable drug(s) infusing:  Rate:na    LOC at time of transport: 2/4 at baseline     Other pertinent information: walker    CIWA score=na  NIH score=na

## 2023-01-12 NOTE — PROGRESS NOTES
Assumed pt care this morning at 0730. Pt is A&OX1 person,   RA, DONG  Tele: NSR  CT brain was completed, pt didn't have CT on admission. Echo order cancelled since pt had one recently. Tolerated meals well. Encouraged oral intake. Bed in lowest position, call light within reach, bed alarm on. Family present at bedside.

## 2023-01-12 NOTE — DISCHARGE INSTRUCTIONS
Sometimes managing your health at home requires assistance. The [facility] team has recognized your preference to use [facility]. They can be reached by phone at [phone]. The fax number for your reference is [phone]. Miranda Mdeina

## 2023-01-12 NOTE — DISCHARGE PLANNING
NURSING DISCHARGE NOTE    Discharged Home via Wheelchair. Accompanied by Family member  Belongings Taken by patient/family. Discharge order in place. Discharge education provided to  and daughter present at bedside. Pt has an upcoming appt with PCP. Medications reviewed. All questions answered at the moment of discharge. IV removed.

## 2023-01-15 ENCOUNTER — APPOINTMENT (OUTPATIENT)
Dept: MRI IMAGING | Facility: HOSPITAL | Age: 83
End: 2023-01-15
Attending: EMERGENCY MEDICINE
Payer: MEDICARE

## 2023-01-15 PROCEDURE — 70553 MRI BRAIN STEM W/O & W/DYE: CPT | Performed by: EMERGENCY MEDICINE

## 2023-01-15 PROCEDURE — 70549 MR ANGIOGRAPH NECK W/O&W/DYE: CPT | Performed by: EMERGENCY MEDICINE

## 2023-01-15 PROCEDURE — 70546 MR ANGIOGRAPH HEAD W/O&W/DYE: CPT | Performed by: EMERGENCY MEDICINE

## 2023-03-11 ENCOUNTER — HOSPITAL ENCOUNTER (OUTPATIENT)
Facility: HOSPITAL | Age: 83
Setting detail: OBSERVATION
Discharge: HOME | End: 2023-03-13
Attending: EMERGENCY MEDICINE | Admitting: HOSPITALIST
Payer: MEDICARE

## 2023-03-11 DIAGNOSIS — F02.80 LATE ONSET ALZHEIMER'S DISEASE WITHOUT BEHAVIORAL DISTURBANCE (HCC): ICD-10-CM

## 2023-03-11 DIAGNOSIS — G30.1 LATE ONSET ALZHEIMER'S DISEASE WITHOUT BEHAVIORAL DISTURBANCE (HCC): ICD-10-CM

## 2023-03-11 DIAGNOSIS — R53.1 WEAKNESS: ICD-10-CM

## 2023-03-11 DIAGNOSIS — E11.9 WELL CONTROLLED DIABETES MELLITUS (HCC): ICD-10-CM

## 2023-03-11 DIAGNOSIS — D64.9 ANEMIA, UNSPECIFIED TYPE: ICD-10-CM

## 2023-03-11 DIAGNOSIS — I10 ESSENTIAL HYPERTENSION: ICD-10-CM

## 2023-03-11 DIAGNOSIS — R19.7 DIARRHEA, UNSPECIFIED TYPE: ICD-10-CM

## 2023-03-11 DIAGNOSIS — N30.00 ACUTE CYSTITIS WITHOUT HEMATURIA: Primary | ICD-10-CM

## 2023-03-11 LAB
ALBUMIN SERPL-MCNC: 3.4 G/DL (ref 3.4–5)
ALBUMIN/GLOB SERPL: 0.9 {RATIO} (ref 1–2)
ALP LIVER SERPL-CCNC: 82 U/L
ALT SERPL-CCNC: 16 U/L
ANION GAP SERPL CALC-SCNC: 3 MMOL/L (ref 0–18)
AST SERPL-CCNC: 18 U/L (ref 15–37)
BASOPHILS # BLD AUTO: 0 X10(3) UL (ref 0–0.2)
BASOPHILS NFR BLD AUTO: 0 %
BILIRUB SERPL-MCNC: 0.2 MG/DL (ref 0.1–2)
BILIRUB UR QL STRIP.AUTO: NEGATIVE
BUN BLD-MCNC: 18 MG/DL (ref 7–18)
CALCIUM BLD-MCNC: 9 MG/DL (ref 8.5–10.1)
CHLORIDE SERPL-SCNC: 110 MMOL/L (ref 98–112)
CLARITY UR REFRACT.AUTO: CLEAR
CO2 SERPL-SCNC: 27 MMOL/L (ref 21–32)
COLOR UR AUTO: YELLOW
CREAT BLD-MCNC: 1.03 MG/DL
EOSINOPHIL # BLD AUTO: 0.03 X10(3) UL (ref 0–0.7)
EOSINOPHIL NFR BLD AUTO: 0.8 %
ERYTHROCYTE [DISTWIDTH] IN BLOOD BY AUTOMATED COUNT: 16.3 %
GFR SERPLBLD BASED ON 1.73 SQ M-ARVRAT: 53 ML/MIN/1.73M2 (ref 60–?)
GLOBULIN PLAS-MCNC: 3.6 G/DL (ref 2.8–4.4)
GLUCOSE BLD-MCNC: 142 MG/DL (ref 70–99)
GLUCOSE UR STRIP.AUTO-MCNC: NEGATIVE MG/DL
HCT VFR BLD AUTO: 31.4 %
HGB BLD-MCNC: 9.3 G/DL
HYALINE CASTS #/AREA URNS AUTO: PRESENT /LPF
IMM GRANULOCYTES # BLD AUTO: 0.01 X10(3) UL (ref 0–1)
IMM GRANULOCYTES NFR BLD: 0.3 %
KETONES UR STRIP.AUTO-MCNC: NEGATIVE MG/DL
LYMPHOCYTES # BLD AUTO: 0.76 X10(3) UL (ref 1–4)
LYMPHOCYTES NFR BLD AUTO: 19.5 %
MCH RBC QN AUTO: 27 PG (ref 26–34)
MCHC RBC AUTO-ENTMCNC: 29.6 G/DL (ref 31–37)
MCV RBC AUTO: 91 FL
MONOCYTES # BLD AUTO: 0.48 X10(3) UL (ref 0.1–1)
MONOCYTES NFR BLD AUTO: 12.3 %
NEUTROPHILS # BLD AUTO: 2.61 X10 (3) UL (ref 1.5–7.7)
NEUTROPHILS # BLD AUTO: 2.61 X10(3) UL (ref 1.5–7.7)
NEUTROPHILS NFR BLD AUTO: 67.1 %
NITRITE UR QL STRIP.AUTO: POSITIVE
OSMOLALITY SERPL CALC.SUM OF ELEC: 294 MOSM/KG (ref 275–295)
PH UR STRIP.AUTO: 5 [PH] (ref 5–8)
PLATELET # BLD AUTO: 196 10(3)UL (ref 150–450)
POTASSIUM SERPL-SCNC: 4 MMOL/L (ref 3.5–5.1)
PROT SERPL-MCNC: 7 G/DL (ref 6.4–8.2)
PROT UR STRIP.AUTO-MCNC: NEGATIVE MG/DL
RBC # BLD AUTO: 3.45 X10(6)UL
RBC UR QL AUTO: NEGATIVE
SARS-COV-2 RNA RESP QL NAA+PROBE: NOT DETECTED
SODIUM SERPL-SCNC: 140 MMOL/L (ref 136–145)
SP GR UR STRIP.AUTO: 1.01 (ref 1–1.03)
UROBILINOGEN UR STRIP.AUTO-MCNC: <2 MG/DL
WBC # BLD AUTO: 3.9 X10(3) UL (ref 4–11)

## 2023-03-11 PROCEDURE — 87077 CULTURE AEROBIC IDENTIFY: CPT | Performed by: EMERGENCY MEDICINE

## 2023-03-11 PROCEDURE — 87186 SC STD MICRODIL/AGAR DIL: CPT | Performed by: EMERGENCY MEDICINE

## 2023-03-11 PROCEDURE — 85025 COMPLETE CBC W/AUTO DIFF WBC: CPT | Performed by: EMERGENCY MEDICINE

## 2023-03-11 PROCEDURE — 99285 EMERGENCY DEPT VISIT HI MDM: CPT

## 2023-03-11 PROCEDURE — 96361 HYDRATE IV INFUSION ADD-ON: CPT

## 2023-03-11 PROCEDURE — 87086 URINE CULTURE/COLONY COUNT: CPT | Performed by: EMERGENCY MEDICINE

## 2023-03-11 PROCEDURE — 81001 URINALYSIS AUTO W/SCOPE: CPT | Performed by: EMERGENCY MEDICINE

## 2023-03-11 PROCEDURE — 80053 COMPREHEN METABOLIC PANEL: CPT | Performed by: EMERGENCY MEDICINE

## 2023-03-11 PROCEDURE — 96365 THER/PROPH/DIAG IV INF INIT: CPT

## 2023-03-11 RX ORDER — NITROFURANTOIN 25; 75 MG/1; MG/1
100 CAPSULE ORAL ONCE
Status: DISCONTINUED | OUTPATIENT
Start: 2023-03-11 | End: 2023-03-11

## 2023-03-12 PROBLEM — R19.7 DIARRHEA, UNSPECIFIED TYPE: Status: ACTIVE | Noted: 2023-03-12

## 2023-03-12 PROBLEM — R53.1 WEAKNESS: Status: ACTIVE | Noted: 2023-03-12

## 2023-03-12 LAB
ANION GAP SERPL CALC-SCNC: 6 MMOL/L (ref 0–18)
BASOPHILS # BLD AUTO: 0.01 X10(3) UL (ref 0–0.2)
BASOPHILS NFR BLD AUTO: 0.3 %
BUN BLD-MCNC: 15 MG/DL (ref 7–18)
C DIFF TOX B STL QL: POSITIVE
CALCIUM BLD-MCNC: 8.7 MG/DL (ref 8.5–10.1)
CHLORIDE SERPL-SCNC: 113 MMOL/L (ref 98–112)
CO2 SERPL-SCNC: 23 MMOL/L (ref 21–32)
CREAT BLD-MCNC: 0.73 MG/DL
EOSINOPHIL # BLD AUTO: 0.08 X10(3) UL (ref 0–0.7)
EOSINOPHIL NFR BLD AUTO: 2.4 %
ERYTHROCYTE [DISTWIDTH] IN BLOOD BY AUTOMATED COUNT: 16.1 %
GFR SERPLBLD BASED ON 1.73 SQ M-ARVRAT: 81 ML/MIN/1.73M2 (ref 60–?)
GLUCOSE BLD-MCNC: 114 MG/DL (ref 70–99)
GLUCOSE BLD-MCNC: 96 MG/DL (ref 70–99)
HCT VFR BLD AUTO: 27.1 %
HGB BLD-MCNC: 8.1 G/DL
IMM GRANULOCYTES # BLD AUTO: 0.01 X10(3) UL (ref 0–1)
IMM GRANULOCYTES NFR BLD: 0.3 %
LYMPHOCYTES # BLD AUTO: 0.89 X10(3) UL (ref 1–4)
LYMPHOCYTES NFR BLD AUTO: 26.6 %
MCH RBC QN AUTO: 27.6 PG (ref 26–34)
MCHC RBC AUTO-ENTMCNC: 29.9 G/DL (ref 31–37)
MCV RBC AUTO: 92.5 FL
MONOCYTES # BLD AUTO: 0.46 X10(3) UL (ref 0.1–1)
MONOCYTES NFR BLD AUTO: 13.7 %
NEUTROPHILS # BLD AUTO: 1.9 X10 (3) UL (ref 1.5–7.7)
NEUTROPHILS # BLD AUTO: 1.9 X10(3) UL (ref 1.5–7.7)
NEUTROPHILS NFR BLD AUTO: 56.7 %
OSMOLALITY SERPL CALC.SUM OF ELEC: 295 MOSM/KG (ref 275–295)
PLATELET # BLD AUTO: 183 10(3)UL (ref 150–450)
POTASSIUM SERPL-SCNC: 3.8 MMOL/L (ref 3.5–5.1)
RBC # BLD AUTO: 2.93 X10(6)UL
SODIUM SERPL-SCNC: 142 MMOL/L (ref 136–145)
WBC # BLD AUTO: 3.4 X10(3) UL (ref 4–11)

## 2023-03-12 PROCEDURE — 87427 SHIGA-LIKE TOXIN AG IA: CPT | Performed by: HOSPITALIST

## 2023-03-12 PROCEDURE — 80048 BASIC METABOLIC PNL TOTAL CA: CPT | Performed by: HOSPITALIST

## 2023-03-12 PROCEDURE — 87045 FECES CULTURE AEROBIC BACT: CPT | Performed by: HOSPITALIST

## 2023-03-12 PROCEDURE — 96372 THER/PROPH/DIAG INJ SC/IM: CPT

## 2023-03-12 PROCEDURE — 87046 STOOL CULTR AEROBIC BACT EA: CPT | Performed by: HOSPITALIST

## 2023-03-12 PROCEDURE — 82272 OCCULT BLD FECES 1-3 TESTS: CPT | Performed by: HOSPITALIST

## 2023-03-12 PROCEDURE — 82962 GLUCOSE BLOOD TEST: CPT

## 2023-03-12 PROCEDURE — 87493 C DIFF AMPLIFIED PROBE: CPT | Performed by: HOSPITALIST

## 2023-03-12 PROCEDURE — 85025 COMPLETE CBC W/AUTO DIFF WBC: CPT | Performed by: HOSPITALIST

## 2023-03-12 RX ORDER — HEPARIN SODIUM 5000 [USP'U]/ML
5000 INJECTION, SOLUTION INTRAVENOUS; SUBCUTANEOUS EVERY 8 HOURS SCHEDULED
Status: DISCONTINUED | OUTPATIENT
Start: 2023-03-12 | End: 2023-03-13

## 2023-03-12 RX ORDER — RIVASTIGMINE 13.3 MG/24H
1 PATCH, EXTENDED RELEASE TRANSDERMAL DAILY
Status: DISCONTINUED | OUTPATIENT
Start: 2023-03-12 | End: 2023-03-13

## 2023-03-12 RX ORDER — BUSPIRONE HYDROCHLORIDE 5 MG/1
15 TABLET ORAL DAILY
Status: DISCONTINUED | OUTPATIENT
Start: 2023-03-12 | End: 2023-03-13

## 2023-03-12 RX ORDER — OLANZAPINE 2.5 MG/1
5 TABLET ORAL NIGHTLY PRN
Status: DISCONTINUED | OUTPATIENT
Start: 2023-03-12 | End: 2023-03-13

## 2023-03-12 RX ORDER — SODIUM CHLORIDE 9 MG/ML
INJECTION, SOLUTION INTRAVENOUS CONTINUOUS
Status: DISCONTINUED | OUTPATIENT
Start: 2023-03-12 | End: 2023-03-12

## 2023-03-12 RX ORDER — ASPIRIN 81 MG/1
81 TABLET ORAL DAILY
Status: DISCONTINUED | OUTPATIENT
Start: 2023-03-12 | End: 2023-03-13

## 2023-03-12 RX ORDER — BUSPIRONE HYDROCHLORIDE 15 MG/1
30 TABLET ORAL NIGHTLY
Status: DISCONTINUED | OUTPATIENT
Start: 2023-03-12 | End: 2023-03-13

## 2023-03-12 RX ORDER — RALOXIFENE HYDROCHLORIDE 60 MG/1
60 TABLET, FILM COATED ORAL DAILY
Status: DISCONTINUED | OUTPATIENT
Start: 2023-03-12 | End: 2023-03-13

## 2023-03-12 RX ORDER — ACETAMINOPHEN 500 MG
TABLET ORAL EVERY 6 HOURS PRN
Status: DISCONTINUED | OUTPATIENT
Start: 2023-03-12 | End: 2023-03-13

## 2023-03-12 RX ORDER — FUROSEMIDE 20 MG/1
30 TABLET ORAL DAILY
Status: DISCONTINUED | OUTPATIENT
Start: 2023-03-13 | End: 2023-03-13

## 2023-03-12 RX ORDER — ATORVASTATIN CALCIUM 40 MG/1
40 TABLET, FILM COATED ORAL NIGHTLY
Status: DISCONTINUED | OUTPATIENT
Start: 2023-03-12 | End: 2023-03-13

## 2023-03-12 RX ORDER — DULOXETIN HYDROCHLORIDE 60 MG/1
60 CAPSULE, DELAYED RELEASE ORAL 2 TIMES DAILY
Status: DISCONTINUED | OUTPATIENT
Start: 2023-03-12 | End: 2023-03-13

## 2023-03-12 RX ORDER — MEMANTINE HYDROCHLORIDE 10 MG/1
10 TABLET ORAL 2 TIMES DAILY
Status: DISCONTINUED | OUTPATIENT
Start: 2023-03-12 | End: 2023-03-13

## 2023-03-12 RX ORDER — TRAMADOL HYDROCHLORIDE 50 MG/1
50 TABLET ORAL EVERY 12 HOURS PRN
Status: DISCONTINUED | OUTPATIENT
Start: 2023-03-12 | End: 2023-03-13

## 2023-03-12 RX ORDER — SENNOSIDES 8.6 MG
8.6 TABLET ORAL DAILY
Status: DISCONTINUED | OUTPATIENT
Start: 2023-03-12 | End: 2023-03-13

## 2023-03-12 RX ORDER — BUSPIRONE HYDROCHLORIDE 15 MG/1
30 TABLET ORAL 2 TIMES DAILY
Status: DISCONTINUED | OUTPATIENT
Start: 2023-03-12 | End: 2023-03-12

## 2023-03-12 RX ORDER — PANTOPRAZOLE SODIUM 20 MG/1
20 TABLET, DELAYED RELEASE ORAL
Status: DISCONTINUED | OUTPATIENT
Start: 2023-03-12 | End: 2023-03-13

## 2023-03-12 RX ORDER — POLYETHYLENE GLYCOL 3350 17 G/17G
17 POWDER, FOR SOLUTION ORAL DAILY PRN
Status: DISCONTINUED | OUTPATIENT
Start: 2023-03-12 | End: 2023-03-13

## 2023-03-12 RX ORDER — METHENAMINE HIPPURATE 1000 MG/1
1 TABLET ORAL DAILY
Status: DISCONTINUED | OUTPATIENT
Start: 2023-03-12 | End: 2023-03-13

## 2023-03-12 NOTE — PLAN OF CARE
Patient is alert and oriented to self, place, and situation, disoriented to time. Patient on RA, vitals stable, no fever overnight. Per family multiple pasty stools at home. No diarrhea. Patient denies pain or SOB. Safety precautions in place, family at the bedside. Will continue to m onitor. Problem: SAFETY ADULT - FALL  Goal: Free from fall injury  Description: INTERVENTIONS:  - Assess pt frequently for physical needs  - Identify cognitive and physical deficits and behaviors that affect risk of falls.   - [place] fall precautions as indicated by assessment.  - Educate pt/family on patient safety including physical limitations  - Instruct pt to call for assistance with activity based on assessment  - Modify environment to reduce risk of injury  - Provide assistive devices as appropriate  - Consider OT/PT consult to assist with strengthening/mobility  - Encourage toileting schedule  Outcome: Progressing

## 2023-03-12 NOTE — ED INITIAL ASSESSMENT (HPI)
Pt presents to ed from home with family who states pt has had loose stools x3 days and increased weakness.

## 2023-03-12 NOTE — ED QUICK NOTES
Orders for admission, patient is aware of plan and ready to go upstairs. Any questions, please call ED RN Joceline Martinez at extension [phone].      Patient Covid vaccination status: Fully vaccinated     COVID Test Ordered in ED: Rapid SARS-CoV-2 by PCR    COVID Suspicion at Admission: Low clinical suspicion for COVID    Running Infusions:      Mental Status/LOC at time of transport: AO x 3    Other pertinent information: Incontinent  CIWA score: N/A   NIH score:  N/A

## 2023-03-12 NOTE — PLAN OF CARE
Pt received alert, oriented x2. Denies pain. Tolerating diet. Stool testing positive for cdiff and occult blood, MD and family aware. Contact precautions in place. PO vanco initiated. Daughter inquiring about \"stool transfer\", endorsed to MD for discussion. Fall precautions in place. Call light within reach. Problem: SAFETY ADULT - FALL  Goal: Free from fall injury  Description: INTERVENTIONS:  - Assess pt frequently for physical needs  - Identify cognitive and physical deficits and behaviors that affect risk of falls.   - [place] fall precautions as indicated by assessment.  - Educate pt/family on patient safety including physical limitations  - Instruct pt to call for assistance with activity based on assessment  - Modify environment to reduce risk of injury  - Provide assistive devices as appropriate  - Consider OT/PT consult to assist with strengthening/mobility  - Encourage toileting schedule  Outcome: Progressing     Problem: GASTROINTESTINAL - ADULT  Goal: Maintains or returns to baseline bowel function  Description: INTERVENTIONS:  - Assess bowel function  - Maintain adequate hydration with IV or PO as ordered and tolerated  - Evaluate effectiveness of GI medications  - Encourage mobilization and activity  - Obtain nutritional consult as needed  - Establish a toileting routine/schedule  - Consider collaborating with pharmacy to review patient's medication profile  Outcome: Progressing

## 2023-03-13 VITALS
TEMPERATURE: 99 F | DIASTOLIC BLOOD PRESSURE: 59 MMHG | BODY MASS INDEX: 26.22 KG/M2 | RESPIRATION RATE: 18 BRPM | HEART RATE: 92 BPM | OXYGEN SATURATION: 96 % | WEIGHT: 148 LBS | SYSTOLIC BLOOD PRESSURE: 144 MMHG | HEIGHT: 63 IN

## 2023-03-13 PROCEDURE — 96372 THER/PROPH/DIAG INJ SC/IM: CPT

## 2023-03-13 RX ORDER — NITROFURANTOIN 25; 75 MG/1; MG/1
100 CAPSULE ORAL 2 TIMES DAILY
Qty: 10 CAPSULE | Refills: 0 | Status: SHIPPED | OUTPATIENT
Start: 2023-03-13 | End: 2023-03-18

## 2023-03-13 RX ORDER — METHENAMINE HIPPURATE 1000 MG/1
1 TABLET ORAL DAILY
Refills: 0 | Status: SHIPPED | COMMUNITY
Start: 2023-03-13

## 2023-03-13 NOTE — PLAN OF CARE
Pt A/O to person and place. VSS. Afebrile. Pt denies pain throughout day. Medications admin per MAR. Pt positive for C. Diff. Contact iso precautions maintained. Fall and safety precautions in place. Call light within reach.

## 2023-03-13 NOTE — PLAN OF CARE
Pt received A&Ox2, has dementia. VSS. RA. Tele NSR. Denies pain. Pt agitated, Zyprexa given prn. Medications given per MAR, receiving Vanco PO. Call light within reach. Fall & contact precautions in place. Family at bedside. Problem: SAFETY ADULT - FALL  Goal: Free from fall injury  Description: INTERVENTIONS:  - Assess pt frequently for physical needs  - Identify cognitive and physical deficits and behaviors that affect risk of falls.   - [place] fall precautions as indicated by assessment.  - Educate pt/family on patient safety including physical limitations  - Instruct pt to call for assistance with activity based on assessment  - Modify environment to reduce risk of injury  - Provide assistive devices as appropriate  - Consider OT/PT consult to assist with strengthening/mobility  - Encourage toileting schedule  Outcome: Progressing     Problem: GASTROINTESTINAL - ADULT  Goal: Maintains or returns to baseline bowel function  Description: INTERVENTIONS:  - Assess bowel function  - Maintain adequate hydration with IV or PO as ordered and tolerated  - Evaluate effectiveness of GI medications  - Encourage mobilization and activity  - Obtain nutritional consult as needed  - Establish a toileting routine/schedule  - Consider collaborating with pharmacy to review patient's medication profile  Outcome: Progressing

## 2023-03-13 NOTE — PROGRESS NOTES
NURSING DISCHARGE NOTE    Discharged Home via Wheelchair. Accompanied by Family member  Belongings Taken by patient/family. Pt discharged at approx. 1605. AVS discussed w/ pt and family. All belongings sent w/ pt.

## 2023-03-29 ENCOUNTER — APPOINTMENT (OUTPATIENT)
Dept: GENERAL RADIOLOGY | Facility: HOSPITAL | Age: 83
DRG: 377 | End: 2023-03-29
Attending: EMERGENCY MEDICINE
Payer: MEDICARE

## 2023-03-29 ENCOUNTER — HOSPITAL ENCOUNTER (INPATIENT)
Facility: HOSPITAL | Age: 83
LOS: 2 days | Discharge: HOME HEALTH | DRG: 377 | End: 2023-04-05
Attending: EMERGENCY MEDICINE | Admitting: INTERNAL MEDICINE
Payer: MEDICARE

## 2023-03-29 DIAGNOSIS — K92.1 MELENA: Primary | ICD-10-CM

## 2023-03-29 DIAGNOSIS — R55 SYNCOPE, NEAR: ICD-10-CM

## 2023-03-29 DIAGNOSIS — D64.9 ANEMIA, UNSPECIFIED TYPE: ICD-10-CM

## 2023-03-29 LAB
ALBUMIN SERPL-MCNC: 3.4 G/DL (ref 3.4–5)
ALBUMIN/GLOB SERPL: 1 {RATIO} (ref 1–2)
ALP LIVER SERPL-CCNC: 72 U/L
ALT SERPL-CCNC: 14 U/L
ANION GAP SERPL CALC-SCNC: 6 MMOL/L (ref 0–18)
ANTIBODY SCREEN: NEGATIVE
AST SERPL-CCNC: 12 U/L (ref 15–37)
BASOPHILS # BLD AUTO: 0.02 X10(3) UL (ref 0–0.2)
BASOPHILS NFR BLD AUTO: 0.4 %
BILIRUB SERPL-MCNC: 0.2 MG/DL (ref 0.1–2)
BILIRUB UR QL STRIP.AUTO: NEGATIVE
BUN BLD-MCNC: 18 MG/DL (ref 7–18)
CALCIUM BLD-MCNC: 9.5 MG/DL (ref 8.5–10.1)
CHLORIDE SERPL-SCNC: 109 MMOL/L (ref 98–112)
CO2 SERPL-SCNC: 25 MMOL/L (ref 21–32)
COLOR UR AUTO: YELLOW
CREAT BLD-MCNC: 1.1 MG/DL
EOSINOPHIL # BLD AUTO: 0.04 X10(3) UL (ref 0–0.7)
EOSINOPHIL NFR BLD AUTO: 0.7 %
ERYTHROCYTE [DISTWIDTH] IN BLOOD BY AUTOMATED COUNT: 15 %
GFR SERPLBLD BASED ON 1.73 SQ M-ARVRAT: 49 ML/MIN/1.73M2 (ref 60–?)
GLOBULIN PLAS-MCNC: 3.5 G/DL (ref 2.8–4.4)
GLUCOSE BLD-MCNC: 127 MG/DL (ref 70–99)
GLUCOSE UR STRIP.AUTO-MCNC: NEGATIVE MG/DL
HCT VFR BLD AUTO: 28.3 %
HGB BLD-MCNC: 8.3 G/DL
IMM GRANULOCYTES # BLD AUTO: 0.02 X10(3) UL (ref 0–1)
IMM GRANULOCYTES NFR BLD: 0.4 %
LYMPHOCYTES # BLD AUTO: 0.58 X10(3) UL (ref 1–4)
LYMPHOCYTES NFR BLD AUTO: 10.4 %
MCH RBC QN AUTO: 25.9 PG (ref 26–34)
MCHC RBC AUTO-ENTMCNC: 29.3 G/DL (ref 31–37)
MCV RBC AUTO: 88.4 FL
MONOCYTES # BLD AUTO: 0.34 X10(3) UL (ref 0.1–1)
MONOCYTES NFR BLD AUTO: 6.1 %
NEUTROPHILS # BLD AUTO: 4.57 X10 (3) UL (ref 1.5–7.7)
NEUTROPHILS # BLD AUTO: 4.57 X10(3) UL (ref 1.5–7.7)
NEUTROPHILS NFR BLD AUTO: 82 %
NITRITE UR QL STRIP.AUTO: NEGATIVE
OSMOLALITY SERPL CALC.SUM OF ELEC: 293 MOSM/KG (ref 275–295)
PH UR STRIP.AUTO: 5 [PH] (ref 5–8)
PLATELET # BLD AUTO: 247 10(3)UL (ref 150–450)
POTASSIUM SERPL-SCNC: 3.6 MMOL/L (ref 3.5–5.1)
PROT SERPL-MCNC: 6.9 G/DL (ref 6.4–8.2)
PROT UR STRIP.AUTO-MCNC: 100 MG/DL
RBC # BLD AUTO: 3.2 X10(6)UL
RBC UR QL AUTO: NEGATIVE
RH BLOOD TYPE: NEGATIVE
SARS-COV-2 RNA RESP QL NAA+PROBE: NOT DETECTED
SODIUM SERPL-SCNC: 140 MMOL/L (ref 136–145)
SP GR UR STRIP.AUTO: 1.02 (ref 1–1.03)
TROPONIN I HIGH SENSITIVITY: 20 NG/L
UROBILINOGEN UR STRIP.AUTO-MCNC: <2 MG/DL
WBC # BLD AUTO: 5.6 X10(3) UL (ref 4–11)
WBC #/AREA URNS AUTO: >50 /HPF

## 2023-03-29 PROCEDURE — 85018 HEMOGLOBIN: CPT | Performed by: EMERGENCY MEDICINE

## 2023-03-29 PROCEDURE — 87088 URINE BACTERIA CULTURE: CPT | Performed by: EMERGENCY MEDICINE

## 2023-03-29 PROCEDURE — 85025 COMPLETE CBC W/AUTO DIFF WBC: CPT | Performed by: EMERGENCY MEDICINE

## 2023-03-29 PROCEDURE — 87086 URINE CULTURE/COLONY COUNT: CPT | Performed by: EMERGENCY MEDICINE

## 2023-03-29 PROCEDURE — 99285 EMERGENCY DEPT VISIT HI MDM: CPT

## 2023-03-29 PROCEDURE — 71045 X-RAY EXAM CHEST 1 VIEW: CPT | Performed by: EMERGENCY MEDICINE

## 2023-03-29 PROCEDURE — 87186 SC STD MICRODIL/AGAR DIL: CPT | Performed by: EMERGENCY MEDICINE

## 2023-03-29 PROCEDURE — 85014 HEMATOCRIT: CPT | Performed by: EMERGENCY MEDICINE

## 2023-03-29 PROCEDURE — 80053 COMPREHEN METABOLIC PANEL: CPT | Performed by: EMERGENCY MEDICINE

## 2023-03-29 PROCEDURE — 86900 BLOOD TYPING SEROLOGIC ABO: CPT | Performed by: EMERGENCY MEDICINE

## 2023-03-29 PROCEDURE — 81001 URINALYSIS AUTO W/SCOPE: CPT | Performed by: EMERGENCY MEDICINE

## 2023-03-29 PROCEDURE — 84484 ASSAY OF TROPONIN QUANT: CPT | Performed by: EMERGENCY MEDICINE

## 2023-03-29 PROCEDURE — 85025 COMPLETE CBC W/AUTO DIFF WBC: CPT

## 2023-03-29 PROCEDURE — 86901 BLOOD TYPING SEROLOGIC RH(D): CPT | Performed by: EMERGENCY MEDICINE

## 2023-03-29 PROCEDURE — 86920 COMPATIBILITY TEST SPIN: CPT

## 2023-03-29 PROCEDURE — 82272 OCCULT BLD FECES 1-3 TESTS: CPT

## 2023-03-29 PROCEDURE — 93005 ELECTROCARDIOGRAM TRACING: CPT

## 2023-03-29 PROCEDURE — 96361 HYDRATE IV INFUSION ADD-ON: CPT

## 2023-03-29 PROCEDURE — 86850 RBC ANTIBODY SCREEN: CPT | Performed by: EMERGENCY MEDICINE

## 2023-03-29 PROCEDURE — 93010 ELECTROCARDIOGRAM REPORT: CPT

## 2023-03-29 PROCEDURE — 96374 THER/PROPH/DIAG INJ IV PUSH: CPT

## 2023-03-29 PROCEDURE — C9113 INJ PANTOPRAZOLE SODIUM, VIA: HCPCS | Performed by: EMERGENCY MEDICINE

## 2023-03-29 PROCEDURE — 80053 COMPREHEN METABOLIC PANEL: CPT

## 2023-03-29 RX ORDER — DULOXETIN HYDROCHLORIDE 30 MG/1
60 CAPSULE, DELAYED RELEASE ORAL 2 TIMES DAILY
Status: DISCONTINUED | OUTPATIENT
Start: 2023-03-29 | End: 2023-04-05

## 2023-03-29 RX ORDER — ONDANSETRON 2 MG/ML
4 INJECTION INTRAMUSCULAR; INTRAVENOUS EVERY 6 HOURS PRN
Status: DISCONTINUED | OUTPATIENT
Start: 2023-03-29 | End: 2023-04-05

## 2023-03-29 RX ORDER — ATORVASTATIN CALCIUM 40 MG/1
40 TABLET, FILM COATED ORAL NIGHTLY
Status: DISCONTINUED | OUTPATIENT
Start: 2023-03-29 | End: 2023-04-05

## 2023-03-29 RX ORDER — ASPIRIN 81 MG/1
81 TABLET ORAL DAILY
Status: DISCONTINUED | OUTPATIENT
Start: 2023-03-30 | End: 2023-04-05

## 2023-03-29 RX ORDER — VANCOMYCIN HYDROCHLORIDE 125 MG/1
125 CAPSULE ORAL ONCE
Status: COMPLETED | OUTPATIENT
Start: 2023-03-29 | End: 2023-03-29

## 2023-03-29 RX ORDER — SODIUM PHOSPHATE, DIBASIC AND SODIUM PHOSPHATE, MONOBASIC 7; 19 G/133ML; G/133ML
1 ENEMA RECTAL ONCE AS NEEDED
Status: DISCONTINUED | OUTPATIENT
Start: 2023-03-29 | End: 2023-04-05

## 2023-03-29 RX ORDER — RALOXIFENE HYDROCHLORIDE 60 MG/1
60 TABLET, FILM COATED ORAL DAILY
Status: DISCONTINUED | OUTPATIENT
Start: 2023-03-30 | End: 2023-04-05

## 2023-03-29 RX ORDER — BUSPIRONE HYDROCHLORIDE 5 MG/1
15 TABLET ORAL DAILY
Status: DISCONTINUED | OUTPATIENT
Start: 2023-03-30 | End: 2023-04-05

## 2023-03-29 RX ORDER — SODIUM CHLORIDE 9 MG/ML
INJECTION, SOLUTION INTRAVENOUS CONTINUOUS
Status: DISCONTINUED | OUTPATIENT
Start: 2023-03-29 | End: 2023-03-31

## 2023-03-29 RX ORDER — OLANZAPINE 2.5 MG/1
5 TABLET ORAL NIGHTLY PRN
Status: DISCONTINUED | OUTPATIENT
Start: 2023-03-29 | End: 2023-04-05

## 2023-03-29 RX ORDER — BUSPIRONE HYDROCHLORIDE 15 MG/1
30 TABLET ORAL NIGHTLY
Status: DISCONTINUED | OUTPATIENT
Start: 2023-03-29 | End: 2023-03-31

## 2023-03-29 RX ORDER — ACETAMINOPHEN 500 MG
500 TABLET ORAL EVERY 4 HOURS PRN
Status: DISCONTINUED | OUTPATIENT
Start: 2023-03-29 | End: 2023-04-05

## 2023-03-29 RX ORDER — BISACODYL 10 MG
10 SUPPOSITORY, RECTAL RECTAL
Status: DISCONTINUED | OUTPATIENT
Start: 2023-03-29 | End: 2023-04-05

## 2023-03-29 RX ORDER — MEMANTINE HYDROCHLORIDE 10 MG/1
10 TABLET ORAL 2 TIMES DAILY
Status: DISCONTINUED | OUTPATIENT
Start: 2023-03-30 | End: 2023-03-31

## 2023-03-29 RX ORDER — MELATONIN
3 NIGHTLY PRN
Status: DISCONTINUED | OUTPATIENT
Start: 2023-03-29 | End: 2023-04-05

## 2023-03-29 RX ORDER — RIVASTIGMINE 13.3 MG/24H
1 PATCH, EXTENDED RELEASE TRANSDERMAL DAILY
Status: DISCONTINUED | OUTPATIENT
Start: 2023-03-29 | End: 2023-04-05

## 2023-03-29 RX ORDER — POLYETHYLENE GLYCOL 3350 17 G/17G
17 POWDER, FOR SOLUTION ORAL DAILY PRN
Status: DISCONTINUED | OUTPATIENT
Start: 2023-03-29 | End: 2023-04-05

## 2023-03-29 RX ORDER — SENNOSIDES 8.6 MG
17.2 TABLET ORAL NIGHTLY PRN
Status: DISCONTINUED | OUTPATIENT
Start: 2023-03-29 | End: 2023-04-05

## 2023-03-30 ENCOUNTER — ANESTHESIA EVENT (OUTPATIENT)
Dept: ENDOSCOPY | Facility: HOSPITAL | Age: 83
DRG: 377 | End: 2023-03-30
Payer: MEDICARE

## 2023-03-30 ENCOUNTER — ANESTHESIA (OUTPATIENT)
Dept: ENDOSCOPY | Facility: HOSPITAL | Age: 83
DRG: 377 | End: 2023-03-30
Payer: MEDICARE

## 2023-03-30 LAB
ALBUMIN SERPL-MCNC: 2.7 G/DL (ref 3.4–5)
ALBUMIN/GLOB SERPL: 1 {RATIO} (ref 1–2)
ALP LIVER SERPL-CCNC: 64 U/L
ALT SERPL-CCNC: 11 U/L
ANION GAP SERPL CALC-SCNC: 4 MMOL/L (ref 0–18)
AST SERPL-CCNC: 9 U/L (ref 15–37)
ATRIAL RATE: 79 BPM
BASOPHILS # BLD AUTO: 0.01 X10(3) UL (ref 0–0.2)
BASOPHILS NFR BLD AUTO: 0.3 %
BILIRUB SERPL-MCNC: 0.2 MG/DL (ref 0.1–2)
BUN BLD-MCNC: 17 MG/DL (ref 7–18)
CALCIUM BLD-MCNC: 8.4 MG/DL (ref 8.5–10.1)
CHLORIDE SERPL-SCNC: 117 MMOL/L (ref 98–112)
CO2 SERPL-SCNC: 25 MMOL/L (ref 21–32)
CREAT BLD-MCNC: 0.72 MG/DL
EOSINOPHIL # BLD AUTO: 0.06 X10(3) UL (ref 0–0.7)
EOSINOPHIL NFR BLD AUTO: 1.6 %
ERYTHROCYTE [DISTWIDTH] IN BLOOD BY AUTOMATED COUNT: 15.3 %
GFR SERPLBLD BASED ON 1.73 SQ M-ARVRAT: 82 ML/MIN/1.73M2 (ref 60–?)
GLOBULIN PLAS-MCNC: 2.8 G/DL (ref 2.8–4.4)
GLUCOSE BLD-MCNC: 95 MG/DL (ref 70–99)
HCT VFR BLD AUTO: 21.5 %
HGB BLD-MCNC: 6.3 G/DL
HGB BLD-MCNC: 8.6 G/DL
IMM GRANULOCYTES # BLD AUTO: 0.01 X10(3) UL (ref 0–1)
IMM GRANULOCYTES NFR BLD: 0.3 %
LYMPHOCYTES # BLD AUTO: 1.02 X10(3) UL (ref 1–4)
LYMPHOCYTES NFR BLD AUTO: 26.9 %
MAGNESIUM SERPL-MCNC: 2.1 MG/DL (ref 1.6–2.6)
MCH RBC QN AUTO: 26.5 PG (ref 26–34)
MCHC RBC AUTO-ENTMCNC: 29.3 G/DL (ref 31–37)
MCV RBC AUTO: 90.3 FL
MONOCYTES # BLD AUTO: 0.47 X10(3) UL (ref 0.1–1)
MONOCYTES NFR BLD AUTO: 12.4 %
NEUTROPHILS # BLD AUTO: 2.22 X10 (3) UL (ref 1.5–7.7)
NEUTROPHILS # BLD AUTO: 2.22 X10(3) UL (ref 1.5–7.7)
NEUTROPHILS NFR BLD AUTO: 58.5 %
OSMOLALITY SERPL CALC.SUM OF ELEC: 303 MOSM/KG (ref 275–295)
P AXIS: 57 DEGREES
P-R INTERVAL: 168 MS
PLATELET # BLD AUTO: 193 10(3)UL (ref 150–450)
POTASSIUM SERPL-SCNC: 3.2 MMOL/L (ref 3.5–5.1)
PROT SERPL-MCNC: 5.5 G/DL (ref 6.4–8.2)
Q-T INTERVAL: 442 MS
QRS DURATION: 80 MS
QTC CALCULATION (BEZET): 506 MS
R AXIS: -10 DEGREES
RBC # BLD AUTO: 2.38 X10(6)UL
SODIUM SERPL-SCNC: 146 MMOL/L (ref 136–145)
T AXIS: 90 DEGREES
VENTRICULAR RATE: 79 BPM
WBC # BLD AUTO: 3.8 X10(3) UL (ref 4–11)

## 2023-03-30 PROCEDURE — C9113 INJ PANTOPRAZOLE SODIUM, VIA: HCPCS | Performed by: EMERGENCY MEDICINE

## 2023-03-30 PROCEDURE — 0DJ08ZZ INSPECTION OF UPPER INTESTINAL TRACT, VIA NATURAL OR ARTIFICIAL OPENING ENDOSCOPIC: ICD-10-PCS | Performed by: INTERNAL MEDICINE

## 2023-03-30 PROCEDURE — 30233H1 TRANSFUSION OF NONAUTOLOGOUS WHOLE BLOOD INTO PERIPHERAL VEIN, PERCUTANEOUS APPROACH: ICD-10-PCS | Performed by: INTERNAL MEDICINE

## 2023-03-30 PROCEDURE — 85025 COMPLETE CBC W/AUTO DIFF WBC: CPT | Performed by: HOSPITALIST

## 2023-03-30 PROCEDURE — 80053 COMPREHEN METABOLIC PANEL: CPT | Performed by: HOSPITALIST

## 2023-03-30 PROCEDURE — 85018 HEMOGLOBIN: CPT | Performed by: HOSPITALIST

## 2023-03-30 PROCEDURE — 83735 ASSAY OF MAGNESIUM: CPT | Performed by: HOSPITALIST

## 2023-03-30 PROCEDURE — 36430 TRANSFUSION BLD/BLD COMPNT: CPT

## 2023-03-30 PROCEDURE — 96376 TX/PRO/DX INJ SAME DRUG ADON: CPT

## 2023-03-30 RX ORDER — LABETALOL HYDROCHLORIDE 5 MG/ML
5 INJECTION, SOLUTION INTRAVENOUS EVERY 5 MIN PRN
Status: ACTIVE | OUTPATIENT
Start: 2023-03-30 | End: 2023-03-30

## 2023-03-30 RX ORDER — METOCLOPRAMIDE HYDROCHLORIDE 5 MG/ML
10 INJECTION INTRAMUSCULAR; INTRAVENOUS AS NEEDED
Status: DISCONTINUED | OUTPATIENT
Start: 2023-03-30 | End: 2023-03-30

## 2023-03-30 RX ORDER — NALOXONE HYDROCHLORIDE 0.4 MG/ML
80 INJECTION, SOLUTION INTRAMUSCULAR; INTRAVENOUS; SUBCUTANEOUS AS NEEDED
Status: ACTIVE | OUTPATIENT
Start: 2023-03-30 | End: 2023-03-30

## 2023-03-30 RX ORDER — ONDANSETRON 2 MG/ML
4 INJECTION INTRAMUSCULAR; INTRAVENOUS AS NEEDED
Status: ACTIVE | OUTPATIENT
Start: 2023-03-30 | End: 2023-03-30

## 2023-03-30 RX ORDER — FAMOTIDINE 20 MG/1
40 TABLET, FILM COATED ORAL DAILY
Status: DISCONTINUED | OUTPATIENT
Start: 2023-03-30 | End: 2023-04-05

## 2023-03-30 RX ORDER — LIDOCAINE HYDROCHLORIDE 10 MG/ML
INJECTION, SOLUTION EPIDURAL; INFILTRATION; INTRACAUDAL; PERINEURAL AS NEEDED
Status: DISCONTINUED | OUTPATIENT
Start: 2023-03-30 | End: 2023-03-30 | Stop reason: SURG

## 2023-03-30 RX ORDER — FAMOTIDINE 40 MG/1
40 TABLET, FILM COATED ORAL DAILY
Qty: 30 TABLET | Refills: 0 | Status: SHIPPED | OUTPATIENT
Start: 2023-03-30

## 2023-03-30 RX ORDER — SODIUM CHLORIDE, SODIUM LACTATE, POTASSIUM CHLORIDE, CALCIUM CHLORIDE 600; 310; 30; 20 MG/100ML; MG/100ML; MG/100ML; MG/100ML
INJECTION, SOLUTION INTRAVENOUS CONTINUOUS
Status: DISCONTINUED | OUTPATIENT
Start: 2023-03-30 | End: 2023-03-31

## 2023-03-30 RX ORDER — SODIUM CHLORIDE, SODIUM LACTATE, POTASSIUM CHLORIDE, CALCIUM CHLORIDE 600; 310; 30; 20 MG/100ML; MG/100ML; MG/100ML; MG/100ML
INJECTION, SOLUTION INTRAVENOUS CONTINUOUS PRN
Status: DISCONTINUED | OUTPATIENT
Start: 2023-03-30 | End: 2023-03-30 | Stop reason: SURG

## 2023-03-30 RX ADMIN — SODIUM CHLORIDE, SODIUM LACTATE, POTASSIUM CHLORIDE, CALCIUM CHLORIDE: 600; 310; 30; 20 INJECTION, SOLUTION INTRAVENOUS at 14:15:00

## 2023-03-30 RX ADMIN — SODIUM CHLORIDE, SODIUM LACTATE, POTASSIUM CHLORIDE, CALCIUM CHLORIDE: 600; 310; 30; 20 INJECTION, SOLUTION INTRAVENOUS at 14:05:00

## 2023-03-30 RX ADMIN — LIDOCAINE HYDROCHLORIDE 50 MG: 10 INJECTION, SOLUTION EPIDURAL; INFILTRATION; INTRACAUDAL; PERINEURAL at 14:08:00

## 2023-03-30 NOTE — PLAN OF CARE
NURSING ADMISSION NOTE    Patient admitted via Cart  Oriented to room. Safety precautions initiated. Bed in low position. Call light in reach. Assumed care of pt @ 2130. Pt a/o x2, confused. Pt baseline orientation due to hx dementia. Hearing aids. VSS. Afebrile. Denies pain. NPO initiated. IVF & IV protonix infusing per MAR. C Diff positive, isolation in place. PO vanco given. Noted black stool. Navigator completed w/ family.  at bedside. Call light within reach. Safety precautions in place. 2306: Pt critical hgb 6.3, MD notified. 1 Unit PRBC ordered. Endorsed to day shift RN.      Problem: GASTROINTESTINAL - ADULT  Goal: Minimal or absence of nausea and vomiting  Description: INTERVENTIONS:  - Maintain adequate hydration with IV or PO as ordered and tolerated  - Nasogastric tube to low intermittent suction as ordered  - Evaluate effectiveness of ordered antiemetic medications  - Provide nonpharmacologic comfort measures as appropriate  - Advance diet as tolerated, if ordered  - Obtain nutritional consult as needed  - Evaluate fluid balance  Outcome: Progressing     Problem: GASTROINTESTINAL - ADULT  Goal: Maintains or returns to baseline bowel function  Description: INTERVENTIONS:  - Assess bowel function  - Maintain adequate hydration with IV or PO as ordered and tolerated  - Evaluate effectiveness of GI medications  - Encourage mobilization and activity  - Obtain nutritional consult as needed  - Establish a toileting routine/schedule  - Consider collaborating with pharmacy to review patient's medication profile  Outcome: Progressing     Problem: METABOLIC/FLUID AND ELECTROLYTES - ADULT  Goal: Electrolytes maintained within normal limits  Description: INTERVENTIONS:  - Monitor labs and rhythm and assess patient for signs and symptoms of electrolyte imbalances  - Administer electrolyte replacement as ordered  - Monitor response to electrolyte replacements, including rhythm and repeat lab results as appropriate  - Fluid restriction as ordered  - Instruct patient on fluid and nutrition restrictions as appropriate  Outcome: Progressing     Problem: Impaired Functional Mobility  Goal: Achieve highest/safest level of mobility/gait  Description: Interventions:  - Assess patient's functional ability and stability  - Promote increasing activity/tolerance for mobility and gait  - Educate and engage patient/family in tolerated activity level and precautions  Outcome: Progressing

## 2023-03-30 NOTE — ED QUICK NOTES
Orders for admission, patient is aware of plan and ready to go upstairs. Any questions, please call ED RN Yadira Syed  at extension [phone]. Vaccinated?  Yes  Type of COVID test sent: Rapid PCR  COVID Suspicion level: Low      Titratable drug(s) infusing: N/A  Rate:    LOC at time of transport:AXOX1 (Baseline)/Alzheimers     Other pertinent information:    CIWA score=n/a  NIH score=n/a

## 2023-03-30 NOTE — PLAN OF CARE
Pt alert and oriented X1-2, pleasantly confused.  at bedside. VSS. Pt received 1 unit of blood this morning, hgb redraw was 8.6. Pt went for EGD, NPO this morning, now on regular diet. Pt having black stools, formed. All medications given per [facility]. IVF and protonix infusing per MAR. Contact precautions in place due to C-diff infection. Fall precautions in place, call light in reach. 1700: Replaced patient bed due to faulty call light.      Problem: GASTROINTESTINAL - ADULT  Goal: Minimal or absence of nausea and vomiting  Description: INTERVENTIONS:  - Maintain adequate hydration with IV or PO as ordered and tolerated  - Nasogastric tube to low intermittent suction as ordered  - Evaluate effectiveness of ordered antiemetic medications  - Provide nonpharmacologic comfort measures as appropriate  - Advance diet as tolerated, if ordered  - Obtain nutritional consult as needed  - Evaluate fluid balance  Outcome: Progressing

## 2023-03-30 NOTE — OPERATIVE REPORT
[id number] [facility] Patient Status:  Observation    1940 MRN [id number]   Location [address] [address] Attending Serafin Cho MD   Hosp Day # 0 PCP Handy Whelan MD         PATIENT NAME: Ryan Duran  DATE OF OPERATION: 3/30/2023    PREOPERATIVE DIAGNOSIS:  1. Anemia  2. Hx duodenal AVMs  3. Melena  POSTOPERATIVE DIAGNOSIS:  1. Duodenal AVMs, mild oozing. APC ablation    PROCEDURE PERFORMED: Upper endoscopy with MAC sedation  SURGEON: Tiana Gey A. Damien Castleman, MD   MEDICATIONS: MAC IV in divided doses under the supervision of Anesthesia. PROCEDURE AND FINDINGS: The patient was placed into the left lateral decubitus position after informed consent was obtained. All questions were answered. MAC IV sedation was administered. The Olympus video gastroscope was introduced into the mouth and passed to the third portion of the duodenum. The entire examined esophagus was normal.  The entire examined stomach,  including retroflexion view, was normal.  There were two small AVMs second portion duodenum with min oozing. APC cautery ablation. The remainder of the entire examined duodenum was normal.   The gastroscope was removed from the patient. The procedure was completed. There were no implants placed nor significant blood loss. The patient tolerated the procedure well. There were no immediate post procedure complications. Moderate sedation time:  None. Deep sedation provided by anesthesia    RECOMMENDATIONS:  1. Famotidine 40 mg x 1 month. Stop PPI  2. Regular diet  3. Cont C dif Rx.  4. Transfuse as needed  5. Resume Brillanta in 5 days if still clinically indicated  6. No further recs      Marc A. Damien Castleman, MD

## 2023-03-30 NOTE — ANESTHESIA POSTPROCEDURE EVALUATION
[address] [facility] Patient Status:  Observation   Age/Gender 78year old female MRN [id number]   Location [address] [address] Attending Lele Rashid MD   Hosp Day # 0 PCP Fahad Rodriguez MD       Anesthesia Post-op Note    ESOPHAGOGASTRODUODENOSCOPY (EGD) W/ APC    Procedure Summary     Date: 03/30/23 Room / Location: [facility] ENDOSCOPY 03 / [facility] ENDOSCOPY    Anesthesia Start: 8003 Anesthesia Stop:     Procedure: ESOPHAGOGASTRODUODENOSCOPY (EGD) W/ APC Diagnosis: (Duodenum AVM)    Surgeons: Rosie Osborn MD Anesthesiologist: Herminio Mabry MD    Anesthesia Type: MAC ASA Status: 3          Anesthesia Type: MAC    Vitals Value Taken Time   /69 03/30/23 1419   Temp  03/30/23 1423   Pulse 79 03/30/23 1422   Resp 17 03/30/23 1422   SpO2 94 % 03/30/23 1422   Vitals shown include unvalidated device data. Patient Location: Endoscopy    Anesthesia Type: MAC    Airway Patency: patent    Postop Pain Control: adequate    Mental Status: preanesthetic baseline    Nausea/Vomiting: none    Cardiopulmonary/Hydration status: stable euvolemic    Complications: no apparent anesthesia related complications    Postop vital signs: stable    Dental Exam: Unchanged from Preop    Patient to be discharged home when criteria met.

## 2023-03-30 NOTE — PHYSICAL THERAPY NOTE
Pt order received and chart reviewed. Pt HGB at critical value at 6.3 and likely will have a procedure today. Will hold PT eval and f/u tomorrow.

## 2023-03-30 NOTE — OCCUPATIONAL THERAPY NOTE
OT order received, chart reviewed, pt with low Hgb, Ot will hold 2/2 dept protocol, OT will follow as approp.

## 2023-03-31 LAB
ANION GAP SERPL CALC-SCNC: 6 MMOL/L (ref 0–18)
BASOPHILS # BLD AUTO: 0.02 X10(3) UL (ref 0–0.2)
BASOPHILS NFR BLD AUTO: 0.6 %
BLOOD TYPE BARCODE: 9500
BUN BLD-MCNC: 9 MG/DL (ref 7–18)
CALCIUM BLD-MCNC: 9 MG/DL (ref 8.5–10.1)
CHLORIDE SERPL-SCNC: 113 MMOL/L (ref 98–112)
CO2 SERPL-SCNC: 25 MMOL/L (ref 21–32)
CREAT BLD-MCNC: 0.77 MG/DL
EOSINOPHIL # BLD AUTO: 0.16 X10(3) UL (ref 0–0.7)
EOSINOPHIL NFR BLD AUTO: 4.4 %
ERYTHROCYTE [DISTWIDTH] IN BLOOD BY AUTOMATED COUNT: 15.3 %
GFR SERPLBLD BASED ON 1.73 SQ M-ARVRAT: 76 ML/MIN/1.73M2 (ref 60–?)
GLUCOSE BLD-MCNC: 120 MG/DL (ref 70–99)
HCT VFR BLD AUTO: 27.7 %
HGB BLD-MCNC: 8.6 G/DL
IMM GRANULOCYTES # BLD AUTO: 0.01 X10(3) UL (ref 0–1)
IMM GRANULOCYTES NFR BLD: 0.3 %
LYMPHOCYTES # BLD AUTO: 0.75 X10(3) UL (ref 1–4)
LYMPHOCYTES NFR BLD AUTO: 20.8 %
MCH RBC QN AUTO: 27 PG (ref 26–34)
MCHC RBC AUTO-ENTMCNC: 31 G/DL (ref 31–37)
MCV RBC AUTO: 87.1 FL
MONOCYTES # BLD AUTO: 0.31 X10(3) UL (ref 0.1–1)
MONOCYTES NFR BLD AUTO: 8.6 %
NEUTROPHILS # BLD AUTO: 2.35 X10 (3) UL (ref 1.5–7.7)
NEUTROPHILS # BLD AUTO: 2.35 X10(3) UL (ref 1.5–7.7)
NEUTROPHILS NFR BLD AUTO: 65.3 %
OSMOLALITY SERPL CALC.SUM OF ELEC: 298 MOSM/KG (ref 275–295)
PLATELET # BLD AUTO: 192 10(3)UL (ref 150–450)
POTASSIUM SERPL-SCNC: 3.4 MMOL/L (ref 3.5–5.1)
POTASSIUM SERPL-SCNC: 4.2 MMOL/L (ref 3.5–5.1)
RBC # BLD AUTO: 3.18 X10(6)UL
SODIUM SERPL-SCNC: 144 MMOL/L (ref 136–145)
UNIT VOLUME: 350 ML
WBC # BLD AUTO: 3.6 X10(3) UL (ref 4–11)

## 2023-03-31 PROCEDURE — 97535 SELF CARE MNGMENT TRAINING: CPT

## 2023-03-31 PROCEDURE — 85025 COMPLETE CBC W/AUTO DIFF WBC: CPT | Performed by: HOSPITALIST

## 2023-03-31 PROCEDURE — C9113 INJ PANTOPRAZOLE SODIUM, VIA: HCPCS | Performed by: EMERGENCY MEDICINE

## 2023-03-31 PROCEDURE — 97166 OT EVAL MOD COMPLEX 45 MIN: CPT

## 2023-03-31 PROCEDURE — 97161 PT EVAL LOW COMPLEX 20 MIN: CPT

## 2023-03-31 PROCEDURE — 80048 BASIC METABOLIC PNL TOTAL CA: CPT | Performed by: NURSE PRACTITIONER

## 2023-03-31 PROCEDURE — 97116 GAIT TRAINING THERAPY: CPT

## 2023-03-31 PROCEDURE — 84132 ASSAY OF SERUM POTASSIUM: CPT | Performed by: HOSPITALIST

## 2023-03-31 RX ORDER — BUSPIRONE HYDROCHLORIDE 15 MG/1
30 TABLET ORAL NIGHTLY
Status: DISCONTINUED | OUTPATIENT
Start: 2023-03-31 | End: 2023-04-05

## 2023-03-31 RX ORDER — HALOPERIDOL 5 MG/ML
2 INJECTION INTRAMUSCULAR EVERY 6 HOURS PRN
Status: DISCONTINUED | OUTPATIENT
Start: 2023-03-31 | End: 2023-04-05

## 2023-03-31 RX ORDER — MEMANTINE HYDROCHLORIDE 10 MG/1
10 TABLET ORAL 2 TIMES DAILY
Status: DISCONTINUED | OUTPATIENT
Start: 2023-03-31 | End: 2023-04-05

## 2023-03-31 RX ORDER — POTASSIUM CHLORIDE 20 MEQ/1
40 TABLET, EXTENDED RELEASE ORAL EVERY 4 HOURS
Status: COMPLETED | OUTPATIENT
Start: 2023-03-31 | End: 2023-03-31

## 2023-03-31 NOTE — PLAN OF CARE
Received pt a/o x2, confused. Reoriented frequently. VSS. Afebrile. Denies pain. IVF & IV protonix infusing per MAR. Tolerating diet. C diff + isolation maintained. Melena stool overnight. Stools formed.  at bedside. Call light within reach. Safety precautions in place.      Problem: GASTROINTESTINAL - ADULT  Goal: Minimal or absence of nausea and vomiting  Description: INTERVENTIONS:  - Maintain adequate hydration with IV or PO as ordered and tolerated  - Nasogastric tube to low intermittent suction as ordered  - Evaluate effectiveness of ordered antiemetic medications  - Provide nonpharmacologic comfort measures as appropriate  - Advance diet as tolerated, if ordered  - Obtain nutritional consult as needed  - Evaluate fluid balance  Outcome: Progressing     Problem: GASTROINTESTINAL - ADULT  Goal: Maintains or returns to baseline bowel function  Description: INTERVENTIONS:  - Assess bowel function  - Maintain adequate hydration with IV or PO as ordered and tolerated  - Evaluate effectiveness of GI medications  - Encourage mobilization and activity  - Obtain nutritional consult as needed  - Establish a toileting routine/schedule  - Consider collaborating with pharmacy to review patient's medication profile  Outcome: Progressing     Problem: METABOLIC/FLUID AND ELECTROLYTES - ADULT  Goal: Electrolytes maintained within normal limits  Description: INTERVENTIONS:  - Monitor labs and rhythm and assess patient for signs and symptoms of electrolyte imbalances  - Administer electrolyte replacement as ordered  - Monitor response to electrolyte replacements, including rhythm and repeat lab results as appropriate  - Fluid restriction as ordered  - Instruct patient on fluid and nutrition restrictions as appropriate  Outcome: Progressing     Problem: Impaired Functional Mobility  Goal: Achieve highest/safest level of mobility/gait  Description: Interventions:  - Assess patient's functional ability and stability  - Promote increasing activity/tolerance for mobility and gait  - Educate and engage patient/family in tolerated activity level and precautions  Outcome: Progressing

## 2023-03-31 NOTE — PLAN OF CARE
Pt alert and oriented X1-2, confused.  at bedside. VSS> Hemoglobin has been stable throughout the day, pt still having dark BM. Pt tolerating regular diet well. Consulted ID for positive UA. Pt worked with physical therapy, tolerated well. Pt changed and respotoned for comfort as needed. Isolation precautions in place. All meds given per [facility]. IVF infusing, Protonix stopped this afternoon. Fall precautions in place, call light in reach. 1530: Pt became very agitated and anxious, pagemichael GUDINO for PRN medication, gave 2mg IM haldol.      Problem: GASTROINTESTINAL - ADULT  Goal: Minimal or absence of nausea and vomiting  Description: INTERVENTIONS:  - Maintain adequate hydration with IV or PO as ordered and tolerated  - Nasogastric tube to low intermittent suction as ordered  - Evaluate effectiveness of ordered antiemetic medications  - Provide nonpharmacologic comfort measures as appropriate  - Advance diet as tolerated, if ordered  - Obtain nutritional consult as needed  - Evaluate fluid balance  Outcome: Progressing  Goal: Maintains or returns to baseline bowel function  Description: INTERVENTIONS:  - Assess bowel function  - Maintain adequate hydration with IV or PO as ordered and tolerated  - Evaluate effectiveness of GI medications  - Encourage mobilization and activity  - Obtain nutritional consult as needed  - Establish a toileting routine/schedule  - Consider collaborating with pharmacy to review patient's medication profile  Outcome: Progressing

## 2023-04-01 LAB
BASOPHILS # BLD AUTO: 0.02 X10(3) UL (ref 0–0.2)
BASOPHILS NFR BLD AUTO: 0.6 %
EOSINOPHIL # BLD AUTO: 0.22 X10(3) UL (ref 0–0.7)
EOSINOPHIL NFR BLD AUTO: 6.3 %
ERYTHROCYTE [DISTWIDTH] IN BLOOD BY AUTOMATED COUNT: 15.3 %
HCT VFR BLD AUTO: 29.9 %
HGB BLD-MCNC: 9.1 G/DL
IMM GRANULOCYTES # BLD AUTO: 0.01 X10(3) UL (ref 0–1)
IMM GRANULOCYTES NFR BLD: 0.3 %
LYMPHOCYTES # BLD AUTO: 1.01 X10(3) UL (ref 1–4)
LYMPHOCYTES NFR BLD AUTO: 28.9 %
MCH RBC QN AUTO: 26.7 PG (ref 26–34)
MCHC RBC AUTO-ENTMCNC: 30.4 G/DL (ref 31–37)
MCV RBC AUTO: 87.7 FL
MONOCYTES # BLD AUTO: 0.42 X10(3) UL (ref 0.1–1)
MONOCYTES NFR BLD AUTO: 12 %
NEUTROPHILS # BLD AUTO: 1.82 X10 (3) UL (ref 1.5–7.7)
NEUTROPHILS # BLD AUTO: 1.82 X10(3) UL (ref 1.5–7.7)
NEUTROPHILS NFR BLD AUTO: 51.9 %
PLATELET # BLD AUTO: 205 10(3)UL (ref 150–450)
RBC # BLD AUTO: 3.41 X10(6)UL
WBC # BLD AUTO: 3.5 X10(3) UL (ref 4–11)

## 2023-04-01 PROCEDURE — 85025 COMPLETE CBC W/AUTO DIFF WBC: CPT | Performed by: HOSPITALIST

## 2023-04-01 NOTE — HOME CARE LIAISON
Received referral via [facility] for [facility]. Spoke w/ pt's spouse to discuss. Pt has recent hx of [facility]. Spouse would like to think about HH and get back to Liaison.

## 2023-04-01 NOTE — PROGRESS NOTES
Pt resting comfortably. Chart reviewed. HB stable  Discussed with daughter yesterday.   No further recs,

## 2023-04-01 NOTE — PLAN OF CARE
Pt received drowsy, oriented to self. Sleeping most of the morning. All meds per MAR. VSS, afebrile. Poor appetite. Purewick in place. Contact precautions in place. No BM this shift. Repositioned prn. Fall precautions in place.     Problem: GASTROINTESTINAL - ADULT  Goal: Minimal or absence of nausea and vomiting  Description: INTERVENTIONS:  - Maintain adequate hydration with IV or PO as ordered and tolerated  - Nasogastric tube to low intermittent suction as ordered  - Evaluate effectiveness of ordered antiemetic medications  - Provide nonpharmacologic comfort measures as appropriate  - Advance diet as tolerated, if ordered  - Obtain nutritional consult as needed  - Evaluate fluid balance  Outcome: Progressing     Problem: GASTROINTESTINAL - ADULT  Goal: Maintains or returns to baseline bowel function  Description: INTERVENTIONS:  - Assess bowel function  - Maintain adequate hydration with IV or PO as ordered and tolerated  - Evaluate effectiveness of GI medications  - Encourage mobilization and activity  - Obtain nutritional consult as needed  - Establish a toileting routine/schedule  - Consider collaborating with pharmacy to review patient's medication profile  Outcome: Progressing     Problem: METABOLIC/FLUID AND ELECTROLYTES - ADULT  Goal: Electrolytes maintained within normal limits  Description: INTERVENTIONS:  - Monitor labs and rhythm and assess patient for signs and symptoms of electrolyte imbalances  - Administer electrolyte replacement as ordered  - Monitor response to electrolyte replacements, including rhythm and repeat lab results as appropriate  - Fluid restriction as ordered  - Instruct patient on fluid and nutrition restrictions as appropriate  Outcome: Progressing

## 2023-04-02 LAB
ANION GAP SERPL CALC-SCNC: 4 MMOL/L (ref 0–18)
BASOPHILS # BLD AUTO: 0.02 X10(3) UL (ref 0–0.2)
BASOPHILS NFR BLD AUTO: 0.6 %
BUN BLD-MCNC: 18 MG/DL (ref 7–18)
CALCIUM BLD-MCNC: 8.9 MG/DL (ref 8.5–10.1)
CHLORIDE SERPL-SCNC: 113 MMOL/L (ref 98–112)
CO2 SERPL-SCNC: 26 MMOL/L (ref 21–32)
CREAT BLD-MCNC: 0.82 MG/DL
D DIMER PPP FEU-MCNC: 1.3 UG/ML FEU (ref ?–0.85)
EOSINOPHIL # BLD AUTO: 0.22 X10(3) UL (ref 0–0.7)
EOSINOPHIL NFR BLD AUTO: 6.4 %
ERYTHROCYTE [DISTWIDTH] IN BLOOD BY AUTOMATED COUNT: 15.2 %
GFR SERPLBLD BASED ON 1.73 SQ M-ARVRAT: 70 ML/MIN/1.73M2 (ref 60–?)
GLUCOSE BLD-MCNC: 102 MG/DL (ref 70–99)
HCT VFR BLD AUTO: 31 %
HGB BLD-MCNC: 9.1 G/DL
IMM GRANULOCYTES # BLD AUTO: 0.01 X10(3) UL (ref 0–1)
IMM GRANULOCYTES NFR BLD: 0.3 %
LYMPHOCYTES # BLD AUTO: 0.95 X10(3) UL (ref 1–4)
LYMPHOCYTES NFR BLD AUTO: 27.5 %
MCH RBC QN AUTO: 26.6 PG (ref 26–34)
MCHC RBC AUTO-ENTMCNC: 29.4 G/DL (ref 31–37)
MCV RBC AUTO: 90.6 FL
MONOCYTES # BLD AUTO: 0.32 X10(3) UL (ref 0.1–1)
MONOCYTES NFR BLD AUTO: 9.3 %
NEUTROPHILS # BLD AUTO: 1.93 X10 (3) UL (ref 1.5–7.7)
NEUTROPHILS # BLD AUTO: 1.93 X10(3) UL (ref 1.5–7.7)
NEUTROPHILS NFR BLD AUTO: 55.9 %
OSMOLALITY SERPL CALC.SUM OF ELEC: 298 MOSM/KG (ref 275–295)
PLATELET # BLD AUTO: 222 10(3)UL (ref 150–450)
POTASSIUM SERPL-SCNC: 4 MMOL/L (ref 3.5–5.1)
RBC # BLD AUTO: 3.42 X10(6)UL
SODIUM SERPL-SCNC: 143 MMOL/L (ref 136–145)
WBC # BLD AUTO: 3.5 X10(3) UL (ref 4–11)

## 2023-04-02 PROCEDURE — 97116 GAIT TRAINING THERAPY: CPT

## 2023-04-02 PROCEDURE — 80048 BASIC METABOLIC PNL TOTAL CA: CPT | Performed by: HOSPITALIST

## 2023-04-02 PROCEDURE — 85379 FIBRIN DEGRADATION QUANT: CPT | Performed by: HOSPITALIST

## 2023-04-02 PROCEDURE — 85610 PROTHROMBIN TIME: CPT | Performed by: INTERNAL MEDICINE

## 2023-04-02 PROCEDURE — 85025 COMPLETE CBC W/AUTO DIFF WBC: CPT | Performed by: HOSPITALIST

## 2023-04-02 RX ORDER — METOPROLOL TARTRATE 5 MG/5ML
5 INJECTION INTRAVENOUS EVERY 6 HOURS PRN
Status: DISCONTINUED | OUTPATIENT
Start: 2023-04-02 | End: 2023-04-05

## 2023-04-02 RX ORDER — DIPHENHYDRAMINE HCL 25 MG
50 CAPSULE ORAL ONCE
Status: CANCELLED | OUTPATIENT
Start: 2023-04-02 | End: 2023-04-02

## 2023-04-02 RX ORDER — PREDNISONE 50 MG/1
50 TABLET ORAL EVERY 6 HOURS
Status: COMPLETED | OUTPATIENT
Start: 2023-04-02 | End: 2023-04-03

## 2023-04-02 RX ORDER — DIPHENHYDRAMINE HCL 25 MG
50 CAPSULE ORAL ONCE
Status: COMPLETED | OUTPATIENT
Start: 2023-04-03 | End: 2023-04-03

## 2023-04-02 RX ORDER — PREDNISONE 50 MG/1
50 TABLET ORAL EVERY 6 HOURS
Status: CANCELLED | OUTPATIENT
Start: 2023-04-02 | End: 2023-04-03

## 2023-04-02 NOTE — PLAN OF CARE
Patient drowsy, easily arousable, oriented to person when received this morning. Patient denies nausea, no bowel movement noted. Patent's heart rate 140's, Sinus Tachycardia with ambulation, 110's @ rest after ambulation, Dr. Lino Wilkinson notified, Dr. Yancy Hernández notified. Patient more alert this afternoon. [address] shows Sinus Tachycardia, heart rate 100's-110's, respirations non-labored, O2 sat % on room air. Patient had 1 large greenish soft stool. Dr. Lino Wilkinson spoke with patient's  regarding plan of care.

## 2023-04-03 ENCOUNTER — APPOINTMENT (OUTPATIENT)
Dept: INTERVENTIONAL RADIOLOGY/VASCULAR | Facility: HOSPITAL | Age: 83
DRG: 377 | End: 2023-04-03
Attending: INTERNAL MEDICINE
Payer: MEDICARE

## 2023-04-03 ENCOUNTER — APPOINTMENT (OUTPATIENT)
Dept: CT IMAGING | Facility: HOSPITAL | Age: 83
DRG: 377 | End: 2023-04-03
Attending: HOSPITALIST
Payer: MEDICARE

## 2023-04-03 ENCOUNTER — APPOINTMENT (OUTPATIENT)
Dept: ULTRASOUND IMAGING | Facility: HOSPITAL | Age: 83
DRG: 377 | End: 2023-04-03
Attending: INTERNAL MEDICINE
Payer: MEDICARE

## 2023-04-03 LAB
ANION GAP SERPL CALC-SCNC: 5 MMOL/L (ref 0–18)
BASOPHILS # BLD AUTO: 0.01 X10(3) UL (ref 0–0.2)
BASOPHILS NFR BLD AUTO: 0.3 %
BUN BLD-MCNC: 26 MG/DL (ref 7–18)
CALCIUM BLD-MCNC: 9 MG/DL (ref 8.5–10.1)
CHLORIDE SERPL-SCNC: 111 MMOL/L (ref 98–112)
CO2 SERPL-SCNC: 23 MMOL/L (ref 21–32)
CREAT BLD-MCNC: 0.87 MG/DL
EOSINOPHIL # BLD AUTO: 0 X10(3) UL (ref 0–0.7)
EOSINOPHIL NFR BLD AUTO: 0 %
ERYTHROCYTE [DISTWIDTH] IN BLOOD BY AUTOMATED COUNT: 14.7 %
GFR SERPLBLD BASED ON 1.73 SQ M-ARVRAT: 65 ML/MIN/1.73M2 (ref 60–?)
GLUCOSE BLD-MCNC: 197 MG/DL (ref 70–99)
HCT VFR BLD AUTO: 30.6 %
HGB BLD-MCNC: 9.3 G/DL
IMM GRANULOCYTES # BLD AUTO: 0.02 X10(3) UL (ref 0–1)
IMM GRANULOCYTES NFR BLD: 0.7 %
INR BLD: 1.05 (ref 0.85–1.16)
LYMPHOCYTES # BLD AUTO: 0.44 X10(3) UL (ref 1–4)
LYMPHOCYTES NFR BLD AUTO: 14.8 %
MCH RBC QN AUTO: 26.6 PG (ref 26–34)
MCHC RBC AUTO-ENTMCNC: 30.4 G/DL (ref 31–37)
MCV RBC AUTO: 87.7 FL
MONOCYTES # BLD AUTO: 0.03 X10(3) UL (ref 0.1–1)
MONOCYTES NFR BLD AUTO: 1 %
NEUTROPHILS # BLD AUTO: 2.48 X10 (3) UL (ref 1.5–7.7)
NEUTROPHILS # BLD AUTO: 2.48 X10(3) UL (ref 1.5–7.7)
NEUTROPHILS NFR BLD AUTO: 83.2 %
OSMOLALITY SERPL CALC.SUM OF ELEC: 298 MOSM/KG (ref 275–295)
PLATELET # BLD AUTO: 240 10(3)UL (ref 150–450)
POTASSIUM SERPL-SCNC: 4.1 MMOL/L (ref 3.5–5.1)
PROTHROMBIN TIME: 13.7 SECONDS (ref 11.6–14.8)
RBC # BLD AUTO: 3.49 X10(6)UL
SODIUM SERPL-SCNC: 139 MMOL/L (ref 136–145)
WBC # BLD AUTO: 3 X10(3) UL (ref 4–11)

## 2023-04-03 PROCEDURE — 85025 COMPLETE CBC W/AUTO DIFF WBC: CPT | Performed by: HOSPITALIST

## 2023-04-03 PROCEDURE — 06H03DZ INSERTION OF INTRALUMINAL DEVICE INTO INFERIOR VENA CAVA, PERCUTANEOUS APPROACH: ICD-10-PCS | Performed by: RADIOLOGY

## 2023-04-03 PROCEDURE — 99152 MOD SED SAME PHYS/QHP 5/>YRS: CPT | Performed by: RADIOLOGY

## 2023-04-03 PROCEDURE — B519ZZZ FLUOROSCOPY OF INFERIOR VENA CAVA: ICD-10-PCS | Performed by: RADIOLOGY

## 2023-04-03 PROCEDURE — 37191 INS ENDOVAS VENA CAVA FILTR: CPT | Performed by: RADIOLOGY

## 2023-04-03 PROCEDURE — 80048 BASIC METABOLIC PNL TOTAL CA: CPT | Performed by: HOSPITALIST

## 2023-04-03 PROCEDURE — 93970 EXTREMITY STUDY: CPT | Performed by: INTERNAL MEDICINE

## 2023-04-03 PROCEDURE — 71275 CT ANGIOGRAPHY CHEST: CPT | Performed by: HOSPITALIST

## 2023-04-03 RX ORDER — VANCOMYCIN HYDROCHLORIDE 125 MG/1
CAPSULE ORAL
Qty: 91 CAPSULE | Refills: 0 | Status: SHIPPED
Start: 2023-04-03 | End: 2023-04-05

## 2023-04-03 RX ORDER — HEPARIN SODIUM 5000 [USP'U]/ML
INJECTION, SOLUTION INTRAVENOUS; SUBCUTANEOUS
Status: COMPLETED
Start: 2023-04-03 | End: 2023-04-03

## 2023-04-03 RX ORDER — LIDOCAINE HYDROCHLORIDE 10 MG/ML
INJECTION, SOLUTION INFILTRATION; PERINEURAL
Status: COMPLETED
Start: 2023-04-03 | End: 2023-04-03

## 2023-04-03 RX ORDER — MIDAZOLAM HYDROCHLORIDE 1 MG/ML
INJECTION INTRAMUSCULAR; INTRAVENOUS
Status: COMPLETED
Start: 2023-04-03 | End: 2023-04-03

## 2023-04-03 NOTE — PLAN OF CARE
Assumed care of pt 1530  A+Ox1, RA, NSR, no c/o pain  Right groin site C/D/I   -post cath per protocol   -flat till 1800 per order   -vitals/neurovasc per order  Vanco cont'd  Pt/family updated on plan of care  Call light in reach, needs addressed

## 2023-04-03 NOTE — PROGRESS NOTES
Pt positive for PE per CT chest/angio. Returned to unit asymptomatic. Ultrasound of bilateral lower extremities ordered. Hematology consulted for possible IVC filter per hospitalist. Likely transfer to cardiac telemetry unit post-IR IVC filter placement.

## 2023-04-03 NOTE — PLAN OF CARE
Patient alert and oriented to person and place. On Room air. Vital signs stable. Afebrile. No complaints of pain or discomfort. No reports of shortness of breath. Scheduled medications given per MAR. Family at bedside. Call light within reach. Safety precautions in place. Will continue to monitor. CT chest/angio scheduled for today. Pt allergic to contrast dye, given prednisone and benadryl per order. Awaiting results.     Problem: GASTROINTESTINAL - ADULT  Goal: Minimal or absence of nausea and vomiting  Description: INTERVENTIONS:  - Maintain adequate hydration with IV or PO as ordered and tolerated  - Nasogastric tube to low intermittent suction as ordered  - Evaluate effectiveness of ordered antiemetic medications  - Provide nonpharmacologic comfort measures as appropriate  - Advance diet as tolerated, if ordered  - Obtain nutritional consult as needed  - Evaluate fluid balance  Outcome: Progressing  Goal: Maintains or returns to baseline bowel function  Description: INTERVENTIONS:  - Assess bowel function  - Maintain adequate hydration with IV or PO as ordered and tolerated  - Evaluate effectiveness of GI medications  - Encourage mobilization and activity  - Obtain nutritional consult as needed  - Establish a toileting routine/schedule  - Consider collaborating with pharmacy to review patient's medication profile  Outcome: Progressing     Problem: METABOLIC/FLUID AND ELECTROLYTES - ADULT  Goal: Electrolytes maintained within normal limits  Description: INTERVENTIONS:  - Monitor labs and rhythm and assess patient for signs and symptoms of electrolyte imbalances  - Administer electrolyte replacement as ordered  - Monitor response to electrolyte replacements, including rhythm and repeat lab results as appropriate  - Fluid restriction as ordered  - Instruct patient on fluid and nutrition restrictions as appropriate  Outcome: Progressing     Problem: CARDIOVASCULAR - ADULT  Goal: Maintains optimal cardiac output and hemodynamic stability  Description: INTERVENTIONS:  - Monitor vital signs, rhythm, and trends  - Monitor for bleeding, hypotension and signs of decreased cardiac output  - Evaluate effectiveness of vasoactive medications to optimize hemodynamic stability  - Monitor arterial and/or venous puncture sites for bleeding and/or hematoma  - Assess quality of pulses, skin color and temperature  - Assess for signs of decreased coronary artery perfusion - ex.  Angina  - Evaluate fluid balance, assess for edema, trend weights  Outcome: Progressing  Goal: Absence of cardiac arrhythmias or at baseline  Description: INTERVENTIONS:  - Continuous cardiac monitoring, monitor vital signs, obtain 12 lead EKG if indicated  - Evaluate effectiveness of antiarrhythmic and heart rate control medications as ordered  - Initiate emergency measures for life threatening arrhythmias  - Monitor electrolytes and administer replacement therapy as ordered  Outcome: Progressing

## 2023-04-03 NOTE — HOME CARE LIAISON
Received referral via [facility] for Home Health services. Spoke w/ patient and provided with list of [facility] providers from [facility], choice is [facility]. Agency reserved in [facility] and contact information placed on AVS.Financial interest disclosure provided.  Notified Raheel.

## 2023-04-03 NOTE — PLAN OF CARE
Patient alert and oriented to person and place, on RA, vitals are stable. No fever overnight, no complaints of pain or SOB. Pt started on CT contrast allergy prep with prednisone, CT angio chest scheduled for 9 am. Pt and family aware and agree with plan. All meds given per MAR. 1 BM overnight, no diarrhea. Will continue to monitor. Family at the bedside.       Problem: GASTROINTESTINAL - ADULT  Goal: Minimal or absence of nausea and vomiting  Description: INTERVENTIONS:  - Maintain adequate hydration with IV or PO as ordered and tolerated  - Nasogastric tube to low intermittent suction as ordered  - Evaluate effectiveness of ordered antiemetic medications  - Provide nonpharmacologic comfort measures as appropriate  - Advance diet as tolerated, if ordered  - Obtain nutritional consult as needed  - Evaluate fluid balance  Outcome: Progressing  Goal: Maintains or returns to baseline bowel function  Description: INTERVENTIONS:  - Assess bowel function  - Maintain adequate hydration with IV or PO as ordered and tolerated  - Evaluate effectiveness of GI medications  - Encourage mobilization and activity  - Obtain nutritional consult as needed  - Establish a toileting routine/schedule  - Consider collaborating with pharmacy to review patient's medication profile  Outcome: Progressing     Problem: METABOLIC/FLUID AND ELECTROLYTES - ADULT  Goal: Electrolytes maintained within normal limits  Description: INTERVENTIONS:  - Monitor labs and rhythm and assess patient for signs and symptoms of electrolyte imbalances  - Administer electrolyte replacement as ordered  - Monitor response to electrolyte replacements, including rhythm and repeat lab results as appropriate  - Fluid restriction as ordered  - Instruct patient on fluid and nutrition restrictions as appropriate  Outcome: Progressing     Problem: CARDIOVASCULAR - ADULT  Goal: Maintains optimal cardiac output and hemodynamic stability  Description: INTERVENTIONS:  - Monitor vital signs, rhythm, and trends  - Monitor for bleeding, hypotension and signs of decreased cardiac output  - Evaluate effectiveness of vasoactive medications to optimize hemodynamic stability  - Monitor arterial and/or venous puncture sites for bleeding and/or hematoma  - Assess quality of pulses, skin color and temperature  - Assess for signs of decreased coronary artery perfusion - ex.  Angina  - Evaluate fluid balance, assess for edema, trend weights  Outcome: Progressing  Goal: Absence of cardiac arrhythmias or at baseline  Description: INTERVENTIONS:  - Continuous cardiac monitoring, monitor vital signs, obtain 12 lead EKG if indicated  - Evaluate effectiveness of antiarrhythmic and heart rate control medications as ordered  - Initiate emergency measures for life threatening arrhythmias  - Monitor electrolytes and administer replacement therapy as ordered  Outcome: Progressing

## 2023-04-03 NOTE — DISCHARGE INSTRUCTIONS
Please call [facility]. Fax: [phone]. Tel: [phone] to schedule a video appointment with infectious disease in 2 to 3 weeks    Sometimes managing your health at home requires assistance. The [facility] team has recognized your preference to use [facility]. They can be reached by phone at [phone]. The fax number for your reference is [phone]. A representative from the home health agency will contact you or your family to schedule your first visit. Darshan Reveal

## 2023-04-04 LAB
ANION GAP SERPL CALC-SCNC: 5 MMOL/L (ref 0–18)
BASOPHILS # BLD AUTO: 0.01 X10(3) UL (ref 0–0.2)
BASOPHILS NFR BLD AUTO: 0.2 %
BUN BLD-MCNC: 29 MG/DL (ref 7–18)
CALCIUM BLD-MCNC: 8.7 MG/DL (ref 8.5–10.1)
CHLORIDE SERPL-SCNC: 113 MMOL/L (ref 98–112)
CO2 SERPL-SCNC: 24 MMOL/L (ref 21–32)
CREAT BLD-MCNC: 0.76 MG/DL
EOSINOPHIL # BLD AUTO: 0.01 X10(3) UL (ref 0–0.7)
EOSINOPHIL NFR BLD AUTO: 0.2 %
ERYTHROCYTE [DISTWIDTH] IN BLOOD BY AUTOMATED COUNT: 14.7 %
GFR SERPLBLD BASED ON 1.73 SQ M-ARVRAT: 77 ML/MIN/1.73M2 (ref 60–?)
GLUCOSE BLD-MCNC: 128 MG/DL (ref 70–99)
HCT VFR BLD AUTO: 27.8 %
HGB BLD-MCNC: 8.5 G/DL
IMM GRANULOCYTES # BLD AUTO: 0.01 X10(3) UL (ref 0–1)
IMM GRANULOCYTES NFR BLD: 0.2 %
LYMPHOCYTES # BLD AUTO: 1.09 X10(3) UL (ref 1–4)
LYMPHOCYTES NFR BLD AUTO: 21.5 %
MCH RBC QN AUTO: 27.1 PG (ref 26–34)
MCHC RBC AUTO-ENTMCNC: 30.6 G/DL (ref 31–37)
MCV RBC AUTO: 88.5 FL
MONOCYTES # BLD AUTO: 0.46 X10(3) UL (ref 0.1–1)
MONOCYTES NFR BLD AUTO: 9.1 %
NEUTROPHILS # BLD AUTO: 3.49 X10 (3) UL (ref 1.5–7.7)
NEUTROPHILS # BLD AUTO: 3.49 X10(3) UL (ref 1.5–7.7)
NEUTROPHILS NFR BLD AUTO: 68.8 %
OSMOLALITY SERPL CALC.SUM OF ELEC: 301 MOSM/KG (ref 275–295)
PLATELET # BLD AUTO: 223 10(3)UL (ref 150–450)
POTASSIUM SERPL-SCNC: 3.8 MMOL/L (ref 3.5–5.1)
RBC # BLD AUTO: 3.14 X10(6)UL
SODIUM SERPL-SCNC: 142 MMOL/L (ref 136–145)
WBC # BLD AUTO: 5.1 X10(3) UL (ref 4–11)

## 2023-04-04 PROCEDURE — 97530 THERAPEUTIC ACTIVITIES: CPT

## 2023-04-04 PROCEDURE — 97116 GAIT TRAINING THERAPY: CPT

## 2023-04-04 PROCEDURE — 85025 COMPLETE CBC W/AUTO DIFF WBC: CPT | Performed by: HOSPITALIST

## 2023-04-04 PROCEDURE — 97110 THERAPEUTIC EXERCISES: CPT

## 2023-04-04 PROCEDURE — 80048 BASIC METABOLIC PNL TOTAL CA: CPT | Performed by: HOSPITALIST

## 2023-04-04 NOTE — PLAN OF CARE
Assumed care of pt around 1900. A/o x1, baseline dementia. NSR on tele. RA.  R groin site soft, no hematoma. Pedal pulses palpable 2+. Denies any pain overnight. Contact isolation precautions. Incontinent episodes. Voiding per purewick. Family member at the bedside overnight. Currently resting in bed w/ call light within reach & safety precautions in place.

## 2023-04-04 NOTE — PLAN OF CARE
Assumed care @ 0730. Alert and orientated X1. NSR on tele, RA, VSS. Patient denies pain. Aspirin resumed, See MAR. Oral abx given per order, See MAR. PT re-eval today, home PT/ 24 hr supervision. Adequate oral intake, see flowsheets. Plan for discharge tomorrow. Patient/family updated on plan of care. Bed/chair alarm on, call light within reach. Monitoring patient needs.

## 2023-04-04 NOTE — CDS QUERY
India Mcrae     Dear Dr. Sky Brock:   Clinical information (provided below) indicates a diagnosis of Sepsis on admission secondary to C diff Colitis, by the infectious disease consultant 03/31. For accurate ICD-10-CM code assignment:   BASED ON YOUR CLINICAL JUDGMENT, PLEASE CLARIFY  THE DOCUMENTED DIAGNOSIS OF SEPSIS    [  x ] Sepsis secondary to C diff Colitis ruled out  [   ] Sepsis secondary to C diff Colitis, present on admission, confirmed: (Please provide any additional clinical information supportive of the diagnosis in the medical record)   [   ] Other (please specify): ___________________________________________________________  __________________________________________________________________________________________  Documentation from the Medical Record:  Possible Risk Factors: C Diff Colitis  Clinical Indicators:   ___3/12 C diff +  ___ 3/29 to ER with syncope, weakness. GIB, acute blood loss anemia. Hypotension present in ER 78/59, tachycardiac some tachypnea u to 22 RR. WBC <4. Lactic acid not assessed. ___3/31 Infectious Disease Consult: Sepsis on admission: with weakness, near syncope, hypotension, leukocytosis,  Secondary to C diff Colitis, also GIB and drop of Hgb, UA is abn, Cul with E coli, Blood cul are NGTD  Drop of Hgb noted too, S/P EGD and ablation today, 2.C diff Colitis: First episode per spouse, Agree with PO Vancomycin,125mg QID, abd is soft and non tender, 3. Cystitis: UA is abn, Cul with E coli, Recently treated with Macrobid  as OP, Will hold off any more abx, likely diarrhea related cross contamination too,  Treatment: Infectious disease consult, PO Vancomycin 125mg QID, IVF: 250cc Normal saline bolus 3/29/23, 0.9NS IVF 125cc/hr changed to 100cc/hr. For questions regarding this query, please contact Clinical Documentation:  Omari Trejo RN [phone]. [email or web address]  Thank you.   THIS FORM IS A PERMANENT PART OF THE MEDICAL RECORD

## 2023-04-04 NOTE — PROGRESS NOTES
Brief Cardiology note    No further changes from cardiac standpoint. She is ok to be off Brilinta since it has been nearly 1 year since PCI. Would restart ASA 81 once GI clears. We will make appoint,ent for her to see her outpt cardiologist in 1-2 months. Please contact cardiology with further ?'s. We will follow peripherally.      Maya Andersen MD

## 2023-04-05 VITALS
BODY MASS INDEX: 25.62 KG/M2 | RESPIRATION RATE: 18 BRPM | WEIGHT: 144.63 LBS | HEART RATE: 102 BPM | OXYGEN SATURATION: 98 % | TEMPERATURE: 98 F | HEIGHT: 63 IN | DIASTOLIC BLOOD PRESSURE: 67 MMHG | SYSTOLIC BLOOD PRESSURE: 109 MMHG

## 2023-04-05 LAB
ANION GAP SERPL CALC-SCNC: 5 MMOL/L (ref 0–18)
BASOPHILS # BLD AUTO: 0.02 X10(3) UL (ref 0–0.2)
BASOPHILS NFR BLD AUTO: 0.4 %
BUN BLD-MCNC: 34 MG/DL (ref 7–18)
CALCIUM BLD-MCNC: 9 MG/DL (ref 8.5–10.1)
CHLORIDE SERPL-SCNC: 113 MMOL/L (ref 98–112)
CO2 SERPL-SCNC: 24 MMOL/L (ref 21–32)
CREAT BLD-MCNC: 0.85 MG/DL
EOSINOPHIL # BLD AUTO: 0.18 X10(3) UL (ref 0–0.7)
EOSINOPHIL NFR BLD AUTO: 3.9 %
ERYTHROCYTE [DISTWIDTH] IN BLOOD BY AUTOMATED COUNT: 15 %
GFR SERPLBLD BASED ON 1.73 SQ M-ARVRAT: 67 ML/MIN/1.73M2 (ref 60–?)
GLUCOSE BLD-MCNC: 115 MG/DL (ref 70–99)
HCT VFR BLD AUTO: 28.8 %
HGB BLD-MCNC: 8.6 G/DL
IMM GRANULOCYTES # BLD AUTO: 0.01 X10(3) UL (ref 0–1)
IMM GRANULOCYTES NFR BLD: 0.2 %
LYMPHOCYTES # BLD AUTO: 1.81 X10(3) UL (ref 1–4)
LYMPHOCYTES NFR BLD AUTO: 39.5 %
MCH RBC QN AUTO: 25.8 PG (ref 26–34)
MCHC RBC AUTO-ENTMCNC: 29.9 G/DL (ref 31–37)
MCV RBC AUTO: 86.5 FL
MONOCYTES # BLD AUTO: 0.4 X10(3) UL (ref 0.1–1)
MONOCYTES NFR BLD AUTO: 8.7 %
NEUTROPHILS # BLD AUTO: 2.16 X10 (3) UL (ref 1.5–7.7)
NEUTROPHILS # BLD AUTO: 2.16 X10(3) UL (ref 1.5–7.7)
NEUTROPHILS NFR BLD AUTO: 47.3 %
OSMOLALITY SERPL CALC.SUM OF ELEC: 303 MOSM/KG (ref 275–295)
PLATELET # BLD AUTO: 220 10(3)UL (ref 150–450)
POTASSIUM SERPL-SCNC: 4 MMOL/L (ref 3.5–5.1)
RBC # BLD AUTO: 3.33 X10(6)UL
SODIUM SERPL-SCNC: 142 MMOL/L (ref 136–145)
WBC # BLD AUTO: 4.6 X10(3) UL (ref 4–11)

## 2023-04-05 PROCEDURE — 97535 SELF CARE MNGMENT TRAINING: CPT

## 2023-04-05 PROCEDURE — 80048 BASIC METABOLIC PNL TOTAL CA: CPT | Performed by: HOSPITALIST

## 2023-04-05 PROCEDURE — 97530 THERAPEUTIC ACTIVITIES: CPT

## 2023-04-05 PROCEDURE — 85025 COMPLETE CBC W/AUTO DIFF WBC: CPT | Performed by: HOSPITALIST

## 2023-04-05 RX ORDER — VANCOMYCIN HYDROCHLORIDE 125 MG/1
CAPSULE ORAL
Qty: 91 CAPSULE | Refills: 0 | Status: SHIPPED | OUTPATIENT
Start: 2023-04-05 | End: 2023-04-05

## 2023-04-05 NOTE — PLAN OF CARE
Pt discharged to home with [facility] in stable condition. Discharge paper, instructions, prescriptions and f/u appts provided to pt's , he verbalized understanding. [facility] staff wheeled the pt down to the [facility] main lobby via w/c. Pt's daughter to drive the pt home.

## 2023-04-05 NOTE — CM/SW NOTE
[facility] met with pt/family who are agreeable for services at [facility]. SW entered orders for RN, PT, OT, ST along with face to face. Pt resides with spouse, dtr.         04/05/23 1100   Discharge disposition   Expected discharge disposition Home or [facility]   Discharge transportation Private car     Kory Brady, [place]  Discharge 2011 [facility]

## 2023-04-05 NOTE — PLAN OF CARE
Assumed care of pt around 1900. A/o x1, baseline dementia. NSR on tele. RA.  R groin site soft, no hematoma. Incontinent episodes overnight, voiding per purewick. BM x1. Contact isolation precautions in place, PO Vanco as ordered. C/o low back pain, PRN Tylenol given w/ some relief. Resting in bed w/ safety precautions in place.

## 2023-04-06 ENCOUNTER — HOSPITAL ENCOUNTER (EMERGENCY)
Facility: HOSPITAL | Age: 83
Discharge: HOME | End: 2023-04-07
Attending: EMERGENCY MEDICINE
Payer: MEDICARE

## 2023-04-06 DIAGNOSIS — R06.00 DYSPNEA, UNSPECIFIED TYPE: Primary | ICD-10-CM

## 2023-04-06 PROCEDURE — 36415 COLL VENOUS BLD VENIPUNCTURE: CPT

## 2023-04-06 PROCEDURE — 99284 EMERGENCY DEPT VISIT MOD MDM: CPT

## 2023-04-06 PROCEDURE — 93010 ELECTROCARDIOGRAM REPORT: CPT

## 2023-04-06 PROCEDURE — 99285 EMERGENCY DEPT VISIT HI MDM: CPT

## 2023-04-07 ENCOUNTER — APPOINTMENT (OUTPATIENT)
Dept: GENERAL RADIOLOGY | Facility: HOSPITAL | Age: 83
End: 2023-04-07
Attending: EMERGENCY MEDICINE
Payer: MEDICARE

## 2023-04-07 VITALS
DIASTOLIC BLOOD PRESSURE: 73 MMHG | HEART RATE: 60 BPM | OXYGEN SATURATION: 100 % | SYSTOLIC BLOOD PRESSURE: 138 MMHG | RESPIRATION RATE: 15 BRPM | TEMPERATURE: 97 F

## 2023-04-07 LAB
ALBUMIN SERPL-MCNC: 3.4 G/DL (ref 3.4–5)
ALBUMIN/GLOB SERPL: 0.9 {RATIO} (ref 1–2)
ALP LIVER SERPL-CCNC: 78 U/L
ALT SERPL-CCNC: 22 U/L
ANION GAP SERPL CALC-SCNC: 6 MMOL/L (ref 0–18)
APTT PPP: 23.9 SECONDS (ref 23.3–35.6)
AST SERPL-CCNC: 23 U/L (ref 15–37)
ATRIAL RATE: 70 BPM
BASOPHILS # BLD AUTO: 0.03 X10(3) UL (ref 0–0.2)
BASOPHILS NFR BLD AUTO: 0.6 %
BILIRUB SERPL-MCNC: 0.2 MG/DL (ref 0.1–2)
BUN BLD-MCNC: 25 MG/DL (ref 7–18)
CALCIUM BLD-MCNC: 9.4 MG/DL (ref 8.5–10.1)
CHLORIDE SERPL-SCNC: 109 MMOL/L (ref 98–112)
CO2 SERPL-SCNC: 28 MMOL/L (ref 21–32)
CREAT BLD-MCNC: 0.95 MG/DL
EOSINOPHIL # BLD AUTO: 0.27 X10(3) UL (ref 0–0.7)
EOSINOPHIL NFR BLD AUTO: 5.3 %
ERYTHROCYTE [DISTWIDTH] IN BLOOD BY AUTOMATED COUNT: 15.1 %
GFR SERPLBLD BASED ON 1.73 SQ M-ARVRAT: 59 ML/MIN/1.73M2 (ref 60–?)
GLOBULIN PLAS-MCNC: 3.7 G/DL (ref 2.8–4.4)
GLUCOSE BLD-MCNC: 114 MG/DL (ref 70–99)
HCT VFR BLD AUTO: 32.5 %
HGB BLD-MCNC: 9.8 G/DL
IMM GRANULOCYTES # BLD AUTO: 0.02 X10(3) UL (ref 0–1)
IMM GRANULOCYTES NFR BLD: 0.4 %
INR BLD: 1.02 (ref 0.85–1.16)
LYMPHOCYTES # BLD AUTO: 1.59 X10(3) UL (ref 1–4)
LYMPHOCYTES NFR BLD AUTO: 31.2 %
MCH RBC QN AUTO: 26.1 PG (ref 26–34)
MCHC RBC AUTO-ENTMCNC: 30.2 G/DL (ref 31–37)
MCV RBC AUTO: 86.7 FL
MONOCYTES # BLD AUTO: 0.42 X10(3) UL (ref 0.1–1)
MONOCYTES NFR BLD AUTO: 8.2 %
NEUTROPHILS # BLD AUTO: 2.77 X10 (3) UL (ref 1.5–7.7)
NEUTROPHILS # BLD AUTO: 2.77 X10(3) UL (ref 1.5–7.7)
NEUTROPHILS NFR BLD AUTO: 54.3 %
OSMOLALITY SERPL CALC.SUM OF ELEC: 301 MOSM/KG (ref 275–295)
P-R INTERVAL: 288 MS
PLATELET # BLD AUTO: 251 10(3)UL (ref 150–450)
POTASSIUM SERPL-SCNC: 4 MMOL/L (ref 3.5–5.1)
PROT SERPL-MCNC: 7.1 G/DL (ref 6.4–8.2)
PROTHROMBIN TIME: 13.4 SECONDS (ref 11.6–14.8)
Q-T INTERVAL: 416 MS
QRS DURATION: 84 MS
QTC CALCULATION (BEZET): 449 MS
R AXIS: -28 DEGREES
RBC # BLD AUTO: 3.75 X10(6)UL
SODIUM SERPL-SCNC: 143 MMOL/L (ref 136–145)
T AXIS: 148 DEGREES
TROPONIN I HIGH SENSITIVITY: 16 NG/L
VENTRICULAR RATE: 70 BPM
WBC # BLD AUTO: 5.1 X10(3) UL (ref 4–11)

## 2023-04-07 PROCEDURE — 85730 THROMBOPLASTIN TIME PARTIAL: CPT | Performed by: EMERGENCY MEDICINE

## 2023-04-07 PROCEDURE — 84484 ASSAY OF TROPONIN QUANT: CPT | Performed by: EMERGENCY MEDICINE

## 2023-04-07 PROCEDURE — 85610 PROTHROMBIN TIME: CPT | Performed by: EMERGENCY MEDICINE

## 2023-04-07 PROCEDURE — 85025 COMPLETE CBC W/AUTO DIFF WBC: CPT | Performed by: EMERGENCY MEDICINE

## 2023-04-07 PROCEDURE — 93005 ELECTROCARDIOGRAM TRACING: CPT

## 2023-04-07 PROCEDURE — 80053 COMPREHEN METABOLIC PANEL: CPT | Performed by: EMERGENCY MEDICINE

## 2023-04-07 PROCEDURE — 71046 X-RAY EXAM CHEST 2 VIEWS: CPT | Performed by: EMERGENCY MEDICINE

## 2023-04-07 NOTE — ED INITIAL ASSESSMENT (HPI)
C/o of DOT with ambulation. Family believes to be decondition d/t recent admission.    Denies any complaints at rest in triage

## 2023-04-14 ENCOUNTER — LAB REQUISITION (OUTPATIENT)
Dept: LAB | Facility: HOSPITAL | Age: 83
End: 2023-04-14
Payer: MEDICARE

## 2023-04-14 DIAGNOSIS — K31.819 ANGIODYSPLASIA OF STOMACH AND DUODENUM WITHOUT BLEEDING: ICD-10-CM

## 2023-04-14 LAB
ALBUMIN SERPL-MCNC: 3.3 G/DL (ref 3.4–5)
ALBUMIN/GLOB SERPL: 1 {RATIO} (ref 1–2)
ALP LIVER SERPL-CCNC: 76 U/L
ALT SERPL-CCNC: 13 U/L
ANION GAP SERPL CALC-SCNC: 6 MMOL/L (ref 0–18)
AST SERPL-CCNC: 16 U/L (ref 15–37)
BASOPHILS # BLD AUTO: 0.03 X10(3) UL (ref 0–0.2)
BASOPHILS NFR BLD AUTO: 0.5 %
BILIRUB SERPL-MCNC: 0.3 MG/DL (ref 0.1–2)
BUN BLD-MCNC: 14 MG/DL (ref 7–18)
CALCIUM BLD-MCNC: 9.1 MG/DL (ref 8.5–10.1)
CHLORIDE SERPL-SCNC: 111 MMOL/L (ref 98–112)
CO2 SERPL-SCNC: 27 MMOL/L (ref 21–32)
CREAT BLD-MCNC: 0.96 MG/DL
EOSINOPHIL # BLD AUTO: 0.19 X10(3) UL (ref 0–0.7)
EOSINOPHIL NFR BLD AUTO: 3.3 %
ERYTHROCYTE [DISTWIDTH] IN BLOOD BY AUTOMATED COUNT: 15.9 %
GFR SERPLBLD BASED ON 1.73 SQ M-ARVRAT: 58 ML/MIN/1.73M2 (ref 60–?)
GLOBULIN PLAS-MCNC: 3.2 G/DL (ref 2.8–4.4)
GLUCOSE BLD-MCNC: 104 MG/DL (ref 70–99)
HCT VFR BLD AUTO: 32.8 %
HGB BLD-MCNC: 9.6 G/DL
IMM GRANULOCYTES # BLD AUTO: 0.01 X10(3) UL (ref 0–1)
IMM GRANULOCYTES NFR BLD: 0.2 %
LYMPHOCYTES # BLD AUTO: 1.21 X10(3) UL (ref 1–4)
LYMPHOCYTES NFR BLD AUTO: 21.2 %
MCH RBC QN AUTO: 25.9 PG (ref 26–34)
MCHC RBC AUTO-ENTMCNC: 29.3 G/DL (ref 31–37)
MCV RBC AUTO: 88.4 FL
MONOCYTES # BLD AUTO: 0.42 X10(3) UL (ref 0.1–1)
MONOCYTES NFR BLD AUTO: 7.4 %
NEUTROPHILS # BLD AUTO: 3.84 X10 (3) UL (ref 1.5–7.7)
NEUTROPHILS # BLD AUTO: 3.84 X10(3) UL (ref 1.5–7.7)
NEUTROPHILS NFR BLD AUTO: 67.4 %
OSMOLALITY SERPL CALC.SUM OF ELEC: 299 MOSM/KG (ref 275–295)
PLATELET # BLD AUTO: 264 10(3)UL (ref 150–450)
POTASSIUM SERPL-SCNC: 3.9 MMOL/L (ref 3.5–5.1)
PROT SERPL-MCNC: 6.5 G/DL (ref 6.4–8.2)
RBC # BLD AUTO: 3.71 X10(6)UL
SODIUM SERPL-SCNC: 144 MMOL/L (ref 136–145)
WBC # BLD AUTO: 5.7 X10(3) UL (ref 4–11)

## 2023-04-14 PROCEDURE — 80053 COMPREHEN METABOLIC PANEL: CPT | Performed by: HOSPITALIST

## 2023-04-14 PROCEDURE — 85025 COMPLETE CBC W/AUTO DIFF WBC: CPT | Performed by: HOSPITALIST

## 2023-06-27 ENCOUNTER — HOSPITAL ENCOUNTER (OUTPATIENT)
Facility: HOSPITAL | Age: 83
Setting detail: OBSERVATION
Discharge: HOME | End: 2023-06-28
Attending: EMERGENCY MEDICINE | Admitting: HOSPITALIST
Payer: MEDICARE

## 2023-06-27 DIAGNOSIS — F03.90 DEMENTIA, UNSPECIFIED DEMENTIA SEVERITY, UNSPECIFIED DEMENTIA TYPE, UNSPECIFIED WHETHER BEHAVIORAL, PSYCHOTIC, OR MOOD DISTURBANCE OR ANXIETY (HCC): ICD-10-CM

## 2023-06-27 DIAGNOSIS — R40.4 SEDATED DUE TO MEDICATION: Primary | ICD-10-CM

## 2023-06-27 DIAGNOSIS — R31.9 URINARY TRACT INFECTION WITH HEMATURIA, SITE UNSPECIFIED: ICD-10-CM

## 2023-06-27 DIAGNOSIS — N39.0 URINARY TRACT INFECTION WITH HEMATURIA, SITE UNSPECIFIED: ICD-10-CM

## 2023-06-27 DIAGNOSIS — T50.905A SEDATED DUE TO MEDICATION: Primary | ICD-10-CM

## 2023-06-28 ENCOUNTER — APPOINTMENT (OUTPATIENT)
Dept: CT IMAGING | Facility: HOSPITAL | Age: 83
End: 2023-06-28
Attending: EMERGENCY MEDICINE
Payer: MEDICARE

## 2023-06-28 VITALS
RESPIRATION RATE: 20 BRPM | DIASTOLIC BLOOD PRESSURE: 91 MMHG | HEART RATE: 81 BPM | BODY MASS INDEX: 27.22 KG/M2 | OXYGEN SATURATION: 100 % | WEIGHT: 153.63 LBS | HEIGHT: 63 IN | TEMPERATURE: 98 F | SYSTOLIC BLOOD PRESSURE: 138 MMHG

## 2023-06-28 PROBLEM — F03.90 DEMENTIA, UNSPECIFIED DEMENTIA SEVERITY, UNSPECIFIED DEMENTIA TYPE, UNSPECIFIED WHETHER BEHAVIORAL, PSYCHOTIC, OR MOOD DISTURBANCE OR ANXIETY (HCC): Status: ACTIVE | Noted: 2023-06-28

## 2023-06-28 PROBLEM — N39.0 URINARY TRACT INFECTION WITH HEMATURIA, SITE UNSPECIFIED: Status: ACTIVE | Noted: 2023-06-28

## 2023-06-28 PROBLEM — R41.82 AMS (ALTERED MENTAL STATUS): Status: ACTIVE | Noted: 2023-06-28

## 2023-06-28 PROBLEM — R40.4 SEDATED DUE TO MEDICATION: Status: ACTIVE | Noted: 2023-06-28

## 2023-06-28 PROBLEM — R31.9 URINARY TRACT INFECTION WITH HEMATURIA, SITE UNSPECIFIED: Status: ACTIVE | Noted: 2023-06-28

## 2023-06-28 PROBLEM — T50.905A SEDATED DUE TO MEDICATION: Status: ACTIVE | Noted: 2023-06-28

## 2023-06-28 LAB
ALBUMIN SERPL-MCNC: 3.1 G/DL (ref 3.4–5)
ALBUMIN/GLOB SERPL: 0.9 {RATIO} (ref 1–2)
ALP LIVER SERPL-CCNC: 60 U/L
ALT SERPL-CCNC: 14 U/L
AMPHET UR QL SCN: NEGATIVE
ANION GAP SERPL CALC-SCNC: 2 MMOL/L (ref 0–18)
ARTERIAL PATENCY WRIST A: POSITIVE
AST SERPL-CCNC: 12 U/L (ref 15–37)
ATRIAL RATE: 69 BPM
BASE EXCESS BLDA CALC-SCNC: 3 MMOL/L (ref ?–2)
BASOPHILS # BLD AUTO: 0.02 X10(3) UL (ref 0–0.2)
BASOPHILS NFR BLD AUTO: 0.5 %
BENZODIAZ UR QL SCN: NEGATIVE
BILIRUB SERPL-MCNC: 0.2 MG/DL (ref 0.1–2)
BILIRUB UR QL STRIP.AUTO: NEGATIVE
BODY TEMPERATURE: 98.6 F
BUN BLD-MCNC: 21 MG/DL (ref 7–18)
CALCIUM BLD-MCNC: 8.9 MG/DL (ref 8.5–10.1)
CANNABINOIDS UR QL SCN: NEGATIVE
CHLORIDE SERPL-SCNC: 113 MMOL/L (ref 98–112)
CO2 SERPL-SCNC: 28 MMOL/L (ref 21–32)
COCAINE UR QL: NEGATIVE
COHGB MFR BLD: 1.4 % SAT (ref 0–3)
COLOR UR AUTO: YELLOW
CREAT BLD-MCNC: 0.92 MG/DL
CREAT UR-SCNC: 175 MG/DL
EOSINOPHIL # BLD AUTO: 0.14 X10(3) UL (ref 0–0.7)
EOSINOPHIL NFR BLD AUTO: 3.6 %
ERYTHROCYTE [DISTWIDTH] IN BLOOD BY AUTOMATED COUNT: 16.8 %
GFR SERPLBLD BASED ON 1.73 SQ M-ARVRAT: 61 ML/MIN/1.73M2 (ref 60–?)
GLOBULIN PLAS-MCNC: 3.3 G/DL (ref 2.8–4.4)
GLUCOSE BLD-MCNC: 125 MG/DL (ref 70–99)
GLUCOSE BLD-MCNC: 84 MG/DL (ref 70–99)
GLUCOSE UR STRIP.AUTO-MCNC: NEGATIVE MG/DL
HCO3 BLDA-SCNC: 27.3 MEQ/L (ref 21–27)
HCT VFR BLD AUTO: 32.6 %
HGB BLD-MCNC: 9.7 G/DL
HGB BLD-MCNC: 9.9 G/DL
HYALINE CASTS #/AREA URNS AUTO: PRESENT /LPF
IMM GRANULOCYTES # BLD AUTO: 0.01 X10(3) UL (ref 0–1)
IMM GRANULOCYTES NFR BLD: 0.3 %
LACTATE SERPL-SCNC: 1.4 MMOL/L (ref 0.4–2)
LYMPHOCYTES # BLD AUTO: 1.21 X10(3) UL (ref 1–4)
LYMPHOCYTES NFR BLD AUTO: 31.3 %
MCH RBC QN AUTO: 26.1 PG (ref 26–34)
MCHC RBC AUTO-ENTMCNC: 29.8 G/DL (ref 31–37)
MCV RBC AUTO: 87.6 FL
MDMA UR QL SCN: NEGATIVE
METHGB MFR BLD: 0.2 % SAT (ref 0.4–1.5)
MONOCYTES # BLD AUTO: 0.34 X10(3) UL (ref 0.1–1)
MONOCYTES NFR BLD AUTO: 8.8 %
NEUTROPHILS # BLD AUTO: 2.14 X10 (3) UL (ref 1.5–7.7)
NEUTROPHILS # BLD AUTO: 2.14 X10(3) UL (ref 1.5–7.7)
NEUTROPHILS NFR BLD AUTO: 55.5 %
NITRITE UR QL STRIP.AUTO: POSITIVE
OPIATES UR QL SCN: NEGATIVE
OSMOLALITY SERPL CALC.SUM OF ELEC: 300 MOSM/KG (ref 275–295)
OXYCODONE UR QL SCN: NEGATIVE
OXYHGB MFR BLDA: 95.6 % (ref 92–100)
P AXIS: 49 DEGREES
P-R INTERVAL: 254 MS
PCO2 BLDA: 43 MM HG (ref 35–45)
PH BLDA: 7.42 [PH] (ref 7.35–7.45)
PH UR STRIP.AUTO: 5 [PH] (ref 5–8)
PLATELET # BLD AUTO: 166 10(3)UL (ref 150–450)
PO2 BLDA: 74 MM HG (ref 80–100)
POTASSIUM SERPL-SCNC: 4.3 MMOL/L (ref 3.5–5.1)
PROCALCITONIN SERPL-MCNC: <0.05 NG/ML (ref ?–0.16)
PROT SERPL-MCNC: 6.4 G/DL (ref 6.4–8.2)
PROT UR STRIP.AUTO-MCNC: 30 MG/DL
Q-T INTERVAL: 460 MS
QRS DURATION: 82 MS
QTC CALCULATION (BEZET): 492 MS
R AXIS: -19 DEGREES
RBC # BLD AUTO: 3.72 X10(6)UL
RBC UR QL AUTO: NEGATIVE
SODIUM SERPL-SCNC: 143 MMOL/L (ref 136–145)
SP GR UR STRIP.AUTO: 1.02 (ref 1–1.03)
T AXIS: 89 DEGREES
UROBILINOGEN UR STRIP.AUTO-MCNC: <2 MG/DL
VENTRICULAR RATE: 69 BPM
WBC # BLD AUTO: 3.9 X10(3) UL (ref 4–11)
WBC #/AREA URNS AUTO: >50 /HPF

## 2023-06-28 PROCEDURE — 83605 ASSAY OF LACTIC ACID: CPT | Performed by: INTERNAL MEDICINE

## 2023-06-28 PROCEDURE — 85025 COMPLETE CBC W/AUTO DIFF WBC: CPT | Performed by: EMERGENCY MEDICINE

## 2023-06-28 PROCEDURE — 80307 DRUG TEST PRSMV CHEM ANLYZR: CPT | Performed by: EMERGENCY MEDICINE

## 2023-06-28 PROCEDURE — 84145 PROCALCITONIN (PCT): CPT | Performed by: INTERNAL MEDICINE

## 2023-06-28 PROCEDURE — 97165 OT EVAL LOW COMPLEX 30 MIN: CPT

## 2023-06-28 PROCEDURE — 82803 BLOOD GASES ANY COMBINATION: CPT | Performed by: EMERGENCY MEDICINE

## 2023-06-28 PROCEDURE — 87086 URINE CULTURE/COLONY COUNT: CPT | Performed by: EMERGENCY MEDICINE

## 2023-06-28 PROCEDURE — 36600 WITHDRAWAL OF ARTERIAL BLOOD: CPT | Performed by: EMERGENCY MEDICINE

## 2023-06-28 PROCEDURE — 87186 SC STD MICRODIL/AGAR DIL: CPT | Performed by: EMERGENCY MEDICINE

## 2023-06-28 PROCEDURE — 93005 ELECTROCARDIOGRAM TRACING: CPT

## 2023-06-28 PROCEDURE — 82375 ASSAY CARBOXYHB QUANT: CPT | Performed by: EMERGENCY MEDICINE

## 2023-06-28 PROCEDURE — 87040 BLOOD CULTURE FOR BACTERIA: CPT | Performed by: INTERNAL MEDICINE

## 2023-06-28 PROCEDURE — 97535 SELF CARE MNGMENT TRAINING: CPT

## 2023-06-28 PROCEDURE — 87077 CULTURE AEROBIC IDENTIFY: CPT | Performed by: EMERGENCY MEDICINE

## 2023-06-28 PROCEDURE — 85018 HEMOGLOBIN: CPT | Performed by: EMERGENCY MEDICINE

## 2023-06-28 PROCEDURE — 80053 COMPREHEN METABOLIC PANEL: CPT | Performed by: EMERGENCY MEDICINE

## 2023-06-28 PROCEDURE — 83050 HGB METHEMOGLOBIN QUAN: CPT | Performed by: EMERGENCY MEDICINE

## 2023-06-28 PROCEDURE — 82962 GLUCOSE BLOOD TEST: CPT

## 2023-06-28 PROCEDURE — 70450 CT HEAD/BRAIN W/O DYE: CPT | Performed by: EMERGENCY MEDICINE

## 2023-06-28 PROCEDURE — 81001 URINALYSIS AUTO W/SCOPE: CPT | Performed by: EMERGENCY MEDICINE

## 2023-06-28 RX ORDER — NITROFURANTOIN 25; 75 MG/1; MG/1
100 CAPSULE ORAL 2 TIMES DAILY
Status: DISCONTINUED | OUTPATIENT
Start: 2023-06-28 | End: 2023-06-28

## 2023-06-28 RX ORDER — VANCOMYCIN HYDROCHLORIDE 125 MG/1
125 CAPSULE ORAL 2 TIMES DAILY
Qty: 20 CAPSULE | Refills: 0 | Status: SHIPPED | OUTPATIENT
Start: 2023-06-28 | End: 2023-07-08

## 2023-06-28 RX ORDER — VANCOMYCIN HYDROCHLORIDE 125 MG/1
125 CAPSULE ORAL 2 TIMES DAILY
Status: DISCONTINUED | OUTPATIENT
Start: 2023-06-28 | End: 2023-06-28

## 2023-06-28 RX ORDER — METOCLOPRAMIDE HYDROCHLORIDE 5 MG/ML
5 INJECTION INTRAMUSCULAR; INTRAVENOUS EVERY 8 HOURS PRN
Status: DISCONTINUED | OUTPATIENT
Start: 2023-06-28 | End: 2023-06-28

## 2023-06-28 RX ORDER — HEPARIN SODIUM 5000 [USP'U]/ML
5000 INJECTION, SOLUTION INTRAVENOUS; SUBCUTANEOUS EVERY 8 HOURS SCHEDULED
Status: DISCONTINUED | OUTPATIENT
Start: 2023-06-28 | End: 2023-06-28

## 2023-06-28 RX ORDER — SODIUM CHLORIDE 9 MG/ML
INJECTION, SOLUTION INTRAVENOUS CONTINUOUS
Status: DISCONTINUED | OUTPATIENT
Start: 2023-06-28 | End: 2023-06-28

## 2023-06-28 RX ORDER — OLANZAPINE 5 MG/1
5 TABLET ORAL NIGHTLY PRN
COMMUNITY
End: 2023-06-28

## 2023-06-28 RX ORDER — NITROFURANTOIN 25; 75 MG/1; MG/1
100 CAPSULE ORAL 2 TIMES DAILY
Qty: 10 CAPSULE | Refills: 0 | Status: SHIPPED | OUTPATIENT
Start: 2023-06-28 | End: 2023-07-03

## 2023-06-28 RX ORDER — ONDANSETRON 2 MG/ML
4 INJECTION INTRAMUSCULAR; INTRAVENOUS EVERY 6 HOURS PRN
Status: DISCONTINUED | OUTPATIENT
Start: 2023-06-28 | End: 2023-06-28

## 2023-06-28 RX ORDER — LEVOFLOXACIN 5 MG/ML
750 INJECTION, SOLUTION INTRAVENOUS ONCE
Status: COMPLETED | OUTPATIENT
Start: 2023-06-28 | End: 2023-06-28

## 2023-06-28 RX ORDER — ACETAMINOPHEN 500 MG
500 TABLET ORAL EVERY 4 HOURS PRN
Status: DISCONTINUED | OUTPATIENT
Start: 2023-06-28 | End: 2023-06-28

## 2023-10-26 ENCOUNTER — APPOINTMENT (OUTPATIENT)
Dept: CT IMAGING | Age: 83
End: 2023-10-26
Attending: EMERGENCY MEDICINE

## 2023-10-26 ENCOUNTER — HOSPITAL ENCOUNTER (OUTPATIENT)
Age: 83
Setting detail: OBSERVATION
Discharge: HOME HEALTH | End: 2023-10-27
Attending: EMERGENCY MEDICINE | Admitting: INTERNAL MEDICINE

## 2023-10-26 DIAGNOSIS — D62 ACUTE BLOOD LOSS ANEMIA: ICD-10-CM

## 2023-10-26 DIAGNOSIS — S09.90XA HEAD INJURY WITHOUT FRACTURE OF SKULL, INITIAL ENCOUNTER: Primary | ICD-10-CM

## 2023-10-26 DIAGNOSIS — F03.90 DEMENTIA WITHOUT BEHAVIORAL DISTURBANCE, PSYCHOTIC DISTURBANCE, MOOD DISTURBANCE, OR ANXIETY, UNSPECIFIED DEMENTIA SEVERITY, UNSPECIFIED DEMENTIA TYPE (CMD): ICD-10-CM

## 2023-10-26 DIAGNOSIS — S01.01XA LACERATION OF SCALP, INITIAL ENCOUNTER: ICD-10-CM

## 2023-10-26 DIAGNOSIS — S01.81XA FACIAL LACERATION, INITIAL ENCOUNTER: ICD-10-CM

## 2023-10-26 LAB
ALBUMIN SERPL-MCNC: 3.1 G/DL (ref 3.6–5.1)
ALBUMIN/GLOB SERPL: 0.9 {RATIO} (ref 1–2.4)
ALP SERPL-CCNC: 72 UNITS/L (ref 45–117)
ALT SERPL-CCNC: 25 UNITS/L
ANION GAP SERPL CALC-SCNC: 8 MMOL/L (ref 7–19)
AST SERPL-CCNC: 15 UNITS/L
BASOPHILS # BLD: 0 K/MCL (ref 0–0.3)
BASOPHILS NFR BLD: 0 %
BILIRUB SERPL-MCNC: 0.1 MG/DL (ref 0.2–1)
BUN SERPL-MCNC: 22 MG/DL (ref 6–20)
BUN/CREAT SERPL: 24 (ref 7–25)
CALCIUM SERPL-MCNC: 8.9 MG/DL (ref 8.4–10.2)
CHLORIDE SERPL-SCNC: 110 MMOL/L (ref 97–110)
CO2 SERPL-SCNC: 28 MMOL/L (ref 21–32)
CREAT SERPL-MCNC: 0.91 MG/DL (ref 0.51–0.95)
DEPRECATED RDW RBC: 50.6 FL (ref 39–50)
EGFRCR SERPLBLD CKD-EPI 2021: 62 ML/MIN/{1.73_M2}
EOSINOPHIL # BLD: 0.1 K/MCL (ref 0–0.5)
EOSINOPHIL NFR BLD: 1 %
ERYTHROCYTE [DISTWIDTH] IN BLOOD: 14.8 % (ref 11–15)
FASTING DURATION TIME PATIENT: ABNORMAL H
GLOBULIN SER-MCNC: 3.3 G/DL (ref 2–4)
GLUCOSE SERPL-MCNC: 129 MG/DL (ref 70–99)
HCT VFR BLD CALC: 27.2 % (ref 36–46.5)
HGB BLD-MCNC: 8.4 G/DL (ref 12–15.5)
IMM GRANULOCYTES # BLD AUTO: 0 K/MCL (ref 0–0.2)
IMM GRANULOCYTES # BLD: 1 %
LYMPHOCYTES # BLD: 1 K/MCL (ref 1–4)
LYMPHOCYTES NFR BLD: 19 %
MCH RBC QN AUTO: 28.7 PG (ref 26–34)
MCHC RBC AUTO-ENTMCNC: 30.9 G/DL (ref 32–36.5)
MCV RBC AUTO: 92.8 FL (ref 78–100)
MONOCYTES # BLD: 0.4 K/MCL (ref 0.3–0.9)
MONOCYTES NFR BLD: 9 %
NEUTROPHILS # BLD: 3.5 K/MCL (ref 1.8–7.7)
NEUTROPHILS NFR BLD: 70 %
NRBC BLD MANUAL-RTO: 0 /100 WBC
PLATELET # BLD AUTO: 183 K/MCL (ref 140–450)
POTASSIUM SERPL-SCNC: 4.4 MMOL/L (ref 3.4–5.1)
PROT SERPL-MCNC: 6.4 G/DL (ref 6.4–8.2)
RBC # BLD: 2.93 MIL/MCL (ref 4–5.2)
SODIUM SERPL-SCNC: 142 MMOL/L (ref 135–145)
WBC # BLD: 5 K/MCL (ref 4.2–11)

## 2023-10-26 PROCEDURE — G0378 HOSPITAL OBSERVATION PER HR: HCPCS

## 2023-10-26 PROCEDURE — 80053 COMPREHEN METABOLIC PANEL: CPT | Performed by: EMERGENCY MEDICINE

## 2023-10-26 PROCEDURE — 13122 CMPLX RPR S/A/L ADDL 5 CM/>: CPT

## 2023-10-26 PROCEDURE — 90471 IMMUNIZATION ADMIN: CPT | Performed by: EMERGENCY MEDICINE

## 2023-10-26 PROCEDURE — 70450 CT HEAD/BRAIN W/O DYE: CPT

## 2023-10-26 PROCEDURE — 10002800 HB RX 250 W HCPCS: Performed by: EMERGENCY MEDICINE

## 2023-10-26 PROCEDURE — 12055 INTMD RPR FACE/MM 12.6-20 CM: CPT

## 2023-10-26 PROCEDURE — 72125 CT NECK SPINE W/O DYE: CPT

## 2023-10-26 PROCEDURE — 10004651 HB RX, NO CHARGE ITEM: Performed by: EMERGENCY MEDICINE

## 2023-10-26 PROCEDURE — 90715 TDAP VACCINE 7 YRS/> IM: CPT | Performed by: EMERGENCY MEDICINE

## 2023-10-26 PROCEDURE — 99284 EMERGENCY DEPT VISIT MOD MDM: CPT

## 2023-10-26 PROCEDURE — 13121 CMPLX RPR S/A/L 2.6-7.5 CM: CPT

## 2023-10-26 PROCEDURE — 10002807 HB RX 258: Performed by: EMERGENCY MEDICINE

## 2023-10-26 PROCEDURE — 85025 COMPLETE CBC W/AUTO DIFF WBC: CPT | Performed by: EMERGENCY MEDICINE

## 2023-10-26 RX ORDER — DULOXETIN HYDROCHLORIDE 60 MG/1
60 CAPSULE, DELAYED RELEASE ORAL 2 TIMES DAILY
COMMUNITY

## 2023-10-26 RX ORDER — PSEUDOEPHED/ACETAMINOPH/DIPHEN 30MG-500MG
2 TABLET ORAL EVERY 4 HOURS PRN
COMMUNITY
Start: 2023-09-27

## 2023-10-26 RX ORDER — SODIUM CHLORIDE 9 MG/ML
INJECTION, SOLUTION INTRAVENOUS CONTINUOUS
Status: ACTIVE | OUTPATIENT
Start: 2023-10-26 | End: 2023-10-27

## 2023-10-26 RX ORDER — OLANZAPINE 5 MG/1
5 TABLET ORAL NIGHTLY PRN
COMMUNITY

## 2023-10-26 RX ORDER — POTASSIUM CHLORIDE 750 MG/1
10 TABLET, EXTENDED RELEASE ORAL DAILY
COMMUNITY
Start: 2023-08-03

## 2023-10-26 RX ORDER — POLYETHYLENE GLYCOL 3350 17 G/17G
17 POWDER, FOR SOLUTION ORAL DAILY PRN
Status: ON HOLD | COMMUNITY
End: 2023-10-27

## 2023-10-26 RX ORDER — FAMOTIDINE 40 MG/1
40 TABLET, FILM COATED ORAL DAILY
Status: ON HOLD | COMMUNITY
End: 2023-10-27

## 2023-10-26 RX ORDER — ATORVASTATIN CALCIUM 40 MG/1
40 TABLET, FILM COATED ORAL DAILY
COMMUNITY

## 2023-10-26 RX ORDER — SACUBITRIL AND VALSARTAN 24; 26 MG/1; MG/1
1 TABLET, FILM COATED ORAL EVERY 12 HOURS SCHEDULED
COMMUNITY
Start: 2023-06-07

## 2023-10-26 RX ORDER — RIVASTIGMINE 13.3 MG/24H
1 PATCH, EXTENDED RELEASE TRANSDERMAL DAILY
COMMUNITY
Start: 2023-08-04

## 2023-10-26 RX ORDER — ACETAMINOPHEN 500 MG
1000 TABLET ORAL ONCE
Status: COMPLETED | OUTPATIENT
Start: 2023-10-26 | End: 2023-10-26

## 2023-10-26 RX ORDER — FERROUS SULFATE 325(65) MG
325 TABLET ORAL
COMMUNITY

## 2023-10-26 RX ORDER — FUROSEMIDE 20 MG/1
20 TABLET ORAL DAILY
COMMUNITY
Start: 2023-10-13

## 2023-10-26 RX ORDER — TRAMADOL HYDROCHLORIDE 50 MG/1
50 TABLET ORAL 2 TIMES DAILY PRN
Status: ON HOLD | COMMUNITY
End: 2023-10-27

## 2023-10-26 RX ORDER — LIDOCAINE HYDROCHLORIDE 10 MG/ML
20 INJECTION, SOLUTION EPIDURAL; INFILTRATION; INTRACAUDAL; PERINEURAL ONCE
Status: DISCONTINUED | OUTPATIENT
Start: 2023-10-26 | End: 2023-10-27 | Stop reason: HOSPADM

## 2023-10-26 RX ORDER — ASPIRIN 81 MG/1
81 TABLET ORAL DAILY
COMMUNITY

## 2023-10-26 RX ORDER — BUSPIRONE HYDROCHLORIDE 15 MG/1
15 TABLET ORAL DAILY
COMMUNITY

## 2023-10-26 RX ORDER — MEMANTINE HYDROCHLORIDE 28 MG/1
28 CAPSULE, EXTENDED RELEASE ORAL DAILY
COMMUNITY

## 2023-10-26 RX ORDER — LIDOCAINE HYDROCHLORIDE 10 MG/ML
10 INJECTION, SOLUTION INFILTRATION; PERINEURAL ONCE
Status: DISCONTINUED | OUTPATIENT
Start: 2023-10-26 | End: 2023-10-27 | Stop reason: HOSPADM

## 2023-10-26 RX ADMIN — ACETAMINOPHEN 1000 MG: 500 TABLET ORAL at 16:13

## 2023-10-26 RX ADMIN — SODIUM CHLORIDE: 9 INJECTION, SOLUTION INTRAVENOUS at 21:41

## 2023-10-26 RX ADMIN — TETANUS TOXOID, REDUCED DIPHTHERIA TOXOID AND ACELLULAR PERTUSSIS VACCINE, ADSORBED 0.5 ML: 5; 2.5; 8; 8; 2.5 SUSPENSION INTRAMUSCULAR at 16:10

## 2023-10-26 SDOH — ECONOMIC STABILITY: TRANSPORTATION INSECURITY
IN THE PAST 12 MONTHS, HAS THE LACK OF TRANSPORTATION KEPT YOU FROM MEDICAL APPOINTMENTS OR FROM GETTING MEDICATIONS?: NO

## 2023-10-26 SDOH — HEALTH STABILITY: PHYSICAL HEALTH: DO YOU HAVE DIFFICULTY DRESSING OR BATHING?: YES

## 2023-10-26 SDOH — ECONOMIC STABILITY: FOOD INSECURITY: HOW OFTEN IN THE PAST 12 MONTHS WERE YOU WORRIED OR STRESSED ABOUT HAVING ENOUGH MONEY TO BUY NUTRITIOUS MEALS?: NEVER

## 2023-10-26 SDOH — ECONOMIC STABILITY: HOUSING INSECURITY: ARE YOU WORRIED ABOUT LOSING YOUR HOUSING?: NO

## 2023-10-26 SDOH — HEALTH STABILITY: PHYSICAL HEALTH: DO YOU HAVE SERIOUS DIFFICULTY WALKING OR CLIMBING STAIRS?: YES

## 2023-10-26 SDOH — ECONOMIC STABILITY: TRANSPORTATION INSECURITY
IN THE PAST 12 MONTHS, HAS LACK OF TRANSPORTATION KEPT YOU FROM MEETINGS, WORK, OR FROM GETTING THINGS NEEDED FOR DAILY LIVING?: NO

## 2023-10-26 SDOH — ECONOMIC STABILITY: HOUSING INSECURITY: WHAT IS YOUR LIVING SITUATION TODAY?: HOUSE

## 2023-10-26 SDOH — HEALTH STABILITY: GENERAL: BECAUSE OF A PHYSICAL, MENTAL, OR EMOTIONAL CONDITION, DO YOU HAVE DIFFICULTY DOING ERRANDS ALONE?: YES

## 2023-10-26 SDOH — ECONOMIC STABILITY: HOUSING INSECURITY: WHAT IS YOUR LIVING SITUATION TODAY?: SPOUSE;ADULT CHILDREN

## 2023-10-26 SDOH — ECONOMIC STABILITY: GENERAL

## 2023-10-26 SDOH — HEALTH STABILITY: GENERAL
BECAUSE OF A PHYSICAL, MENTAL, OR EMOTIONAL CONDITION, DO YOU HAVE SERIOUS DIFFICULTY CONCENTRATING, REMEMBERING OR MAKING DECISIONS?: YES

## 2023-10-26 SDOH — SOCIAL STABILITY: SOCIAL NETWORK: SUPPORT SYSTEMS: CHILDREN;FAMILY MEMBERS;FRIENDS

## 2023-10-26 ASSESSMENT — ACTIVITIES OF DAILY LIVING (ADL)
TOILETING: NEEDS ASSISTANCE
DRESSING: NEEDS ASSISTANCE
RECENT_DECLINE_ADL: YES, DECLINE IN BATHING/DRESSING/FEEDING, COLLABORATE WITH PROVIDER (T);YES, DECLINE IN AMBULATION/TRANSFERRING, COLLABORATE WITH PROVIDER (T)
FEEDING: NEEDS ASSISTANCE
ADL_BEFORE_ADMISSION: NEEDS/REQUIRES ASSISTANCE
ADL_SCORE: 6
BATHING: NEEDS ASSISTANCE

## 2023-10-26 ASSESSMENT — LIFESTYLE VARIABLES
HOW OFTEN DO YOU HAVE 6 OR MORE DRINKS ON ONE OCCASION: NEVER
HOW OFTEN DO YOU HAVE A DRINK CONTAINING ALCOHOL: NEVER
ALCOHOL_USE_STATUS: NO OR LOW RISK WITH VALIDATED TOOL
AUDIT-C TOTAL SCORE: 0
HOW MANY STANDARD DRINKS CONTAINING ALCOHOL DO YOU HAVE ON A TYPICAL DAY: 0,1 OR 2

## 2023-10-27 VITALS
OXYGEN SATURATION: 94 % | HEART RATE: 80 BPM | DIASTOLIC BLOOD PRESSURE: 84 MMHG | TEMPERATURE: 98.2 F | WEIGHT: 170.42 LBS | RESPIRATION RATE: 14 BRPM | SYSTOLIC BLOOD PRESSURE: 149 MMHG

## 2023-10-27 LAB
ANION GAP SERPL CALC-SCNC: 7 MMOL/L (ref 7–19)
BUN SERPL-MCNC: 15 MG/DL (ref 6–20)
BUN/CREAT SERPL: 20 (ref 7–25)
CALCIUM SERPL-MCNC: 8.5 MG/DL (ref 8.4–10.2)
CHLORIDE SERPL-SCNC: 113 MMOL/L (ref 97–110)
CO2 SERPL-SCNC: 27 MMOL/L (ref 21–32)
CREAT SERPL-MCNC: 0.76 MG/DL (ref 0.51–0.95)
DEPRECATED RDW RBC: 50.3 FL (ref 39–50)
EGFRCR SERPLBLD CKD-EPI 2021: 77 ML/MIN/{1.73_M2}
ERYTHROCYTE [DISTWIDTH] IN BLOOD: 15 % (ref 11–15)
FASTING DURATION TIME PATIENT: ABNORMAL H
GLUCOSE SERPL-MCNC: 104 MG/DL (ref 70–99)
HCT VFR BLD CALC: 26.3 % (ref 36–46.5)
HGB BLD-MCNC: 7.9 G/DL (ref 12–15.5)
LYMPHOCYTES # BLD: 1 K/MCL (ref 1–4)
LYMPHOCYTES NFR BLD: 27 %
MCH RBC QN AUTO: 27.6 PG (ref 26–34)
MCHC RBC AUTO-ENTMCNC: 30 G/DL (ref 32–36.5)
MCV RBC AUTO: 92 FL (ref 78–100)
MONOCYTES # BLD: 0.3 K/MCL (ref 0.3–0.9)
MONOCYTES NFR BLD: 8 %
NEUTROPHILS # BLD: 2.3 K/MCL (ref 1.8–7.7)
NEUTS SEG NFR BLD: 65 %
NRBC BLD MANUAL-RTO: 0 /100 WBC
OVALOCYTES BLD QL SMEAR: NORMAL
PLAT MORPH BLD: NORMAL
PLATELET # BLD AUTO: 179 K/MCL (ref 140–450)
POTASSIUM SERPL-SCNC: 3.7 MMOL/L (ref 3.4–5.1)
RAINBOW EXTRA TUBES HOLD SPECIMEN: NORMAL
RBC # BLD: 2.86 MIL/MCL (ref 4–5.2)
SODIUM SERPL-SCNC: 143 MMOL/L (ref 135–145)
WBC # BLD: 3.6 K/MCL (ref 4.2–11)
WBC MORPH BLD: NORMAL

## 2023-10-27 PROCEDURE — 36415 COLL VENOUS BLD VENIPUNCTURE: CPT | Performed by: INTERNAL MEDICINE

## 2023-10-27 PROCEDURE — G0378 HOSPITAL OBSERVATION PER HR: HCPCS

## 2023-10-27 PROCEDURE — 10002803 HB RX 637: Performed by: INTERNAL MEDICINE

## 2023-10-27 PROCEDURE — 51701 INSERT BLADDER CATHETER: CPT

## 2023-10-27 PROCEDURE — 85027 COMPLETE CBC AUTOMATED: CPT | Performed by: INTERNAL MEDICINE

## 2023-10-27 PROCEDURE — 10004651 HB RX, NO CHARGE ITEM: Performed by: INTERNAL MEDICINE

## 2023-10-27 PROCEDURE — 97166 OT EVAL MOD COMPLEX 45 MIN: CPT

## 2023-10-27 PROCEDURE — 51798 US URINE CAPACITY MEASURE: CPT

## 2023-10-27 PROCEDURE — 97535 SELF CARE MNGMENT TRAINING: CPT

## 2023-10-27 PROCEDURE — 80048 BASIC METABOLIC PNL TOTAL CA: CPT | Performed by: INTERNAL MEDICINE

## 2023-10-27 PROCEDURE — 97162 PT EVAL MOD COMPLEX 30 MIN: CPT

## 2023-10-27 RX ORDER — RIVASTIGMINE 13.3 MG/24H
1 PATCH, EXTENDED RELEASE TRANSDERMAL DAILY
Status: DISCONTINUED | OUTPATIENT
Start: 2023-10-27 | End: 2023-10-27 | Stop reason: HOSPADM

## 2023-10-27 RX ORDER — POLYETHYLENE GLYCOL 3350 17 G/17G
17 POWDER, FOR SOLUTION ORAL DAILY PRN
Status: DISCONTINUED | OUTPATIENT
Start: 2023-10-27 | End: 2023-10-27 | Stop reason: HOSPADM

## 2023-10-27 RX ORDER — DULOXETIN HYDROCHLORIDE 60 MG/1
60 CAPSULE, DELAYED RELEASE ORAL 2 TIMES DAILY
Status: DISCONTINUED | OUTPATIENT
Start: 2023-10-27 | End: 2023-10-27 | Stop reason: HOSPADM

## 2023-10-27 RX ORDER — ACETAMINOPHEN 325 MG/1
650 TABLET ORAL EVERY 4 HOURS PRN
Status: DISCONTINUED | OUTPATIENT
Start: 2023-10-27 | End: 2023-10-27 | Stop reason: HOSPADM

## 2023-10-27 RX ORDER — FAMOTIDINE 20 MG/1
40 TABLET, FILM COATED ORAL DAILY
Status: DISCONTINUED | OUTPATIENT
Start: 2023-10-27 | End: 2023-10-27 | Stop reason: HOSPADM

## 2023-10-27 RX ORDER — 0.9 % SODIUM CHLORIDE 0.9 %
2 VIAL (ML) INJECTION EVERY 12 HOURS SCHEDULED
Status: DISCONTINUED | OUTPATIENT
Start: 2023-10-27 | End: 2023-10-27 | Stop reason: HOSPADM

## 2023-10-27 RX ORDER — BUSPIRONE HYDROCHLORIDE 15 MG/1
15 TABLET ORAL DAILY
Status: DISCONTINUED | OUTPATIENT
Start: 2023-10-27 | End: 2023-10-27 | Stop reason: HOSPADM

## 2023-10-27 RX ORDER — ATORVASTATIN CALCIUM 40 MG/1
40 TABLET, FILM COATED ORAL DAILY
Status: DISCONTINUED | OUTPATIENT
Start: 2023-10-27 | End: 2023-10-27 | Stop reason: HOSPADM

## 2023-10-27 RX ORDER — MEMANTINE HYDROCHLORIDE 10 MG/1
10 TABLET ORAL EVERY 12 HOURS SCHEDULED
Status: DISCONTINUED | OUTPATIENT
Start: 2023-10-27 | End: 2023-10-27 | Stop reason: HOSPADM

## 2023-10-27 RX ORDER — OLANZAPINE 5 MG/1
5 TABLET ORAL NIGHTLY PRN
Status: DISCONTINUED | OUTPATIENT
Start: 2023-10-27 | End: 2023-10-27 | Stop reason: HOSPADM

## 2023-10-27 RX ORDER — ASPIRIN 81 MG/1
81 TABLET ORAL DAILY
Status: DISCONTINUED | OUTPATIENT
Start: 2023-10-27 | End: 2023-10-27 | Stop reason: HOSPADM

## 2023-10-27 RX ORDER — ONDANSETRON 2 MG/ML
4 INJECTION INTRAMUSCULAR; INTRAVENOUS EVERY 12 HOURS PRN
Status: DISCONTINUED | OUTPATIENT
Start: 2023-10-27 | End: 2023-10-27 | Stop reason: HOSPADM

## 2023-10-27 RX ORDER — TRAMADOL HYDROCHLORIDE 50 MG/1
50 TABLET ORAL 2 TIMES DAILY PRN
Status: DISCONTINUED | OUTPATIENT
Start: 2023-10-27 | End: 2023-10-27 | Stop reason: HOSPADM

## 2023-10-27 RX ORDER — ONDANSETRON 4 MG/1
4 TABLET, ORALLY DISINTEGRATING ORAL EVERY 12 HOURS PRN
Status: DISCONTINUED | OUTPATIENT
Start: 2023-10-27 | End: 2023-10-27 | Stop reason: HOSPADM

## 2023-10-27 RX ORDER — ACETAMINOPHEN 650 MG/1
650 SUPPOSITORY RECTAL EVERY 4 HOURS PRN
Status: DISCONTINUED | OUTPATIENT
Start: 2023-10-27 | End: 2023-10-27 | Stop reason: HOSPADM

## 2023-10-27 RX ORDER — ETHYL ALCOHOL 700 MG/ML
1 GEL TOPICAL EVERY EVENING
COMMUNITY

## 2023-10-27 RX ADMIN — SACUBITRIL AND VALSARTAN 1 TABLET: 24; 26 TABLET, FILM COATED ORAL at 08:59

## 2023-10-27 RX ADMIN — ATORVASTATIN CALCIUM 40 MG: 40 TABLET, FILM COATED ORAL at 08:59

## 2023-10-27 RX ADMIN — FAMOTIDINE 40 MG: 20 TABLET, FILM COATED ORAL at 08:59

## 2023-10-27 RX ADMIN — MEMANTINE HYDROCHLORIDE 10 MG: 10 TABLET, FILM COATED ORAL at 08:59

## 2023-10-27 RX ADMIN — RIVASTIGMINE 1 PATCH: 13.3 PATCH, EXTENDED RELEASE TRANSDERMAL at 11:24

## 2023-10-27 RX ADMIN — SODIUM CHLORIDE, PRESERVATIVE FREE 2 ML: 5 INJECTION INTRAVENOUS at 08:59

## 2023-10-27 RX ADMIN — ASPIRIN 81 MG: 81 TABLET, COATED ORAL at 08:59

## 2023-10-27 RX ADMIN — DULOXETINE HYDROCHLORIDE 60 MG: 60 CAPSULE, DELAYED RELEASE ORAL at 08:59

## 2023-10-27 RX ADMIN — BUSPIRONE HYDROCHLORIDE 15 MG: 15 TABLET ORAL at 08:59

## 2023-10-27 SDOH — SOCIAL STABILITY: SOCIAL NETWORK
HOW OFTEN DO YOU SEE OR TALK TO PEOPLE THAT YOU CARE ABOUT AND FEEL CLOSE TO? (FOR EXAMPLE: TALKING TO FRIENDS ON THE PHONE, VISITING FRIENDS OR FAMILY, GOING TO CHURCH OR CLUB MEETINGS): 5 OR MORE TIMES A WEEK

## 2023-10-27 ASSESSMENT — ACTIVITIES OF DAILY LIVING (ADL)
EATING: MINIMAL ASSIST (MIN)
ADL_SHORT_OF_BREATH: NO
HOME_MANAGEMENT_TIME_ENTRY: 23
PRIOR_ADL: MINIMAL ASSIST (MIN)
GROOMING: MINIMAL ASSIST (MIN)
PRIOR_ADL_TOILETING: MODERATE ASSIST (MOD)
PRIOR_ADL_BATHING: MODERATE ASSIST (MOD)

## 2023-10-27 ASSESSMENT — COGNITIVE AND FUNCTIONAL STATUS - GENERAL
HELP NEEDED FOR BATHING: A LOT
DAILY_ACTIVITY_RAW_SCORE: 13
HELP NEEDED FOR PERSONAL GROOMING: A LITTLE
HELP NEEDED DRESSING REGULAR UPPER BODY CLOTHING: A LOT
DAILY_ACTIVITY_CONVERTED_SCORE: 32.03
HELP NEEDED FOR TOILETING: A LOT
BASIC_MOBILITY_RAW_SCORE: 18
HELP NEEDED DRESSING REGULAR LOWER BODY CLOTHING: TOTAL
BASIC_MOBILITY_CONVERTED_SCORE: 41.05

## 2023-10-27 ASSESSMENT — PAIN SCALES - GENERAL
PAINLEVEL_OUTOF10: 0
PAINLEVEL_OUTOF10: 0

## 2023-10-27 ASSESSMENT — PATIENT HEALTH QUESTIONNAIRE - PHQ9: IS PATIENT ABLE TO COMPLETE PHQ2 OR PHQ9: NO, PATIENT WILL NEVER BE ABLE TO COMPLETE

## 2023-12-20 ENCOUNTER — INCIDENTAL FINDING (OUTPATIENT)
Dept: GENERAL RADIOLOGY | Age: 83
End: 2023-12-20

## 2024-08-31 NOTE — PROGRESS NOTES
"[facility] Attending Shared Visit Note    This is a shared visit. Please see Advanced Practice Provider's encounter note for additional details.        Overnight events/Subjective: had a BM    Exam:   Physical exam: Well appearing, no distress. Normal rate, regular rhythm, non labored breathing, clear to auscultation, abdomen non distended, no LE edema, \"woody\" appearing legs, minimal bilateral upper extremity swelling. no focal neuro deficits.     A/P:   86 F with SSS s/p PPM, HTN, CKD and HFpEF who presented with decompensated HF and was found to have a newly reduced EF (30-35%) and PYP suggestion of TTR amyloid.  #New HFrEF + TTR amyloidosis: Despite high pacing burden, she does not have intrinsic conduction to allow change in pacing settings to minimize pacing. Low dose ARNI, SGLT2, spironolactone and metoprolol as her BP is around 100/60. Low threshold to cut back. Tafamidis as an outpatient. Continue lasix 40 mg PO for mild volume overload    Dispo: medically ready for discharge, pending pre cert for SNF    Oscar Marsh MD    ________________________________________________________________________________  Problems:   Principal Problem:    Acute systolic heart failure (Multi)  Active Problems:    Sick sinus syndrome (Multi)    NSTEMI (non-ST elevated myocardial infarction) (Multi)    Cardiomyopathy (Multi)    Pacemaker    TERI (acute kidney injury) (CMS-[facility])    Wild-type transthyretin-related (ATTR) amyloidosis (Multi)      Objective   Admit Date: 8/24/2024  Hospital Length of Stay: 7   Home: [facility] [id number]    Vitals:      8/31/2024    10:57 AM 8/31/2024     7:44 AM 8/31/2024     5:05 AM 8/30/2024    11:50 PM 8/30/2024     8:27 PM 8/30/2024     3:26 PM 8/30/2024     3:25 PM   Vitals   Systolic 104 125 125 113 127 121 136   Diastolic 57 69 64 62 63 64 75   Heart Rate 67 71 68 62 65 68 66   Temp 36.2 °C (97.2 °F) 36.1 °C (97 °F) 36.1 °C (97 °F)  36.1 °C (97 °F)     Resp 17 17 17 18 18     Weight (lb)   116.18     " Pt noted to have episode of HR in the 30s-- with long pause vs asystole noted on monitor. Pt BP prior to event was normal- BP after event 97/65. Pt states she feels fine and is not dizzy or nauseous, but patient is a poor historian d/t dementia. Cardiology paged regarding.   BMI   20.58 kg/m2       BSA (m2)   1.53 m2         Wt Readings from Last 5 Encounters:   08/31/24 52.7 kg (116 lb 2.9 oz)       Intake/Output Summary (Last 24 hours) at 8/31/2024 1118  Last data filed at 8/31/2024 0505  Gross per 24 hour   Intake 120 ml   Output 1001 ml   Net -881 ml         MEDICATIONS  Infusions:       Scheduled:  aspirin, 81 mg, Daily  calcium carbonate-vitamin D3, 1 tablet, Daily  dapagliflozin propanediol, 5 mg, q24h  enoxaparin, 30 mg, q24h  ergocalciferol, 1,250 mcg, Every Sunday  furosemide, 40 mg, Daily  metoprolol succinate XL, 12.5 mg, Daily  pantoprazole, 40 mg, Daily before breakfast  polyethylene glycol, 17 g, Daily  predniSONE, 2.5 mg, Daily  rosuvastatin, 20 mg, Nightly  sacubitriL-valsartan, 0.5 tablet, BID  sennosides-docusate sodium, 1 tablet, Nightly  spironolactone, 12.5 mg, Daily      PRN:  acetaminophen, 650 mg, q6h PRN  benzonatate, 100 mg, TID PRN  ipratropium-albuteroL, 3 mL, q6h PRN  LORazepam, 0.5 mg, q5 min PRN  magnesium hydroxide, 30 mL, Daily PRN  ondansetron, 4 mg, q8h PRN   Or  ondansetron, 4 mg, q8h PRN      Prior to Admission Meds:  Medications Prior to Admission   Medication Sig Dispense Refill Last Dose    amLODIPine (Norvasc) 10 mg tablet Take 1 tablet (10 mg) by mouth once daily.   Past Week    aspirin 81 mg EC tablet Take 1 tablet (81 mg) by mouth once daily.   Past Week    atorvastatin (Lipitor) 10 mg tablet Take 1 tablet (10 mg) by mouth once daily.   Past Week    calcium carbonate-vitamin D3 (Calcium 600 + D,3,) 600 mg-10 mcg (400 unit) tablet Take 1 tablet by mouth once daily.   Past Week    ergocalciferol (Vitamin D-2) 50 MCG (2000 UT) capsule capsule Take 1 capsule (50 mcg) by mouth 1 (one) time per week.   Past Month    furosemide (Lasix) 20 mg tablet Take 1 tablet (20 mg) by mouth 2 times a day.   Past Week    metoprolol tartrate (Lopressor) 50 mg tablet Take 1 tablet by mouth once daily.   Past Week    torsemide (Demadex) 20 mg tablet Take 1 tablet  "(20 mg) by mouth once daily.   Past Week    traMADol (Ultram) 50 mg tablet Take 1 tablet (50 mg) by mouth every 8 hours if needed.   Past Week    nitroglycerin (Nitrostat) 0.4 mg SL tablet Place 1 tablet (0.4 mg) under the tongue every 5 minutes if needed for chest pain (for up to 3 doses.Call 911 is pain persists).   More than a month       DATA:  CMP:  Recent Labs     08/30/24 1907 08/29/24 1844 08/28/24 1902 08/27/24 1821 08/26/24 1827 08/25/24 1932 08/24/24 2238 08/23/24 2235   * 137 136 138 141 142 141 143   K 4.6 4.8 4.3 5.0 3.6 4.1 4.4 4.0   CL 98 96* 96* 101 103 105 107 108*   CO2 18* 29 28 21 27 27 25 22   ANIONGAP 24* 17 16 21* 15 14 13 17   BUN 36* 35* 31* 29* 28* 29* 29* 29*   CREATININE 1.60* 1.49* 1.65* 1.40* 1.51* 1.55* 1.56* 1.61*   EGFR 31* 34* 30* 36* 33* 32* 32* 31*   MG 2.37 2.16 2.19 2.26 2.10 2.03 2.24  --      Recent Labs     08/30/24 1907 08/29/24 1844 08/29/24  0954 08/28/24 1902 08/27/24 1821 08/26/24 1827 08/25/24 1932 08/24/24 2238 08/23/24 2235   ALBUMIN 2.9* 3.0*  --  2.9* 2.8* 3.2* 3.0* 3.1* 3.5   ALT  --   --   --   --   --   --   --   --  12   AST  --   --   --   --   --   --   --   --  18   BILITOT  --   --   --   --   --   --   --   --  0.7   LIPASE  --   --  9  --   --   --   --   --   --      CBC:  Recent Labs     08/30/24 1907 08/29/24 1844 08/28/24  1902 08/27/24  1821 08/26/24  1827 08/26/24  0940 08/25/24  1932 08/24/24  2238   WBC 7.1 5.9 5.6 5.7 6.2 5.4 5.4 4.4   HGB 16.0 14.8 14.1 14.1 14.3 13.0 13.3 13.0   HCT 51.2* 47.0* 45.2 44.8 43.5 40.0 42.8 40.8    199 211 213 214 213 216 196   MCV 86 85 85 84 82 82 84 84     COAG:   Recent Labs     08/26/24  0940 08/25/24  1932 08/24/24  1305 08/24/24  0645 08/24/24  0001   INR  --   --   --   --  1.1   HAUF 0.5 0.5 0.6 0.6  --      ABO: No results for input(s): \"ABO\" in the last 57098 hours.  HEME/ENDO: No results for input(s): \"FERRITIN\", \"IRONSAT\", \"TSH\", \"FREET4\", \"HGBA1C\" in the last 11323 " "hours.  CARDIAC:   Recent Labs     08/23/24  2341 08/23/24  2235   TROPHS 189* 235*   BNP  --  2,626*     No results for input(s): \"CHOL\", \"LDLF\", \"LDLCALC\", \"HDL\", \"TRIG\" in the last 47993 hours.  TOX:No results for input(s): \"AMPHETAMINE\", \"BENZO\", \"CANNABINOID\", \"COCAI\", \"FENTANYL\", \"OPIATE\", \"OXYCODONE\", \"PCP\" in the last 85876 hours.    No lab exists for component: \"BARBSCRUR\"  MICRO: No results for input(s): \"ESR\", \"CRP\", \"PROCAL\" in the last 68852 hours.  No results found for the last 90 days.      FOLLOWUP:   Future Appointments   Date Time Provider Department Center   9/17/2024  3:00 PM Lit Goss MD [id number]             "

## 2024-10-28 ENCOUNTER — APPOINTMENT (OUTPATIENT)
Dept: CT IMAGING | Facility: HOSPITAL | Age: 84
End: 2024-10-28
Attending: EMERGENCY MEDICINE
Payer: MEDICARE

## 2024-10-28 ENCOUNTER — HOSPITAL ENCOUNTER (OUTPATIENT)
Facility: HOSPITAL | Age: 84
Setting detail: OBSERVATION
LOS: 1 days | Discharge: HOME HEALTH | End: 2024-10-29
Attending: EMERGENCY MEDICINE | Admitting: HOSPITALIST
Payer: MEDICARE

## 2024-10-28 ENCOUNTER — APPOINTMENT (OUTPATIENT)
Dept: GENERAL RADIOLOGY | Facility: HOSPITAL | Age: 84
End: 2024-10-28
Attending: EMERGENCY MEDICINE
Payer: MEDICARE

## 2024-10-28 DIAGNOSIS — N39.0 URINARY TRACT INFECTION WITHOUT HEMATURIA, SITE UNSPECIFIED: ICD-10-CM

## 2024-10-28 DIAGNOSIS — R41.82 ALTERED MENTAL STATUS, UNSPECIFIED ALTERED MENTAL STATUS TYPE: Primary | ICD-10-CM

## 2024-10-28 DIAGNOSIS — R53.1 WEAKNESS GENERALIZED: ICD-10-CM

## 2024-10-28 LAB
ALBUMIN SERPL-MCNC: 5.1 G/DL (ref 3.2–4.8)
ALBUMIN/GLOB SERPL: 1.8 {RATIO} (ref 1–2)
ALP LIVER SERPL-CCNC: 68 U/L
ALT SERPL-CCNC: 16 U/L
ANION GAP SERPL CALC-SCNC: 6 MMOL/L (ref 0–18)
APTT PPP: 20.1 SECONDS (ref 23–36)
AST SERPL-CCNC: 24 U/L (ref ?–34)
ATRIAL RATE: 72 BPM
BASOPHILS # BLD AUTO: 0.01 X10(3) UL (ref 0–0.2)
BASOPHILS NFR BLD AUTO: 0.2 %
BILIRUB SERPL-MCNC: 0.4 MG/DL (ref 0.2–1.1)
BILIRUB UR QL STRIP.AUTO: NEGATIVE
BUN BLD-MCNC: 18 MG/DL (ref 9–23)
CALCIUM BLD-MCNC: 10.6 MG/DL (ref 8.7–10.4)
CHLORIDE SERPL-SCNC: 103 MMOL/L (ref 98–112)
CO2 SERPL-SCNC: 28 MMOL/L (ref 21–32)
COLOR UR AUTO: YELLOW
CREAT BLD-MCNC: 1.03 MG/DL
EGFRCR SERPLBLD CKD-EPI 2021: 53 ML/MIN/1.73M2 (ref 60–?)
EOSINOPHIL # BLD AUTO: 0.02 X10(3) UL (ref 0–0.7)
EOSINOPHIL NFR BLD AUTO: 0.3 %
ERYTHROCYTE [DISTWIDTH] IN BLOOD BY AUTOMATED COUNT: 14.3 %
GLOBULIN PLAS-MCNC: 2.9 G/DL (ref 2–3.5)
GLUCOSE BLD-MCNC: 104 MG/DL (ref 70–99)
GLUCOSE BLD-MCNC: 108 MG/DL (ref 70–99)
GLUCOSE BLD-MCNC: 96 MG/DL (ref 70–99)
GLUCOSE UR STRIP.AUTO-MCNC: NORMAL MG/DL
HCT VFR BLD AUTO: 39 %
HGB BLD-MCNC: 12.6 G/DL
HYALINE CASTS #/AREA URNS AUTO: PRESENT /LPF
IMM GRANULOCYTES # BLD AUTO: 0.02 X10(3) UL (ref 0–1)
IMM GRANULOCYTES NFR BLD: 0.3 %
INR BLD: 0.95 (ref 0.8–1.2)
KETONES UR STRIP.AUTO-MCNC: NEGATIVE MG/DL
LEUKOCYTE ESTERASE UR QL STRIP.AUTO: 500
LYMPHOCYTES # BLD AUTO: 1.18 X10(3) UL (ref 1–4)
LYMPHOCYTES NFR BLD AUTO: 19.9 %
MCH RBC QN AUTO: 28 PG (ref 26–34)
MCHC RBC AUTO-ENTMCNC: 32.3 G/DL (ref 31–37)
MCV RBC AUTO: 86.7 FL
MONOCYTES # BLD AUTO: 0.5 X10(3) UL (ref 0.1–1)
MONOCYTES NFR BLD AUTO: 8.4 %
NEUTROPHILS # BLD AUTO: 4.2 X10 (3) UL (ref 1.5–7.7)
NEUTROPHILS # BLD AUTO: 4.2 X10(3) UL (ref 1.5–7.7)
NEUTROPHILS NFR BLD AUTO: 70.9 %
NITRITE UR QL STRIP.AUTO: NEGATIVE
OSMOLALITY SERPL CALC.SUM OF ELEC: 286 MOSM/KG (ref 275–295)
P AXIS: 52 DEGREES
P-R INTERVAL: 216 MS
PH UR STRIP.AUTO: 6.5 [PH] (ref 5–8)
PLATELET # BLD AUTO: 226 10(3)UL (ref 150–450)
POTASSIUM SERPL-SCNC: 4.2 MMOL/L (ref 3.5–5.1)
PROT SERPL-MCNC: 8 G/DL (ref 5.7–8.2)
PROT UR STRIP.AUTO-MCNC: 30 MG/DL
PROTHROMBIN TIME: 12.8 SECONDS (ref 11.6–14.8)
Q-T INTERVAL: 402 MS
QRS DURATION: 82 MS
QTC CALCULATION (BEZET): 440 MS
R AXIS: -21 DEGREES
RBC # BLD AUTO: 4.5 X10(6)UL
RBC UR QL AUTO: NEGATIVE
SARS-COV-2 RNA RESP QL NAA+PROBE: NOT DETECTED
SODIUM SERPL-SCNC: 137 MMOL/L (ref 136–145)
SP GR UR STRIP.AUTO: 1.02 (ref 1–1.03)
T AXIS: 63 DEGREES
TROPONIN I SERPL HS-MCNC: 23 NG/L
UROBILINOGEN UR STRIP.AUTO-MCNC: NORMAL MG/DL
VENTRICULAR RATE: 72 BPM
WBC # BLD AUTO: 5.9 X10(3) UL (ref 4–11)
WBC #/AREA URNS AUTO: >50 /HPF

## 2024-10-28 PROCEDURE — 82962 GLUCOSE BLOOD TEST: CPT

## 2024-10-28 PROCEDURE — 85610 PROTHROMBIN TIME: CPT

## 2024-10-28 PROCEDURE — 84484 ASSAY OF TROPONIN QUANT: CPT

## 2024-10-28 PROCEDURE — 87186 SC STD MICRODIL/AGAR DIL: CPT | Performed by: EMERGENCY MEDICINE

## 2024-10-28 PROCEDURE — 85730 THROMBOPLASTIN TIME PARTIAL: CPT

## 2024-10-28 PROCEDURE — 96365 THER/PROPH/DIAG IV INF INIT: CPT

## 2024-10-28 PROCEDURE — 93005 ELECTROCARDIOGRAM TRACING: CPT

## 2024-10-28 PROCEDURE — 85610 PROTHROMBIN TIME: CPT | Performed by: EMERGENCY MEDICINE

## 2024-10-28 PROCEDURE — 84484 ASSAY OF TROPONIN QUANT: CPT | Performed by: EMERGENCY MEDICINE

## 2024-10-28 PROCEDURE — 87077 CULTURE AEROBIC IDENTIFY: CPT | Performed by: EMERGENCY MEDICINE

## 2024-10-28 PROCEDURE — 87086 URINE CULTURE/COLONY COUNT: CPT | Performed by: EMERGENCY MEDICINE

## 2024-10-28 PROCEDURE — 93010 ELECTROCARDIOGRAM REPORT: CPT

## 2024-10-28 PROCEDURE — 85730 THROMBOPLASTIN TIME PARTIAL: CPT | Performed by: EMERGENCY MEDICINE

## 2024-10-28 PROCEDURE — 81001 URINALYSIS AUTO W/SCOPE: CPT | Performed by: EMERGENCY MEDICINE

## 2024-10-28 PROCEDURE — 71045 X-RAY EXAM CHEST 1 VIEW: CPT | Performed by: EMERGENCY MEDICINE

## 2024-10-28 PROCEDURE — 80053 COMPREHEN METABOLIC PANEL: CPT

## 2024-10-28 PROCEDURE — 70450 CT HEAD/BRAIN W/O DYE: CPT | Performed by: EMERGENCY MEDICINE

## 2024-10-28 PROCEDURE — 85025 COMPLETE CBC W/AUTO DIFF WBC: CPT | Performed by: EMERGENCY MEDICINE

## 2024-10-28 PROCEDURE — 80053 COMPREHEN METABOLIC PANEL: CPT | Performed by: EMERGENCY MEDICINE

## 2024-10-28 PROCEDURE — 99285 EMERGENCY DEPT VISIT HI MDM: CPT

## 2024-10-28 PROCEDURE — 85025 COMPLETE CBC W/AUTO DIFF WBC: CPT

## 2024-10-28 RX ORDER — RIVASTIGMINE 13.3 MG/24H
1 PATCH, EXTENDED RELEASE TRANSDERMAL NIGHTLY
Status: DISCONTINUED | OUTPATIENT
Start: 2024-10-28 | End: 2024-10-29

## 2024-10-28 RX ORDER — FUROSEMIDE 20 MG/1
20 TABLET ORAL DAILY
Status: DISCONTINUED | OUTPATIENT
Start: 2024-10-28 | End: 2024-10-29

## 2024-10-28 RX ORDER — SODIUM CHLORIDE 9 MG/ML
INJECTION, SOLUTION INTRAVENOUS CONTINUOUS
Status: DISCONTINUED | OUTPATIENT
Start: 2024-10-28 | End: 2024-10-28

## 2024-10-28 RX ORDER — METOCLOPRAMIDE HYDROCHLORIDE 5 MG/ML
5 INJECTION INTRAMUSCULAR; INTRAVENOUS EVERY 8 HOURS PRN
Status: DISCONTINUED | OUTPATIENT
Start: 2024-10-28 | End: 2024-10-29

## 2024-10-28 RX ORDER — RALOXIFENE HYDROCHLORIDE 60 MG/1
60 TABLET, FILM COATED ORAL DAILY
Status: DISCONTINUED | OUTPATIENT
Start: 2024-10-28 | End: 2024-10-28

## 2024-10-28 RX ORDER — LABETALOL HYDROCHLORIDE 5 MG/ML
10 INJECTION, SOLUTION INTRAVENOUS EVERY 6 HOURS PRN
Status: DISCONTINUED | OUTPATIENT
Start: 2024-10-28 | End: 2024-10-29

## 2024-10-28 RX ORDER — ATORVASTATIN CALCIUM 40 MG/1
40 TABLET, FILM COATED ORAL NIGHTLY
Status: DISCONTINUED | OUTPATIENT
Start: 2024-10-28 | End: 2024-10-29

## 2024-10-28 RX ORDER — BUSPIRONE HYDROCHLORIDE 15 MG/1
15 TABLET ORAL 2 TIMES DAILY
Status: DISCONTINUED | OUTPATIENT
Start: 2024-10-28 | End: 2024-10-29

## 2024-10-28 RX ORDER — PANTOPRAZOLE SODIUM 20 MG/1
20 TABLET, DELAYED RELEASE ORAL
Status: DISCONTINUED | OUTPATIENT
Start: 2024-10-29 | End: 2024-10-29

## 2024-10-28 RX ORDER — POLYETHYLENE GLYCOL 3350 17 G/17G
17 POWDER, FOR SOLUTION ORAL DAILY PRN
Status: DISCONTINUED | OUTPATIENT
Start: 2024-10-28 | End: 2024-10-29

## 2024-10-28 RX ORDER — ACETAMINOPHEN 325 MG/1
650 TABLET ORAL EVERY 4 HOURS PRN
Status: DISCONTINUED | OUTPATIENT
Start: 2024-10-28 | End: 2024-10-29

## 2024-10-28 RX ORDER — MEMANTINE HYDROCHLORIDE 10 MG/1
10 TABLET ORAL 2 TIMES DAILY
Status: DISCONTINUED | OUTPATIENT
Start: 2024-10-28 | End: 2024-10-29

## 2024-10-28 RX ORDER — ENOXAPARIN SODIUM 100 MG/ML
40 INJECTION SUBCUTANEOUS DAILY
Status: DISCONTINUED | OUTPATIENT
Start: 2024-10-29 | End: 2024-10-29

## 2024-10-28 RX ORDER — ONDANSETRON 2 MG/ML
4 INJECTION INTRAMUSCULAR; INTRAVENOUS EVERY 6 HOURS PRN
Status: DISCONTINUED | OUTPATIENT
Start: 2024-10-28 | End: 2024-10-29

## 2024-10-28 RX ORDER — BUSPIRONE HYDROCHLORIDE 15 MG/1
15 TABLET ORAL 2 TIMES DAILY
COMMUNITY

## 2024-10-28 RX ORDER — DULOXETIN HYDROCHLORIDE 60 MG/1
60 CAPSULE, DELAYED RELEASE ORAL 2 TIMES DAILY
Status: DISCONTINUED | OUTPATIENT
Start: 2024-10-28 | End: 2024-10-29

## 2024-10-28 NOTE — ED QUICK NOTES
Orders for admission, patient is aware of plan and ready to go upstairs. Any questions, please call ED SAULO sun at extension [phone].     Patient Covid vaccination status: Fully vaccinated     COVID Test Ordered in ED: Rapid SARS-CoV-2 by PCR    COVID Suspicion at Admission: N/A    Running Infusions:  None    Mental Status/LOC at time of transport: alert to person and is at patient normal baseline    Other pertinent information:   CIWA score: N/A   NIH score:  2

## 2024-10-28 NOTE — H&P
[facility] Hospitalist History and Physical      Chief Complaint   Patient presents with    Stroke     Slow to respond at 12:45 when being bathed         PCP: Jorge Lr MD      History of Present Illness: Patient is a 84 year old female with PMH sig for Alzheimer's disease, hypertension, T2DM, CAD, HFrEF, chronic back pain who presents for weakness and altered mentation.  Patient was noted to be very weak at home and confused recently.  Family was concerned she was having a stroke and brought her to the ED for further evaluation.  Her workup was significant for UA positive for infection.  CT head negative for acute process.  ED spoke to neuro/neurostroke, no further neurological workup was warranted.  Patient was given IV antibiotics in the ED.    Past Medical History:    ANXIETY    Anxiety    Arrhythmia    pt unaware    BACK PAIN    LS & C-SPINE DZ W/ PAIN    Chronic rhinitis    Depression    Esophageal reflux    GERD    W/ PHAN's    High blood pressure    High cholesterol    Hypercholesterolemia    HYPERLIPIDEMIA    Hypertension    Irritable bowel syndrome with constipation    Kidney disease    frequent infections; pt states frequent UTIs    Late onset Alzheimer's disease without behavioral disturbance (HCC)    Migraines    NSTEMI (non-ST elevated myocardial infarction) (HCC)    Ophthalmic migraine    DANNIE (obstructive sleep apnea)    AHI 11 RDI 13 REM AHI 0 Supine AHI 11 non-supine AHI 0 Sao2 Ye 83%     OSTEOPOROSIS    Other and unspecified hyperlipidemia    OTHER DISEASES    PSVT, pt unaware    OTHER DISEASES    Barrott's Esoghagus    Reflux    barretts    Sleep apnea    wears a mouth gaurd    UTI (lower urinary tract infection)    no UTI currently as of 5/5/16      Past Surgical History:   Procedure Laterality Date    Benign biopsy left  ?    stereotactic core biopsy    Benign biopsy right  ?    stereotactic core biopsy    Colonoscopy  11/01/2008    RECHECK 5 YRS    Colonoscopy N/A  07/19/2021    Procedure: COLONOSCOPY;  Surgeon: Manish Rodriguez MD;  Location: [facility] ENDOSCOPY    Colonoscopy,biopsy  01/08/2014    Procedure: ESOPHAGOGASTRODUODENOSCOPY, COLONOSCOPY, POSSIBLE BIOPSY, POSSIBLE POLYPECTOMY 53080,93511;  Surgeon: Manish Rodriguez MD;  Location: [facility]    Hysterectomy      Optx dstl radl x-artic fx/epiphysl sep Left 10/06/2014    Procedure: OPEN REDUCTION INTERNAL FIXATION ARM;  Surgeon: Augustina Banuelos MD;  Location: [facility]    Other surgical history      URETHRAL SUSPENSION    Other surgical history      STEROTACTIC BX's BOTH BREASTS    Other surgical history  05/01/2008    EGD = PHAN'S    Other surgical history N/A 05/17/2016    Procedure: ENDOSCOPIC ULTRASOUND (EUS);  Surgeon: Glenn Chou MD;  Location:  ENDOSCOPY    Patient documented not to have experienced any of the following events  01/08/2014    Procedure: ESOPHAGOGASTRODUODENOSCOPY, COLONOSCOPY, POSSIBLE BIOPSY, POSSIBLE POLYPECTOMY 36942,71874;  Surgeon: Manish Rodriguez MD;  Location: [facility]    Patient documented not to have experienced any of the following events Left 10/06/2014    Procedure: OPEN REDUCTION INTERNAL FIXATION ARM;  Surgeon: Augustina Banuelos MD;  Location: [facility]    Patient with preoperative order for iv antibiotic surgical site infect Left 10/06/2014    Procedure: OPEN REDUCTION INTERNAL FIXATION ARM;  Surgeon: Augustina Banuelos MD;  Location: [facility]    Patient withough preoperative order for iv antibiotic surgical site infection prophylaxis.  01/08/2014    Procedure: ESOPHAGOGASTRODUODENOSCOPY, COLONOSCOPY, POSSIBLE BIOPSY, POSSIBLE POLYPECTOMY 92155,93470;  Surgeon: Manish Rodriguez MD;  Location: [facility]    Upper gi endoscopy,biopsy  01/08/2014    Procedure: ESOPHAGOGASTRODUODENOSCOPY, COLONOSCOPY, POSSIBLE BIOPSY, POSSIBLE POLYPECTOMY 07605,26283;  Surgeon: Manish Rodriguez MD;  Location: [facility]    Vena cava filter           ALL:  Allergies[1]     Prior to Admission Medications   Medication Sig    Esomeprazole Magnesium 20 MG Oral Capsule Delayed Release Take 1 capsule (20 mg total) by mouth 2 (two) times daily with meals.    Probiotic Product (FLORAJEN3 OR) Take 1 capsule by mouth daily.    Flaxseed-Bernice Prim-Bilberry (TEARS AGAIN HYDRATE OR) Take 1 capsule by mouth daily.    famotidine 40 MG Oral Tab Take 1 tablet (40 mg total) by mouth daily. X 1 month    Potassium Chloride ER 10 MEQ Oral Tab CR Take 1 tablet (10 mEq total) by mouth daily.    sennosides 8.6 MG Oral Tab Take 1 tablet (8.6 mg total) by mouth daily.    polyethylene glycol, PEG 3350, 17 g Oral Powd Pack Take 17 g by mouth daily as needed.    traMADol 50 MG Oral Tab Take 1 tablet (50 mg total) by mouth every 12 (twelve) hours as needed for Pain.    aspirin 81 MG Oral Tab EC Take 1 tablet (81 mg total) by mouth daily.    acetaminophen 500 MG Oral Tab Take 1 tablet (500 mg total) by mouth every 4 (four) hours as needed for Pain.    furosemide 20 MG Oral Tab Take 1 tablet (20 mg total) by mouth daily.    sacubitril-valsartan 24-26 MG Oral Tab Take 1 tablet by mouth 2 (two) times daily.    rivastigmine 13.3 MG/24HR Transdermal Patch 24 Hr Place 1 patch onto the skin daily.    memantine (NAMENDA XR) 28 MG Oral Capsule SR 24 Hr Take 1 capsule (28 mg total) by mouth daily.    atorvastatin 40 MG Oral Tab Take 1 tablet (40 mg total) by mouth nightly.    Raloxifene HCl 60 MG Oral Tab Take 1 tablet (60 mg total) by mouth daily.    ferrous sulfate 325 (65 FE) MG Oral Tab EC Take 1 tablet (325 mg total) by mouth daily with breakfast.    busPIRone 15 MG Oral Tab Take 2 tablets (30 mg total) by mouth in the morning and 2 tablets (30 mg total) before bedtime. Pt takes 15 mg in the morning and 30 mg at night..    Vitamin B-12 1000 MCG Oral Tab Take 1 tablet (1,000 mcg total) by mouth daily.    DULoxetine HCl 60 MG Oral Cap DR Particles Take 1 capsule (60 mg total) by mouth 2 (two)  times daily.    Cholecalciferol (VITAMIN D3) 2000 UNIT Oral Cap Take 2 capsules (4,000 Units total) by mouth daily. 2 capsules daily    CALCIUM 600 + D OR Take 2 tablets by mouth daily.       Social History     Tobacco Use    Smoking status: Former     Current packs/day: 0.00     Average packs/day: 1 pack/day for 15.0 years (15.0 ttl pk-yrs)     Types: Cigarettes     Start date: 1963     Quit date: 1978     Years since quittin.4    Smokeless tobacco: Never   Substance Use Topics    Alcohol use: Not Currently        Fam Hx  Family History   Problem Relation Age of Onset    Heart Disorder Father     Other (Other) Mother         ENCEPHALITIS    Hypertension Brother     Diabetes Brother        Review of Systems  Unable to perform full review of systems due to patient's underlying dementia and altered mental status.    OBJECTIVE:  BP (!) 180/102   Pulse 74   Temp 97.1 °F (36.2 °C) (Temporal)   Resp 14   SpO2 100%   Physical Exam:  General: Alert, awake, conversive but not making sense.   HEENT:  Normocephalic, atraumatic.  Neck:  Trachea midline.  Neck supple  Chest:  Clear to auscultation bilaterally. No wheezes, rales, or rhonchi.  CV:  Regular rate and rhythm.  Positive S1/S2.   GI: Bowel sounds present in all four quadrants, abdomen is soft, non-tender, non-distended.  Extremities:  Trace lower extremity edema. No cyanosis.  Neurological:  AAOx1. Moving all extremities.   Skin:  Warm and dry.            Data Review:    LABS:   Lab Results   Component Value Date    WBC 5.9 10/28/2024    HGB 12.6 10/28/2024    HCT 39.0 10/28/2024    .0 10/28/2024    CREATSERUM 1.03 10/28/2024    BUN 18 10/28/2024     10/28/2024    K 4.2 10/28/2024     10/28/2024    CO2 28.0 10/28/2024     10/28/2024    CA 10.6 10/28/2024    ALB 5.1 10/28/2024    ALKPHO 68 10/28/2024    BILT 0.4 10/28/2024    TP 8.0 10/28/2024    AST 24 10/28/2024    ALT 16 10/28/2024    PTT 20.1 10/28/2024    INR 0.95  10/28/2024    PTP 12.8 10/28/2024    PGLU 96 10/28/2024       CXR: image personally reviewed.  No consolidations or opacities.     Radiology: XR CHEST AP PORTABLE  (CPT=71045)    Result Date: 10/28/2024  PROCEDURE:  XR CHEST AP PORTABLE  (CPT=71045)  TECHNIQUE:  AP chest radiograph was obtained.  COMPARISON:  [facility] , XR, XR CHEST PA + LAT CHEST (YHS=46353), 4/07/2023, 1:09 AM.  [facility] , XR, XR CHEST AP PORTABLE  (CPT=71045), 3/29/2023, 6:13 PM.  INDICATIONS:  slow to respond  PATIENT STATED HISTORY: (As transcribed by Technologist)     FINDINGS:  No focal consolidation.  No pleural effusion.  No pneumothorax.  No pulmonary edema.  The cardiomediastinal silhouette is similar to prior, slightly prominent.  No acute osseous findings.            CONCLUSION:  No acute radiographic findings.   LOCATION:  [facility]      Dictated by (CST): Sheldon Quiñones MD on 10/28/2024 at 5:01 PM     Finalized by (CST): Sheldon Quiñones MD on 10/28/2024 at 5:02 PM       CT BRAIN OR HEAD (CPT=70450)    Result Date: 10/28/2024  PROCEDURE:  CT BRAIN OR HEAD (46770)  COMPARISON:  [facility] , CT, CT BRAIN OR HEAD (02332), 6/28/2023, 1:28 AM.  INDICATIONS:  slow to respond  TECHNIQUE:  Noncontrast CT scanning is performed through the brain. Dose reduction techniques were used. Dose information is transmitted to the ACR (American College of Radiology) NRDR (National Radiology Data Registry) which includes the Dose Index Registry.  PATIENT STATED HISTORY: (As transcribed by Technologist)  Weakness and slow to respond.    FINDINGS:  There is cerebral atrophy with symmetric prominence of the ventricles. There are patchy areas of low attenuation in the periventricular and deep white matter which are nonspecific but most consistent with small vessel ischemic changes. There is no evidence of hemorrhage, mass, midline shift, or extra-axial fluid collection.  The visualized paranasal sinuses show no findings. No evidence of depressed skull fracture.             CONCLUSION: 1. No acute intracranial findings 2. Cerebral atrophy with chronic microvascular ischemic changes.    LOCATION:  [facility]   Dictated by (CST): Sheldon Quiñones MD on 10/28/2024 at 4:33 PM     Finalized by (CST): Sheldon Quiñones MD on 10/28/2024 at 4:36 PM          Assessment/Plan:   84 year old female with PMH sig for Alzheimer's disease, hypertension, T2DM, CAD, HFrEF, chronic back pain who presents for weakness and altered mentation.    #Acute cystitis  #Acute metabolic encephalopathy superimposed on Alzheimer's dementia 2/2 above  #Generalized weakness  -Previous urine cultures reviewed.  Continue Rocephin.  Follow-up urine culture for this admission.  -Continue to monitor mental status  -Continue Namenda  -PT/OT/SLP    #CAD s/p PCI  #ICM/HFrEF (w/ recovered EF), chronic  #HTN  -Echo 4/2022 EF 30 to 35% -> 50 to 55% 12/2022  -Continue aspirin/statin, will hold diuretics for now    #Depression/anxiety  -Continue buspirone, duloxetine    # T2DM  -SSI    DVT prophylaxis: Lovenox  Code Status: Full Code      Outpatient records or previous hospital records reviewed.   [facility] hospitalist to continue to follow patient while in house  A total of 80 minutes taken with patient and coordinating care.  Greater than 50% face to face encounter.      Valentino Concepcion [facility] Hospitalist               [1]   Allergies  Allergen Reactions    Ciprofloxacin UNKNOWN     Burning of espohagus and upset stomach form cipro    Penicillins HIVES and UNKNOWN    Radiology Contrast Iodinated Dyes RASH     Diffuse erythema post study  -- Occurred in 1980's?,    Amoxil UNKNOWN     Cant remember    Biaxin [Clarithromycin]      Cant remember    Cefuroxime Axetil      Cant remember    Doxycycline Calcium      Cant remember    Sulfa Antibiotics HIVES

## 2024-10-28 NOTE — ED PROVIDER NOTES
Patient Seen in: [facility] Emergency Department      History     Chief Complaint   Patient presents with    Stroke     Slow to respond at 12:45 when being bathed      Stated Complaint: slow to respond    Subjective:   HPI      84-year-old female presents to the emergency department for evaluation of generalized weakness and altered mental status.  Patient has underlying dementia, history per daughter at bedside.  Daughter reports over the last few days patient has been getting increasingly weak, denies any focal weakness but states she is generally weak.  Today patient seemed \"slower to respond \"than usual, daughter does not feel patient has had any slurred speech or difficulty speaking.  Daughter denies any acute change in her symptoms today, symptoms have been slowly getting worse over the last few days.  Today while the patient was being bathed daughter reports she seemed slower to respond, decided they needed to go to the hospital for evaluation and called 911.  EMS called code stroke prior to arrival, they report they were told patient had acute mental status changes today, daughter confirms that this is not true.    Objective:     Past Medical History:    ANXIETY    Anxiety    Arrhythmia    pt unaware    BACK PAIN    LS & C-SPINE DZ W/ PAIN    Chronic rhinitis    Depression    Esophageal reflux    GERD    W/ PHAN's    High blood pressure    High cholesterol    Hypercholesterolemia    HYPERLIPIDEMIA    Hypertension    Irritable bowel syndrome with constipation    Kidney disease    frequent infections; pt states frequent UTIs    Late onset Alzheimer's disease without behavioral disturbance (HCC)    Migraines    NSTEMI (non-ST elevated myocardial infarction) (HCC)    Ophthalmic migraine    DANNIE (obstructive sleep apnea)    AHI 11 RDI 13 REM AHI 0 Supine AHI 11 non-supine AHI 0 Sao2 Ye 83%     OSTEOPOROSIS    Other and unspecified hyperlipidemia    OTHER DISEASES    PSVT, pt unaware    OTHER DISEASES     Barrott's Esoghagus    Reflux    barretts    Sleep apnea    wears a mouth gaurd    UTI (lower urinary tract infection)    no UTI currently as of 5/5/16              Past Surgical History:   Procedure Laterality Date    Benign biopsy left  ?    stereotactic core biopsy    Benign biopsy right  ?    stereotactic core biopsy    Colonoscopy  11/01/2008    RECHECK 5 YRS    Colonoscopy N/A 07/19/2021    Procedure: COLONOSCOPY;  Surgeon: Manish Rodriguez MD;  Location: [facility] ENDOSCOPY    Colonoscopy,biopsy  01/08/2014    Procedure: ESOPHAGOGASTRODUODENOSCOPY, COLONOSCOPY, POSSIBLE BIOPSY, POSSIBLE POLYPECTOMY [id number];  Surgeon: Manish Rodriguez MD;  Location: [facility]    Hysterectomy      Optx dstl radl x-artic fx/epiphysl sep Left 10/06/2014    Procedure: OPEN REDUCTION INTERNAL FIXATION ARM;  Surgeon: Augustina Banuelos MD;  Location: [facility]    Other surgical history      URETHRAL SUSPENSION    Other surgical history      STEROTACTIC BX's BOTH BREASTS    Other surgical history  05/01/2008    EGD = PHAN'S    Other surgical history N/A 05/17/2016    Procedure: ENDOSCOPIC ULTRASOUND (EUS);  Surgeon: Glenn Chou MD;  Location:  ENDOSCOPY    Patient documented not to have experienced any of the following events  01/08/2014    Procedure: ESOPHAGOGASTRODUODENOSCOPY, COLONOSCOPY, POSSIBLE BIOPSY, POSSIBLE POLYPECTOMY [id number];  Surgeon: Manish Rodriguez MD;  Location: [facility]    Patient documented not to have experienced any of the following events Left 10/06/2014    Procedure: OPEN REDUCTION INTERNAL FIXATION ARM;  Surgeon: Augustina Banuelos MD;  Location: [facility]    Patient with preoperative order for iv antibiotic surgical site infect Left 10/06/2014    Procedure: OPEN REDUCTION INTERNAL FIXATION ARM;  Surgeon: Augustina Banuelos MD;  Location: [facility]    Patient withough preoperative order for iv antibiotic surgical site infection prophylaxis.  01/08/2014     Procedure: ESOPHAGOGASTRODUODENOSCOPY, COLONOSCOPY, POSSIBLE BIOPSY, POSSIBLE POLYPECTOMY [id number];  Surgeon: Manish Rodriguez MD;  Location: [facility]    Upper gi endoscopy,biopsy  2014    Procedure: ESOPHAGOGASTRODUODENOSCOPY, COLONOSCOPY, POSSIBLE BIOPSY, POSSIBLE POLYPECTOMY [id number];  Surgeon: Manish Rodriguez MD;  Location: [facility]    Vena cava filter                  Social History     Socioeconomic History    Marital status:    Tobacco Use    Smoking status: Former     Current packs/day: 0.00     Average packs/day: 1 pack/day for 15.0 years (15.0 ttl pk-yrs)     Types: Cigarettes     Start date: 1963     Quit date: 1978     Years since quittin.4    Smokeless tobacco: Never   Vaping Use    Vaping status: Never Used   Substance and Sexual Activity    Alcohol use: Not Currently    Drug use: No     Social Drivers of Health     Financial Resource Strain: Low Risk  (10/26/2023)    Received from [facility], [facility]    Financial Resource Strain     In the past year, have you or any family members you live with been unable to get any of the following when it was really needed? Check all that apply.: None   Food Insecurity: Not At Risk (10/26/2023)    Received from [facility], [facility]    Food Insecurity     RETIRE How often in the past 12 months would you say you are worried or stressed about having enough money to buy nutritious meals? : Never   Transportation Needs: No Transportation Needs (10/26/2023)    [facility], [facility]    PRAPARE - Transportation     In the past 12 months, has lack of transportation kept you from medical appointments or from getting medications?: No     In the past 12 months, has lack of transportation kept you from meetings, work, or from getting things needed for daily living?: No   Social  Connections: Low Risk  (10/27/2023)    Received from [facility], [facility]    Social Connections     How often do you see or talk to people that you care about and feel close to? (For example: talking to friends on the phone, visiting friends or family, going to [employer] or club meetings): 5 or more times a week                  Physical Exam     ED Triage Vitals   BP 10/28/24 1415 (!) 168/94   Pulse 10/28/24 1415 73   Resp 10/28/24 1415 21   Temp 10/28/24 1417 97.1 °F (36.2 °C)   Temp src 10/28/24 1417 Temporal   SpO2 10/28/24 1415 100 %   O2 Device 10/28/24 1415 None (Room air)       Current Vitals:   Vital Signs  BP: (!) 192/91 (ER MD okay with permissive HTN)  Pulse: 69  Resp: 14  Temp: 97.1 °F (36.2 °C)  Temp src: Temporal  MAP (mmHg): (!) 121    Oxygen Therapy  SpO2: 100 %  O2 Device: None (Room air)        Physical Exam  GENERAL: Patient is awake, alert, answers questions appropriately, , in no acute distress.  HEENT: Pupils equal round reactive to light, extraocular muscles are intact, there is no scleral icterus.  Mucous membranes are moderately dry, oropharynx is clear, uvula midline.  Scalp is atraumatic.  NECK: Neck is supple, there is no nuchal rigidity.    HEART: Regular rate and rhythm, no murmurs.  LUNGS: Clear to auscultation bilaterally.  No Rales, no rhonchi, no wheezing, no stridor.  ABDOMEN: Soft, nondistended,non tender, bowel sounds are present, no rebound, no rigidity, no guarding.no pulsatile masses. No CVA tenderness  EXTREMITIES: No peripheral edema, no calf tenderness, dorsal pedal pulses present and equal bilaterally.  SKIN: Warm, dry, intact, no rashes.  NEUROLOGIC EXAM: Tongue midline, no facial drooping, no ptosis, muscle strength +5/5 bilateral upper and lower extremities cerebellar finger to nose intact, no pronator drift, sensation intact.        ED Course     Labs Reviewed   COMP METABOLIC PANEL (14) - Abnormal; Notable for the following components:        Result Value    Glucose 104 (*)     Creatinine 1.03 (*)     Calcium, Total 10.6 (*)     eGFR-Cr 53 (*)     Albumin 5.1 (*)     All other components within normal limits   PTT, ACTIVATED - Abnormal; Notable for the following components:    PTT 20.1 (*)     All other components within normal limits   URINALYSIS WITH CULTURE REFLEX - Abnormal; Notable for the following components:    Clarity Urine Turbid (*)     Protein Urine 30 (*)     Leukocyte Esterase Urine 500 (*)     WBC Urine >50 (*)     RBC Urine 6-10 (*)     Bacteria Urine 1+ (*)     Squamous Epi. Cells Few (*)     Transitional Cells Few (*)     Hyaline Casts Present (*)     All other components within normal limits   PROTHROMBIN TIME (PT) - Normal   TROPONIN I HIGH SENSITIVITY - Normal   POCT GLUCOSE - Normal   RAPID SARS-COV-2 BY PCR - Normal   CBC WITH DIFFERENTIAL WITH PLATELET   URINE CULTURE, ROUTINE     EKG    Rate, intervals and axes as noted on EKG Report.  Rate: 72  Rhythm: Sinus Rhythm  Reading: Sinus rhythm.  No ST elevation.  No change compared to EKG dated June 28, 2023                       MDM        Differential diagnosis before testing includes but not limited to CVA, pneumonia, electrolyte abnormality, urinary tract infection, which is a medical condition that poses a threat to life/function      Radiographic images  I personally reviewed the radiographs and my individual interpretation shows chest x-ray no pneumothorax or infiltrate  I also reviewed the official reports that showed chest x-ray no acute radiographic findings, CT brain no acute intracranial findings.  Chronic microvascular ischemic changes    History obtained by external source  (EMS, family, law enforcement, )other sources of medical information included history per daughter and EMS as in HPI    Discussion of management (consult/physicians, social work, pharmacy,ect) duly hospitalist physician Dr. Cardenas      Course of Events during Emergency Room Visit include IV  established, patient placed on cardiac monitor and pulse ox.  Code stroke was called prior to arrival, patient was evaluated with stroke navigator, stroke neurologist was contacted and agree to cancel code stroke.  CBC was unremarkable, chemistry sodium 137 potassium 4.2 BUN 18 creatinine 1.03 glucose 104.  Urinalysis negative nitrate 500 leukocyte esterase with greater than 50 WBCs.  Chest x-ray and CT brain without acute findings.  Patient was given IV fluids and IV antibiotics.  Patient presents with generalized weakness with altered mental status, likely due to urinary tract infection.  Discussed with hospitalist physician and was admitted for further evaluation treatment.  Discussed all results and plan for with daughter at bedside who agrees with plan    Shared decision making was utilized           Disposition:    Admission  I have discussed with the patient the results of test, differential diagnosis, and treatment plan. They expressed clear understanding of these instructions and agrees to the plan provided.         Note to patient: The 21st Century Cures Act makes medical notes like these available to patients in the interest of transparency. However, this is a medical document intended as peer to peer communication. It is written in medical language and may contain abbreviations or verbiage that are unfamiliar. It may appear blunt or direct. Medical documents are intended to carry relevant information, facts as evident, and the clinical opinion of the practitioner.             Admission disposition: 10/28/2024  6:08 PM           Medical Decision Making      Disposition and Plan     Clinical Impression:  1. Altered mental status, unspecified altered mental status type    2. Urinary tract infection without hematuria, site unspecified    3. Weakness generalized         Disposition:  Admit  10/28/2024  6:08 pm    Follow-up:  No follow-up provider specified.        Medications Prescribed:  Current Discharge  Medication List              Supplementary Documentation:         Hospital Problems       Present on Admission  Date Reviewed: 2/18/2022            ICD-10-CM Noted POA    * (Principal) Altered mental status, unspecified altered mental status type R41.82 10/28/2024 Unknown

## 2024-10-28 NOTE — ED INITIAL ASSESSMENT (HPI)
Pt brought in via ems after pt daughter was bathing her and pt became slow to respond. Pt very weak and had an episode of incontinence.

## 2024-10-29 VITALS
RESPIRATION RATE: 14 BRPM | BODY MASS INDEX: 28.28 KG/M2 | TEMPERATURE: 98 F | WEIGHT: 153.69 LBS | DIASTOLIC BLOOD PRESSURE: 88 MMHG | SYSTOLIC BLOOD PRESSURE: 147 MMHG | HEIGHT: 62 IN | OXYGEN SATURATION: 99 % | HEART RATE: 91 BPM

## 2024-10-29 PROBLEM — R41.82 ALTERED MENTAL STATUS, UNSPECIFIED ALTERED MENTAL STATUS TYPE: Status: RESOLVED | Noted: 2024-10-28 | Resolved: 2024-10-29

## 2024-10-29 LAB
ANION GAP SERPL CALC-SCNC: 6 MMOL/L (ref 0–18)
BASOPHILS # BLD AUTO: 0.01 X10(3) UL (ref 0–0.2)
BASOPHILS NFR BLD AUTO: 0.2 %
BUN BLD-MCNC: 20 MG/DL (ref 9–23)
CALCIUM BLD-MCNC: 10.1 MG/DL (ref 8.7–10.4)
CHLORIDE SERPL-SCNC: 106 MMOL/L (ref 98–112)
CO2 SERPL-SCNC: 28 MMOL/L (ref 21–32)
CREAT BLD-MCNC: 0.99 MG/DL
EGFRCR SERPLBLD CKD-EPI 2021: 56 ML/MIN/1.73M2 (ref 60–?)
EOSINOPHIL # BLD AUTO: 0.05 X10(3) UL (ref 0–0.7)
EOSINOPHIL NFR BLD AUTO: 1 %
ERYTHROCYTE [DISTWIDTH] IN BLOOD BY AUTOMATED COUNT: 14 %
GLUCOSE BLD-MCNC: 108 MG/DL (ref 70–99)
GLUCOSE BLD-MCNC: 118 MG/DL (ref 70–99)
GLUCOSE BLD-MCNC: 120 MG/DL (ref 70–99)
HCT VFR BLD AUTO: 34.6 %
HGB BLD-MCNC: 11 G/DL
IMM GRANULOCYTES # BLD AUTO: 0.01 X10(3) UL (ref 0–1)
IMM GRANULOCYTES NFR BLD: 0.2 %
LYMPHOCYTES # BLD AUTO: 1.15 X10(3) UL (ref 1–4)
LYMPHOCYTES NFR BLD AUTO: 22.6 %
MAGNESIUM SERPL-MCNC: 2.1 MG/DL (ref 1.6–2.6)
MCH RBC QN AUTO: 27.9 PG (ref 26–34)
MCHC RBC AUTO-ENTMCNC: 31.8 G/DL (ref 31–37)
MCV RBC AUTO: 87.8 FL
MONOCYTES # BLD AUTO: 0.44 X10(3) UL (ref 0.1–1)
MONOCYTES NFR BLD AUTO: 8.7 %
NEUTROPHILS # BLD AUTO: 3.42 X10 (3) UL (ref 1.5–7.7)
NEUTROPHILS # BLD AUTO: 3.42 X10(3) UL (ref 1.5–7.7)
NEUTROPHILS NFR BLD AUTO: 67.3 %
OSMOLALITY SERPL CALC.SUM OF ELEC: 294 MOSM/KG (ref 275–295)
PLATELET # BLD AUTO: 198 10(3)UL (ref 150–450)
POTASSIUM SERPL-SCNC: 4.4 MMOL/L (ref 3.5–5.1)
RBC # BLD AUTO: 3.94 X10(6)UL
SODIUM SERPL-SCNC: 140 MMOL/L (ref 136–145)
WBC # BLD AUTO: 5.1 X10(3) UL (ref 4–11)

## 2024-10-29 PROCEDURE — 82962 GLUCOSE BLOOD TEST: CPT

## 2024-10-29 PROCEDURE — 85025 COMPLETE CBC W/AUTO DIFF WBC: CPT | Performed by: STUDENT IN AN ORGANIZED HEALTH CARE EDUCATION/TRAINING PROGRAM

## 2024-10-29 PROCEDURE — 97161 PT EVAL LOW COMPLEX 20 MIN: CPT

## 2024-10-29 PROCEDURE — 97530 THERAPEUTIC ACTIVITIES: CPT

## 2024-10-29 PROCEDURE — 97116 GAIT TRAINING THERAPY: CPT

## 2024-10-29 PROCEDURE — 83735 ASSAY OF MAGNESIUM: CPT | Performed by: STUDENT IN AN ORGANIZED HEALTH CARE EDUCATION/TRAINING PROGRAM

## 2024-10-29 PROCEDURE — 92610 EVALUATE SWALLOWING FUNCTION: CPT

## 2024-10-29 PROCEDURE — 80048 BASIC METABOLIC PNL TOTAL CA: CPT | Performed by: STUDENT IN AN ORGANIZED HEALTH CARE EDUCATION/TRAINING PROGRAM

## 2024-10-29 RX ORDER — CEFPODOXIME PROXETIL 200 MG/1
400 TABLET, FILM COATED ORAL 2 TIMES DAILY
Qty: 16 TABLET | Refills: 0 | Status: SHIPPED | OUTPATIENT
Start: 2024-10-29 | End: 2024-11-02

## 2024-10-29 NOTE — DISCHARGE SUMMARY
[facility] Hospitalist Discharge Summary     Patient ID:  Isabella Addison  84 year old  1/19/1940    Admit date: 10/28/2024  Discharge date and time: 10/29/24  Attending Physician: Valentino Metcalf DO   Primary Care Physician: Jorge Lr MD   Discharge Diagnoses: Weakness generalized [R53.1]  Urinary tract infection without hematuria, site unspecified [N39.0]  Altered mental status, unspecified altered mental status type [R41.82]    Discharge Condition: Stable    Disposition:  Home w/ [facility]    Follow up:   - PCP within 1 week  - Consults: Derm    Hospital Course:  84 year old female with PMH sig for Alzheimer's disease, hypertension, T2DM, CAD, HFrEF, chronic back pain who presents for weakness and altered mentation.  Patient was noted to be very weak at home and confused recently.  Family was concerned she was having a stroke and brought her to the ED for further evaluation.  Her workup was significant for UA positive for infection.  CT head negative for acute process.  ED spoke to neuro/neurostroke, no further neurological workup was warranted.  Patient was given IV antibiotics in the ED.       #Acute cystitis  #Acute metabolic encephalopathy superimposed on Alzheimer's dementia 2/2 above  #Generalized weakness  -Previous urine cultures reviewed.  Continue Rocephin.  Discharged on cefpodoxime.  -Continue Namenda  -PT/OT/SLP.  Discharge home with home health.     #CAD s/p PCI  #ICM/HFrEF (w/ recovered EF), chronic  #HTN  -Echo 4/2022 EF 30 to 35% -> 50 to 55% 12/2022  -Continue aspirin/statin, resume diuretics     #Depression/anxiety  -Continue buspirone, duloxetine     # T2DM  -SSI       Exam on Day of DC:  /79 (BP Location: Left arm)   Pulse 83   Temp 97.7 °F (36.5 °C) (Oral)   Resp 17   Ht 5' 2\" (1.575 m)   Wt 153 lb 10.6 oz (69.7 kg)   SpO2 94%   BMI 28.10 kg/m²   Physical Exam:  General: Alert, awake, cooperative.  HEENT:  Normocephalic, atraumatic.  Neck:  Trachea midline.  Neck  supple  Chest:  Clear to auscultation bilaterally. No wheezes, rales, or rhonchi.  CV:  Regular rate and rhythm.  Positive S1/S2.   GI: Bowel sounds present in all four quadrants, abdomen is soft, non-tender, non-distended.  Extremities:  Trace lower extremity edema. No cyanosis.  Neurological:  AAOx1. Moving all extremities.   Skin:  Warm and dry.         I as the attending physician reconciled the current and discharge medications on day of discharge.     Current Discharge Medication List        START taking these medications    Details   cefpodoxime 200 MG Oral Tab Take 2 tablets (400 mg total) by mouth 2 (two) times daily for 4 days.           CONTINUE these medications which have NOT CHANGED    Details   busPIRone 15 MG Oral Tab Take 1 tablet (15 mg total) by mouth in the morning and 1 tablet (15 mg total) before bedtime.      Esomeprazole Magnesium 20 MG Oral Capsule Delayed Release Take 1 capsule (20 mg total) by mouth 2 (two) times daily with meals.      Potassium Chloride ER 10 MEQ Oral Tab CR Take 1 tablet (10 mEq total) by mouth daily.      aspirin 81 MG Oral Tab EC Take 1 tablet (81 mg total) by mouth daily.      acetaminophen 500 MG Oral Tab Take 1 tablet (500 mg total) by mouth every 4 (four) hours as needed for Pain.      sacubitril-valsartan 24-26 MG Oral Tab Take 1 tablet by mouth 2 (two) times daily.      rivastigmine 13.3 MG/24HR Transdermal Patch 24 Hr Place 1 patch onto the skin daily.      memantine (NAMENDA XR) 28 MG Oral Capsule SR 24 Hr Take 1 capsule (28 mg total) by mouth daily.      atorvastatin 40 MG Oral Tab Take 1 tablet (40 mg total) by mouth nightly.      ferrous sulfate 325 (65 FE) MG Oral Tab EC Take 1 tablet (325 mg total) by mouth daily with breakfast.      Vitamin B-12 1000 MCG Oral Tab Take 1 tablet (1,000 mcg total) by mouth daily.      DULoxetine HCl 60 MG Oral Cap DR Particles Take 1 capsule (60 mg total) by mouth 2 (two) times daily.      Cholecalciferol (VITAMIN D3) 2000  UNIT Oral Cap Take 2 capsules (4,000 Units total) by mouth daily. 2 capsules daily      CALCIUM 600 + D OR Take 2 tablets by mouth daily.           STOP taking these medications       polyethylene glycol, PEG 3350, 17 g Oral Powd Pack        furosemide 20 MG Oral Tab              [facility]calf [facility]

## 2024-10-29 NOTE — PAYOR COMM NOTE
--------------PLEASE NOTE PATIENT STATUS CHANGED TO OBSERVATION AT THIS TIME  ADMISSION REVIEW     Payor: [employer] MEDICARE ADV PPO  Subscriber #:  [id number]  Authorization Number: [id number]    Admit date: 10/28/24  Admit time:  8:00 PM       REVIEW DOCUMENTATION:     ED Provider Notes        ED Provider Notes signed by Andrew Chavarria DO at 10/28/2024  6:43 PM          Patient Seen in: [facility] Emergency Department      History     Chief Complaint   Patient presents with    Stroke     Slow to respond at 12:45 when being bathed      Stated Complaint: slow to respond    Subjective:   HPI      84-year-old female presents to the emergency department for evaluation of generalized weakness and altered mental status.  Patient has underlying dementia, history per daughter at bedside.  Daughter reports over the last few days patient has been getting increasingly weak, denies any focal weakness but states she is generally weak.  Today patient seemed \"slower to respond \"than usual, daughter does not feel patient has had any slurred speech or difficulty speaking.  Daughter denies any acute change in her symptoms today, symptoms have been slowly getting worse over the last few days.  Today while the patient was being bathed daughter reports she seemed slower to respond, decided they needed to go to the hospital for evaluation and called 911.  EMS called code stroke prior to arrival, they report they were told patient had acute mental status changes today, daughter confirms that this is not true.    Objective:     Past Medical History:    ANXIETY    Anxiety    Arrhythmia    pt unaware    BACK PAIN    LS & C-SPINE DZ W/ PAIN    Chronic rhinitis    Depression    Esophageal reflux    GERD    W/ PHAN's    High blood pressure    High cholesterol    Hypercholesterolemia    HYPERLIPIDEMIA    Hypertension    Irritable bowel syndrome with constipation    Kidney disease    frequent infections; pt states frequent UTIs    Late onset  Alzheimer's disease without behavioral disturbance (HCC)    Migraines    NSTEMI (non-ST elevated myocardial infarction) (HCC)    Ophthalmic migraine    DANNIE (obstructive sleep apnea)    AHI 11 RDI 13 REM AHI 0 Supine AHI 11 non-supine AHI 0 Sao2 Ye 83%     OSTEOPOROSIS    Other and unspecified hyperlipidemia    OTHER DISEASES    PSVT, pt unaware    OTHER DISEASES    Barrott's Esoghagus    Reflux    barretts    Sleep apnea    wears a mouth gaurd    UTI (lower urinary tract infection)    no UTI currently as of 5/5/16              Past Surgical History:   Procedure Laterality Date    Benign biopsy left  ?    stereotactic core biopsy    Benign biopsy right  ?    stereotactic core biopsy    Colonoscopy  11/01/2008    RECHECK 5 YRS    Colonoscopy N/A 07/19/2021    Procedure: COLONOSCOPY;  Surgeon: Manish Rodriguez MD;  Location: [facility] ENDOSCOPY    Colonoscopy,biopsy  01/08/2014    Procedure: ESOPHAGOGASTRODUODENOSCOPY, COLONOSCOPY, POSSIBLE BIOPSY, POSSIBLE POLYPECTOMY [id number];  Surgeon: Manish Rodriguez MD;  Location: [facility]    Hysterectomy      Optx dstl radl x-artic fx/epiphysl sep Left 10/06/2014    Procedure: OPEN REDUCTION INTERNAL FIXATION ARM;  Surgeon: Augustina Banuelos MD;  [facility]: [facility]    Other surgical history      URETHRAL SUSPENSION    Other surgical history      STEROTACTIC BX's BOTH BREASTS    Other surgical history  05/01/2008    EGD = PHAN'S    Other surgical history N/A 05/17/2016    Procedure: ENDOSCOPIC ULTRASOUND (EUS);  Surgeon: Glenn Chou MD;  Location:  ENDOSCOPY    Patient documented not to have experienced any of the following events  01/08/2014    Procedure: ESOPHAGOGASTRODUODENOSCOPY, COLONOSCOPY, POSSIBLE BIOPSY, POSSIBLE POLYPECTOMY [id number];  Surgeon: Manish Rodriguez MD;  Location: [facility]    Patient documented not to have experienced any of the following events Left 10/06/2014    Procedure: OPEN REDUCTION INTERNAL FIXATION ARM;   Surgeon: Augustina Banuelos MD;  Location: [facility]    Patient with preoperative order for iv antibiotic surgical site infect Left 10/06/2014    Procedure: OPEN REDUCTION INTERNAL FIXATION ARM;  Surgeon: Augustina Banuelos MD;  Location: [facility]    Patient withough preoperative order for iv antibiotic surgical site infection prophylaxis.  2014    Procedure: ESOPHAGOGASTRODUODENOSCOPY, COLONOSCOPY, POSSIBLE BIOPSY, POSSIBLE POLYPECTOMY [id number];  Surgeon: Manish Rodriguez MD;  Location: [facility]    Upper gi endoscopy,biopsy  2014    Procedure: ESOPHAGOGASTRODUODENOSCOPY, COLONOSCOPY, POSSIBLE BIOPSY, POSSIBLE POLYPECTOMY 93574,[id number];  Surgeon: Manish Rodriguez MD;  Location: [facility]    Vena cava filter                  Social History     Socioeconomic History    Marital status:    Tobacco Use    Smoking status: Former     Current packs/day: 0.00     Average packs/day: 1 pack/day for 15.0 years (15.0 ttl pk-yrs)     Types: Cigarettes     Start date: 1963     Quit date: 1978     Years since quittin.4    Smokeless tobacco: Never   Vaping Use    Vaping status: Never Used   Substance and Sexual Activity    Alcohol use: Not Currently    Drug use: No     Social Drivers of Health     Financial Resource Strain: Low Risk  (10/26/2023)    Received from [facility]    Financial Resource Strain     In the past year, have you or any family members you live with been unable to get any of the following when it was really needed? Check all that apply.: None   Food Insecurity: Not At Risk (10/26/2023)    Received from [facility] [employer] [facility]    Food Insecurity     RETIRE How often in the past 12 months would you say you are worried or stressed about having enough money to buy nutritious meals? : Never   Transportation Needs: No Transportation Needs (10/26/2023)    Received from [facility],  [facility], [facility], [facility]    PRAPARE - Transportation     In the past 12 months, has lack of transportation kept you from medical appointments or from getting medications?: No     In the past 12 months, has lack of transportation kept you from meetings, work, or from getting things needed for daily living?: No   Social Connections: Low Risk  (10/27/2023)    Received from [facility], [facility]    Social Connections     How often do you see or talk to people that you care about and feel close to? (For example: talking to friends on the phone, visiting friends or family, going to [employer] or club meetings): 5 or more times a week                  Physical Exam     ED Triage Vitals   BP 10/28/24 1415 (!) 168/94   Pulse 10/28/24 1415 73   Resp 10/28/24 1415 21   Temp 10/28/24 1417 97.1 °F (36.2 °C)   Temp src 10/28/24 1417 Temporal   SpO2 10/28/24 1415 100 %   O2 Device 10/28/24 1415 None (Room air)       Current Vitals:   Vital Signs  BP: (!) 192/91 (ER MD okay with permissive HTN)  Pulse: 69  Resp: 14  Temp: 97.1 °F (36.2 °C)  Temp src: Temporal  MAP (mmHg): (!) 121    Oxygen Therapy  SpO2: 100 %  O2 Device: None (Room air)        Physical Exam  GENERAL: Patient is awake, alert, answers questions appropriately, , in no acute distress.  HEENT: Pupils equal round reactive to light, extraocular muscles are intact, there is no scleral icterus.  Mucous membranes are moderately dry, oropharynx is clear, uvula midline.  Scalp is atraumatic.  NECK: Neck is supple, there is no nuchal rigidity.    HEART: Regular rate and rhythm, no murmurs.  LUNGS: Clear to auscultation bilaterally.  No Rales, no rhonchi, no wheezing, no stridor.  ABDOMEN: Soft, nondistended,non tender, bowel sounds are present, no rebound, no rigidity, no guarding.no pulsatile masses. No CVA tenderness  EXTREMITIES: No peripheral edema, no calf tenderness, dorsal pedal pulses present and equal  bilaterally.  SKIN: Warm, dry, intact, no rashes.  NEUROLOGIC EXAM: Tongue midline, no facial drooping, no ptosis, muscle strength +5/5 bilateral upper and lower extremities cerebellar finger to nose intact, no pronator drift, sensation intact.        ED Course     Labs Reviewed   COMP METABOLIC PANEL (14) - Abnormal; Notable for the following components:       Result Value    Glucose 104 (*)     Creatinine 1.03 (*)     Calcium, Total 10.6 (*)     eGFR-Cr 53 (*)     Albumin 5.1 (*)     All other components within normal limits   PTT, ACTIVATED - Abnormal; Notable for the following components:    PTT 20.1 (*)     All other components within normal limits   URINALYSIS WITH CULTURE REFLEX - Abnormal; Notable for the following components:    Clarity Urine Turbid (*)     Protein Urine 30 (*)     Leukocyte Esterase Urine 500 (*)     WBC Urine >50 (*)     RBC Urine 6-10 (*)     Bacteria Urine 1+ (*)     Squamous Epi. Cells Few (*)     Transitional Cells Few (*)     Hyaline Casts Present (*)     All other components within normal limits   PROTHROMBIN TIME (PT) - Normal   TROPONIN I HIGH SENSITIVITY - Normal   POCT GLUCOSE - Normal   RAPID SARS-COV-2 BY PCR - Normal   CBC WITH DIFFERENTIAL WITH PLATELET   URINE CULTURE, ROUTINE     EKG    Rate, intervals and axes as noted on EKG Report.  Rate: 72  Rhythm: Sinus Rhythm  Reading: Sinus rhythm.  No ST elevation.  No change compared to EKG dated June 28, 2023                      MDM        Differential diagnosis before testing includes but not limited to CVA, pneumonia, electrolyte abnormality, urinary tract infection, which is a medical condition that poses a threat to life/function      Radiographic images  I personally reviewed the radiographs and my individual interpretation shows chest x-ray no pneumothorax or infiltrate  I also reviewed the official reports that showed chest x-ray no acute radiographic findings, CT brain no acute intracranial findings.  Chronic  microvascular ischemic changes    History obtained by external source  (EMS, family, law enforcement, )other sources of medical information included history per daughter and EMS as in HPI    Discussion of management (consult/physicians, social work, pharmacy,ect) duly hospitalist physician Dr. Cardenas      Course of Events during Emergency Room Visit include IV established, patient placed on cardiac monitor and pulse ox.  Code stroke was called prior to arrival, patient was evaluated with stroke navigator, stroke neurologist was contacted and agree to cancel code stroke.  CBC was unremarkable, chemistry sodium 137 potassium 4.2 BUN 18 creatinine 1.03 glucose 104.  Urinalysis negative nitrate 500 leukocyte esterase with greater than 50 WBCs.  Chest x-ray and CT brain without acute findings.  Patient was given IV fluids and IV antibiotics.  Patient presents with generalized weakness with altered mental status, likely due to urinary tract infection.  Discussed with hospitalist physician and was admitted for further evaluation treatment.  Discussed all results and plan for with daughter at bedside who agrees with plan    Shared decision making was utilized           Disposition:    Admission  I have discussed with the patient the results of test, differential diagnosis, and treatment plan. They expressed clear understanding of these instructions and agrees to the plan provided.         Note to patient: The 21st Century Cures Act makes medical notes like these available to patients in the interest of transparency. However, this is a medical document intended as peer to peer communication. It is written in medical language and may contain abbreviations or verbiage that are unfamiliar. It may appear blunt or direct. Medical documents are intended to carry relevant information, facts as evident, and the clinical opinion of the practitioner.             Admission disposition: 10/28/2024  6:08 PM           Medical Decision  Making      Disposition and Plan     Clinical Impression:  1. Altered mental status, unspecified altered mental status type    2. Urinary tract infection without hematuria, site unspecified    3. Weakness generalized         Disposition:  Admit  10/28/2024  6:08 pm    Follow-up:  No follow-up provider specified.        Medications Prescribed:  Current Discharge Medication List              Supplementary Documentation:         Hospital Problems       Present on Admission  Date Reviewed: 2/18/2022            ICD-10-CM Noted POA    * (Principal) Altered mental status, unspecified altered mental status type R41.82 10/28/2024 Unknown                                                        Signed by Andrew Chavarria DO on 10/28/2024  6:43 PM         MEDICATIONS ADMINISTERED IN LAST 1 DAY:  atorvastatin (Lipitor) tab 40 mg       Date Action Dose Route User    10/28/2024 2317 Given 40 mg Oral Larry Narvaez RN          busPIRone (Buspar) tab 15 mg       Date Action Dose Route User    10/29/2024 0824 Given 15 mg Oral Nikia Faulkner RN    10/28/2024 2356 Given 15 mg Oral Larry Narvaez RN          cefTRIAXone (Rocephin) 1 g in sodium chloride 0.9% 100 mL IVPB-ADDV       Date Action Dose Route User    10/28/2024 1635 New Bag 1 g Intravenous Marily Mccurdy RN          DULoxetine (Cymbalta) DR cap 60 mg       Date Action Dose Route User    10/29/2024 0824 Given 60 mg Oral Nikia Faulkner RN    10/28/2024 2318 Given 60 mg Oral Larry Narvaez RN          enoxaparin (Lovenox) 40 MG/0.4ML SUBQ injection 40 mg       Date Action Dose Route User    10/29/2024 0824 Given 40 mg Subcutaneous (Left Lower Abdomen) Nikia Faulkner RN          furosemide (Lasix) tab 20 mg       Date Action Dose Route User    10/29/2024 1030 Given 20 mg Oral Nikia Faulkner RN          memantine (Namenda) tab 10 mg       Date Action Dose Route User    10/29/2024 0824 Given 10 mg Oral Nikia Faulkner RN    10/28/2024 2317 Given 10 mg Oral  Larry Narvaez, SAULO          pantoprazole (Protonix) DR tab 20 mg       Date Action Dose Route User    10/29/2024 0548 Given 20 mg Oral Larry Narvaez RN          rivastigmine (Exelon) 13.3 MG/24HR patch 1 patch       Date Action Dose Route User    10/28/2024 2319 Patch Applied 1 patch Transdermal (Right Upper Arm) Larry Narvaez, SAULO          sacubitril-valsartan (Entresto) 24-26 MG per tab 1 tablet       Date Action Dose Route User    10/29/2024 1030 Given 1 tablet Oral Nikia Faulkner RN            Vitals (last day)       Date/Time Temp Pulse Resp BP SpO2 Weight O2 Device O2 Flow Rate (L/min) [facility]    10/29/24 0830 97.8 °F (36.6 °C) 81 23 177/86 98 % -- None (Room air) -- LM    10/29/24 0400 98.5 °F (36.9 °C) 79 16 153/91 94 % -- None (Room air) -- KEL    10/29/24 0000 98.3 °F (36.8 °C) 79 15 153/85 94 % -- None (Room air) -- KEL    10/28/24 2021 -- -- -- -- -- 153 lb 10.6 oz (69.7 kg) -- -- BG    10/28/24 2000 98.3 °F (36.8 °C) 78 16 165/95 100 % -- None (Room air) -- KEL    10/28/24 1930 -- 84 17 150/90 100 % -- -- -- TJ    10/28/24 1915 -- 75 15 160/91 100 % -- -- -- TJ    10/28/24 1830 -- 69 14 192/91 100 % -- None (Room air) -- ET    10/28/24 1823 -- 74 14 -- 100 % -- -- -- ET    10/28/24 1700 -- 68 14 180/102 100 % -- -- -- TJ    10/28/24 1545 -- 65 14 193/91 100 % -- -- -- TJ    10/28/24 1515 -- 65 14 197/96 100 % -- -- -- TJ    10/28/24 1417 97.1 °F (36.2 °C) 73 18 174/84 100 % -- None (Room air) -- EM    10/28/24 1415 -- 73 21 168/94 100 % -- None (Room air) -- EM

## 2024-10-29 NOTE — DISCHARGE INSTRUCTIONS
Post Discharge Instructions:    -Follow up with your PCP within 1 week.   -Continue cefpodoxime for 4 more days to complete antibiotic course.   -Follow up with dermatology regarding skin lesion.

## 2024-10-29 NOTE — CM/SW NOTE
This is a Code 44. Patient failed inpatient criteria. Second level of review completed and supports observation.  UR committee in agreement. UR RN Discussed with Dr. Metcalf  who approves observation status.  Observation order placed. CINTRON given to the patient and signed copy placed in their chart.

## 2024-10-29 NOTE — PHYSICAL THERAPY NOTE
PHYSICAL THERAPY EVALUATION - INPATIENT     Room Number: 7602/7602-A  Evaluation Date: 10/29/2024  Type of Evaluation: Initial  Physician Order: PT Eval and Treat    Presenting Problem: AMS, weakness, acute cystitis, acute metabolic encephalopathy  Co-Morbidities : Alzheimer's, HTN, DM2, HFrEF, chronic back pain  Reason for Therapy: Mobility Dysfunction and Discharge Planning    PHYSICAL THERAPY ASSESSMENT   Patient is a 84 year old female admitted 10/28/2024 for AMS, weakness, acute cystitis, acute metabolic encephalopathy.  Prior to admission, patient's baseline is ambulatory with RW, assist for all ADLs.  Patient is currently functioning near baseline with bed mobility, transfers, gait, and stair negotiation.  Patient is requiring contact guard assist as a result of the following impairments: decreased functional strength, decreased endurance/aerobic capacity, impaired static and dynamic balance, impaired motor planning, decreased muscular endurance, and cognitive deficits (AMS).  Physical Therapy will continue to follow for duration of hospitalization.    Patient will benefit from continued skilled PT Services at discharge to promote prior level of function and safety with additional support and return home with home health PT.    PLAN DURING HOSPITALIZATION  Nursing Mobility Recommendation : 1 Assist  PT Device Recommendation: Rolling walker  PT Treatment Plan: Bed mobility;Body mechanics;Endurance;Energy conservation;Patient education;Family education;Gait training;Balance training;Transfer training;Strengthening;Stair training  Rehab Potential : Fair  Frequency (Obs): 3x/week     CURRENT GOALS    Goal #1 Patient is able to demonstrate supine - sit EOB @ level: supervision     Goal #2 Patient is able to demonstrate transfers EOB to/from Chair/Wheelchair at assistance level: supervision     Goal #3 Patient is able to ambulate 150 feet with assist device: walker - rolling at assistance level: supervision      Goal #4 Pt able to ascend and descend stairs with railing and CGA   Goal #5    Goal #6    Goal Comments: Goals established on 10/29/2024      PHYSICAL THERAPY MEDICAL/SOCIAL HISTORY  History related to current admission: Patient is a 84 year old female admitted on 10/28/2024 from home for AMS, weakness.  Pt diagnosed with acute cystitis, metabolic encephalopathy.    HOME SITUATION  Type of Home: House  Home Layout: Two level  Stairs to Enter : 2   Railing: No    Stairs to Bedroom: 14    Railing: Yes    Lives With: Spouse;Daughter (grandson)    Drives: No          Prior Level of [place]: Pt poor historian. Pt dtr reports she typically ambulates with RW, occasionally forgets to use it. Pt has 24/7 assist from dtr, spouse, or grandson. Receives full assist for bathing and toileting, set up assist for dressing, washing her face. Wears depends. Has a tub shower with transfer bench with handles. Regular height toilet with vanity next to it to push off of. Has a gait belt that they do not use. Dtr stands behind her on the stairs, there is a seat on the landing for a rest break. Spouse has been up in [place] recently due to their other home getting work done.     SUBJECTIVE  \"I don't know\"     OBJECTIVE  Precautions: Bed/chair alarm  Fall Risk: High fall risk    WEIGHT BEARING RESTRICTION     PAIN ASSESSMENT  Ratin          COGNITION  Overall Cognitive Status:  Impaired    RANGE OF MOTION AND STRENGTH ASSESSMENT  Upper extremity ROM and strength are within functional limits; grossly mildly weak; equal bilat     Lower extremity ROM is within functional limits     Lower extremity strength is within functional limits; grossly mildly weak, equal bilat    BALANCE  Static Sitting: Fair +  Dynamic Sitting: Poor +  Static Standing: Poor +  Dynamic Standing: Poor +    ADDITIONAL TESTS                                    ACTIVITY TOLERANCE                         O2 WALK       NEUROLOGICAL FINDINGS                        AM-PAC  '6-Clicks' INPATIENT SHORT FORM - BASIC MOBILITY  How much difficulty does the patient currently have...  Patient Difficulty: Turning over in bed (including adjusting bedclothes, sheets and blankets)?: A Little   Patient Difficulty: Sitting down on and standing up from a chair with arms (e.g., wheelchair, bedside commode, etc.): A Little   Patient Difficulty: Moving from lying on back to sitting on the side of the bed?: A Little   How much help from another person does the patient currently need...   Help from Another: Moving to and from a bed to a chair (including a wheelchair)?: A Little   Help from Another: Need to walk in hospital room?: A Little   Help from Another: Climbing 3-5 steps with a railing?: A Little     AM-PAC Score:  Raw Score: 18   Approx Degree of Impairment: 46.58%   Standardized Score (AM-PAC Scale): 43.63   CMS Modifier (G-Code): CK    FUNCTIONAL ABILITY STATUS  Gait Assessment   Functional Mobility/Gait Assessment  Gait Assistance: Contact guard assist (Alex for RW positioning)  Distance (ft): 40  Assistive Device: Rolling walker  Pattern: Shuffle (dec mary and speed)    Skilled Therapy Provided     Bed Mobility:  Rolling: NT  Supine to sit: Alex   Sit to supine: NT     Transfer Mobility:  Sit to stand: CGA   Stand to sit: CGA  Gait = CGA    Therapist's Comments: RN cleared for session. Pt agreeable for therapy, received supine. Pt poor historian, lengthy discussion held with dtr via phone. Pt with increased confusion, req mod-max verbal and tactile cues. Pt req assist for RW mgmt in hallway to assist during turns and navigation. Instructed to call for nursing staff for any needs and OOB mobility.     Exercise/Education Provided:  Bed mobility  Body mechanics  Energy conservation  Functional activity tolerated  Gait training  Posture  Strengthening  Transfer training    Patient End of Session: Up in chair;Needs met;Call light within reach;RN aware of session/findings;All patient questions  and concerns addressed;Alarm set;Discussed recommendations with /;Hospital anti-slip socks      Patient Evaluation Complexity Level:  History High - 3 or more personal factors and/or co-morbidities   Examination of body systems Low -  addressing 1-2 elements   Clinical Presentation Low- Stable   Clinical Decision Making Low Complexity       PT Session Time: 30 minutes  Gait Training: 15 minutes  Therapeutic Activity: 10 minutes

## 2024-10-29 NOTE — SLP NOTE
ADULT SWALLOWING EVALUATION    ASSESSMENT    ASSESSMENT/OVERALL IMPRESSION:  Order received for bedside swallowing evaluation per stroke protocol. Pt presented to [facility] from home with generalized weakness and AMS. Pmhx includes but not limited to Alzheimer's dementia, Huerta's esophagus, and GERD. CT revealed no acute intracranial findings. Pt currently on regular solids and thin liquids diet.    Pt found lying upright in bed; required auditory and visual cues to participate; appears to be questionable historian. Oral [facility] exam revealed apparent upper dentures with natural lower teeth, though pt unable to confirm; with a clean and moist oral cavity. No oral motor deficits observed. Clear vocal quality and volitional swallow elicited; unable to elicit a productive cough. Assisted pt in feeding po trials of thin liquids via cup and straw; puree, soft solid, and hard solid. Utilized breakfast tray items. Adequate retrieval and containment of bolus, timely oral transit, prolonged but complete mastication, and complete clearing of the oral cavity noted. Pharyngeal response appeared timely; hyolaryngeal elevation appeared to be of adequate strength upon palpation. Clear vocal quality following po trials.    Mild oral phase impairment due to prolonged mastication; apparent intact pharyngeal phase of swallow with no overt s/s of aspiration observed. Recommend regular solids and thin liquids diet with use of aspiration precautions.    Will follow up to ensure safety with diet and educate pt on compensatory strategies/swallow precautions.     RECOMMENDATIONS   Diet Recommendations - Solids: Regular  Diet Recommendations - Liquids: Thin Liquids    Compensatory Strategies Recommended: Alternate consistencies;Slow rate  Aspiration Precautions: Upright position;Slow rate  Medication Administration Recommendations: One pill at a time (whole or crushed in puree if any difficulties)  Treatment Plan/Recommendations: Dysphagia  therapy;Aspiration precautions    HISTORY   MEDICAL HISTORY  Reason for Referral: Stroke protocol    Problem List  Principal Problem:    Altered mental status, unspecified altered mental status type  Active Problems:    Weakness generalized    Urinary tract infection without hematuria, site unspecified      Past Medical History  Past Medical History:    ANXIETY    Anxiety    Arrhythmia    pt unaware    BACK PAIN    LS & C-SPINE DZ W/ PAIN    Chronic rhinitis    Depression    Esophageal reflux    GERD    W/ PHAN's    High blood pressure    High cholesterol    Hypercholesterolemia    HYPERLIPIDEMIA    Hypertension    Irritable bowel syndrome with constipation    Kidney disease    frequent infections; pt states frequent UTIs    Late onset Alzheimer's disease without behavioral disturbance (HCC)    Migraines    NSTEMI (non-ST elevated myocardial infarction) (HCC)    Ophthalmic migraine    DANNIE (obstructive sleep apnea)    AHI 11 RDI 13 REM AHI 0 Supine AHI 11 non-supine AHI 0 Sao2 Ye 83%     OSTEOPOROSIS    Other and unspecified hyperlipidemia    OTHER DISEASES    PSVT, pt unaware    OTHER DISEASES    Barrott's Esoghagus    Reflux    barretts    Sleep apnea    wears a mouth gaurd    UTI (lower urinary tract infection)    no UTI currently as of 5/5/16       Prior Living Situation: Home with support  Diet Prior to Admission: Regular;Thin liquids  Precautions: None    Patient/Family Goals: Etiology of symptoms     SWALLOWING HISTORY  Current Diet Consistency: Regular;Thin liquids  Dysphagia History: No documented history of dysphagia.   Imaging Results: CT 10/28/2024:  CONCLUSION:   1. No acute intracranial findings   2. Cerebral atrophy with chronic microvascular ischemic changes.     CXR 10/28/2024:  CONCLUSION:  No acute radiographic findings.     SUBJECTIVE       OBJECTIVE   ORAL MOTOR EXAMINATION  Dentition: Functional;Upper dentures  Symmetry: Within Functional Limits  Strength: Within Functional Limits  Tone:  Within Functional Limits  Range of Motion: Within Functional Limits  Rate of Motion: Within Functional Limits    Voice Quality: Clear  Respiratory Status: Unlabored  Consistencies Trialed: Thin liquids;Puree;Hard solid  Method of Presentation: Self presentation;Staff/Clinician assistance;Cup;Straw;Single sips;Consecutive swallows  Patient Positioning: Midline;Upright (in bed)    Oral Phase of Swallow: Impaired  Bolus Retrieval: Intact  Bilabial Seal: Intact  Bolus Formation: Intact  Bolus Propulsion: Intact  Mastication: Impaired  Retention: Intact    Pharyngeal Phase of Swallow:  (appears to be WFL- no clinical s/s of aspiration observed)      (Please note: Silent aspiration cannot be evaluated clinically. Videofluoroscopic Swallow Study is required to rule-out silent aspiration.)    Esophageal Phase of Swallow: No complaints consistent with possible esophageal involvement  Comments:     GOALS  Goal #1 The patient will tolerate regular consistency and thin liquids without overt signs or symptoms of aspiration with 95 % accuracy over 1-2 session(s).  New goal   Goal #2 The patient/family/caregiver will demonstrate understanding and implementation of aspiration precautions and swallow strategies independently over 1-2 session(s).    New goal   Goal #3 The patient will utilize compensatory strategies as outlined by  BSSE (clinical evaluation) including Slow rate, Small bites, Small sips, Alternate liquids/solids, Upright 90 degrees with minimal assistance 95 % of the time across 2 sessions.  New goal     FOLLOW UP  Treatment Plan/Recommendations: Dysphagia therapy;Aspiration precautions  Number of Visits to Meet Established Goals:  (1-2)  Follow Up Needed (Documentation Required): Yes  SLP Follow-up Date: 10/30/24    Thank you for your referral.   If you have any questions, please contact Estelle HERNANDEZ, SLP Student Intern

## 2024-10-29 NOTE — PLAN OF CARE
Pt Aox1   -baseline  Pt denies pain  RA  Nsr   Iv anbx  Pt worked with therapy and tolerated  Pt sitting in chair with chair alarm on.  Speech veraal completed  Pt's bed in lowest position and bed alarm on  Discharge Planning:   Daughter notified   -iv and tele removed    -went over importance of  meds and follow up appointments.   -pt has all belongings  All of pt and family questions

## 2024-10-29 NOTE — PLAN OF CARE
NURSING ADMISSION NOTE    Assumed patient care at 2018  Pt is A/OX1-2  Admission navigator completed  On RA and NSR on tele  Tolerating cardiac diet    Patient admitted via Cart  Oriented to room.  Safety precautions initiated.  Bed in low position.  Call light in reach.Call light within reach  Plan of care ongoing

## 2024-10-29 NOTE — CM/SW NOTE
Pt admitted under observation from home on 10/28 due to AMS/Weakness. Anticipated therapy need: Home with Home Healthcare.     Chart reviewed, pt resides home with family where she receives 24 hr care. Pt has hx of using [facility] from previous admissions, return referral sent.         LUCAS Jackson

## 2024-11-23 ENCOUNTER — APPOINTMENT (OUTPATIENT)
Dept: GENERAL RADIOLOGY | Facility: HOSPITAL | Age: 84
DRG: 811 | End: 2024-11-23
Attending: EMERGENCY MEDICINE
Payer: MEDICARE

## 2024-11-23 ENCOUNTER — APPOINTMENT (OUTPATIENT)
Dept: CT IMAGING | Facility: HOSPITAL | Age: 84
DRG: 811 | End: 2024-11-23
Attending: EMERGENCY MEDICINE
Payer: MEDICARE

## 2024-11-23 ENCOUNTER — HOSPITAL ENCOUNTER (INPATIENT)
Facility: HOSPITAL | Age: 84
LOS: 9 days | Discharge: NURSING, REHAB OR LONG-TERM CARE | DRG: 811 | End: 2024-12-02
Attending: EMERGENCY MEDICINE | Admitting: INTERNAL MEDICINE
Payer: MEDICARE

## 2024-11-23 DIAGNOSIS — R53.1 WEAKNESS GENERALIZED: Primary | ICD-10-CM

## 2024-11-23 DIAGNOSIS — N17.9 AKI (ACUTE KIDNEY INJURY) (HCC): ICD-10-CM

## 2024-11-23 LAB
ALBUMIN SERPL-MCNC: 4.2 G/DL (ref 3.2–4.8)
ALBUMIN/GLOB SERPL: 1.2 {RATIO} (ref 1–2)
ALP LIVER SERPL-CCNC: 67 U/L
ALT SERPL-CCNC: 10 U/L
AMPHET UR QL SCN: NEGATIVE
ANION GAP SERPL CALC-SCNC: 6 MMOL/L (ref 0–18)
AST SERPL-CCNC: 15 U/L (ref ?–34)
BASOPHILS # BLD AUTO: 0.01 X10(3) UL (ref 0–0.2)
BASOPHILS NFR BLD AUTO: 0.1 %
BENZODIAZ UR QL SCN: NEGATIVE
BILIRUB SERPL-MCNC: 0.3 MG/DL (ref 0.2–1.1)
BILIRUB UR QL STRIP.AUTO: NEGATIVE
BUN BLD-MCNC: 44 MG/DL (ref 9–23)
CALCIUM BLD-MCNC: 11.8 MG/DL (ref 8.7–10.4)
CANNABINOIDS UR QL SCN: NEGATIVE
CHLORIDE SERPL-SCNC: 108 MMOL/L (ref 98–112)
CLARITY UR REFRACT.AUTO: CLEAR
CO2 SERPL-SCNC: 24 MMOL/L (ref 21–32)
COCAINE UR QL: NEGATIVE
COLOR UR AUTO: YELLOW
CREAT BLD-MCNC: 1.43 MG/DL
CREAT UR-SCNC: 205.7 MG/DL
EGFRCR SERPLBLD CKD-EPI 2021: 36 ML/MIN/1.73M2 (ref 60–?)
EOSINOPHIL # BLD AUTO: 0.08 X10(3) UL (ref 0–0.7)
EOSINOPHIL NFR BLD AUTO: 1.1 %
ERYTHROCYTE [DISTWIDTH] IN BLOOD BY AUTOMATED COUNT: 15.4 %
EST. AVERAGE GLUCOSE BLD GHB EST-MCNC: 128 MG/DL (ref 68–126)
FENTANYL UR QL SCN: NEGATIVE
FLUAV + FLUBV RNA SPEC NAA+PROBE: NEGATIVE
FLUAV + FLUBV RNA SPEC NAA+PROBE: NEGATIVE
GLOBULIN PLAS-MCNC: 3.4 G/DL (ref 2–3.5)
GLUCOSE BLD-MCNC: 123 MG/DL (ref 70–99)
GLUCOSE BLD-MCNC: 127 MG/DL (ref 70–99)
GLUCOSE UR STRIP.AUTO-MCNC: NORMAL MG/DL
HBA1C MFR BLD: 6.1 % (ref ?–5.7)
HCT VFR BLD AUTO: 30.4 %
HGB BLD-MCNC: 9.4 G/DL
HYALINE CASTS #/AREA URNS AUTO: PRESENT /LPF
IMM GRANULOCYTES # BLD AUTO: 0.04 X10(3) UL (ref 0–1)
IMM GRANULOCYTES NFR BLD: 0.6 %
LEUKOCYTE ESTERASE UR QL STRIP.AUTO: 25
LYMPHOCYTES # BLD AUTO: 0.61 X10(3) UL (ref 1–4)
LYMPHOCYTES NFR BLD AUTO: 8.6 %
MCH RBC QN AUTO: 28.1 PG (ref 26–34)
MCHC RBC AUTO-ENTMCNC: 30.9 G/DL (ref 31–37)
MCV RBC AUTO: 91 FL
MDMA UR QL SCN: NEGATIVE
MONOCYTES # BLD AUTO: 0.73 X10(3) UL (ref 0.1–1)
MONOCYTES NFR BLD AUTO: 10.3 %
NEUTROPHILS # BLD AUTO: 5.59 X10 (3) UL (ref 1.5–7.7)
NEUTROPHILS # BLD AUTO: 5.59 X10(3) UL (ref 1.5–7.7)
NEUTROPHILS NFR BLD AUTO: 79.3 %
NITRITE UR QL STRIP.AUTO: NEGATIVE
OPIATES UR QL SCN: NEGATIVE
OSMOLALITY SERPL CALC.SUM OF ELEC: 299 MOSM/KG (ref 275–295)
OXYCODONE UR QL SCN: NEGATIVE
PH UR STRIP.AUTO: 5.5 [PH] (ref 5–8)
PLATELET # BLD AUTO: 118 10(3)UL (ref 150–450)
POTASSIUM SERPL-SCNC: 5.5 MMOL/L (ref 3.5–5.1)
PROT SERPL-MCNC: 7.6 G/DL (ref 5.7–8.2)
PROT UR STRIP.AUTO-MCNC: 30 MG/DL
RBC # BLD AUTO: 3.34 X10(6)UL
RBC UR QL AUTO: NEGATIVE
RSV RNA SPEC NAA+PROBE: NEGATIVE
SARS-COV-2 RNA RESP QL NAA+PROBE: NOT DETECTED
SODIUM SERPL-SCNC: 138 MMOL/L (ref 136–145)
SP GR UR STRIP.AUTO: 1.03 (ref 1–1.03)
UROBILINOGEN UR STRIP.AUTO-MCNC: NORMAL MG/DL
WBC # BLD AUTO: 7.1 X10(3) UL (ref 4–11)

## 2024-11-23 PROCEDURE — 70450 CT HEAD/BRAIN W/O DYE: CPT | Performed by: EMERGENCY MEDICINE

## 2024-11-23 PROCEDURE — 71045 X-RAY EXAM CHEST 1 VIEW: CPT | Performed by: EMERGENCY MEDICINE

## 2024-11-23 RX ORDER — NICOTINE POLACRILEX 4 MG
30 LOZENGE BUCCAL
Status: DISCONTINUED | OUTPATIENT
Start: 2024-11-23 | End: 2024-12-02

## 2024-11-23 RX ORDER — ONDANSETRON 2 MG/ML
4 INJECTION INTRAMUSCULAR; INTRAVENOUS EVERY 6 HOURS PRN
Status: DISCONTINUED | OUTPATIENT
Start: 2024-11-23 | End: 2024-12-02

## 2024-11-23 RX ORDER — BISACODYL 10 MG
10 SUPPOSITORY, RECTAL RECTAL
Status: DISCONTINUED | OUTPATIENT
Start: 2024-11-23 | End: 2024-12-02

## 2024-11-23 RX ORDER — ACETAMINOPHEN 500 MG
500 TABLET ORAL EVERY 4 HOURS PRN
Status: DISCONTINUED | OUTPATIENT
Start: 2024-11-23 | End: 2024-12-02

## 2024-11-23 RX ORDER — MELATONIN
1000 DAILY
Status: DISCONTINUED | OUTPATIENT
Start: 2024-11-24 | End: 2024-12-02

## 2024-11-23 RX ORDER — SODIUM CHLORIDE 9 MG/ML
INJECTION, SOLUTION INTRAVENOUS CONTINUOUS
Status: ACTIVE | OUTPATIENT
Start: 2024-11-23 | End: 2024-11-23

## 2024-11-23 RX ORDER — DULOXETIN HYDROCHLORIDE 60 MG/1
60 CAPSULE, DELAYED RELEASE ORAL 2 TIMES DAILY
Status: DISCONTINUED | OUTPATIENT
Start: 2024-11-23 | End: 2024-12-02

## 2024-11-23 RX ORDER — FERROUS SULFATE 325(65) MG
325 TABLET, DELAYED RELEASE (ENTERIC COATED) ORAL
Status: DISCONTINUED | OUTPATIENT
Start: 2024-11-24 | End: 2024-12-02

## 2024-11-23 RX ORDER — PANTOPRAZOLE SODIUM 40 MG/1
40 TABLET, DELAYED RELEASE ORAL
Status: DISCONTINUED | OUTPATIENT
Start: 2024-11-24 | End: 2024-12-02

## 2024-11-23 RX ORDER — ASPIRIN 81 MG/1
81 TABLET ORAL DAILY
Status: DISCONTINUED | OUTPATIENT
Start: 2024-11-23 | End: 2024-12-02

## 2024-11-23 RX ORDER — SENNOSIDES 8.6 MG
17.2 TABLET ORAL NIGHTLY PRN
Status: DISCONTINUED | OUTPATIENT
Start: 2024-11-23 | End: 2024-12-02

## 2024-11-23 RX ORDER — DEXTROSE MONOHYDRATE 25 G/50ML
50 INJECTION, SOLUTION INTRAVENOUS
Status: DISCONTINUED | OUTPATIENT
Start: 2024-11-23 | End: 2024-12-02

## 2024-11-23 RX ORDER — POLYETHYLENE GLYCOL 3350 17 G/17G
17 POWDER, FOR SOLUTION ORAL DAILY PRN
Status: DISCONTINUED | OUTPATIENT
Start: 2024-11-23 | End: 2024-12-02

## 2024-11-23 RX ORDER — MEMANTINE HYDROCHLORIDE 10 MG/1
10 TABLET ORAL 2 TIMES DAILY
Status: DISCONTINUED | OUTPATIENT
Start: 2024-11-23 | End: 2024-12-02

## 2024-11-23 RX ORDER — SODIUM CHLORIDE 9 MG/ML
INJECTION, SOLUTION INTRAVENOUS CONTINUOUS
Status: DISCONTINUED | OUTPATIENT
Start: 2024-11-23 | End: 2024-11-26

## 2024-11-23 RX ORDER — ACETAMINOPHEN 325 MG/1
650 TABLET ORAL EVERY 4 HOURS PRN
Status: DISCONTINUED | OUTPATIENT
Start: 2024-11-23 | End: 2024-12-02

## 2024-11-23 RX ORDER — HYDROCODONE BITARTRATE AND ACETAMINOPHEN 5; 325 MG/1; MG/1
2 TABLET ORAL EVERY 4 HOURS PRN
Status: DISCONTINUED | OUTPATIENT
Start: 2024-11-23 | End: 2024-11-26

## 2024-11-23 RX ORDER — NICOTINE POLACRILEX 4 MG
15 LOZENGE BUCCAL
Status: DISCONTINUED | OUTPATIENT
Start: 2024-11-23 | End: 2024-12-02

## 2024-11-23 RX ORDER — HYDROCODONE BITARTRATE AND ACETAMINOPHEN 5; 325 MG/1; MG/1
1 TABLET ORAL EVERY 4 HOURS PRN
Status: DISCONTINUED | OUTPATIENT
Start: 2024-11-23 | End: 2024-11-26

## 2024-11-23 RX ORDER — METOCLOPRAMIDE HYDROCHLORIDE 5 MG/ML
5 INJECTION INTRAMUSCULAR; INTRAVENOUS EVERY 8 HOURS PRN
Status: DISCONTINUED | OUTPATIENT
Start: 2024-11-23 | End: 2024-12-02

## 2024-11-23 RX ORDER — BUSPIRONE HYDROCHLORIDE 5 MG/1
15 TABLET ORAL 2 TIMES DAILY
Status: DISCONTINUED | OUTPATIENT
Start: 2024-11-23 | End: 2024-12-02

## 2024-11-23 RX ORDER — SODIUM CHLORIDE 9 MG/ML
INJECTION, SOLUTION INTRAVENOUS ONCE
Status: COMPLETED | OUTPATIENT
Start: 2024-11-23 | End: 2024-11-23

## 2024-11-23 RX ORDER — ATORVASTATIN CALCIUM 40 MG/1
40 TABLET, FILM COATED ORAL NIGHTLY
Status: DISCONTINUED | OUTPATIENT
Start: 2024-11-23 | End: 2024-12-02

## 2024-11-23 RX ORDER — ENOXAPARIN SODIUM 100 MG/ML
30 INJECTION SUBCUTANEOUS NIGHTLY
Status: DISCONTINUED | OUTPATIENT
Start: 2024-11-23 | End: 2024-11-25

## 2024-11-23 RX ORDER — RIVASTIGMINE 13.3 MG/24H
1 PATCH, EXTENDED RELEASE TRANSDERMAL NIGHTLY
Status: DISCONTINUED | OUTPATIENT
Start: 2024-11-23 | End: 2024-12-02

## 2024-11-23 RX ORDER — CHOLECALCIFEROL (VITAMIN D3) 25 MCG
4000 TABLET ORAL DAILY
Status: DISCONTINUED | OUTPATIENT
Start: 2024-11-24 | End: 2024-12-02

## 2024-11-23 NOTE — ED QUICK NOTES
Orders for admission, patient is aware of plan and ready to go upstairs. Any questions, please call [facility] SAULO Finney at extension [phone].     Patient Covid vaccination status: Fully vaccinated     COVID Test Ordered in ED: SARS-CoV-2/Flu A and B/RSV by PCR (GeneXpert)    COVID Suspicion at Admission: N/A    Running Infusions:      Mental Status/LOC at time of transport: a&o self    Other pertinent information:   CIWA score: N/A   NIH score:  N/A

## 2024-11-23 NOTE — ED QUICK NOTES
AT BEDSIDE, PT IS NORMALLY AMBULATORY WITH A WALKER, HAS BEEN MORE LETHARGIC THE LAST FEW DAYS.  LAST NIGHT SHE ONLY ATE SMALL AMOUNT OF DINNER, HAS BEEN DIFFICULT TO AROUSE.  SHE IS NORMALLY ABLE TO AMBULATE UP STAIRS WITH  ASSIST OF  BUT HAS BEEN DECLINING.

## 2024-11-23 NOTE — ED INITIAL ASSESSMENT (HPI)
PT PRESENTS TO ED WITH ALTERED MENTAL STATUS FROM HOME VIA EMS, PT IS NORMALLY MORE RESPONSIVE, A&OX1, HAS HX OF DEMENTIA AND LIVES WITH  AND DAUGHTER.  PT JUST COMPLETED ABX FOR UTI TODAY.

## 2024-11-23 NOTE — ED PROVIDER NOTES
Patient Seen in: [facility] Emergency Department      History     Chief Complaint   Patient presents with    Altered Mental Status     Stated Complaint: ams    Subjective:   HPI      84-year-old female brought in with generalized fatigue.  Patient's  reports she has a history of dementia.  Over the last couple of days she has become so weak that he had to fix a bed for her on the couch because she could not make it upstairs to the bedroom.  He reports 2 nights ago she had an unwitnessed fall.  He does not believe she hit her head but she did have a scratch on her leg.  Her appetites been very poor.  She is drinking very little fluids.  He had difficulty getting her to take her medications today.  She just finished antibiotics today for urinary tract infection.  According to duly clinic documentation she was originally started on Macrobid 9 days ago but cultures came back showing resistance and she was ultimately treated with cefdinir x 5 days.   reports that the urine is less dark and odorous.      Objective:     Past Medical History:    ANXIETY    Anxiety    Arrhythmia    pt unaware    BACK PAIN    LS & C-SPINE DZ W/ PAIN    Chronic rhinitis    Depression    Esophageal reflux    GERD    W/ PHAN's    High blood pressure    High cholesterol    Hypercholesterolemia    HYPERLIPIDEMIA    Hypertension    Irritable bowel syndrome with constipation    Kidney disease    frequent infections; pt states frequent UTIs    Late onset Alzheimer's disease without behavioral disturbance (HCC)    Migraines    NSTEMI (non-ST elevated myocardial infarction) (HCC)    Ophthalmic migraine    DANNIE (obstructive sleep apnea)    AHI 11 RDI 13 REM AHI 0 Supine AHI 11 non-supine AHI 0 Sao2 Ye 83%     OSTEOPOROSIS    Other and unspecified hyperlipidemia    OTHER DISEASES    PSVT, pt unaware    OTHER DISEASES    Barrott's Esoghagus    Reflux    barretts    Sleep apnea    wears a mouth gaurd    UTI (lower urinary tract  infection)    no UTI currently as of 5/5/16              Past Surgical History:   Procedure Laterality Date    Benign biopsy left  ?    stereotactic core biopsy    Benign biopsy right  ?    stereotactic core biopsy    Colonoscopy  11/01/2008    RECHECK 5 YRS    Colonoscopy N/A 07/19/2021    Procedure: COLONOSCOPY;  Surgeon: Manish Rodriguez MD;  Location: [facility] ENDOSCOPY    Colonoscopy,biopsy  01/08/2014    Procedure: ESOPHAGOGASTRODUODENOSCOPY, COLONOSCOPY, POSSIBLE BIOPSY, POSSIBLE POLYPECTOMY [id number];  Surgeon: Manish Rodriguez MD;  Location: [facility]    Hysterectomy      Optx dstl radl x-artic fx/epiphysl sep Left 10/06/2014    Procedure: OPEN REDUCTION INTERNAL FIXATION ARM;  Surgeon: Augustina Banuelos MD;  Location: [facility]    Other surgical history      URETHRAL SUSPENSION    Other surgical history      STEROTACTIC BX's BOTH BREASTS    Other surgical history  05/01/2008    EGD = PHAN'S    Other surgical history N/A 05/17/2016    Procedure: ENDOSCOPIC ULTRASOUND (EUS);  Surgeon: Glenn Chou MD;  Location:  ENDOSCOPY    Patient documented not to have experienced any of the following events  01/08/2014    Procedure: ESOPHAGOGASTRODUODENOSCOPY, COLONOSCOPY, POSSIBLE BIOPSY, POSSIBLE POLYPECTOMY [id number];  Surgeon: Manish Rodriguez MD;  Location: [facility]    Patient documented not to have experienced any of the following events Left 10/06/2014    Procedure: OPEN REDUCTION INTERNAL FIXATION ARM;  Surgeon: Augustina Banuelos MD;  Location: [facility]    Patient with preoperative order for iv antibiotic surgical site infect Left 10/06/2014    Procedure: OPEN REDUCTION INTERNAL FIXATION ARM;  Surgeon: Augustina Banuelos MD;  Location: [facility]    Patient withough preoperative order for iv antibiotic surgical site infection prophylaxis.  01/08/2014    Procedure: ESOPHAGOGASTRODUODENOSCOPY, COLONOSCOPY, POSSIBLE BIOPSY, POSSIBLE POLYPECTOMY [id number];  Surgeon: Michael  Manish KIM MD;  Location: [facility]    Upper gi endoscopy,biopsy  2014    Procedure: ESOPHAGOGASTRODUODENOSCOPY, COLONOSCOPY, POSSIBLE BIOPSY, POSSIBLE POLYPECTOMY [id number],[id number];  Surgeon: Manish Rodriguez MD;  Location: [facility]    Vena cava filter                  Social History     Socioeconomic History    Marital status:    Tobacco Use    Smoking status: Former     Current packs/day: 0.00     Average packs/day: 1 pack/day for 15.0 years (15.0 ttl pk-yrs)     Types: Cigarettes     Start date: 1963     Quit date: 1978     Years since quittin.5    Smokeless tobacco: Never   Vaping Use    Vaping status: Never Used   Substance and Sexual Activity    Alcohol use: Not Currently    Drug use: No     Social Drivers of Health     Financial Resource Strain: Low Risk  (10/26/2023)    Received from [place], [facility]    Financial Resource Strain     In the past year, have you or any family members you live with been unable to get any of the following when it was really needed? Check all that apply.: None   Food Insecurity: No Food Insecurity (2024)    Food Insecurity     Food Insecurity: Never true   Transportation Needs: No Transportation Needs (2024)    Transportation Needs     Lack of Transportation: No   Social Connections: Low Risk  (10/27/2023)    Received from [place], [facility]    Social Connections     How often do you see or talk to people that you care about and feel close to? (For example: talking to friends on the phone, visiting friends or family, going to [employer] or club meetings): 5 or more times a week   Housing Stability: Low Risk  (2024)    Housing Stability     Housing Instability: No                  Physical Exam     ED Triage Vitals   BP 24 1258 134/73   Pulse 24 1258 84   Resp 24 1258 15   Temp 24 1343 97.5 °F (36.4 °C)   Temp src 24 1343 Axillary    SpO2 11/23/24 1258 100 %   O2 Device 11/23/24 1258 None (Room air)       Current Vitals:   Vital Signs  BP: 131/73  Pulse: 85  Resp: 17  Temp: 98 °F (36.7 °C)  Temp src: Oral  MAP (mmHg): 88    Oxygen Therapy  SpO2: 100 %  O2 Device: None (Room air)  Pulse Oximetry Type: Continuous  Oximetry Probe Site Changed: No  Pulse Ox Probe Location: Left hand        Physical Exam  General:  Vitals as listed.  HEENT: Contusion to the left ear.  No obvious scalp injuries.  Dry mucous membranes.  Lungs: good air exchange and clear   Heart: regular rate rhythm and no murmur   Abdomen: There is a fullness in the suprapubic region. No peritoneal signs   Extremities: no edema, normal peripheral pulses   Neuro: Awake and alert.  Moves all extremities.  Skin: no rashes or nodules    ED Course     Labs Reviewed   COMP METABOLIC PANEL (14) - Abnormal; Notable for the following components:       Result Value    Glucose 123 (*)     Potassium 5.5 (*)     BUN 44 (*)     Creatinine 1.43 (*)     Calcium, Total 11.8 (*)     Calculated Osmolality 299 (*)     eGFR-Cr 36 (*)     All other components within normal limits   CBC WITH DIFFERENTIAL WITH PLATELET - Abnormal; Notable for the following components:    RBC 3.34 (*)     HGB 9.4 (*)     HCT 30.4 (*)     .0 (*)     MCHC 30.9 (*)     Lymphocyte Absolute 0.61 (*)     All other components within normal limits   URINALYSIS WITH CULTURE REFLEX - Abnormal; Notable for the following components:    Ketones Urine Trace (*)     Protein Urine 30 (*)     Leukocyte Esterase Urine 25 (*)     Squamous Epi. Cells Few (*)     Hyaline Casts Present (*)     All other components within normal limits   HEMOGLOBIN A1C - Abnormal; Notable for the following components:    HgbA1C 6.1 (*)     Estimated Average Glucose 128 (*)     All other components within normal limits   SARS-COV-2/FLU A AND B/RSV BY PCR (GENEXPERT) - Normal    Narrative:     This test is intended for the qualitative detection and  differentiation of SARS-CoV-2, influenza A, influenza B, and respiratory syncytial virus (RSV) viral RNA in nasopharyngeal or nares swabs from individuals suspected of respiratory viral infection consistent with COVID-19 by their healthcare provider. Signs and symptoms of respiratory viral infection due to SARS-CoV-2, influenza, and RSV can be similar.    Test performed using the Xpert Xpress SARS-CoV-2/FLU/RSV (real time RT-PCR)  assay on the GeneXpert instrument, [employer], [place] [address].   This test is being used under the Food and Drug Administration's Emergency Use Authorization.    The authorized Fact Sheet for Healthcare Providers for this assay is available upon request from the laboratory.   DRUG SCREEN 8 W/OUT CONFIRMATION, URINE    Narrative:     Results of the Urine Drug Screen should be used only for medical purposes.   URINE CULTURE, ROUTINE            CT BRAIN OR HEAD (CPT=70450)    Result Date: 11/23/2024            PROCEDURE:  CT BRAIN OR HEAD (48388)  COMPARISON:  [facility] , CT, CT BRAIN OR HEAD ([id number]), 10/28/2024, 4:18 PM.  INDICATIONS:  ams  TECHNIQUE:  Noncontrast CT scanning is performed through the brain. Dose reduction techniques were used. Dose information is transmitted to the ACR (American College of Radiology) NRDR (National Radiology Data Registry) which includes the Dose Index Registry.  PATIENT STATED HISTORY: (As transcribed by Technologist)  Patient is here for AMS.    FINDINGS: No evidence of intracranial hemorrhage or extra-axial fluid collection. Lucencies in the deep periventricular white matter are likely sequelae of chronic small vessel ischemic disease. Prominence of the sulci is noted. No mass effect. Visualized portions of paranasal sinuses are unremarkable. Visualized portions of the mastoid air cells are unremarkable. Visualized portions of the orbits are unremarkable. IMPRESSION: Global parenchymal volume loss is noted.  Sequelae of chronic small vessel ischemic  disease is noted. No evidence of intracranial hemorrhage or extra-axial fluid collection.  No significant change since prior exam.    LOCATION:  [facility]   Dictated by (CST): Brett Singer MD on 11/23/2024 at 3:33 PM     Finalized by (CST): Brett Singer MD on 11/23/2024 at 3:34 PM       XR CHEST AP PORTABLE  (CPT=71045)    Result Date: 11/23/2024  PROCEDURE:  XR CHEST AP PORTABLE  (CPT=71045)  TECHNIQUE:  AP chest radiograph was obtained.  COMPARISON:  [facility] , XR, XR CHEST AP PORTABLE  (CPT=71045), 10/28/2024, 4:43 PM.  INDICATIONS:  ams  PATIENT STATED HISTORY: (As transcribed by Technologist)  Patient offered no additional history at this time.    FINDINGS:  Perihilar streaky opacity peribronchial cuffing can be seen with bronchitis.  There is no airspace consolidation.  There is stable cardiomegaly.  No pleural effusions.            CONCLUSION:  Perihilar streaky opacity peribronchial cuffing can be seen with bronchitis.   LOCATION:  [facility]      Dictated by (CST): David French MD on 11/23/2024 at 2:45 PM     Finalized by (CST): David French MD on 11/23/2024 at 2:46 PM            MDM      84-year-old female brought in for generalized weakness.  Recent antibiotics for UTI    Differential includes but is not limited to urinary tract infection, dehydration, metabolic disturbances, intracranial hemorrhage, a life threat.    CBC, CMP, urinalysis, chest x-ray, CT head, viral nasal swab ordered for further evaluation.  Patient has a history of congestive heart failure with ejection fraction 30%.  250 mL fluid bolus ordered.    Toxicology screen was accidentally ordered on this patient.  The labs were put in process but we called the lab and canceled.    My independent interpretation of CT the brain is that there is no acute intracranial hemorrhage.    Radiology reports chest x-ray shows evidence of possible bronchitis.  Laboratory evaluation does not show a UTI.  There is evidence of DK.  Receiving IV  fluids.  Possible fatigue secondary to dehydration.  Discussed with admitting physician and admitted for further management.        Admission disposition: 11/23/2024  3:22 PM           Medical Decision Making      Disposition and Plan     Clinical Impression:  1. Weakness generalized    2. DK (acute kidney injury) (HCC)         Disposition:  Admit  11/23/2024  3:22 pm    Follow-up:  No follow-up provider specified.        Medications Prescribed:  Current Discharge Medication List              Supplementary Documentation:         Hospital Problems       Present on Admission  Date Reviewed: 11/23/2024            ICD-10-CM Noted POA    * (Principal) Weakness generalized R53.1 4/28/2022 Unknown    DK (acute kidney injury) (HCC) N17.9 11/23/2024 Unknown

## 2024-11-24 LAB
AMMONIA PLAS-MCNC: <10 UMOL/L (ref 11–32)
ANION GAP SERPL CALC-SCNC: 8 MMOL/L (ref 0–18)
ARTERIAL PATENCY WRIST A: POSITIVE
BASE EXCESS BLDA CALC-SCNC: 1.8 MMOL/L (ref ?–2)
BASOPHILS # BLD AUTO: 0.01 X10(3) UL (ref 0–0.2)
BASOPHILS NFR BLD AUTO: 0.2 %
BODY TEMPERATURE: 98.6 F
BUN BLD-MCNC: 33 MG/DL (ref 9–23)
CALCIUM BLD-MCNC: 9.9 MG/DL (ref 8.7–10.4)
CHLORIDE SERPL-SCNC: 110 MMOL/L (ref 98–112)
CO2 SERPL-SCNC: 22 MMOL/L (ref 21–32)
COHGB MFR BLD: 2 % SAT (ref 0–3)
CREAT BLD-MCNC: 0.93 MG/DL
EGFRCR SERPLBLD CKD-EPI 2021: 60 ML/MIN/1.73M2 (ref 60–?)
EOSINOPHIL # BLD AUTO: 0.06 X10(3) UL (ref 0–0.7)
EOSINOPHIL NFR BLD AUTO: 1.1 %
ERYTHROCYTE [DISTWIDTH] IN BLOOD BY AUTOMATED COUNT: 15.4 %
FIO2: 21 %
GLUCOSE BLD-MCNC: 104 MG/DL (ref 70–99)
GLUCOSE BLD-MCNC: 118 MG/DL (ref 70–99)
GLUCOSE BLD-MCNC: 99 MG/DL (ref 70–99)
HCO3 BLDA-SCNC: 26.3 MEQ/L (ref 21–27)
HCT VFR BLD AUTO: 29.9 %
HGB BLD-MCNC: 8.8 G/DL
HGB BLD-MCNC: 8.8 G/DL
IMM GRANULOCYTES # BLD AUTO: 0.02 X10(3) UL (ref 0–1)
IMM GRANULOCYTES NFR BLD: 0.4 %
LYMPHOCYTES # BLD AUTO: 0.57 X10(3) UL (ref 1–4)
LYMPHOCYTES NFR BLD AUTO: 10.3 %
MCH RBC QN AUTO: 27.8 PG (ref 26–34)
MCHC RBC AUTO-ENTMCNC: 29.4 G/DL (ref 31–37)
MCV RBC AUTO: 94.3 FL
METHGB MFR BLD: 0.6 % SAT (ref 0.4–1.5)
MONOCYTES # BLD AUTO: 0.6 X10(3) UL (ref 0.1–1)
MONOCYTES NFR BLD AUTO: 10.9 %
NEUTROPHILS # BLD AUTO: 4.25 X10 (3) UL (ref 1.5–7.7)
NEUTROPHILS # BLD AUTO: 4.25 X10(3) UL (ref 1.5–7.7)
NEUTROPHILS NFR BLD AUTO: 77.1 %
OSMOLALITY SERPL CALC.SUM OF ELEC: 298 MOSM/KG (ref 275–295)
OXYHGB MFR BLDA: 94.8 % (ref 92–100)
PCO2 BLDA: 36 MM HG (ref 35–45)
PH BLDA: 7.46 [PH] (ref 7.35–7.45)
PLATELET # BLD AUTO: 116 10(3)UL (ref 150–450)
PO2 BLDA: 75 MM HG (ref 80–100)
POTASSIUM SERPL-SCNC: 4.6 MMOL/L (ref 3.5–5.1)
RBC # BLD AUTO: 3.17 X10(6)UL
SODIUM SERPL-SCNC: 140 MMOL/L (ref 136–145)
WBC # BLD AUTO: 5.5 X10(3) UL (ref 4–11)

## 2024-11-24 NOTE — PROGRESS NOTES
NURSING ADMISSION NOTE      Patient admitted via Cart  Oriented to room.  Safety precautions initiated.  Bed in low position.  Call light in reach.  Rec'd pt from ED, lethargic, opens eyes to touch, became more alert once , Tigre, came to bedside.  Navigator completed with input from .  IVF rate decreased to 50mL/hr per orders.  Fall precautions maintained.

## 2024-11-24 NOTE — PHYSICAL THERAPY NOTE
PHYSICAL THERAPY EVALUATION - INPATIENT     Room Number: 431/431-A  Evaluation Date: 11/24/2024  Type of Evaluation: Initial  Physician Order: PT Eval and Treat    Presenting Problem: admit with fatigue, weakness, AMS  Co-Morbidities : anxiety, depression, GERD, HTN, HLD, IBS, alzheimers, chf, osteoporosis, paroxysmal SVT  Reason for Therapy: Mobility Dysfunction and Discharge Planning    PHYSICAL THERAPY ASSESSMENT   Patient is a 84 year old female admitted 11/23/2024 for fatigue, weakness, AMS.  Prior to admission, patient's baseline is grossly Alex/supervision per spouse.  Patient is currently functioning below baseline with bed mobility, transfers, and maintaining seated position.  Patient is requiring maximum assist and dependent as a result of the following impairments: decreased functional strength, decreased endurance/aerobic capacity, impaired motor planning, decreased muscular endurance, and cognitive deficits (decr VENU, decr command following).  Physical Therapy will continue to follow for duration of hospitalization.    At time of PT evaluation, pt with inconsistent command following and decr level of alertness. Max cues for pt participation, however was inconsistent. At this time, given current status, pt likely would benefit from 24hr/LTC care with restorative therapies, as likelihood for meaningful carryover from therapy limited. If pt with improved level of alertness and ability to participate, would benefit from gradual therapies. PT will continue to follow in house and assess for improved participation/carryover and adjust dc rec as appropriate.    PLAN DURING HOSPITALIZATION  Nursing Mobility Recommendation : Lift Equipment  PT Device Recommendation: Mechanical lift  PT Treatment Plan: Bed mobility;Body mechanics;Endurance;Energy conservation;Patient education;Family education;Gait training;Neuromuscular re-educate;Range of motion;Strengthening;Transfer training;Stair training;Balance  training;Stoop training  Rehab Potential : Guarded  Frequency (Obs): 3x/week     CURRENT GOALS    Goal #1 Patient is able to perform rolling L and R with modA     Goal #2 Patient is able to  complete HEP with assist from caregiver/family     Goal #3 Patient is will follow 75% of commands for meaningful participation and carryover in therapy     Goal #4    Goal #5    Goal #6    Goal Comments: Goals established on 11/24/2024      PHYSICAL THERAPY MEDICAL/SOCIAL HISTORY  History related to current admission: Patient is a 84 year old female admitted on 11/23/2024 from home with weakness, fatigue, AMS.    HOME SITUATION  Type of Home: House  Home Layout: Two level  Stairs to Enter : 1        Stairs to Bedroom: 13         Lives With: Spouse    Drives: No   Patient Regularly Uses: Rolling walker      Prior Level of [place]: pt spouse at bedside providing information. Prior to admission, pt was ambulating with RW and was able to negotiate stairs to her bedroom with supervision from spouse. Pt spouse providing roughly CGA/SBA for toileting. Just prior to hospitalization, pt spouse reports with incr weakness, he had the pt staying on the main level of the home. Has tub shower with tub transfer bench. Spouse assists with lower body dressing.    SUBJECTIVE  \"Yeah\"    OBJECTIVE  Precautions: Bed/chair alarm  Fall Risk: High fall risk    WEIGHT BEARING RESTRICTION     PAIN ASSESSMENT  Rating: Unable to rate          COGNITION  Arousal/Alertness:  delayed responses to stimuli, generalized responses, and inconsistent responses to stimuli  Attention Span:  difficulty attending to directions  Following Commands:  follows one-step commands inconsistently and improved command following when pt spouse stating command  Initiation: inconsistent to minimal initiation  Motor Planning: impaired    RANGE OF MOTION AND STRENGTH ASSESSMENT  Upper extremity ROM within functional limits     Lower extremity PROM is within functional limits.  MMT unable to be formally assessed 2' impaired command following    BALANCE                ADDITIONAL TESTS                                    ACTIVITY TOLERANCE                         O2 WALK       NEUROLOGICAL FINDINGS  Neurological Findings: None                     AM-PAC '6-Clicks' INPATIENT SHORT FORM - BASIC MOBILITY  How much difficulty does the patient currently have...  Patient Difficulty: Turning over in bed (including adjusting bedclothes, sheets and blankets)?: A Lot   Patient Difficulty: Sitting down on and standing up from a chair with arms (e.g., wheelchair, bedside commode, etc.): Unable   Patient Difficulty: Moving from lying on back to sitting on the side of the bed?: Unable   How much help from another person does the patient currently need...   Help from Another: Moving to and from a bed to a chair (including a wheelchair)?: Total   Help from Another: Need to walk in hospital room?: Total   Help from Another: Climbing 3-5 steps with a railing?: Total     AM-PAC Score:  Raw Score: 7   Approx Degree of Impairment: 92.36%   Standardized Score (AM-PAC Scale): 26.42   CMS Modifier (G-Code): CM    FUNCTIONAL ABILITY STATUS  Gait Assessment   Functional Mobility/Gait Assessment  Gait Assistance: Not tested    Skilled Therapy Provided     Bed Mobility:  Rolling ea dir: total A x1  Supine to sit: not tested, unable to perform safely and pt yelling during rolling.      Transfer Mobility:  Sit to stand: NT   Stand to sit: NT  Gait = NT    Therapist's Comments: pt and spouse educated on role of IP PT services, PT evaluation purpose, PT POC, PT goals, device recommendations, dc recommendations, and importance of mobility while hospitalized.   -VC throughout session for safety/sequencing  -when performing rolling, PT facilitating LE placement and UE placement for improved leverage however pt unable to maintain positions limbs placed in. Pt yelling when performing rolling.   -cues provided throughout therex  for pt participation/engagement. Inconsistent performance by pt.       Exercise/Education Provided:  Bed mobility  Body mechanics  Energy conservation  Functional activity tolerated  Neuromuscular re-educate  ROM  Strengthening  Lower therapeutic exercise:  Ankle pumps  Hip AB/AD  Heel slides  SLR  Upper therapeutic exercise:  Elbow flex/ext and Shoulder flex/ext    Patient End of Session: In bed;Needs met;Call light within reach;RN aware of session/findings;Bracing education provided per handout;All patient questions and concerns addressed;Hospital anti-slip socks;Alarm set;Family present      Patient Evaluation Complexity Level:  History Moderate - 1 or 2 personal factors and/or co-morbidities   Examination of body systems Moderate - addressing a total of 3 or more elements   Clinical Presentation Low- Stable   Clinical Decision Making Low Complexity       PT Session Time: 25 minutes  Therapeutic Exercise: 10 minutes

## 2024-11-24 NOTE — H&P
[facility]   part of [facility]    History and Physical     Isabella Addison Patient Status:  Inpatient    1940 MRN [id number]   Location [facility] 4NW-A Attending Porfirio Giron MD   Hosp Day # 0 PCP Jorge Lr MD     Chief Complaint: Fatigue/weakness and AMS    History of Present Illness: Isabella Addison is a 84 year old female with history of anxiety, depression, GERD, hypertension, hyperlipidemia, irritable bowel syndrome, Alzheimer's dementia, history of migraines, obstructive sleep apnea, chf hx, osteoporosis, paroxysmal SVT, sleep apnea using a mouthguard presenting with fatigue/weakness and AMS. Per the patient's  the patient has become more weak over the last couple days. Patient was receiving treatment for a uti as an outpatient as well. Patient had a unwitnessed fall which the  did not think she hit her head. Patient denies any other positive review of systems.     Past Medical History:  Past Medical History:    ANXIETY    Anxiety    Arrhythmia    pt unaware    BACK PAIN    LS & C-SPINE DZ W/ PAIN    Chronic rhinitis    Depression    Esophageal reflux    GERD    W/ PHAN's    High blood pressure    High cholesterol    Hypercholesterolemia    HYPERLIPIDEMIA    Hypertension    Irritable bowel syndrome with constipation    Kidney disease    frequent infections; pt states frequent UTIs    Late onset Alzheimer's disease without behavioral disturbance (HCC)    Migraines    NSTEMI (non-ST elevated myocardial infarction) (HCC)    Ophthalmic migraine    DANNIE (obstructive sleep apnea)    AHI 11 RDI 13 REM AHI 0 Supine AHI 11 non-supine AHI 0 Sao2 Ye 83%     OSTEOPOROSIS    Other and unspecified hyperlipidemia    OTHER DISEASES    PSVT, pt unaware    OTHER DISEASES    Barrott's Esoghagus    Reflux    barretts    Sleep apnea    wears a mouth gaurd    UTI (lower urinary tract infection)    no UTI currently as of 16        Past Surgical History:    Past Surgical History:   Procedure Laterality Date    Benign biopsy left  ?    stereotactic core biopsy    Benign biopsy right  ?    stereotactic core biopsy    Colonoscopy  11/01/2008    RECHECK 5 YRS    Colonoscopy N/A 07/19/2021    Procedure: COLONOSCOPY;  Surgeon: Manish Rodriguez MD;  Location: [facility] ENDOSCOPY    Colonoscopy,biopsy  01/08/2014    Procedure: ESOPHAGOGASTRODUODENOSCOPY, COLONOSCOPY, POSSIBLE BIOPSY, POSSIBLE POLYPECTOMY 92154,20372;  Surgeon: Manish Rodriguez MD;  Location: [facility]    Hysterectomy      Optx dstl radl x-artic fx/epiphysl sep Left 10/06/2014    Procedure: OPEN REDUCTION INTERNAL FIXATION ARM;  Surgeon: Augustina Banuelos MD;  Location: [facility]    Other surgical history      URETHRAL SUSPENSION    Other surgical history      STEROTACTIC BX's BOTH BREASTS    Other surgical history  05/01/2008    EGD = PHAN'S    Other surgical history N/A 05/17/2016    Procedure: ENDOSCOPIC ULTRASOUND (EUS);  Surgeon: Glenn Chou MD;  Location:  ENDOSCOPY    Patient documented not to have experienced any of the following events  01/08/2014    Procedure: ESOPHAGOGASTRODUODENOSCOPY, COLONOSCOPY, POSSIBLE BIOPSY, POSSIBLE POLYPECTOMY [id number];  Surgeon: Manish Rodriguez MD;  Location: [facility]    Patient documented not to have experienced any of the following events Left 10/06/2014    Procedure: OPEN REDUCTION INTERNAL FIXATION ARM;  Surgeon: Augustina Banuelos MD;  Location: [facility]    Patient with preoperative order for iv antibiotic surgical site infect Left 10/06/2014    Procedure: OPEN REDUCTION INTERNAL FIXATION ARM;  Surgeon: Augustina Banuelos MD;  Location: [facility]    Patient withough preoperative order for iv antibiotic surgical site infection prophylaxis.  01/08/2014    Procedure: ESOPHAGOGASTRODUODENOSCOPY, COLONOSCOPY, POSSIBLE BIOPSY, POSSIBLE POLYPECTOMY [id number];  Surgeon: Manish Rodriguez MD;  Location: [facility]    Upper  gi endoscopy,biopsy  01/08/2014    Procedure: ESOPHAGOGASTRODUODENOSCOPY, COLONOSCOPY, POSSIBLE BIOPSY, POSSIBLE POLYPECTOMY [id number];  Surgeon: Manish Rodriguez MD;  Location: [facility]    Vena cava filter         Social History:  reports that she quit smoking about 46 years ago. Her smoking use included cigarettes. She started smoking about 61 years ago. She has a 15 pack-year smoking history. She has never used smokeless tobacco. She reports that she does not currently use alcohol. She reports that she does not use drugs.    Family History:   Family History   Problem Relation Age of Onset    Heart Disorder Father     Other (Other) Mother         ENCEPHALITIS    Hypertension Brother     Diabetes Brother        Allergies: Allergies[1]    Medications:  Medications Ordered Prior to Encounter[2]    Review of Systems:   A comprehensive 14 point review of systems was completed.    Pertinent positives and negatives noted in the HPI.    Physical Exam:    /73 (BP Location: Left arm)   Pulse 85   Temp 98 °F (36.7 °C) (Oral)   Resp 17   Wt 153 lb 10.6 oz (69.7 kg)   SpO2 100%   BMI 28.10 kg/m²   General: No acute distress. Alert but not cooperative currently  HEENT: Normocephalic atraumatic. Moist mucous membranes. EOM-I.   Neck: No JVD. No carotid bruits.  Respiratory: Clear to auscultation bilaterally. No wheezes. No crackles  Cardiovascular: S1, S2. Regular rate and rhythm. No murmurs  Chest and Back: No tenderness or deformity.  Abdomen: Soft, nontender, nondistended.  Positive bowel sounds. No rebound, guarding  Neurologic: No focal neurological deficits. CNII-XII grossly intact. Pt not cooperative right now so exam was limited  Musculoskeletal: Moves all extremities.  Extremities: + LE edema  Integument: No new rashes or lesions other then bruise on left hip from fall  Psychiatric: sleepy       Diagnostic Data:      Labs:  Recent Labs   Lab 11/23/24  1321   WBC 7.1   HGB 9.4*   MCV 91.0   PLT  118.0*       Recent Labs   Lab 11/23/24  1321   *   BUN 44*   CREATSERUM 1.43*   CA 11.8*   ALB 4.2      K 5.5*      CO2 24.0   ALKPHO 67   AST 15   ALT 10   BILT 0.3   TP 7.6       Estimated Creatinine Clearance: 22.3 mL/min (A) (based on SCr of 1.43 mg/dL (H)).    No results for input(s): \"PTP\", \"INR\" in the last 168 hours.    No results for input(s): \"TROP\", \"CK\" in the last 168 hours.    Imaging: Imaging data reviewed in Epic.      ASSESSMENT / PLAN:   84 year old female with history of anxiety, depression, GERD, hypertension, hyperlipidemia, irritable bowel syndrome, Alzheimer's dementia, history of migraines, osteoporosis, paroxysmal SVT, sleep apnea using a mouthguard presenting with fatigue.     Fatigue  -etiology uncertain  -cxr with no evidence of pna  -ua not grossly abnormal  -no skin infection or other infectious pathology   -CT brain neg  -PT/OT  -monitor clinically    DK  Hyperkalemia  -hold nephrotoxic mediations  -ivf  -trend creat    UTI  -abn ua and cx as otpt  -cont iv abx as inpt for now    Anxiety  Depression  Dementia  -budpirone  -duloxetine  -memantine  -rivastigmine    GERD  -pantoprazole    HTN  -sbp stable    HLD  -atorvastatin     DANNIE    Weakness  -PT/OT pending    Quality:  DVT Prophylaxis: scd, lovenox  CODE status: Full per chart  Goldstein: none    Plan of care discussed with patient and staff    Dispo: no discharge    Primitivo Lawrence MD  formerly [facility] Hospitalist  [phone]               [1]   Allergies  Allergen Reactions    Ciprofloxacin UNKNOWN     Burning of espohagus and upset stomach form cipro    Penicillins HIVES and UNKNOWN    Radiology Contrast Iodinated Dyes RASH     Diffuse erythema post study  -- Occurred in 1980's?,    Amoxil UNKNOWN     Cant remember    Biaxin [Clarithromycin]      Cant remember    Cefuroxime Axetil      Cant remember    Doxycycline Calcium      Cant remember    Sulfa Antibiotics HIVES   [2]   Current Facility-Administered Medications on File  Prior to Encounter   Medication Dose Route Frequency Provider Last Rate Last Admin    [COMPLETED] cefTRIAXone (Rocephin) 1 g in sodium chloride 0.9% 100 mL IVPB-ADDV  1 g Intravenous Once DeonLeoDO melissa   Stopped at 10/28/24 3526     Current Outpatient Medications on File Prior to Encounter   Medication Sig Dispense Refill    busPIRone 15 MG Oral Tab Take 1 tablet (15 mg total) by mouth in the morning and 1 tablet (15 mg total) before bedtime.      Esomeprazole Magnesium 20 MG Oral Capsule Delayed Release Take 1 capsule (20 mg total) by mouth 2 (two) times daily with meals.      Potassium Chloride ER 10 MEQ Oral Tab CR Take 1 tablet (10 mEq total) by mouth daily.      aspirin 81 MG Oral Tab EC Take 1 tablet (81 mg total) by mouth daily.  0    acetaminophen 500 MG Oral Tab Take 1 tablet (500 mg total) by mouth every 4 (four) hours as needed for Pain.  0    sacubitril-valsartan 24-26 MG Oral Tab Take 1 tablet by mouth 2 (two) times daily. 120 tablet 5    rivastigmine 13.3 MG/24HR Transdermal Patch 24 Hr Place 1 patch onto the skin daily.      memantine (NAMENDA XR) 28 MG Oral Capsule SR 24 Hr Take 1 capsule (28 mg total) by mouth daily. 30 capsule 5    atorvastatin 40 MG Oral Tab Take 1 tablet (40 mg total) by mouth nightly. 90 tablet 3    ferrous sulfate 325 (65 FE) MG Oral Tab EC Take 1 tablet (325 mg total) by mouth daily with breakfast.      DULoxetine HCl 60 MG Oral Cap DR Particles Take 1 capsule (60 mg total) by mouth 2 (two) times daily.      Cholecalciferol (VITAMIN D3) 2000 UNIT Oral Cap Take 2 capsules (4,000 Units total) by mouth daily. 2 capsules daily      CALCIUM 600 + D OR Take 2 tablets by mouth daily.      [] cefpodoxime 200 MG Oral Tab Take 2 tablets (400 mg total) by mouth 2 (two) times daily for 4 days. 16 tablet 0    Vitamin B-12 1000 MCG Oral Tab Take 1 tablet (1,000 mcg total) by mouth daily.

## 2024-11-24 NOTE — PLAN OF CARE
Problem: RESPIRATORY - ADULT  Goal: Achieves optimal ventilation and oxygenation  Description: INTERVENTIONS:  - Assess for changes in respiratory status  - Assess for changes in mentation and behavior  - Position to facilitate oxygenation and minimize respiratory effort  - Oxygen supplementation based on oxygen saturation or ABGs  - Provide Smoking Cessation handout, if applicable  - Encourage broncho-pulmonary hygiene including cough, deep breathe, Incentive Spirometry  - Assess the need for suctioning and perform as needed  - Assess and instruct to report SOB or any respiratory difficulty  - Respiratory Therapy support as indicated  - Manage/alleviate anxiety  - Monitor for signs/symptoms of CO2 retention  Outcome: Progressing     Received patient aox1, more alert, at bedside, assisting patient with dinner. Ivf maintained @50ml/hr tolerating it well. Patient noted to desat when sleeping, cpap in use as per DANNIE protocol, sating well now. Fall precautions maintained.    Exelon patch verified with another RN Glory on right upper  chest

## 2024-11-24 NOTE — CM/SW NOTE
SW noted that patient is current with [facility], PT, OT, ST. SW placed resume of care orders for home health services for MD damon.     SW will continue to follow for plan of care changes and remain available for any additional DC needs or concerns.     Trish Pascual MSW, LSW  Discharge Planner   [id number]

## 2024-11-25 ENCOUNTER — NURSE ONLY (OUTPATIENT)
Dept: ELECTROPHYSIOLOGY | Facility: HOSPITAL | Age: 84
End: 2024-11-25
Attending: Other
Payer: MEDICARE

## 2024-11-25 LAB
ADENOVIRUS PCR:: NOT DETECTED
ANION GAP SERPL CALC-SCNC: 7 MMOL/L (ref 0–18)
B PARAPERT DNA SPEC QL NAA+PROBE: NOT DETECTED
B PERT DNA SPEC QL NAA+PROBE: NOT DETECTED
BASOPHILS # BLD AUTO: 0.02 X10(3) UL (ref 0–0.2)
BASOPHILS NFR BLD AUTO: 0.3 %
BUN BLD-MCNC: 24 MG/DL (ref 9–23)
C PNEUM DNA SPEC QL NAA+PROBE: NOT DETECTED
CALCIUM BLD-MCNC: 9.6 MG/DL (ref 8.7–10.4)
CHLORIDE SERPL-SCNC: 110 MMOL/L (ref 98–112)
CO2 SERPL-SCNC: 23 MMOL/L (ref 21–32)
CORONAVIRUS 229E PCR:: NOT DETECTED
CORONAVIRUS HKU1 PCR:: NOT DETECTED
CORONAVIRUS NL63 PCR:: NOT DETECTED
CORONAVIRUS OC43 PCR:: NOT DETECTED
CREAT BLD-MCNC: 0.94 MG/DL
EGFRCR SERPLBLD CKD-EPI 2021: 59 ML/MIN/1.73M2 (ref 60–?)
EOSINOPHIL # BLD AUTO: 0.05 X10(3) UL (ref 0–0.7)
EOSINOPHIL NFR BLD AUTO: 0.8 %
ERYTHROCYTE [DISTWIDTH] IN BLOOD BY AUTOMATED COUNT: 15.1 %
FLUAV RNA SPEC QL NAA+PROBE: NOT DETECTED
FLUBV RNA SPEC QL NAA+PROBE: NOT DETECTED
GLUCOSE BLD-MCNC: 108 MG/DL (ref 70–99)
GLUCOSE BLD-MCNC: 109 MG/DL (ref 70–99)
GLUCOSE BLD-MCNC: 131 MG/DL (ref 70–99)
GLUCOSE BLD-MCNC: 154 MG/DL (ref 70–99)
GLUCOSE BLD-MCNC: 99 MG/DL (ref 70–99)
HCT VFR BLD AUTO: 31.1 %
HGB BLD-MCNC: 9.3 G/DL
IMM GRANULOCYTES # BLD AUTO: 0.03 X10(3) UL (ref 0–1)
IMM GRANULOCYTES NFR BLD: 0.5 %
LYMPHOCYTES # BLD AUTO: 0.7 X10(3) UL (ref 1–4)
LYMPHOCYTES NFR BLD AUTO: 11.5 %
MAGNESIUM SERPL-MCNC: 2.1 MG/DL (ref 1.6–2.6)
MCH RBC QN AUTO: 27.9 PG (ref 26–34)
MCHC RBC AUTO-ENTMCNC: 29.9 G/DL (ref 31–37)
MCV RBC AUTO: 93.4 FL
METAPNEUMOVIRUS PCR:: NOT DETECTED
MONOCYTES # BLD AUTO: 0.82 X10(3) UL (ref 0.1–1)
MONOCYTES NFR BLD AUTO: 13.4 %
MYCOPLASMA PNEUMONIA PCR:: NOT DETECTED
NEUTROPHILS # BLD AUTO: 4.49 X10 (3) UL (ref 1.5–7.7)
NEUTROPHILS # BLD AUTO: 4.49 X10(3) UL (ref 1.5–7.7)
NEUTROPHILS NFR BLD AUTO: 73.5 %
OSMOLALITY SERPL CALC.SUM OF ELEC: 295 MOSM/KG (ref 275–295)
PARAINFLUENZA 1 PCR:: NOT DETECTED
PARAINFLUENZA 2 PCR:: NOT DETECTED
PARAINFLUENZA 3 PCR:: NOT DETECTED
PARAINFLUENZA 4 PCR:: NOT DETECTED
PLATELET # BLD AUTO: 118 10(3)UL (ref 150–450)
POTASSIUM SERPL-SCNC: 4.3 MMOL/L (ref 3.5–5.1)
PROCALCITONIN SERPL-MCNC: 0.09 NG/ML (ref ?–0.05)
RBC # BLD AUTO: 3.33 X10(6)UL
RHINOVIRUS/ENTERO PCR:: NOT DETECTED
RSV RNA SPEC QL NAA+PROBE: NOT DETECTED
SARS-COV-2 RNA NPH QL NAA+NON-PROBE: NOT DETECTED
SODIUM SERPL-SCNC: 140 MMOL/L (ref 136–145)
WBC # BLD AUTO: 6.1 X10(3) UL (ref 4–11)

## 2024-11-25 PROCEDURE — 95816 EEG AWAKE AND DROWSY: CPT | Performed by: OTHER

## 2024-11-25 PROCEDURE — 99223 1ST HOSP IP/OBS HIGH 75: CPT | Performed by: OTHER

## 2024-11-25 RX ORDER — ENOXAPARIN SODIUM 100 MG/ML
40 INJECTION SUBCUTANEOUS NIGHTLY
Status: DISCONTINUED | OUTPATIENT
Start: 2024-11-26 | End: 2024-12-02

## 2024-11-25 NOTE — PROGRESS NOTES
.[facility] Hospitalist note    PCP: Jorge Lr MD    Chief Complaint:  F/u fatigue/weakness    SUBJECTIVE:  Lethargic. Per , she did wake up and he fed her dinner last night. She would open her mouth for bites although would have eyes closed for some of it. Similarly occurred this morning when nurse fed her breakfast. Tmax 100.5    OBJECTIVE:  Temp:  [97.8 °F (36.6 °C)-100.5 °F (38.1 °C)] 99.5 °F (37.5 °C)  Pulse:  [] 89  Resp:  [16] 16  BP: (107-175)/(63-86) 127/69  SpO2:  [94 %-100 %] 95 %    Intake/Output:    Intake/Output Summary (Last 24 hours) at 11/25/2024 1117  Last data filed at 11/25/2024 0536  Gross per 24 hour   Intake 1403 ml   Output 550 ml   Net 853 ml       Last 3 Weights   11/23/24 1745 153 lb 10.6 oz (69.7 kg)   10/28/24 2021 153 lb 10.6 oz (69.7 kg)   06/28/23 0351 153 lb 9.6 oz (69.7 kg)   06/27/23 2357 150 lb (68 kg)       Exam  Gen: No acute distress  HEENT: anicteric sclera, MMM  Pulm: Lungs clear, normal respiratory effort  CV: Heart with regular rate and rhythm, no peripheral edema  Abd: Abdomen soft, nontender, nondistended, no organomegaly, bowel sounds present  Skin: no rashes or lesions  Neuro: lethargic. Doesn't follow commands consistently other than briefly flickering open eyes    Data Review:         Labs:     Recent Labs   Lab 11/23/24  1321 11/24/24  0659 11/25/24  0607   WBC 7.1 5.5 6.1   HGB 9.4* 8.8* 9.3*   MCV 91.0 94.3 93.4   .0* 116.0* 118.0*   NE 5.59 4.25 4.49   LYMABS 0.61* 0.57* 0.70*       Recent Labs   Lab 11/23/24  1321 11/24/24  0659 11/25/24  0607    140 140   K 5.5* 4.6 4.3    110 110   CO2 24.0 22.0 23.0   BUN 44* 33* 24*   CREATSERUM 1.43* 0.93 0.94   CA 11.8* 9.9 9.6   MG  --   --  2.1   * 104* 108*       Recent Labs   Lab 11/23/24  1321   ALT 10   AST 15   ALB 4.2       No results for input(s): \"TROP\", \"CK\", \"PBNP\", \"PCT\" in the last 168 hours.    No results for input(s): \"CRP\", \"ERICH\", \"LDH\", \"DDIMER\" in the last 168  hours.    Recent Labs   Lab 11/23/24  2223 11/24/24  0510 11/24/24  2134 11/25/24  0506 11/25/24  1113   PGLU 127* 99 118* 109* 154*       Meds:   Scheduled Medication:   cefTRIAXone  1 g Intravenous Q24H    enoxaparin  30 mg Subcutaneous Nightly    insulin aspart  1-10 Units Subcutaneous TID AC and HS    aspirin  81 mg Oral Daily    atorvastatin  40 mg Oral Nightly    busPIRone  15 mg Oral BID    cholecalciferol  4,000 Units Oral Daily    DULoxetine  60 mg Oral BID    pantoprazole  40 mg Oral QAM AC    ferrous sulfate  325 mg Oral Daily with breakfast    memantine  10 mg Oral BID    rivastigmine  1 patch Transdermal Nightly    cyanocobalamin  1,000 mcg Oral Daily     Continuous Infusing Medication:   sodium chloride 50 mL/hr at 11/23/24 1830     PRN Medication:  acetaminophen    acetaminophen **OR** HYDROcodone-acetaminophen **OR** HYDROcodone-acetaminophen    polyethylene glycol (PEG 3350)    sennosides    bisacodyl    ondansetron    metoclopramide    melatonin    glucose **OR** glucose **OR** glucose-vitamin C **OR** dextrose **OR** glucose **OR** glucose **OR** glucose-vitamin C       Microbiology:    Hospital Encounter on 11/23/24   1. Urine Culture, Routine     Status: None    Collection Time: 11/23/24  1:41 PM    Specimen: Urine, clean catch   Result Value Ref Range    Urine Culture No Growth at 18-24 hrs. N/A       Lab Results   Component Value Date    COVID19 Not Detected 11/23/2024    COVID19 Not Detected 10/28/2024    COVID19 Not Detected 03/29/2023        Assessment/Plan:   85 yo woman with h/o htn/hl, dementia, psvt who presented with fatigue, lethargy.  Recent outpt treatment of UTI.     Lethargy/AMS  - initially suspected infectious process but workup neg thus far  - urine culture from this admit 11/23 ntd  - sars/flu/rsv neg  - cxr neg  - ct head neg  - ammonia, abg okay  - neuro consulted- obtaining MRI and EEG  - however, given low grade temps would still consider infectious process- will send resp  viral panel and procalcitonin  - consider further abd imaging given recurrent recent UTIs.  - PT/OT when feasible    UTIs  - treated recently as outpt. Grew proteus from 11/13 with mixed resistance pattern. 11/23 urine culture with mixed mehul.  - repeat urine culture 11/23 here is ntd  - currently on iv ceftriaxone but would consider dc if cultures remain neg.     DK  Hyperkalemia  - resolved with IVF     Anxiety  Depression  Dementia  -buspirone  -duloxetine  -memantine  -rivastigmine  -do not suspect these meds playing a direct role in presentation     GERD  -pantoprazole     HTN  -sbp stable     HLD  -atorvastatin      DANNIE     Weakness  -PT/OT pending     Quality:  DVT Prophylaxis: scd, lovenox  CODE status: Full per chart  Goldstein: none          Kristy Briceno MD  [facility] Hospitalist  Pager: [phone]\

## 2024-11-25 NOTE — PAYOR COMM NOTE
ADMISSION REVIEW     Payor: [employer] MEDICARE ADV PPO  Subscriber #:  [id number]  Authorization Number: [id number]    Admit date: 11/23/24  Admit time:  5:36 PM       REVIEW DOCUMENTATION:     ED Provider Notes        ED Provider Notes signed by Abhijit Jauregui MD at 11/23/2024  9:26 PM       Author: Abhijit Jauregui MD Service: -- Author Type: Physician    Filed: 11/23/2024  9:26 PM Date of Service: 11/23/2024  1:28 PM Status: Signed    : Abhijit Jauregui MD (Physician)           Patient Seen in: [facility] Emergency Department      History     Chief Complaint   Patient presents with    Altered Mental Status     Stated Complaint: ams    Subjective:   HPI      84-year-old female brought in with generalized fatigue.  Patient's  reports she has a history of dementia.  Over the last couple of days she has become so weak that he had to fix a bed for her on the couch because she could not make it upstairs to the bedroom.  He reports 2 nights ago she had an unwitnessed fall.  He does not believe she hit her head but she did have a scratch on her leg.  Her appetites been very poor.  She is drinking very little fluids.  He had difficulty getting her to take her medications today.  She just finished antibiotics today for urinary tract infection.  According to duly clinic documentation she was originally started on Macrobid 9 days ago but cultures came back showing resistance and she was ultimately treated with cefdinir x 5 days.   reports that the urine is less dark and odorous.      Objective:     Past Medical History:    ANXIETY    Anxiety    Arrhythmia    pt unaware    BACK PAIN    LS & C-SPINE DZ W/ PAIN    Chronic rhinitis    Depression    Esophageal reflux    GERD    W/ PHAN's    High blood pressure    High cholesterol    Hypercholesterolemia    HYPERLIPIDEMIA    Hypertension    Irritable bowel syndrome with constipation    Kidney disease    frequent infections; pt states frequent UTIs    Late  onset Alzheimer's disease without behavioral disturbance (HCC)    Migraines    NSTEMI (non-ST elevated myocardial infarction) (HCC)    Ophthalmic migraine    DANNIE (obstructive sleep apnea)    AHI 11 RDI 13 REM AHI 0 Supine AHI 11 non-supine AHI 0 Sao2 Ye 83%     OSTEOPOROSIS    Other and unspecified hyperlipidemia    OTHER DISEASES    PSVT, pt unaware    OTHER DISEASES    Barrott's Esoghagus    Reflux    barretts    Sleep apnea    wears a mouth gaurd    UTI (lower urinary tract infection)    no UTI currently as of 5/5/16              Past Surgical History:   Procedure Laterality Date    Benign biopsy left  ?    stereotactic core biopsy    Benign biopsy right  ?    stereotactic core biopsy    Colonoscopy  11/01/2008    RECHECK 5 YRS    Colonoscopy N/A 07/19/2021    Procedure: COLONOSCOPY;  Surgeon: Manish Rodriguez MD;  Location: [facility] ENDOSCOPY    Colonoscopy,biopsy  01/08/2014    Procedure: ESOPHAGOGASTRODUODENOSCOPY, COLONOSCOPY, POSSIBLE BIOPSY, POSSIBLE POLYPECTOMY 82235,35122;  Surgeon: Manish Rodriguez MD;  Location: [facility]    Hysterectomy      Optx dstl radl x-artic fx/epiphysl sep Left 10/06/2014    Procedure: OPEN REDUCTION INTERNAL FIXATION ARM;  Surgeon: Augustina Banuelos MD;  Location: [facility]    Other surgical history      URETHRAL SUSPENSION    Other surgical history      STEROTACTIC BX's BOTH BREASTS    Other surgical history  05/01/2008    EGD = PHAN'S    Other surgical history N/A 05/17/2016    Procedure: ENDOSCOPIC ULTRASOUND (EUS);  Surgeon: Glenn Chou MD;  Location:  ENDOSCOPY    Patient documented not to have experienced any of the following events  01/08/2014    Procedure: ESOPHAGOGASTRODUODENOSCOPY, COLONOSCOPY, POSSIBLE BIOPSY, POSSIBLE POLYPECTOMY 51893,55188;  Surgeon: Manish Rodriguez MD;  Location: [facility]    Patient documented not to have experienced any of the following events Left 10/06/2014    Procedure: OPEN REDUCTION INTERNAL FIXATION ARM;   Surgeon: Augustina Banuelos MD;  Location: [facility]    Patient with preoperative order for iv antibiotic surgical site infect Left 10/06/2014    Procedure: OPEN REDUCTION INTERNAL FIXATION ARM;  Surgeon: Augustina Banuelos MD;  Location: [facility]    Patient withough preoperative order for iv antibiotic surgical site infection prophylaxis.  2014    Procedure: ESOPHAGOGASTRODUODENOSCOPY, COLONOSCOPY, POSSIBLE BIOPSY, POSSIBLE POLYPECTOMY 20002,60521;  Surgeon: Manish Rodriguez MD;  Location: [facility]    Upper gi endoscopy,biopsy  2014    Procedure: ESOPHAGOGASTRODUODENOSCOPY, COLONOSCOPY, POSSIBLE BIOPSY, POSSIBLE POLYPECTOMY 14680,92672;  Surgeon: Manish Rodriguez MD;  Location: [facility]    Vena cava filter                  Social History     Socioeconomic History    Marital status:    Tobacco Use    Smoking status: Former     Current packs/day: 0.00     Average packs/day: 1 pack/day for 15.0 years (15.0 ttl pk-yrs)     Types: Cigarettes     Start date: 1963     Quit date: 1978     Years since quittin.5    Smokeless tobacco: Never   Vaping Use    Vaping status: Never Used   Substance and Sexual Activity    Alcohol use: Not Currently    Drug use: No     Social Drivers of Health     Financial Resource Strain: Low Risk  (10/26/2023)    [facility] [employer] [employer]    Financial Resource Strain     In the past year, have you or any family members you live with been unable to get any of the following when it was really needed? Check all that apply.: None   Food Insecurity: No Food Insecurity (2024)    Food Insecurity     Food Insecurity: Never true   Transportation Needs: No Transportation Needs (2024)    Transportation Needs     Lack of Transportation: No   Social Connections: Low Risk  (10/27/2023)    Received from [employer] [employer]    Social Connections     How often do you see or  talk to people that you care about and feel close to? (For example: talking to friends on the phone, visiting friends or family, going to Congregational or club meetings): 5 or more times a week   Housing Stability: Low Risk  (11/23/2024)    Housing Stability     Housing Instability: No                  Physical Exam     ED Triage Vitals   BP 11/23/24 1258 134/73   Pulse 11/23/24 1258 84   Resp 11/23/24 1258 15   Temp 11/23/24 1343 97.5 °F (36.4 °C)   Temp src 11/23/24 1343 Axillary   SpO2 11/23/24 1258 100 %   O2 Device 11/23/24 1258 None (Room air)       Current Vitals:   Vital Signs  BP: 131/73  Pulse: 85  Resp: 17  Temp: 98 °F (36.7 °C)  Temp src: Oral  MAP (mmHg): 88    Oxygen Therapy  SpO2: 100 %  O2 Device: None (Room air)  Pulse Oximetry Type: Continuous  Oximetry Probe Site Changed: No  Pulse Ox Probe Location: Left hand        Physical Exam  General:  Vitals as listed.  HEENT: Contusion to the left ear.  No obvious scalp injuries.  Dry mucous membranes.  Lungs: good air exchange and clear   Heart: regular rate rhythm and no murmur   Abdomen: There is a fullness in the suprapubic region. No peritoneal signs   Extremities: no edema, normal peripheral pulses   Neuro: Awake and alert.  Moves all extremities.  Skin: no rashes or nodules    ED Course     Labs Reviewed   COMP METABOLIC PANEL (14) - Abnormal; Notable for the following components:       Result Value    Glucose 123 (*)     Potassium 5.5 (*)     BUN 44 (*)     Creatinine 1.43 (*)     Calcium, Total 11.8 (*)     Calculated Osmolality 299 (*)     eGFR-Cr 36 (*)     All other components within normal limits   CBC WITH DIFFERENTIAL WITH PLATELET - Abnormal; Notable for the following components:    RBC 3.34 (*)     HGB 9.4 (*)     HCT 30.4 (*)     .0 (*)     MCHC 30.9 (*)     Lymphocyte Absolute 0.61 (*)     All other components within normal limits   URINALYSIS WITH CULTURE REFLEX - Abnormal; Notable for the following components:    Ketones Urine Trace  (*)     Protein Urine 30 (*)     Leukocyte Esterase Urine 25 (*)     Squamous Epi. Cells Few (*)     Hyaline Casts Present (*)     All other components within normal limits   HEMOGLOBIN A1C - Abnormal; Notable for the following components:    HgbA1C 6.1 (*)     Estimated Average Glucose 128 (*)     All other components within normal limits   SARS-COV-2/FLU A AND B/RSV BY PCR (GENEXPERT) - Normal    Narrative:     This test is intended for the qualitative detection and differentiation of SARS-CoV-2, influenza A, influenza B, and respiratory syncytial virus (RSV) viral RNA in nasopharyngeal or nares swabs from individuals suspected of respiratory viral infection consistent with COVID-19 by their healthcare provider. Signs and symptoms of respiratory viral infection due to SARS-CoV-2, influenza, and RSV can be similar.    Test performed using the Xpert Xpress SARS-CoV-2/FLU/RSV (real time RT-PCR)  assay on the GeneXpert instrument, [employer], [place] [address].   This test is being used under the Food and Drug Administration's Emergency Use Authorization.    The authorized Fact Sheet for Healthcare Providers for this assay is available upon request from the laboratory.   DRUG SCREEN 8 W/OUT CONFIRMATION, URINE    Narrative:     Results of the Urine Drug Screen should be used only for medical purposes.   URINE CULTURE, ROUTINE            CT BRAIN OR HEAD (CPT=70450)    Result Date: 11/23/2024            PROCEDURE:  CT BRAIN OR HEAD (35279)  COMPARISON:  [facility] , CT, CT BRAIN OR HEAD (61894), 10/28/2024, 4:18 PM.  INDICATIONS:  ams  TECHNIQUE:  Noncontrast CT scanning is performed through the brain. Dose reduction techniques were used. Dose information is transmitted to the ACR (American College of Radiology) NRDR (National Radiology Data Registry) which includes the Dose Index Registry.  PATIENT STATED HISTORY: (As transcribed by Technologist)  Patient is here for AMS.    FINDINGS: No evidence of intracranial hemorrhage  or extra-axial fluid collection. Lucencies in the deep periventricular white matter are likely sequelae of chronic small vessel ischemic disease. Prominence of the sulci is noted. No mass effect. Visualized portions of paranasal sinuses are unremarkable. Visualized portions of the mastoid air cells are unremarkable. Visualized portions of the orbits are unremarkable. IMPRESSION: Global parenchymal volume loss is noted.  Sequelae of chronic small vessel ischemic disease is noted. No evidence of intracranial hemorrhage or extra-axial fluid collection.  No significant change since prior exam.    LOCATION:  [facility]   Dictated by (CST): Brett Singer MD on 11/23/2024 at 3:33 PM     Finalized by (CST): Brett Singer MD on 11/23/2024 at 3:34 PM       XR CHEST AP PORTABLE  (CPT=71045)    Result Date: 11/23/2024  PROCEDURE:  XR CHEST AP PORTABLE  (CPT=71045)  TECHNIQUE:  AP chest radiograph was obtained.  COMPARISON:  [facility] , XR, XR CHEST AP PORTABLE  (CPT=71045), 10/28/2024, 4:43 PM.  INDICATIONS:  ams  PATIENT STATED HISTORY: (As transcribed by Technologist)  Patient offered no additional history at this time.    FINDINGS:  Perihilar streaky opacity peribronchial cuffing can be seen with bronchitis.  There is no airspace consolidation.  There is stable cardiomegaly.  No pleural effusions.            CONCLUSION:  Perihilar streaky opacity peribronchial cuffing can be seen with bronchitis.   LOCATION:  [facility]      Dictated by (CST): David French MD on 11/23/2024 at 2:45 PM     Finalized by (CST): David French MD on 11/23/2024 at 2:46 PM           MDM      84-year-old female brought in for generalized weakness.  Recent antibiotics for UTI    Differential includes but is not limited to urinary tract infection, dehydration, metabolic disturbances, intracranial hemorrhage, a life threat.    CBC, CMP, urinalysis, chest x-ray, CT head, viral nasal swab ordered for further evaluation.  Patient has a history of  congestive heart failure with ejection fraction 30%.  250 mL fluid bolus ordered.    Toxicology screen was accidentally ordered on this patient.  The labs were put in process but we called the lab and canceled.    My independent interpretation of CT the brain is that there is no acute intracranial hemorrhage.    Radiology reports chest x-ray shows evidence of possible bronchitis.  Laboratory evaluation does not show a UTI.  There is evidence of DK.  Receiving IV fluids.  Possible fatigue secondary to dehydration.  Discussed with admitting physician and admitted for further management.        Admission disposition: 11/23/2024  3:22 PM           Medical Decision Making      Disposition and Plan     Clinical Impression:  1. Weakness generalized    2. DK (acute kidney injury) (HCC)         Disposition:  Admit  11/23/2024  3:22 pm    Follow-up:  No follow-up provider specified.        Medications Prescribed:  Current Discharge Medication List              Supplementary Documentation:         Hospital Problems       Present on Admission  Date Reviewed: 11/23/2024            ICD-10-CM Noted POA    * (Principal) Weakness generalized R53.1 4/28/2022 Unknown    DK (acute kidney injury) (HCC) N17.9 11/23/2024 Unknown              Signed by Abhijit Jauregui MD on 11/23/2024  9:26 PM         MEDICATIONS ADMINISTERED IN LAST 1 DAY:  aspirin DR tab 81 mg       Date Action Dose Route User    11/25/2024 0903 Given 81 mg Oral Shruti Landeros RN          atorvastatin (Lipitor) tab 40 mg       Date Action Dose Route User    11/24/2024 2043 Given 40 mg Oral Irineo Oconnor          busPIRone (Buspar) tab 15 mg       Date Action Dose Route User    11/25/2024 0903 Given 15 mg Oral Shruti Landeros RN    11/24/2024 2043 Given 15 mg Oral Irineo Oconnor          cefTRIAXone (Rocephin) 1 g in sodium chloride 0.9% 100 mL IVPB-ADDV       Date Action Dose Route User    11/25/2024 0904 New Bag 1 g Intravenous Shruti Landeros RN           DULoxetine (Cymbalta)  cap 60 mg       Date Action Dose Route User    11/25/2024 0903 Given 60 mg Oral LaxShruti goyal RN    11/24/2024 2043 Given 60 mg Oral Irineo Oconnor          enoxaparin (Lovenox) 30 MG/0.3ML SUBQ injection 30 mg       Date Action Dose Route User    11/24/2024 2048 Given 30 mg Subcutaneous (Right Lower Abdomen) Irineo Oconnor          ferrous sulfate DR tab 325 mg       Date Action Dose Route User    11/25/2024 0902 Given 325 mg Oral LaxShruti goyal RN          memantine (Namenda) tab 10 mg       Date Action Dose Route User    11/25/2024 0902 Given 10 mg Oral Laxjyotsna, Shruti BERG RN    11/24/2024 2043 Given 10 mg Oral Irineo Oconnor          rivastigmine (Exelon) 13.3 MG/24HR patch 1 patch       Date Action Dose Route User    11/24/2024 2042 Patch Applied 1 patch Transdermal (Left Anterior Chest) Irineo Oconnor          cyanocobalamin (Vitamin B12) tab 1,000 mcg       Date Action Dose Route User    11/25/2024 0903 Given 1,000 mcg Oral LaxShruti goyal RN          cholecalciferol (Vitamin D3) tab 4,000 Units       Date Action Dose Route User    11/25/2024 0902 Given 4,000 Units Oral Laxa, Shruti BERG RN            Vitals (last day)       Date/Time Temp Pulse Resp BP SpO2 Weight O2 Device O2 Flow Rate (L/min) [facility]    11/25/24 1043 98.7 °F (37.1 °C) 89 -- 127/69 95 % -- -- -- ES    11/25/24 0700 99.5 °F (37.5 °C) 83 -- 175/86 99 % -- CPAP 2 L/min ES    11/25/24 0350 98.4 °F (36.9 °C) -- 16 -- -- -- CPAP 2 L/min     11/24/24 2320 97.8 °F (36.6 °C) 85 16 128/65 100 % -- CPAP 2 L/min KL    11/24/24 2313 -- -- -- -- -- -- CPAP 2 L/min     11/24/24 2010 100.2 °F (37.9 °C) 103 16 118/75 96 % -- -- -- KL    11/24/24 1854 100.5 °F (38.1 °C) 92 -- 107/66 95 % -- None (Room air) -- [place]    11/24/24 1330 100.3 °F (37.9 °C) 91 16 129/63 94 % -- None (Room air) -- [place]    11/24/24 0950 98.6 °F (37 °C) 95 -- 136/81 93 % -- None (Room air) -- [place]    11/24/24 0731 99.8 °F (37.7 °C) 96 -- 145/77 96 % -- -- -- ES     24 0315 98.7 °F (37.1 °C) 93 18 134/79 100 % -- CPAP -- JT          2024 &[facility]   part of [facility]     History and Physical            Isabella Addison Patient Status:  Inpatient    1940 MRN [id number]   Location [facility] 4NW-A Attending Porfirio Giron MD   Hosp Day # 0 PCP Jorge Lr MD      Chief Complaint: Fatigue/weakness and AMS     History of Present Illness: Isabella Addison is a 84 year old female with history of anxiety, depression, GERD, hypertension, hyperlipidemia, irritable bowel syndrome, Alzheimer's dementia, history of migraines, obstructive sleep apnea, chf hx, osteoporosis, paroxysmal SVT, sleep apnea using a mouthguard presenting with fatigue/weakness and AMS. Per the patient's  the patient has become more weak over the last couple days. Patient was receiving treatment for a uti as an outpatient as well. Patient had a unwitnessed fall which the  did not think she hit her head. Patient denies any other positive review of systems.      Past Medical History:  Past Medical History       Past Medical History:    ANXIETY    Anxiety    Arrhythmia     pt unaware    BACK PAIN     LS & C-SPINE DZ W/ PAIN    Chronic rhinitis    Depression    Esophageal reflux    GERD     W/ PHAN's    High blood pressure    High cholesterol    Hypercholesterolemia    HYPERLIPIDEMIA    Hypertension    Irritable bowel syndrome with constipation    Kidney disease     frequent infections; pt states frequent UTIs    Late onset Alzheimer's disease without behavioral disturbance (HCC)    Migraines    NSTEMI (non-ST elevated myocardial infarction) (HCC)    Ophthalmic migraine    DANNIE (obstructive sleep apnea)     AHI 11 RDI 13 REM AHI 0 Supine AHI 11 non-supine AHI 0 Sao2 Ye 83%     OSTEOPOROSIS    Other and unspecified hyperlipidemia    OTHER DISEASES     PSVT, pt unaware    OTHER DISEASES     Barrott's Esoghagus    Reflux     barretts     Sleep apnea     wears a mouth gaurd    UTI (lower urinary tract infection)     no UTI currently as of 5/5/16            Past Surgical History:   Past Surgical History         Past Surgical History:   Procedure Laterality Date    Benign biopsy left   ?     stereotactic core biopsy    Benign biopsy right   ?     stereotactic core biopsy    Colonoscopy   11/01/2008     RECHECK 5 YRS    Colonoscopy N/A 07/19/2021     Procedure: COLONOSCOPY;  Surgeon: Manish Rodriguez MD;  Location: [facility] ENDOSCOPY    Colonoscopy,biopsy   01/08/2014     Procedure: ESOPHAGOGASTRODUODENOSCOPY, COLONOSCOPY, POSSIBLE BIOPSY, POSSIBLE POLYPECTOMY 49279,96531;  Surgeon: Manish Rodriguez MD;  Location: [facility]    Hysterectomy        Optx dstl radl x-artic fx/epiphysl sep Left 10/06/2014     Procedure: OPEN REDUCTION INTERNAL FIXATION ARM;  Surgeon: Augustina Banuelos MD;  Location: [facility]    Other surgical history         URETHRAL SUSPENSION    Other surgical history         STEROTACTIC BX's BOTH BREASTS    Other surgical history   05/01/2008     EGD = PHAN'S    Other surgical history N/A 05/17/2016     Procedure: ENDOSCOPIC ULTRASOUND (EUS);  Surgeon: Glenn Chou MD;  Location:  ENDOSCOPY    Patient documented not to have experienced any of the following events   01/08/2014     Procedure: ESOPHAGOGASTRODUODENOSCOPY, COLONOSCOPY, POSSIBLE BIOPSY, POSSIBLE POLYPECTOMY 19991,66924;  Surgeon: Manish Rodriguez MD;  Location: [facility]    Patient documented not to have experienced any of the following events Left 10/06/2014     Procedure: OPEN REDUCTION INTERNAL FIXATION ARM;  Surgeon: Augustina Banuelos MD;  Location: [facility]    Patient with preoperative order for iv antibiotic surgical site infect Left 10/06/2014     Procedure: OPEN REDUCTION INTERNAL FIXATION ARM;  Surgeon: Augustina Banuelos MD;  Location: [facility]    Patient withough preoperative order for iv antibiotic surgical site infection  prophylaxis.   01/08/2014     Procedure: ESOPHAGOGASTRODUODENOSCOPY, COLONOSCOPY, POSSIBLE BIOPSY, POSSIBLE POLYPECTOMY 78144,68499;  Surgeon: Manish Rodriguez MD;  Location: [facility]    Upper gi endoscopy,biopsy   01/08/2014     Procedure: ESOPHAGOGASTRODUODENOSCOPY, COLONOSCOPY, POSSIBLE BIOPSY, POSSIBLE POLYPECTOMY 18991,20205;  Surgeon: Manish Rodriguez MD;  Location: [facility]    Vena cava filter                Social History:  reports that she quit smoking about 46 years ago. Her smoking use included cigarettes. She started smoking about 61 years ago. She has a 15 pack-year smoking history. She has never used smokeless tobacco. She reports that she does not currently use alcohol. She reports that she does not use drugs.     Family History:   Family History         Family History   Problem Relation Age of Onset    Heart Disorder Father      Other (Other) Mother           ENCEPHALITIS    Hypertension Brother      Diabetes Brother              Allergies: [Allergies]    [Allergies]        Allergen Reactions    Ciprofloxacin UNKNOWN       Burning of espohagus and upset stomach form cipro    Penicillins HIVES and UNKNOWN    Radiology Contrast Iodinated Dyes RASH       Diffuse erythema post study  -- Occurred in 1980's?,    Amoxil UNKNOWN       Cant remember    Biaxin [Clarithromycin]         Cant remember    Cefuroxime Axetil         Cant remember    Doxycycline Calcium         Cant remember    Sulfa Antibiotics HIVES        Medications:  [Medications Ordered Prior to Encounter]    [Medications Ordered Prior to Encounter]            Current Facility-Administered Medications on File Prior to Encounter   Medication Dose Route Frequency Provider Last Rate Last Admin    [COMPLETED] cefTRIAXone (Rocephin) 1 g in sodium chloride 0.9% 100 mL IVPB-ADDV  1 g Intravenous Once Andrew Chavarria DO   Stopped at 10/28/24 2099             Current Outpatient Medications on File Prior to Encounter   Medication Sig  Dispense Refill    busPIRone 15 MG Oral Tab Take 1 tablet (15 mg total) by mouth in the morning and 1 tablet (15 mg total) before bedtime.        Esomeprazole Magnesium 20 MG Oral Capsule Delayed Release Take 1 capsule (20 mg total) by mouth 2 (two) times daily with meals.        Potassium Chloride ER 10 MEQ Oral Tab CR Take 1 tablet (10 mEq total) by mouth daily.        aspirin 81 MG Oral Tab EC Take 1 tablet (81 mg total) by mouth daily.   0    acetaminophen 500 MG Oral Tab Take 1 tablet (500 mg total) by mouth every 4 (four) hours as needed for Pain.   0    sacubitril-valsartan 24-26 MG Oral Tab Take 1 tablet by mouth 2 (two) times daily. 120 tablet 5    rivastigmine 13.3 MG/24HR Transdermal Patch 24 Hr Place 1 patch onto the skin daily.        memantine (NAMENDA XR) 28 MG Oral Capsule SR 24 Hr Take 1 capsule (28 mg total) by mouth daily. 30 capsule 5    atorvastatin 40 MG Oral Tab Take 1 tablet (40 mg total) by mouth nightly. 90 tablet 3    ferrous sulfate 325 (65 FE) MG Oral Tab EC Take 1 tablet (325 mg total) by mouth daily with breakfast.        DULoxetine HCl 60 MG Oral Cap DR Particles Take 1 capsule (60 mg total) by mouth 2 (two) times daily.        Cholecalciferol (VITAMIN D3) 2000 UNIT Oral Cap Take 2 capsules (4,000 Units total) by mouth daily. 2 capsules daily        CALCIUM 600 + D OR Take 2 tablets by mouth daily.        [] cefpodoxime 200 MG Oral Tab Take 2 tablets (400 mg total) by mouth 2 (two) times daily for 4 days. 16 tablet 0    Vitamin B-12 1000 MCG Oral Tab Take 1 tablet (1,000 mcg total) by mouth daily.            Review of Systems:   A comprehensive 14 point review of systems was completed.    Pertinent positives and negatives noted in the HPI.     Physical Exam:    /73 (BP Location: Left arm)   Pulse 85   Temp 98 °F (36.7 °C) (Oral)   Resp 17   Wt 153 lb 10.6 oz (69.7 kg)   SpO2 100%   BMI 28.10 kg/m²   General: No acute distress. Alert but not cooperative  currently  HEENT: Normocephalic atraumatic. Moist mucous membranes. EOM-I.   Neck: No JVD. No carotid bruits.  Respiratory: Clear to auscultation bilaterally. No wheezes. No crackles  Cardiovascular: S1, S2. Regular rate and rhythm. No murmurs  Chest and Back: No tenderness or deformity.  Abdomen: Soft, nontender, nondistended.  Positive bowel sounds. No rebound, guarding  Neurologic: No focal neurological deficits. CNII-XII grossly intact. Pt not cooperative right now so exam was limited  Musculoskeletal: Moves all extremities.  Extremities: + LE edema  Integument: No new rashes or lesions other then bruise on left hip from fall  Psychiatric: sleepy         Diagnostic Data:       Labs:      Recent Labs   Lab 11/23/24  1321   WBC 7.1   HGB 9.4*   MCV 91.0   .0*             Recent Labs   Lab 11/23/24  1321   *   BUN 44*   CREATSERUM 1.43*   CA 11.8*   ALB 4.2      K 5.5*      CO2 24.0   ALKPHO 67   AST 15   ALT 10   BILT 0.3   TP 7.6         Estimated Creatinine Clearance: 22.3 mL/min (A) (based on SCr of 1.43 mg/dL (H)).     No results for input(s): \"PTP\", \"INR\" in the last 168 hours.     No results for input(s): \"TROP\", \"CK\" in the last 168 hours.     Imaging: Imaging data reviewed in Epic.        ASSESSMENT / PLAN:   84 year old female with history of anxiety, depression, GERD, hypertension, hyperlipidemia, irritable bowel syndrome, Alzheimer's dementia, history of migraines, osteoporosis, paroxysmal SVT, sleep apnea using a mouthguard presenting with fatigue.      Fatigue  -etiology uncertain  -cxr with no evidence of pna  -ua not grossly abnormal  -no skin infection or other infectious pathology   -CT brain neg  -PT/OT  -monitor clinically     DK  Hyperkalemia  -hold nephrotoxic mediations  -ivf  -trend creat     UTI  -abn ua and cx as otpt  -cont iv abx as inpt for now     Anxiety  Depression  Dementia  -budpirone  -duloxetine  -memantine  -rivastigmine     GERD  -pantoprazole      HTN  -sbp stable     HLD  -atorvastatin      DANNIE     Weakness  -PT/OT pending     Quality:  DVT Prophylaxis: scd, lovenox  CODE status: Full per chart  Goldstein: none     Plan of care discussed with patient and staff     Dispo: no discharge     MD Jacqueline Adames Hospitalist    11/25/2024 Hospitalist Progress Note   PCP: Jorge Lr MD     Chief Complaint:  F/u fatigue/weakness     SUBJECTIVE:  Lethargic. Per , she did wake up and he fed her dinner last night. She would open her mouth for bites although would have eyes closed for some of it. Similarly occurred this morning when nurse fed her breakfast. Tmax 100.5     OBJECTIVE:  Temp:  [97.8 °F (36.6 °C)-100.5 °F (38.1 °C)] 99.5 °F (37.5 °C)  Pulse:  [] 89  Resp:  [16] 16  BP: (107-175)/(63-86) 127/69  SpO2:  [94 %-100 %] 95 %     Intake/Output:     Intake/Output Summary (Last 24 hours) at 11/25/2024 1117  Last data filed at 11/25/2024 0536      Gross per 24 hour   Intake 1403 ml   Output 550 ml   Net 853 ml              Last 3 Weights   11/23/24 1745 153 lb 10.6 oz (69.7 kg)   10/28/24 2021 153 lb 10.6 oz (69.7 kg)   06/28/23 0351 153 lb 9.6 oz (69.7 kg)   06/27/23 2357 150 lb (68 kg)         Exam  Gen: No acute distress  HEENT: anicteric sclera, MMM  Pulm: Lungs clear, normal respiratory effort  CV: Heart with regular rate and rhythm, no peripheral edema  Abd: Abdomen soft, nontender, nondistended, no organomegaly, bowel sounds present  Skin: no rashes or lesions  Neuro: lethargic. Doesn't follow commands consistently other than briefly flickering open eyes     Data Review:          Labs:            Recent Labs   Lab 11/23/24  1321 11/24/24  0659 11/25/24  0607   WBC 7.1 5.5 6.1   HGB 9.4* 8.8* 9.3*   MCV 91.0 94.3 93.4   .0* 116.0* 118.0*   NE 5.59 4.25 4.49   LYMABS 0.61* 0.57* 0.70*               Recent Labs   Lab 11/23/24  1321 11/24/24  0659 11/25/24  0607    140 140   K 5.5* 4.6 4.3    110 110   CO2 24.0 22.0  23.0   BUN 44* 33* 24*   CREATSERUM 1.43* 0.93 0.94   CA 11.8* 9.9 9.6   MG  --   --  2.1   * 104* 108*             Recent Labs   Lab 11/23/24  1321   ALT 10   AST 15   ALB 4.2         No results for input(s): \"TROP\", \"CK\", \"PBNP\", \"PCT\" in the last 168 hours.     No results for input(s): \"CRP\", \"ERICH\", \"LDH\", \"DDIMER\" in the last 168 hours.             Recent Labs   Lab 11/23/24  2223 11/24/24  0510 11/24/24  2134 11/25/24  0506 11/25/24  1113   PGLU 127* 99 118* 109* 154*         Meds:   Scheduled Medication:  Scheduled Medications    cefTRIAXone  1 g Intravenous Q24H    enoxaparin  30 mg Subcutaneous Nightly    insulin aspart  1-10 Units Subcutaneous TID AC and HS    aspirin  81 mg Oral Daily    atorvastatin  40 mg Oral Nightly    busPIRone  15 mg Oral BID    cholecalciferol  4,000 Units Oral Daily    DULoxetine  60 mg Oral BID    pantoprazole  40 mg Oral QAM AC    ferrous sulfate  325 mg Oral Daily with breakfast    memantine  10 mg Oral BID    rivastigmine  1 patch Transdermal Nightly    cyanocobalamin  1,000 mcg Oral Daily         Continuous Infusing Medication:  Medication Infusions    sodium chloride 50 mL/hr at 11/23/24 1830         PRN Medication:  PRN Medications     acetaminophen    acetaminophen **OR** HYDROcodone-acetaminophen **OR** HYDROcodone-acetaminophen    polyethylene glycol (PEG 3350)    sennosides    bisacodyl    ondansetron    metoclopramide    melatonin    glucose **OR** glucose **OR** glucose-vitamin C **OR** dextrose **OR** glucose **OR** glucose **OR** glucose-vitamin C            Microbiology:           Hospital Encounter on 11/23/24   1. Urine Culture, Routine     Status: None     Collection Time: 11/23/24  1:41 PM     Specimen: Urine, clean catch   Result Value Ref Range     Urine Culture No Growth at 18-24 hrs. N/A               Lab Results   Component Value Date     COVID19 Not Detected 11/23/2024     COVID19 Not Detected 10/28/2024     COVID19 Not Detected 03/29/2023           Assessment/Plan:   83 yo woman with h/o htn/hl, dementia, psvt who presented with fatigue, lethargy.  Recent outpt treatment of UTI.      Lethargy/AMS  - initially suspected infectious process but workup neg thus far  - urine culture from this admit 11/23 ntd  - sars/flu/rsv neg  - cxr neg  - ct head neg  - ammonia, abg okay  - neuro consulted- obtaining MRI and EEG  - however, given low grade temps would still consider infectious process- will send resp viral panel and procalcitonin  - consider further abd imaging given recurrent recent UTIs.  - PT/OT when feasible     UTIs  - treated recently as outpt. Grew proteus from 11/13 with mixed resistance pattern. 11/23 urine culture with mixed mehul.  - repeat urine culture 11/23 here is ntd  - currently on iv ceftriaxone but would consider dc if cultures remain neg.     DK  Hyperkalemia  - resolved with IVF     Anxiety  Depression  Dementia  -buspirone  -duloxetine  -memantine  -rivastigmine  -do not suspect these meds playing a direct role in presentation     GERD  -pantoprazole     HTN  -sbp stable     HLD  -atorvastatin      DANNIE     Weakness  -PT/OT pending     Quality:  DVT Prophylaxis: scd, lovenox  CODE status: Full per chart  Goldstein: none              Kristy Briceno MD  [facility]

## 2024-11-25 NOTE — PHYSICAL THERAPY NOTE
Attempted to see pt for PT this afternoon. Patient is occupied with EEG. Will follow up at a later date.

## 2024-11-25 NOTE — PLAN OF CARE
Pt A&Ox1, VSS, denies pain, afebrile. Pt on CPAP overnight with no complications. All meds given per MAR, IVF. Pt is resting in bed with call light in reach, safety precautions in place.     Problem: Diabetes/Glucose Control  Goal: Glucose maintained within prescribed range  Description: INTERVENTIONS:  - Monitor Blood Glucose as ordered  - Assess for signs and symptoms of hyperglycemia and hypoglycemia  - Administer ordered medications to maintain glucose within target range  - Assess barriers to adequate nutritional intake and initiate nutrition consult as needed  - Instruct patient on self management of diabetes  Outcome: Progressing     Problem: CARDIOVASCULAR - ADULT  Goal: Maintains optimal cardiac output and hemodynamic stability  Description: INTERVENTIONS:  - Monitor vital signs, rhythm, and trends  - Monitor for bleeding, hypotension and signs of decreased cardiac output  - Evaluate effectiveness of vasoactive medications to optimize hemodynamic stability  - Monitor arterial and/or venous puncture sites for bleeding and/or hematoma  - Assess quality of pulses, skin color and temperature  - Assess for signs of decreased coronary artery perfusion - ex. Angina  - Evaluate fluid balance, assess for edema, trend weights  Outcome: Progressing  Goal: Absence of cardiac arrhythmias or at baseline  Description: INTERVENTIONS:  - Continuous cardiac monitoring, monitor vital signs, obtain 12 lead EKG if indicated  - Evaluate effectiveness of antiarrhythmic and heart rate control medications as ordered  - Initiate emergency measures for life threatening arrhythmias  - Monitor electrolytes and administer replacement therapy as ordered  Outcome: Progressing     Problem: RESPIRATORY - ADULT  Goal: Achieves optimal ventilation and oxygenation  Description: INTERVENTIONS:  - Assess for changes in respiratory status  - Assess for changes in mentation and behavior  - Position to facilitate oxygenation and minimize  respiratory effort  - Oxygen supplementation based on oxygen saturation or ABGs  - Provide Smoking Cessation handout, if applicable  - Encourage broncho-pulmonary hygiene including cough, deep breathe, Incentive Spirometry  - Assess the need for suctioning and perform as needed  - Assess and instruct to report SOB or any respiratory difficulty  - Respiratory Therapy support as indicated  - Manage/alleviate anxiety  - Monitor for signs/symptoms of CO2 retention  Outcome: Progressing     Problem: GENITOURINARY - ADULT  Goal: Absence of urinary retention  Description: INTERVENTIONS:  - Assess patient’s ability to void and empty bladder  - Monitor intake/output and perform bladder scan as needed  - Follow urinary retention protocol/standard of care  - Consider collaborating with pharmacy to review patient's medication profile  - Implement strategies to promote bladder emptying  Outcome: Progressing     Problem: METABOLIC/FLUID AND ELECTROLYTES - ADULT  Goal: Glucose maintained within prescribed range  Description: INTERVENTIONS:  - Monitor Blood Glucose as ordered  - Assess for signs and symptoms of hyperglycemia and hypoglycemia  - Administer ordered medications to maintain glucose within target range  - Assess barriers to adequate nutritional intake and initiate nutrition consult as needed  - Instruct patient on self management of diabetes  Outcome: Progressing  Goal: Electrolytes maintained within normal limits  Description: INTERVENTIONS:  - Monitor labs and rhythm and assess patient for signs and symptoms of electrolyte imbalances  - Administer electrolyte replacement as ordered  - Monitor response to electrolyte replacements, including rhythm and repeat lab results as appropriate  - Fluid restriction as ordered  - Instruct patient on fluid and nutrition restrictions as appropriate  Outcome: Progressing     Problem: SKIN/TISSUE INTEGRITY - ADULT  Goal: Skin integrity remains intact  Description: INTERVENTIONS  -  Assess and document risk factors for pressure ulcer development  - Assess and document skin integrity  - Monitor for areas of redness and/or skin breakdown  - Initiate interventions, skin care algorithm/standards of care as needed  Outcome: Progressing  Goal: Incision(s), wounds(s) or drain site(s) healing without S/S of infection  Description: INTERVENTIONS:  - Assess and document risk factors for pressure ulcer development  - Assess and document skin integrity  - Assess and document dressing/incision, wound bed, drain sites and surrounding tissue  - Implement wound care per orders  - Initiate isolation precautions as appropriate  - Initiate Pressure Ulcer prevention bundle as indicated  Outcome: Progressing  Goal: Oral mucous membranes remain intact  Description: INTERVENTIONS  - Assess oral mucosa and hygiene practices  - Implement preventative oral hygiene regimen  - Implement oral medicated treatments as ordered  Outcome: Progressing     Problem: NEUROLOGICAL - ADULT  Goal: Achieves stable or improved neurological status  Description: INTERVENTIONS  - Assess for and report changes in neurological status  - Initiate measures to prevent increased intracranial pressure  - Maintain blood pressure and fluid volume within ordered parameters to optimize cerebral perfusion and minimize risk of hemorrhage  - Monitor temperature, glucose, and sodium. Initiate appropriate interventions as ordered  Outcome: Progressing  Goal: Achieves maximal functionality and self care  Description: INTERVENTIONS  - Monitor swallowing and airway patency with patient fatigue and changes in neurological status  - Encourage and assist patient to increase activity and self care with guidance from PT/OT  - Encourage visually impaired, hearing impaired and aphasic patients to use assistive/communication devices  Outcome: Progressing     Problem: Impaired Functional Mobility  Goal: Achieve highest/safest level of mobility/gait  Description:  Interventions:  - Assess patient's functional ability and stability  - Promote increasing activity/tolerance for mobility and gait  - Educate and engage patient/family in tolerated activity level and precautions  Outcome: Progressing     Problem: Impaired Cognition  Goal: Patient will exhibit improved attention, thought processing and/or memory  Description: Interventions:  Outcome: Progressing

## 2024-11-25 NOTE — PROCEDURES
EEG REPORT;    Reason for Examination: Encephalopathy    Technical Summary:   18 Channels of EEG and 1 Channel of EKG was performed utilizing internation 10/20 method.        Background Activity:   The background activity consisted of 6 Hz waveforms, reactive to eye opening/ external stimulation.    Abnormality:  Throughout the recording low to medium voltage, polymorphic, 2 to 4 Hz slow activity was noted diffusely over both hemispheres      Activation:    Hyperventilation:   Not Performed.    Photic Stimulation:  Driving response seen.No       Sleep:  Stage I sleep seen.   Stage I and II sleep seen.    Impression:  This is a Abnormal EEG. No focal, lateralized or generalized epileptiform activity seen.   Moderate diffuse slowing into delta range was noted.  This constellation of findings can be seen in encephalopathy due to metabolic/toxic etiology, medication effects or diffuse cerebral injury.  Clinical correlation is recommended.        Michael Beck MD  [facility].

## 2024-11-25 NOTE — CONSULTS
[facility]   NEUROLOGY   CONSULT NOTE    Admission date: 11/23/2024  Reason for Consult: Altered mental status.  Chief Complaint:   Chief Complaint   Patient presents with    Altered Mental Status   ________________________________________________________________    History     History of Presenting Illness  84 year old female with underlying history of multiple medical problems reported below consulted for evaluation of worsening mental status.  Patient was brought to the hospital because of increasing fatigue and somnolence and was admitted with concerns for pneumonia, DK, hyperkalemia.  Patient has remained drowsy, disoriented, somnolent, mostly lethargic and not as responsive as she is at baseline.  According to  patient is able to answer his questions she does have underlying dementia which is moderate in nature, she is able to walk with the help of walker and carry out some of the activities of daily living.  No witnessed episode of seizure, tongue biting, jerking most of the upper or lower extremity or asymmetric new weakness.    History obtained from , chart review..     Past Medical History:    ANXIETY    Anxiety    Arrhythmia    pt unaware    BACK PAIN    LS & C-SPINE DZ W/ PAIN    Chronic rhinitis    Depression    Esophageal reflux    GERD    W/ PHAN's    High blood pressure    High cholesterol    Hypercholesterolemia    HYPERLIPIDEMIA    Hypertension    Irritable bowel syndrome with constipation    Kidney disease    frequent infections; pt states frequent UTIs    Late onset Alzheimer's disease without behavioral disturbance (HCC)    Migraines    NSTEMI (non-ST elevated myocardial infarction) (HCC)    Ophthalmic migraine    DANNIE (obstructive sleep apnea)    AHI 11 RDI 13 REM AHI 0 Supine AHI 11 non-supine AHI 0 Sao2 Ye 83%     OSTEOPOROSIS    Other and unspecified hyperlipidemia    OTHER DISEASES    PSVT, pt unaware    OTHER DISEASES    Barrott's Esoghagus    Reflux     barretts    Sleep apnea    wears a mouth gaurd    UTI (lower urinary tract infection)    no UTI currently as of 5/5/16     Past Surgical History:   Procedure Laterality Date    Benign biopsy left  ?    stereotactic core biopsy    Benign biopsy right  ?    stereotactic core biopsy    Colonoscopy  11/01/2008    RECHECK 5 YRS    Colonoscopy N/A 07/19/2021    Procedure: COLONOSCOPY;  Surgeon: Manish Rodriguez MD;  Location: [facility] ENDOSCOPY    Colonoscopy,biopsy  01/08/2014    Procedure: ESOPHAGOGASTRODUODENOSCOPY, COLONOSCOPY, POSSIBLE BIOPSY, POSSIBLE POLYPECTOMY 19438,56085;  Surgeon: Manish Rodriguez MD;  Location: [facility]    Hysterectomy      Optx dstl radl x-artic fx/epiphysl sep Left 10/06/2014    Procedure: OPEN REDUCTION INTERNAL FIXATION ARM;  Surgeon: Augustina Banuelos MD;  Location: [facility]    Other surgical history      URETHRAL SUSPENSION    Other surgical history      STEROTACTIC BX's BOTH BREASTS    Other surgical history  05/01/2008    EGD = PHAN'S    Other surgical history N/A 05/17/2016    Procedure: ENDOSCOPIC ULTRASOUND (EUS);  Surgeon: Glenn Chou MD;  Location: [facility]    Patient documented not to have experienced any of the following events  01/08/2014    Procedure: ESOPHAGOGASTRODUODENOSCOPY, COLONOSCOPY, POSSIBLE BIOPSY, POSSIBLE POLYPECTOMY [id number];  Surgeon: Manish Rodriguez MD;  Location: [facility]    Patient documented not to have experienced any of the following events Left 10/06/2014    Procedure: OPEN REDUCTION INTERNAL FIXATION ARM;  Surgeon: Augustina Banuelos MD;  Location: [facility]    Patient with preoperative order for iv antibiotic surgical site infect Left 10/06/2014    Procedure: OPEN REDUCTION INTERNAL FIXATION ARM;  Surgeon: Augustina Banuelos MD;  Location: [facility]    Patient withough preoperative order for iv antibiotic surgical site infection prophylaxis.  01/08/2014    Procedure: ESOPHAGOGASTRODUODENOSCOPY, COLONOSCOPY,  POSSIBLE BIOPSY, POSSIBLE POLYPECTOMY [id number];  Surgeon: Manish Rodriguez MD;  Location: [facility]    Upper gi endoscopy,biopsy  2014    Procedure: ESOPHAGOGASTRODUODENOSCOPY, COLONOSCOPY, POSSIBLE BIOPSY, POSSIBLE POLYPECTOMY 97267,60504;  Surgeon: Manish Rodriguez MD;  Location: [facility]    Vena cava filter       Social History     Socioeconomic History    Marital status:    Tobacco Use    Smoking status: Former     Current packs/day: 0.00     Average packs/day: 1 pack/day for 15.0 years (15.0 ttl pk-yrs)     Types: Cigarettes     Start date: 1963     Quit date: 1978     Years since quittin.5    Smokeless tobacco: Never   Vaping Use    Vaping status: Never Used   Substance and Sexual Activity    Alcohol use: Not Currently    Drug use: No     Social Drivers of Health     Financial Resource Strain: Low Risk  (10/26/2023)    Received from [facility]    Financial Resource Strain     In the past year, have you or any family members you live with been unable to get any of the following when it was really needed? Check all that apply.: None   Food Insecurity: No Food Insecurity (2024)    Food Insecurity     Food Insecurity: Never true   Transportation Needs: No Transportation Needs (2024)    Transportation Needs     Lack of Transportation: No   Social Connections: Low Risk  (10/27/2023)    Received from [facility]    Social Connections     How often do you see or talk to people that you care about and feel close to? (For example: talking to friends on the phone, visiting friends or family, going to [employer] or club meetings): 5 or more times a week   Housing Stability: Low Risk  (2024)    Housing Stability     Housing Instability: No     Family History   Problem Relation Age of Onset    Heart Disorder Father     Other (Other) Mother         ENCEPHALITIS    Hypertension Brother      Diabetes Brother      Allergies Allergies[1]    Home Meds  Current Outpatient Medications   Medication Instructions    acetaminophen (TYLENOL EXTRA STRENGTH) 500 mg, Every 4 hours PRN    aspirin 81 mg, Daily    atorvastatin (LIPITOR) 40 mg, Oral, Nightly    busPIRone (BUSPAR) 15 mg, 2 times daily    CALCIUM 600 + D OR 2 tablets, Daily    cholecalciferol (VITAMIN D3) 4,000 Units, Daily    cyanocobalamin (VITAMIN B12) 1,000 mcg, Daily    DULoxetine (CYMBALTA) 60 mg, 2 times daily    Esomeprazole Magnesium (NEXIUM) 20 mg, 2 times daily with meals    ferrous sulfate 325 mg, Daily with breakfast    memantine ER (NAMENDA XR) 28 mg, Oral, Daily    Potassium Chloride ER 10 MEQ Oral Tab CR 10 mEq, Daily    rivastigmine 13.3 MG/24HR Transdermal Patch 24 Hr 1 patch, Daily    sacubitril-valsartan 24-26 MG Oral Tab 1 tablet, Oral, 2 times daily     Scheduled Meds:   cefTRIAXone  1 g Intravenous Q24H    enoxaparin  30 mg Subcutaneous Nightly    insulin aspart  1-10 Units Subcutaneous TID AC and HS    aspirin  81 mg Oral Daily    atorvastatin  40 mg Oral Nightly    busPIRone  15 mg Oral BID    cholecalciferol  4,000 Units Oral Daily    DULoxetine  60 mg Oral BID    pantoprazole  40 mg Oral QAM AC    ferrous sulfate  325 mg Oral Daily with breakfast    memantine  10 mg Oral BID    rivastigmine  1 patch Transdermal Nightly    cyanocobalamin  1,000 mcg Oral Daily     Continuous Infusions:   sodium chloride 50 mL/hr at 11/23/24 1830     PRN Meds:  acetaminophen    acetaminophen **OR** HYDROcodone-acetaminophen **OR** HYDROcodone-acetaminophen    polyethylene glycol (PEG 3350)    sennosides    bisacodyl    ondansetron    metoclopramide    melatonin    glucose **OR** glucose **OR** glucose-vitamin C **OR** dextrose **OR** glucose **OR** glucose **OR** glucose-vitamin C    OBJECTIVE   VITAL SIGNS:   Temp:  [97.8 °F (36.6 °C)-100.5 °F (38.1 °C)] 99.5 °F (37.5 °C)  Pulse:  [] 89  Resp:  [16] 16  BP: (107-175)/(63-86) 127/69  SpO2:   [94 %-100 %] 95 %    PHYSICAL EXAM:    NEUROLOGIC: Exam limited due to patient's condition.  Mental Status: Drowsy, arousable with some difficulty, opens eyes, occasionally follows commands, dysarthric, mostly nonverbal.    Cranial nerves: PERRL.  Doll's eye movement present, corneal reflex present, Face symmetric.  Hearing grossly intact. Tongue midline with normal movements.   Motor: Drift:  Absent; Motor exam is antigravity in all extremities bilaterally  Sensation: Withdraws to painful stimulation appropriately in all 4 extremities.    Cerebellar: Unable to perform.      LABORATORY DATA:  Last 24 hour labs were reviewed in detail.  Recent Labs   Lab 11/23/24  1321 11/24/24  0659 11/25/24  0607    140 140   K 5.5* 4.6 4.3    110 110   CO2 24.0 22.0 23.0   * 104* 108*   BUN 44* 33* 24*   CREATSERUM 1.43* 0.93 0.94     Recent Labs   Lab 11/23/24  1321 11/24/24  0659 11/25/24  0607   WBC 7.1 5.5 6.1   HGB 9.4* 8.8* 9.3*   .0* 116.0* 118.0*     Recent Labs   Lab 11/23/24  1321   ALT 10   AST 15     Recent Labs   Lab 11/25/24  0607   MG 2.1     Last A1c value was 6.1% done 11/23/2024.    Radiology:    CT scan of the brain did not report any acute intracranial abnormality.  Chronic ischemic changes noted.  ASSESSMENT/PLAN   84 year old female with:    Encephalopathy, most likely toxic/metabolic, complicated by underlying dementia.  CT scan of the head did not show any acute intracranial abnormality.  Ammonia level normal, request EEG to assess for subclinical seizures.  Patient on multiple narcotic pain medications.  Advised to discontinue.  Request MRI of the brain plain.  Dementia with behavioral disturbance.  Patient currently on rivastigmine, memantine as well as duloxetine and BuSpar.  Case discussed with  in detail.  All questions answered.  Will follow.    Principal Problem:    Weakness generalized  Active Problems:    DK (acute kidney injury) ([facility])       Michael Beck  MD  Neurohospitalist  [facility]    Disclaimer: This record was dictated using Dragon software. There may be errors due to voice recognition problems that were not realized and corrected during the completion of the note.           [1]   Allergies  Allergen Reactions    Ciprofloxacin UNKNOWN     Burning of espohagus and upset stomach form cipro    Penicillins HIVES and UNKNOWN    Radiology Contrast Iodinated Dyes RASH     Diffuse erythema post study  -- Occurred in 1980's?,    Amoxil UNKNOWN     Cant remember    Biaxin [Clarithromycin]      Cant remember    Cefuroxime Axetil      Cant remember    Doxycycline Calcium      Cant remember    Sulfa Antibiotics HIVES

## 2024-11-25 NOTE — OCCUPATIONAL THERAPY NOTE
Attempted to see pt for OT this afternoon. Patient is occupied with EEG. Will follow up at a later date.

## 2024-11-25 NOTE — PLAN OF CARE
Rec'd pt @0730 very lethargic.  Dr. Lawrence at bedside, pt able to say \"go away\" with noxious stimuli but otherwise non verbal.  Night RN reports desats on RA, rec'd pt with nasal cpap sats 95%.  , Tigre, at bedside, pt encouraged to eat, now on RA with stable sats 94-95%.  Neurology consulted.  Tmax 100.3, MD aware.  Fall precautions and aspiration precautions maintained.    Problem: NEUROLOGICAL - ADULT  Goal: Achieves maximal functionality and self care  Description: INTERVENTIONS  - Monitor swallowing and airway patency with patient fatigue and changes in neurological status  - Encourage and assist patient to increase activity and self care with guidance from PT/OT  - Encourage visually impaired, hearing impaired and aphasic patients to use assistive/communication devices  Outcome: Not Progressing     Problem: Impaired Cognition  Goal: Patient will exhibit improved attention, thought processing and/or memory  Description: Interventions:  Outcome: Not Progressing

## 2024-11-26 LAB
GLUCOSE BLD-MCNC: 103 MG/DL (ref 70–99)
GLUCOSE BLD-MCNC: 119 MG/DL (ref 70–99)
GLUCOSE BLD-MCNC: 124 MG/DL (ref 70–99)
GLUCOSE BLD-MCNC: 167 MG/DL (ref 70–99)

## 2024-11-26 PROCEDURE — 99233 SBSQ HOSP IP/OBS HIGH 50: CPT | Performed by: OTHER

## 2024-11-26 NOTE — OCCUPATIONAL THERAPY NOTE
OCCUPATIONAL THERAPY EVALUATION - INPATIENT     Room Number: 431/431-A  Evaluation Date: 11/26/2024  Type of Evaluation: Initial  Presenting Problem: weakness    Physician Order: IP Consult to Occupational Therapy  Reason for Therapy: ADL/IADL Dysfunction and Discharge Planning    OCCUPATIONAL THERAPY ASSESSMENT   Patient is currently functioning below baseline with toileting, upper body dressing, lower body dressing, grooming, bed mobility, transfers, static sitting balance, dynamic sitting balance, static standing balance, dynamic standing balance, and maintaining seated position. Prior to admission, patient's baseline is Sup for mobility from spouse, Mod A for LB ADLs and Sup for UB ADLs living with dtr in 2-level house.  Patient is requiring maximum assistance as a result of the following impairments: decreased functional strength, decreased functional reach, decreased endurance, impaired coordination, impaired motor planning, decreased muscular endurance, cognitive deficits (confusion), and medical status. Occupational Therapy will continue to follow for duration of hospitalization.    Patient will benefit from continued skilled OT Services to promote return to prior level of function and safety with continuous assistance and gradual rehabilitative therapy    History Related to Current Admission: Patient is a 84 year old female admitted on 11/23/2024 with Presenting Problem: weakness. Co-Morbidities : anxiety, depression, GERD, HTN, HLD, IBS, alzheimers, chf, osteoporosis, paroxysmal SVT    WEIGHT BEARING RESTRICTION       Recommendations for nursing staff:   Transfers: Cheri Steady or sit to stand lift  Toileting location: commode or bed level    EVALUATION SESSION:  Patient Start of Session: supine in bed for session    FUNCTIONAL TRANSFER ASSESSMENT  Sit to Stand: Edge of Bed  Edge of Bed: Maximum Assist (Max A to come to stand with BUE support)    BED MOBILITY  Rolling: Maximum Assist  Supine to Sit :  Maximum Assist  Scooting: Max A to EOB    BALANCE ASSESSMENT  Static Sitting: Contact Guard Assist  Static Standing: Maximum Assist (Max A x1 to maintain standing, max A x2 to take steps to chair, approx 5 steps)    FUNCTIONAL ADL ASSESSMENT  Grooming Seated: Moderate Assist (to wipe face)  UB Dressing Seated: Maximum Assist (for new robe)  LB Dressing Seated: Maximum Assist (for socks while sitting at EOB)    ACTIVITY TOLERANCE: vitals stable                         O2 SATURATIONS       COGNITION  Overall Cognitive Status:  Impaired and pt able to follow approx 50% of commands with repetition, pt slow to respond, pt with extended time to process and intiate    Upper Extremity   ROM: within functional limits   Strength: grossly weak  Coordination  Gross motor: WNL  Fine motor: WNL  Sensation: Light touch:  intact    EDUCATION PROVIDED  Patient Education : Role of Occupational Therapy; Plan of Care  Patient's Response to Education: Verbalized Understanding; Returned Demonstration    Equipment used: RW  Demonstrates functional use, Would benefit from additional trial      Therapist comments: Pt not safe to attempt further ambulation at this time 2/2 weakness and LOB and fatigue with short amb to chair.  Pt declined further ADLs 2/2 fatigue.    Patient End of Session: Up in chair;Needs met;Call light within reach;All patient questions and concerns addressed;Alarm set;Family present    OCCUPATIONAL PROFILE    HOME SITUATION  Type of Home: House  Home Layout: Two level  Lives With: Spouse    Toilet and Equipment: Comfort height toilet  Shower/Tub and Equipment: Walk-in shower       Occupation/Status: retired  Hand Dominance: Right  Drives: No  Patient Regularly Uses: Rolling walker    Prior Level of Function: Prior to admission, patient's baseline is Sup for mobility from spouse, Mod A for LB ADLs and Sup for UB ADLs living with dtr in 2-level house.    SUBJECTIVE   Pt stated, \"I am just so weak    PAIN  ASSESSMENT  Ratin  Location: no pain at this time per pt report       OBJECTIVE  Precautions: Bed/chair alarm  Fall Risk: High fall risk    ASSESSMENTS    AM-PAC ‘6-Clicks’ Inpatient Daily Activity Short Form  -   Putting on and taking off regular lower body clothing?: A Lot  -   Bathing (including washing, rinsing, drying)?: A Lot  -   Toileting, which includes using toilet, bedpan or urinal? : A Lot  -   Putting on and taking off regular upper body clothing?: A Lot  -   Taking care of personal grooming such as brushing teeth?: A Little  -   Eating meals?: A Little    AM-PAC Score:  Score: 14  Approx Degree of Impairment: 59.67%  Standardized Score (AM-PAC Scale): 33.39    ADDITIONAL TESTS     NEUROLOGICAL FINDINGS      COGNITION ASSESSMENTS     PLAN     OT Treatment Plan: Energy conservation/work simplification techniques;Balance activities;ADL training;IADL training;Continued evaluation;Compensatory technique education;Equipment eval/education;Patient/Family training;Patient/Family education;Endurance training;UE strengthening/ROM;Functional transfer training  Rehab Potential : Good  Frequency: 3-5x/week  Number of Visits to Meet Established Goals: 5    ADL Goals   Patient will perform upper body dressing:  with supervision  Patient will perform lower body dressing:  with supervision  Patient will perform toileting: with supervision    Functional Transfer Goals  Patient will transfer from supine to sit:  with supervision  Patient will transfer from sit to stand:  with supervision  Patient will transfer to toilet:  with supervision    UE Exercise Program Goal  Patient will be supervision with bilateral AROM HEP (home exercise program).    Additional Goals:  Pt will verbalize at least 3 energy conservation techniques  Pt will stand at sink for 5 minutes to complete grooming routine    Patient Evaluation Complexity Level:   Occupational Profile/Medical History MODERATE - Expanded review of history including  review of medical or therapy record   Specific performance deficits impacting engagement in ADL/IADL MODERATE  3 - 5 performance deficits   Client Assessment/Performance Deficits MODERATE - Comorbidities and min to mod modifications of tasks    Clinical Decision Making MODERATE - Analysis of occupational profile, detailed assessments, several treatment options    Overall Complexity MODERATE     OT Session Time: 25 minutes  Self-Care Home Management: 15 minutes  Therapeutic Activity: 0 minutes  Neuromuscular Re-education: 0 minutes  Therapeutic Exercise: 0 minutes  Cognitive Skills: 0 minutes  Sensory Integrative: 0 minutes  Orthotic Management and Trainin minutes  Can add/delete any of these

## 2024-11-26 NOTE — CM/SW NOTE
11/26/24 1400   CM/SW Referral Data   Referral Source Social Work (self-referral)   Reason for Referral Discharge planning   Informant Spouse/Significant Other   Patient Info   Patient's Home Environment House   Discharge Needs   Anticipated D/C needs Home health care     SW met with patient's  at bedside to discuss patient care and anticipated therapy needs at [place]. He confirmed that he is patient's primary caregiver and they are living with their daughter and have been since 2020. He stated that patient uses a walker at home but no other DME and that she just stated with [facility] ago.     SW discussed anticipated therapy need for Gradual Rehab and he reported that he has had her go to rehab before and does not feel like that is a good fit for her. He reported that ultimately his goal would be to bring her home with home health services. He continued by stating \"No one has tried to get her up to walk\". SW discussed with rehab services who reported that she is a max assist for transfers.     SW discussed LTC planning options for patient with her  and provided him with information for [facility] to discuss private duty caregivers as well as memory care placement if needed. Patient's  expressed gratitude and understanding.     [facility] referral sent as backup in case family becomes agreeable to placement.     SW will continue to follow for plan of care changes and remain available for any additional DC needs or concerns.     Trish Pascual MSW, LSW  Discharge Planner   [phone]

## 2024-11-26 NOTE — PHYSICAL THERAPY NOTE
PHYSICAL THERAPY TREATMENT NOTE - INPATIENT    Room Number: 431/431-A     Session: 1     Number of Visits to Meet Established Goals: 5    Presenting Problem: admit with fatigue, weakness, AMS  Co-Morbidities : anxiety, depression, GERD, HTN, HLD, IBS, alzheimers, chf, osteoporosis, paroxysmal SVT    PHYSICAL THERAPY ASSESSMENT   Patient demonstrates limited progress this session, goals  updated to reflect patient performance.      Patient is requiring minimal assist and moderate assist as a result of the following impairments: decreased functional strength, decreased endurance/aerobic capacity, impaired dynamic sitting and standing balance, impaired motor planning, cognitive deficits ( ), and medical status.     Patient continues to function below baseline with bed mobility, transfers, and gait.  Next session anticipate patient to progress bed mobility, transfers, and gait.  Physical Therapy will continue to follow patient for duration of hospitalization.    Patient continues to benefit from continued skilled PT services: to promote return to prior level of function and safety with continuous assistance and gradual rehabilitative therapy .    PLAN DURING HOSPITALIZATION  Nursing Mobility Recommendation : Lift Equipment  PT Device Recommendation: Mechanical lift  PT Treatment Plan: Bed mobility;Body mechanics;Endurance;Energy conservation;Patient education;Family education;Gait training;Neuromuscular re-educate;Range of motion;Strengthening;Transfer training;Stair training;Balance training;Stoop training  Frequency (Obs): 3x/week     CURRENT GOALS     Goal #1 Patient is able to perform rolling L and R with modA     Goal #2 Patient is able to  complete HEP with assist from caregiver/family     Goal #3 Patient is will follow 75% of commands for meaningful participation and carryover in therapy     Goal #4 New goal: patient to amb with RW and cga 25 feet.     Goal #5 New goal: patient to transfer from bed to chair with  cga.    Goal #6    Goal Comments: Goals established on 11/24/2024 11/26/2024 all goals ongoing. And added.   SUBJECTIVE  \" Ouch.\"  Pt unable to state the location of pain.     OBJECTIVE  Precautions: Bed/chair alarm    WEIGHT BEARING RESTRICTION     PAIN ASSESSMENT   Rating: Unable to rate          BALANCE                                                                                                                       Static Sitting: Fair  Dynamic Sitting: Fair -           Static Standing: Poor -  Dynamic Standing: Poor -    ACTIVITY TOLERANCE                         O2 WALK       AM-PAC '6-Clicks' INPATIENT SHORT FORM - BASIC MOBILITY  How much difficulty does the patient currently have...  Patient Difficulty: Turning over in bed (including adjusting bedclothes, sheets and blankets)?: A Lot   Patient Difficulty: Sitting down on and standing up from a chair with arms (e.g., wheelchair, bedside commode, etc.): A Little   Patient Difficulty: Moving from lying on back to sitting on the side of the bed?: A Lot   How much help from another person does the patient currently need...   Help from Another: Moving to and from a bed to a chair (including a wheelchair)?: A Lot   Help from Another: Need to walk in hospital room?: A Lot   Help from Another: Climbing 3-5 steps with a railing?: Total     AM-PAC Score:  Raw Score: 12   Approx Degree of Impairment: 68.66%   Standardized Score (AM-PAC Scale): 35.33   CMS Modifier (G-Code): CL    FUNCTIONAL ABILITY STATUS  Gait Assessment   Functional Mobility/Gait Assessment  Gait Assistance: Moderate assistance (min assist of two, pt attempting to sit prematurely.)  Distance (ft): 5 steps to chair  Assistive Device: Rolling walker  Pattern: Shuffle    Skilled Therapy Provided    Bed Mobility:  Rolling: mod assist   Supine<>Sit: max assist   Sit<>Supine: NT  Scooted to edge with max assist, post lob during transition and second person stabilized pt in the back.    In sitting, pt  with post lean, worked on ant wt shift with hand over hand guidance and rocks, pt worked on lateral wt shift with assist. After a few min, pt maintained sitting with cga.   Transfer Mobility:  Sit<>Stand: min to mod assist of two.    Stand<>Sit: mod A    Gait: min A of of one and mod assist of one, pt attempting to sit prematurity before reaching the chair, assist at the buttocks required.     Therapist's Comments: patient performed abeba le ex with manual guidance and assist. Patient with difficulty following commands, fear of fall noted during activity. Patient shows stiff posture and dec verbalization during session. Not safe for gait training due to dec activity tolerance to standing tasks. Patient performed STS three times and step in place and marching during standing. Patient proceeds to sit without warning. 1st time during SPT to chair, patient began taking steps but after two steps began to sit, assist provided to reach back to bed. Second time, patient able to take steps to chair but dec safety noted.       THERAPEUTIC EXERCISES  Lower Extremity Ankle pumps  Hip AB/AD  Heel slides  LAQ     Upper Extremity      Position Sitting and Supine     Repetitions   12   Sets   1     Patient End of Session: Up in chair;Needs met;Call light within reach;RN aware of session/findings;All patient questions and concerns addressed;Alarm set;Family present    PT Session Time: 23 minutes  Gait Trainin minutes  Therapeutic Activity: 5 minutes  Therapeutic Exercise: 8 minutes   Neuromuscular Re-education: 8 minutes

## 2024-11-26 NOTE — PROGRESS NOTES
[facility]  NEW Neurology Progress Note    Isabella Addison Patient Status:  Inpatient    1940 MRN [id number]   Location [facility] 4NW-A Attending Kristy Briceno MD   Hosp Day # 3 PCP Jorge Lr MD     CC: [facility]    Subjective:  Isabella Addison is an 84 year old female with underlying history of anxiety, depression, GERD, HTN, HLD, DANNIE, NSTEMI, and back problems, who presented to the ED  with worsening mental status. Patient was brought to the hospital because of increasing fatigue and somnolence and was admitted with concerns for pneumonia, DK,and hyperkalemia.  Patient has remained drowsy, disoriented, somnolent, mostly lethargic and not as responsive as she is at baseline. According to  patient is able to answer his questions she does have underlying dementia which is moderate in nature, she is able to walk with the help of walker and carry out some of the activities of daily living. According to chart review, he reported no witnessed episode of seizure, tongue biting, jerking most of the upper or lower extremity or asymmetric new weakness. Neurology was consulted for further investigations. Infectious work up so far unremarkable, patient was recently treated for UTI in the beginning of November. CT head did not reveal any acute changes. EEG and MRI brain were ordered.     Seen for a follow up visit today. In bed with CPAP on, sleeping.  just arrived, wanted the patient to be woken up. Mask removed. Patient opens eyes to her name called, recognized .  Smiling. Able to tell me her first name and her husbands first name. Does not respond to questions for time and place. No visible facial asymmetry noted. Moving all extremities to simulation.  concerned, wants pt to work with PT.     MEDICATIONS:  No current outpatient medications on file.     Current Facility-Administered Medications   Medication Dose Route Frequency    enoxaparin (Lovenox) 40  MG/0.4ML SUBQ injection 40 mg  40 mg Subcutaneous Nightly    cefTRIAXone (Rocephin) 1 g in sodium chloride 0.9% 100 mL IVPB-ADDV  1 g Intravenous Q24H    sodium chloride 0.9% infusion   Intravenous Continuous    acetaminophen (Tylenol Extra Strength) tab 500 mg  500 mg Oral Q4H PRN    acetaminophen (Tylenol) tab 650 mg  650 mg Oral Q4H PRN    polyethylene glycol (PEG 3350) (Miralax) 17 g oral packet 17 g  17 g Oral Daily PRN    sennosides (Senokot) tab 17.2 mg  17.2 mg Oral Nightly PRN    bisacodyl (Dulcolax) 10 MG rectal suppository 10 mg  10 mg Rectal Daily PRN    ondansetron (Zofran) 4 MG/2ML injection 4 mg  4 mg Intravenous Q6H PRN    metoclopramide (Reglan) 5 mg/mL injection 5 mg  5 mg Intravenous Q8H PRN    melatonin tab 3 mg  3 mg Oral Nightly PRN    glucose (Dex4) 15 GM/59ML oral liquid 15 g  15 g Oral Q15 Min PRN    Or    glucose (Glutose) 40% oral gel 15 g  15 g Oral Q15 Min PRN    Or    glucose-vitamin C (Dex-4) chewable tab 4 tablet  4 tablet Oral Q15 Min PRN    Or    dextrose 50% injection 50 mL  50 mL Intravenous Q15 Min PRN    Or    glucose (Dex4) 15 GM/59ML oral liquid 30 g  30 g Oral Q15 Min PRN    Or    glucose (Glutose) 40% oral gel 30 g  30 g Oral Q15 Min PRN    Or    glucose-vitamin C (Dex-4) chewable tab 8 tablet  8 tablet Oral Q15 Min PRN    insulin aspart (NovoLOG) 100 Units/mL FlexPen 1-10 Units  1-10 Units Subcutaneous TID AC and HS    aspirin DR tab 81 mg  81 mg Oral Daily    atorvastatin (Lipitor) tab 40 mg  40 mg Oral Nightly    busPIRone (Buspar) tab 15 mg  15 mg Oral BID    cholecalciferol (Vitamin D3) tab 4,000 Units  4,000 Units Oral Daily    DULoxetine (Cymbalta) DR cap 60 mg  60 mg Oral BID    pantoprazole (Protonix) DR tab 40 mg  40 mg Oral QAM AC    ferrous sulfate DR tab 325 mg  325 mg Oral Daily with breakfast    memantine (Namenda) tab 10 mg  10 mg Oral BID    rivastigmine (Exelon) 13.3 MG/24HR patch 1 patch  1 patch Transdermal Nightly    cyanocobalamin (Vitamin B12) tab  1,000 mcg  1,000 mcg Oral Daily       REVIEW OF SYSTEMS:  A 10-point system was reviewed.  Pertinent positives and negatives are noted in HPI.      PHYSICAL EXAMINATION:  VITAL SIGNS: /72 (BP Location: Left arm)   Pulse 79   Temp 98.6 °F (37 °C) (Oral)   Resp 16   Wt 153 lb 10.6 oz (69.7 kg)   SpO2 100%   BMI 28.10 kg/m²   GENERAL:  Patient is a 84 year old female in no acute distress.  HEENT:  Normocephalic, atraumatic  ABD: Soft, non tender  SKIN: Warm, dry, no rashes    NEUROLOGICAL:   Mental status: Oriented to person, recognizes   Speech: Minimally verbal, dysarthric  Memory and comprehension: Impaired  Cranial Nerves:  PERRL , EOMI, no nystagmus, face symmetric, Doll's eye movement present, corneal reflex present. Hearing grossly intact. Tongue midline with normal movements.   Motor: No drift, no focal arm or leg weakness , moving all extremities to stim  Sensory: Withdraws to painful stimulation appropriately in all 4 extremities.   Coordination: MILADIS, unable to perform  Gait: Deferred      Imaging/Diagnostics:  CT BRAIN OR HEAD (CPT=70450)    Result Date: 11/23/2024  PROCEDURE:  CT BRAIN OR HEAD (67657)  COMPARISON:  [facility] , CT, CT BRAIN OR HEAD (09969), 10/28/2024, 4:18 PM.  INDICATIONS:  ams  TECHNIQUE:  Noncontrast CT scanning is performed through the brain. Dose reduction techniques were used. Dose information is transmitted to the ACR (American College of Radiology) NRDR (National Radiology Data Registry) which includes the Dose Index Registry.  PATIENT STATED HISTORY: (As transcribed by Technologist)  Patient is here for AMS.    FINDINGS: No evidence of intracranial hemorrhage or extra-axial fluid collection. Lucencies in the deep periventricular white matter are likely sequelae of chronic small vessel ischemic disease. Prominence of the sulci is noted. No mass effect. Visualized portions of paranasal sinuses are unremarkable. Visualized portions of the mastoid air cells are  unremarkable. Visualized portions of the orbits are unremarkable. IMPRESSION: Global parenchymal volume loss is noted.  Sequelae of chronic small vessel ischemic disease is noted. No evidence of intracranial hemorrhage or extra-axial fluid collection.  No significant change since prior exam.    LOCATION:  [facility]   Dictated by (CST): Brett Singer MD on 11/23/2024 at 3:33 PM     Finalized by (CST): Brett Singer MD on 11/23/2024 at 3:34 PM       XR CHEST AP PORTABLE  (CPT=71045)    Result Date: 11/23/2024  CONCLUSION:  Perihilar streaky opacity peribronchial cuffing can be seen with bronchitis.   LOCATION:  [facility]      Dictated by (CST): David French MD on 11/23/2024 at 2:45 PM     Finalized by (CST): David French MD on 11/23/2024 at 2:46 PM          Labs:  Recent Labs   Lab 11/23/24  1321 11/24/24  0659 11/25/24  0607   RBC 3.34* 3.17* 3.33*   HGB 9.4* 8.8* 9.3*   HCT 30.4* 29.9* 31.1*   MCV 91.0 94.3 93.4   MCH 28.1 27.8 27.9   MCHC 30.9* 29.4* 29.9*   RDW 15.4 15.4 15.1   NEPRELIM 5.59 4.25 4.49   WBC 7.1 5.5 6.1   .0* 116.0* 118.0*         Recent Labs   Lab 11/23/24  1321 11/24/24  0659 11/25/24  0607   * 104* 108*   BUN 44* 33* 24*   CREATSERUM 1.43* 0.93 0.94   EGFRCR 36* 60 59*   CA 11.8* 9.9 9.6    140 140   K 5.5* 4.6 4.3    110 110   CO2 24.0 22.0 23.0       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 Today more awake, alert to self and .   - CT scan of head - no acute intracranial abnormality.    - Ammonia level normal, resp panel normal, ABGs unremarkable  - EEG to assess for subclinical seizures - abnormal, no EFA seen, moderate diffuse slowing into delta range was noted.     - MRI of the brain- pending  - PT/OT and rehab evals when able to cooperate.  - safety and falls precautions  Dementia with behavioral disturbance. Patient currently on  rivastigmine, memantine as well as duloxetine and BuSpar.  Case discussed with  in detail. All questions answered. Discussed with dr. Beck as well, to follow with further recommendations if indicated.      Is this a shared or split note between Advanced Practice Provider and Physician? Yes       Michelle DANIEL  [facility]  11/26/2024, 9:13 AM   [facility] # [id number]      Impression/plan/MDM:  Patient seen and examined personally.  Investigations reviewed.    Encephalopathy, most likely toxic/metabolic, complicated by underlying dementia.  CT scan of the head did not show any acute intracranial abnormality.  Ammonia level normal, EEG reported slowing.  Request MRI of the brain plain.  Home pain medications discontinued.  Interval improvement noted.  Dementia with behavioral disturbance.  Patient currently on rivastigmine, memantine as well as duloxetine and BuSpar.

## 2024-11-26 NOTE — PLAN OF CARE
Pt alert and oriented to self, denies pain. VS stable, pt on CPAP for DANNIE. All meds given per MAR in the evening, pt refused morning medication. IVF infusing. Pt resting in bed with safety precautions in place.    Problem: Diabetes/Glucose Control  Goal: Glucose maintained within prescribed range  Description: INTERVENTIONS:  - Monitor Blood Glucose as ordered  - Assess for signs and symptoms of hyperglycemia and hypoglycemia  - Administer ordered medications to maintain glucose within target range  - Assess barriers to adequate nutritional intake and initiate nutrition consult as needed  - Instruct patient on self management of diabetes  Outcome: Progressing     Problem: CARDIOVASCULAR - ADULT  Goal: Maintains optimal cardiac output and hemodynamic stability  Description: INTERVENTIONS:  - Monitor vital signs, rhythm, and trends  - Monitor for bleeding, hypotension and signs of decreased cardiac output  - Evaluate effectiveness of vasoactive medications to optimize hemodynamic stability  - Monitor arterial and/or venous puncture sites for bleeding and/or hematoma  - Assess quality of pulses, skin color and temperature  - Assess for signs of decreased coronary artery perfusion - ex. Angina  - Evaluate fluid balance, assess for edema, trend weights  Outcome: Progressing  Goal: Absence of cardiac arrhythmias or at baseline  Description: INTERVENTIONS:  - Continuous cardiac monitoring, monitor vital signs, obtain 12 lead EKG if indicated  - Evaluate effectiveness of antiarrhythmic and heart rate control medications as ordered  - Initiate emergency measures for life threatening arrhythmias  - Monitor electrolytes and administer replacement therapy as ordered  Outcome: Progressing     Problem: RESPIRATORY - ADULT  Goal: Achieves optimal ventilation and oxygenation  Description: INTERVENTIONS:  - Assess for changes in respiratory status  - Assess for changes in mentation and behavior  - Position to facilitate oxygenation  and minimize respiratory effort  - Oxygen supplementation based on oxygen saturation or ABGs  - Provide Smoking Cessation handout, if applicable  - Encourage broncho-pulmonary hygiene including cough, deep breathe, Incentive Spirometry  - Assess the need for suctioning and perform as needed  - Assess and instruct to report SOB or any respiratory difficulty  - Respiratory Therapy support as indicated  - Manage/alleviate anxiety  - Monitor for signs/symptoms of CO2 retention  Outcome: Progressing     Problem: METABOLIC/FLUID AND ELECTROLYTES - ADULT  Goal: Glucose maintained within prescribed range  Description: INTERVENTIONS:  - Monitor Blood Glucose as ordered  - Assess for signs and symptoms of hyperglycemia and hypoglycemia  - Administer ordered medications to maintain glucose within target range  - Assess barriers to adequate nutritional intake and initiate nutrition consult as needed  - Instruct patient on self management of diabetes  Outcome: Progressing  Goal: Electrolytes maintained within normal limits  Description: INTERVENTIONS:  - Monitor labs and rhythm and assess patient for signs and symptoms of electrolyte imbalances  - Administer electrolyte replacement as ordered  - Monitor response to electrolyte replacements, including rhythm and repeat lab results as appropriate  - Fluid restriction as ordered  - Instruct patient on fluid and nutrition restrictions as appropriate  Outcome: Progressing     Problem: SKIN/TISSUE INTEGRITY - ADULT  Goal: Skin integrity remains intact  Description: INTERVENTIONS  - Assess and document risk factors for pressure ulcer development  - Assess and document skin integrity  - Monitor for areas of redness and/or skin breakdown  - Initiate interventions, skin care algorithm/standards of care as needed  Outcome: Progressing  Goal: Incision(s), wounds(s) or drain site(s) healing without S/S of infection  Description: INTERVENTIONS:  - Assess and document risk factors for pressure  ulcer development  - Assess and document skin integrity  - Assess and document dressing/incision, wound bed, drain sites and surrounding tissue  - Implement wound care per orders  - Initiate isolation precautions as appropriate  - Initiate Pressure Ulcer prevention bundle as indicated  Outcome: Progressing  Goal: Oral mucous membranes remain intact  Description: INTERVENTIONS  - Assess oral mucosa and hygiene practices  - Implement preventative oral hygiene regimen  - Implement oral medicated treatments as ordered  Outcome: Progressing     Problem: NEUROLOGICAL - ADULT  Goal: Achieves stable or improved neurological status  Description: INTERVENTIONS  - Assess for and report changes in neurological status  - Initiate measures to prevent increased intracranial pressure  - Maintain blood pressure and fluid volume within ordered parameters to optimize cerebral perfusion and minimize risk of hemorrhage  - Monitor temperature, glucose, and sodium. Initiate appropriate interventions as ordered  Outcome: Progressing  Goal: Achieves maximal functionality and self care  Description: INTERVENTIONS  - Monitor swallowing and airway patency with patient fatigue and changes in neurological status  - Encourage and assist patient to increase activity and self care with guidance from PT/OT  - Encourage visually impaired, hearing impaired and aphasic patients to use assistive/communication devices  Outcome: Progressing     Problem: Impaired Functional Mobility  Goal: Achieve highest/safest level of mobility/gait  Description: Interventions:  - Assess patient's functional ability and stability  - Promote increasing activity/tolerance for mobility and gait  - Educate and engage patient/family in tolerated activity level and precautions  Outcome: Progressing     Problem: Impaired Cognition  Goal: Patient will exhibit improved attention, thought processing and/or memory  Description: Interventions:  Outcome: Progressing

## 2024-11-26 NOTE — PAYOR COMM NOTE
CONTINUED STAY REVIEW    Payor: BC[employer] MEDICARE ADV PPO  Subscriber #:  [id number]  Authorization Number: [id number]    Admit date: 24  Admit time:  5:36 PM    REVIEW DOCUMENTATION:       [facility]  NEW Neurology Progress Note           Isabella Addison Patient Status:  Inpatient    1940 MRN [id number]   Location [facility] 4NW-A Attending Kristy Briceno MD   Hosp Day # 3 PCP Jorge Lr MD      CC: AMS     Subjective:  Isabella Addison is an 84 year old female with underlying history of anxiety, depression, GERD, HTN, HLD, DANNIE, NSTEMI, and back problems, who presented to the ED  with worsening mental status. Patient was brought to the hospital because of increasing fatigue and somnolence and was admitted with concerns for pneumonia, DK,and hyperkalemia.  Patient has remained drowsy, disoriented, somnolent, mostly lethargic and not as responsive as she is at baseline. According to  patient is able to answer his questions she does have underlying dementia which is moderate in nature, she is able to walk with the help of walker and carry out some of the activities of daily living. According to chart review, he reported no witnessed episode of seizure, tongue biting, jerking most of the upper or lower extremity or asymmetric new weakness. Neurology was consulted for further investigations. Infectious work up so far unremarkable, patient was recently treated for UTI in the beginning of November. CT head did not reveal any acute changes. EEG and MRI brain were ordered.      Seen for a follow up visit today. In bed with CPAP on, sleeping.  just arrived, wanted the patient to be woken up. Mask removed. Patient opens eyes to her name called, recognized .  Smiling. Able to tell me her first name and her husbands first name. Does not respond to questions for time and place. No visible facial asymmetry noted. Moving all extremities to simulation.   concerned, wants pt to work with PT.      MEDICATIONS:  Prior to Admission Medications   No current outpatient medications on file.         Current Hospital Medications          Current Facility-Administered Medications   Medication Dose Route Frequency    enoxaparin (Lovenox) 40 MG/0.4ML SUBQ injection 40 mg  40 mg Subcutaneous Nightly    cefTRIAXone (Rocephin) 1 g in sodium chloride 0.9% 100 mL IVPB-ADDV  1 g Intravenous Q24H    sodium chloride 0.9% infusion   Intravenous Continuous    acetaminophen (Tylenol Extra Strength) tab 500 mg  500 mg Oral Q4H PRN    acetaminophen (Tylenol) tab 650 mg  650 mg Oral Q4H PRN    polyethylene glycol (PEG 3350) (Miralax) 17 g oral packet 17 g  17 g Oral Daily PRN    sennosides (Senokot) tab 17.2 mg  17.2 mg Oral Nightly PRN    bisacodyl (Dulcolax) 10 MG rectal suppository 10 mg  10 mg Rectal Daily PRN    ondansetron (Zofran) 4 MG/2ML injection 4 mg  4 mg Intravenous Q6H PRN    metoclopramide (Reglan) 5 mg/mL injection 5 mg  5 mg Intravenous Q8H PRN    melatonin tab 3 mg  3 mg Oral Nightly PRN    glucose (Dex4) 15 GM/59ML oral liquid 15 g  15 g Oral Q15 Min PRN     Or    glucose (Glutose) 40% oral gel 15 g  15 g Oral Q15 Min PRN     Or    glucose-vitamin C (Dex-4) chewable tab 4 tablet  4 tablet Oral Q15 Min PRN     Or    dextrose 50% injection 50 mL  50 mL Intravenous Q15 Min PRN     Or    glucose (Dex4) 15 GM/59ML oral liquid 30 g  30 g Oral Q15 Min PRN     Or    glucose (Glutose) 40% oral gel 30 g  30 g Oral Q15 Min PRN     Or    glucose-vitamin C (Dex-4) chewable tab 8 tablet  8 tablet Oral Q15 Min PRN    insulin aspart (NovoLOG) 100 Units/mL FlexPen 1-10 Units  1-10 Units Subcutaneous TID AC and HS    aspirin DR tab 81 mg  81 mg Oral Daily    atorvastatin (Lipitor) tab 40 mg  40 mg Oral Nightly    busPIRone (Buspar) tab 15 mg  15 mg Oral BID    cholecalciferol (Vitamin D3) tab 4,000 Units  4,000 Units Oral Daily    DULoxetine (Cymbalta) DR cap 60 mg  60 mg Oral BID     pantoprazole (Protonix) DR tab 40 mg  40 mg Oral QAM AC    ferrous sulfate DR tab 325 mg  325 mg Oral Daily with breakfast    memantine (Namenda) tab 10 mg  10 mg Oral BID    rivastigmine (Exelon) 13.3 MG/24HR patch 1 patch  1 patch Transdermal Nightly    cyanocobalamin (Vitamin B12) tab 1,000 mcg  1,000 mcg Oral Daily            REVIEW OF SYSTEMS:  A 10-point system was reviewed.  Pertinent positives and negatives are noted in HPI.       PHYSICAL EXAMINATION:  VITAL SIGNS: /72 (BP Location: Left arm)   Pulse 79   Temp 98.6 °F (37 °C) (Oral)   Resp 16   Wt 153 lb 10.6 oz (69.7 kg)   SpO2 100%   BMI 28.10 kg/m²   GENERAL:  Patient is a 84 year old female in no acute distress.  HEENT:  Normocephalic, atraumatic  ABD: Soft, non tender  SKIN: Warm, dry, no rashes     NEUROLOGICAL:   Mental status: Oriented to person, recognizes   Speech: Minimally verbal, dysarthric  Memory and comprehension: Impaired  Cranial Nerves:  PERRL , EOMI, no nystagmus, face symmetric, Doll's eye movement present, corneal reflex present. Hearing grossly intact. Tongue midline with normal movements.   Motor: No drift, no focal arm or leg weakness , moving all extremities to stim  Sensory: Withdraws to painful stimulation appropriately in all 4 extremities.   Coordination: MILADIS, unable to perform  Gait: Deferred        Imaging/Diagnostics:  CT BRAIN OR HEAD (CPT=70450)     Result Date: 11/23/2024  PROCEDURE:  CT BRAIN OR HEAD (29682)  COMPARISON:  [facility] , CT, CT BRAIN OR HEAD (26363), 10/28/2024, 4:18 PM.  INDICATIONS:  ams  TECHNIQUE:  Noncontrast CT scanning is performed through the brain. Dose reduction techniques were used. Dose information is transmitted to the ACR (American College of Radiology) NRDR (National Radiology Data Registry) which includes the Dose Index Registry.  PATIENT STATED HISTORY: (As transcribed by Technologist)  Patient is here for AMS.    FINDINGS: No evidence of intracranial hemorrhage or  extra-axial fluid collection. Lucencies in the deep periventricular white matter are likely sequelae of chronic small vessel ischemic disease. Prominence of the sulci is noted. No mass effect. Visualized portions of paranasal sinuses are unremarkable. Visualized portions of the mastoid air cells are unremarkable. Visualized portions of the orbits are unremarkable. IMPRESSION: Global parenchymal volume loss is noted.  Sequelae of chronic small vessel ischemic disease is noted. No evidence of intracranial hemorrhage or extra-axial fluid collection.  No significant change since prior exam.    LOCATION:  [facility]   Dictated by (CST): Brett Singer MD on 11/23/2024 at 3:33 PM     Finalized by (CST): Brett Singer MD on 11/23/2024 at 3:34 PM        XR CHEST AP PORTABLE  (CPT=71045)     Result Date: 11/23/2024  CONCLUSION:  Perihilar streaky opacity peribronchial cuffing can be seen with bronchitis.   LOCATION:  [facility]      Dictated by (CST): David French MD on 11/23/2024 at 2:45 PM     Finalized by (CST): David French MD on 11/23/2024 at 2:46 PM            Labs:        Recent Labs   Lab 11/23/24  1321 11/24/24  0659 11/25/24  0607   RBC 3.34* 3.17* 3.33*   HGB 9.4* 8.8* 9.3*   HCT 30.4* 29.9* 31.1*   MCV 91.0 94.3 93.4   MCH 28.1 27.8 27.9   MCHC 30.9* 29.4* 29.9*   RDW 15.4 15.4 15.1   NEPRELIM 5.59 4.25 4.49   WBC 7.1 5.5 6.1   .0* 116.0* 118.0*                  Recent Labs   Lab 11/23/24  1321 11/24/24  0659 11/25/24  0607   * 104* 108*   BUN 44* 33* 24*   CREATSERUM 1.43* 0.93 0.94   EGFRCR 36* 60 59*   CA 11.8* 9.9 9.6    140 140   K 5.5* 4.6 4.3    110 110   CO2 24.0 22.0 23.0         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 Today more awake, alert to self and .   - CT scan of head - no acute intracranial abnormality.    - Ammonia level normal, resp panel  normal, ABGs unremarkable  - EEG to assess for subclinical seizures - abnormal, no EFA seen, moderate diffuse slowing into delta range was noted.     - MRI of the brain- pending  - PT/OT and rehab evals when able to cooperate.  - safety and falls precautions  Dementia with behavioral disturbance. Patient currently on rivastigmine, memantine as well as duloxetine and BuSpar.  Case discussed with  in detail. All questions answered. Discussed with dr. Beck as well, to follow with further recommendations if indicated.      Is this a shared or split note between Advanced Practice Provider and Physician? Yes         Michelle Hill APRHEIDI  [facility]  11/26/2024, 9:13 AM   [place] # [id number]        Impression/plan/MDM:  Patient seen and examined personally.  Investigations reviewed.     Encephalopathy, most likely toxic/metabolic, complicated by underlying dementia.  CT scan of the head did not show any acute intracranial abnormality.  Ammonia level normal, EEG reported slowing.  Request MRI of the brain plain.  Home pain medications discontinued.  Interval improvement noted.  Dementia with behavioral disturbance.  Patient currently on rivastigmine, memantine as well as duloxetine and BuSpar.             MEDICATIONS ADMINISTERED IN LAST 1 DAY:  aspirin DR tab 81 mg       Date Action Dose Route User    11/26/2024 0901 Given 81 mg Oral Ernestine Herman RN          atorvastatin (Lipitor) tab 40 mg       Date Action Dose Route User    11/25/2024 2022 Given 40 mg Oral Irineo Oconnor          busPIRone (Buspar) tab 15 mg       Date Action Dose Route User    11/26/2024 0901 Given 15 mg Oral Ernestine Herman RN    11/25/2024 2022 Given 15 mg Oral Irineo Oconnor          cefTRIAXone (Rocephin) 1 g in sodium chloride 0.9% 100 mL IVPB-ADDV       Date Action Dose Route User    11/26/2024 0900 New Bag 1 g Intravenous Ernestine Herman RN          DULoxetine (Cymbalta) DR cap 60 mg       Date Action  Dose Route User    11/26/2024 0901 Given 60 mg Oral Ernestine Herman RN    11/25/2024 2022 Given 60 mg Oral Irineo Oconnor          enoxaparin (Lovenox) 30 MG/0.3ML SUBQ injection 30 mg       Date Action Dose Route User    11/25/2024 2023 Given 30 mg Subcutaneous (Left Lower Abdomen) Irineo Oconnor          ferrous sulfate DR tab 325 mg       Date Action Dose Route User    11/26/2024 0901 Given 325 mg Oral Ernestine Herman RN          memantine (Namenda) tab 10 mg       Date Action Dose Route User    11/26/2024 0901 Given 10 mg Oral Ernestine Herman RN    11/25/2024 2022 Given 10 mg Oral Irineo Oconnor          rivastigmine (Exelon) 13.3 MG/24HR patch 1 patch       Date Action Dose Route User    11/25/2024 2023 Patch Applied 1 patch Transdermal (Right Anterior Chest) Irineo Oconnor          cyanocobalamin (Vitamin B12) tab 1,000 mcg       Date Action Dose Route User    11/26/2024 0901 Given 1,000 mcg Oral Ernestine Herman RN          cholecalciferol (Vitamin D3) tab 4,000 Units       Date Action Dose Route User    11/26/2024 0901 Given 4,000 Units Oral Ernestine Herman RN            Vitals (last day)       Date/Time Temp Pulse Resp BP SpO2 Weight O2 Device O2 Flow Rate (L/min) [facility]    11/26/24 1048 97.8 °F (36.6 °C) 81 16 119/58 98 % -- None (Room air) --     11/26/24 0735 98.6 °F (37 °C) 79 16 141/72 100 % -- -- -- ES    11/26/24 0640 98.4 °F (36.9 °C) 79 15 145/69 100 % -- -- --     11/25/24 2335 98.4 °F (36.9 °C) 87 14 129/73 100 % -- CPAP 4 L/min     11/25/24 1949 98.8 °F (37.1 °C) 96 18 121/65 96 % -- None (Room air) --     11/25/24 1506 98.8 °F (37.1 °C) 89 -- 112/81 100 % -- -- -- ES    11/25/24 1043 98.7 °F (37.1 °C) 89 -- 127/69 95 % -- -- -- ES    11/25/24 0700 99.5 °F (37.5 °C) 83 -- 175/86 99 % -- CPAP 2 L/min ES    11/25/24 0350 98.4 °F (36.9 °C) -- 16 -- -- -- CPAP 2 L/min KL          CIWA Scores (since admission)       None

## 2024-11-26 NOTE — PLAN OF CARE
Pt awake and alert this morning.  Oriented x 1, mumbling words at times, nonsensical at times.  Unable to follow simple commands.  Incontinent of urine, periarea redness, pericare provided with brief change.  Appetite good, staff/family assist with meals.  Blood sugars stable.  Seen by hospitalist and neuro. EEG done today. MRI brain ordered, screening form completed.   at bedside during day shift.  Writer spoke with daughterGracie via phone, updated re: pt status and plan of care.  Verbalizes understanding.  Meds given per MAR.  Needs addressed.  Isolation precautions and safety measures in place.     Problem: Diabetes/Glucose Control  Goal: Glucose maintained within prescribed range  Description: INTERVENTIONS:  - Monitor Blood Glucose as ordered  - Assess for signs and symptoms of hyperglycemia and hypoglycemia  - Administer ordered medications to maintain glucose within target range  - Assess barriers to adequate nutritional intake and initiate nutrition consult as needed  - Instruct patient on self management of diabetes  Outcome: Progressing     Problem: CARDIOVASCULAR - ADULT  Goal: Maintains optimal cardiac output and hemodynamic stability  Description: INTERVENTIONS:  - Monitor vital signs, rhythm, and trends  - Monitor for bleeding, hypotension and signs of decreased cardiac output  - Evaluate effectiveness of vasoactive medications to optimize hemodynamic stability  - Monitor arterial and/or venous puncture sites for bleeding and/or hematoma  - Assess quality of pulses, skin color and temperature  - Assess for signs of decreased coronary artery perfusion - ex. Angina  - Evaluate fluid balance, assess for edema, trend weights  Outcome: Progressing     Problem: RESPIRATORY - ADULT  Goal: Achieves optimal ventilation and oxygenation  Description: INTERVENTIONS:  - Assess for changes in respiratory status  - Assess for changes in mentation and behavior  - Position to facilitate oxygenation and  minimize respiratory effort  - Oxygen supplementation based on oxygen saturation or ABGs  - Provide Smoking Cessation handout, if applicable  - Encourage broncho-pulmonary hygiene including cough, deep breathe, Incentive Spirometry  - Assess the need for suctioning and perform as needed  - Assess and instruct to report SOB or any respiratory difficulty  - Respiratory Therapy support as indicated  - Manage/alleviate anxiety  - Monitor for signs/symptoms of CO2 retention  Outcome: Progressing     Problem: METABOLIC/FLUID AND ELECTROLYTES - ADULT  Goal: Glucose maintained within prescribed range  Description: INTERVENTIONS:  - Monitor Blood Glucose as ordered  - Assess for signs and symptoms of hyperglycemia and hypoglycemia  - Administer ordered medications to maintain glucose within target range  - Assess barriers to adequate nutritional intake and initiate nutrition consult as needed  - Instruct patient on self management of diabetes  Outcome: Progressing     Problem: SKIN/TISSUE INTEGRITY - ADULT  Goal: Incision(s), wounds(s) or drain site(s) healing without S/S of infection  Description: INTERVENTIONS:  - Assess and document risk factors for pressure ulcer development  - Assess and document skin integrity  - Assess and document dressing/incision, wound bed, drain sites and surrounding tissue  - Implement wound care per orders  - Initiate isolation precautions as appropriate  - Initiate Pressure Ulcer prevention bundle as indicated  Outcome: Progressing     Problem: NEUROLOGICAL - ADULT  Goal: Achieves stable or improved neurological status  Description: INTERVENTIONS  - Assess for and report changes in neurological status  - Initiate measures to prevent increased intracranial pressure  - Maintain blood pressure and fluid volume within ordered parameters to optimize cerebral perfusion and minimize risk of hemorrhage  - Monitor temperature, glucose, and sodium. Initiate appropriate interventions as ordered  Outcome:  Not Progressing  Goal: Achieves maximal functionality and self care  Description: INTERVENTIONS  - Monitor swallowing and airway patency with patient fatigue and changes in neurological status  - Encourage and assist patient to increase activity and self care with guidance from PT/OT  - Encourage visually impaired, hearing impaired and aphasic patients to use assistive/communication devices  Outcome: Not Progressing     Problem: Impaired Functional Mobility  Goal: Achieve highest/safest level of mobility/gait  Description: Interventions:  - Assess patient's functional ability and stability  - Promote increasing activity/tolerance for mobility and gait  - Educate and engage patient/family in tolerated activity level and precautions  - Recommend use of total lift for transfers  Outcome: Not Progressing     Problem: Impaired Cognition  Goal: Patient will exhibit improved attention, thought processing and/or memory  Description: Interventions:  - Encourage use of hearing aids  Outcome: Not Progressing

## 2024-11-26 NOTE — PROGRESS NOTES
.[facility] Hospitalist note    PCP: Jorge Lr MD    Chief Complaint:  F/u fatigue/weakness    SUBJECTIVE:  More alert this morning after some prodding. Interactive and opening eyes and staying awake. Pattern of wake/sleep is pretty typical per pt's . She has been eating well.     OBJECTIVE:  Temp:  [98.4 °F (36.9 °C)-98.8 °F (37.1 °C)] 98.6 °F (37 °C)  Pulse:  [79-96] 79  Resp:  [14-18] 16  BP: (112-145)/(65-81) 141/72  SpO2:  [95 %-100 %] 100 %    Intake/Output:    Intake/Output Summary (Last 24 hours) at 11/26/2024 0827  Last data filed at 11/26/2024 0541  Gross per 24 hour   Intake 1341 ml   Output --   Net 1341 ml       Last 3 Weights   11/23/24 1745 153 lb 10.6 oz (69.7 kg)   10/28/24 2021 153 lb 10.6 oz (69.7 kg)   06/28/23 0351 153 lb 9.6 oz (69.7 kg)   06/27/23 2357 150 lb (68 kg)       Exam  Gen: No acute distress  HEENT: anicteric sclera, MMM  Pulm: Lungs clear, normal respiratory effort  CV: Heart with regular rate and rhythm, no peripheral edema  Abd: Abdomen soft, nontender, nondistended, no organomegaly, bowel sounds present  Skin: no rashes or lesions  Neuro: lethargic. Doesn't follow commands consistently other than briefly flickering open eyes    Data Review:         Labs:     Recent Labs   Lab 11/23/24  1321 11/24/24  0659 11/25/24  0607   WBC 7.1 5.5 6.1   HGB 9.4* 8.8* 9.3*   MCV 91.0 94.3 93.4   .0* 116.0* 118.0*   NE 5.59 4.25 4.49   LYMABS 0.61* 0.57* 0.70*       Recent Labs   Lab 11/23/24  1321 11/24/24  0659 11/25/24  0607    140 140   K 5.5* 4.6 4.3    110 110   CO2 24.0 22.0 23.0   BUN 44* 33* 24*   CREATSERUM 1.43* 0.93 0.94   CA 11.8* 9.9 9.6   MG  --   --  2.1   * 104* 108*       Recent Labs   Lab 11/23/24  1321   ALT 10   AST 15   ALB 4.2       Recent Labs   Lab 11/25/24  0607   PCT 0.09*       No results for input(s): \"CRP\", \"ERICH\", \"LDH\", \"DDIMER\" in the last 168 hours.    Recent Labs   Lab 11/25/24  0506 11/25/24  1113 11/25/24  1631  11/25/24 2121 11/26/24  0553   PGLU 109* 154* 99 131* 119*       Meds:   Scheduled Medication:   enoxaparin  40 mg Subcutaneous Nightly    cefTRIAXone  1 g Intravenous Q24H    insulin aspart  1-10 Units Subcutaneous TID AC and HS    aspirin  81 mg Oral Daily    atorvastatin  40 mg Oral Nightly    busPIRone  15 mg Oral BID    cholecalciferol  4,000 Units Oral Daily    DULoxetine  60 mg Oral BID    pantoprazole  40 mg Oral QAM AC    ferrous sulfate  325 mg Oral Daily with breakfast    memantine  10 mg Oral BID    rivastigmine  1 patch Transdermal Nightly    cyanocobalamin  1,000 mcg Oral Daily     Continuous Infusing Medication:   sodium chloride 50 mL/hr at 11/23/24 1830     PRN Medication:  acetaminophen    acetaminophen **OR** HYDROcodone-acetaminophen **OR** HYDROcodone-acetaminophen    polyethylene glycol (PEG 3350)    sennosides    bisacodyl    ondansetron    metoclopramide    melatonin    glucose **OR** glucose **OR** glucose-vitamin C **OR** dextrose **OR** glucose **OR** glucose **OR** glucose-vitamin C       Microbiology:    Hospital Encounter on 11/23/24   1. Urine Culture, Routine     Status: None    Collection Time: 11/23/24  1:41 PM    Specimen: Urine, clean catch   Result Value Ref Range    Urine Culture No Growth at 18-24 hrs. N/A       Lab Results   Component Value Date    COVID19 Not Detected 11/25/2024    COVID19 Not Detected 11/23/2024    COVID19 Not Detected 10/28/2024        Assessment/Plan:   85 yo woman with h/o htn/hl, dementia, psvt who presented with fatigue, lethargy.  Recent outpt treatment of UTI.     Lethargy/AMS  - initially suspected infectious process but workup neg thus far  - urine culture from this admit 11/23 ntd  - sars/flu/rsv neg  - cxr neg  - ct head neg  - ammonia, abg okay. Resp viral panel neg, procal not significantly elevated.  - neuro consulted- mri pending. EEG with slowing.    UTIs  - treated recently as outpt. Grew proteus from 11/13 with mixed resistance pattern.  11/23 urine culture with mixed mehul.  - repeat urine culture 11/23 here is ntd  - stopping ceftriaxone as cultures neg.     DK  Hyperkalemia  - resolved with IVF. Pt taking good po so will stop ivf     Anxiety  Depression  Dementia  -buspirone  -duloxetine  -memantine  -rivastigmine  -do not suspect these meds playing a direct role in presentation     GERD  -pantoprazole     HTN  -sbp stable     HLD  -atorvastatin      DANNIE     Weakness  -PT/OT rec home with home PT     Quality:  DVT Prophylaxis: scd, lovenox  CODE status: Full per chart  Goldstein: none    If pt remains improved and MRI okay, may be able to dc tomorrow.      Kristy Briceno MD  [facility]  Pager: [phone]\

## 2024-11-27 ENCOUNTER — APPOINTMENT (OUTPATIENT)
Dept: MRI IMAGING | Facility: HOSPITAL | Age: 84
DRG: 811 | End: 2024-11-27
Attending: Other
Payer: MEDICARE

## 2024-11-27 LAB
GLUCOSE BLD-MCNC: 104 MG/DL (ref 70–99)
GLUCOSE BLD-MCNC: 149 MG/DL (ref 70–99)
GLUCOSE BLD-MCNC: 153 MG/DL (ref 70–99)
GLUCOSE BLD-MCNC: 169 MG/DL (ref 70–99)

## 2024-11-27 PROCEDURE — 99233 SBSQ HOSP IP/OBS HIGH 50: CPT | Performed by: OTHER

## 2024-11-27 PROCEDURE — 70551 MRI BRAIN STEM W/O DYE: CPT | Performed by: OTHER

## 2024-11-27 NOTE — PROGRESS NOTES
Patient seen and examined.  Patient's  present at the time of visit.  Patient currently sleeping.   states that patient was able to finish her breakfast with the help of nursing staff and him self and has been very talkative today.  She does just a while ago.  No new weakness, numbness, paresthesias.    Impression/MDM:    Encephalopathy, most likely toxic/metabolic, complicated by underlying dementia.  CT scan of the head did not show any acute intracranial abnormality.  Ammonia level normal, EEG reported slowing.  MRI did not report any acute intracranial abnormality.  Interval improvement noted.  Advise OT/PT/rehab.  Dementia with behavioral disturbance.  Patient currently on rivastigmine, memantine as well as duloxetine and BuSpar.  Case discussed with patient's  in detail.  All questions answered.  No further neurorecommendations at this time.  Please feel free to call for further queries.    Time spent 35 minutes.  Greater than 50% time spent counseling.

## 2024-11-27 NOTE — PROGRESS NOTES
.[facility] Hospitalist note    PCP: Jorge Lr MD    Chief Complaint:  F/u fatigue/weakness    SUBJECTIVE:  Patient currently dozing during my visit but apparently was very interrupted and talkative earlier.  Was eating breakfast.   remains quite concerned over patient's ability to function at home as activity was limited with physical therapy yesterday    OBJECTIVE:  Temp:  [97.9 °F (36.6 °C)-99.8 °F (37.7 °C)] 98.3 °F (36.8 °C)  Pulse:  [78-94] 94  Resp:  [14-18] 18  BP: (102-140)/(61-78) 102/61  SpO2:  [97 %-100 %] 97 %    Intake/Output:    Intake/Output Summary (Last 24 hours) at 11/27/2024 1737  Last data filed at 11/27/2024 0610  Gross per 24 hour   Intake --   Output 300 ml   Net -300 ml       Last 3 Weights   11/23/24 1745 153 lb 10.6 oz (69.7 kg)   10/28/24 2021 153 lb 10.6 oz (69.7 kg)   06/28/23 0351 153 lb 9.6 oz (69.7 kg)   06/27/23 2357 150 lb (68 kg)       Exam  Gen: No acute distress  HEENT: anicteric sclera, MMM  Pulm: Lungs clear, normal respiratory effort  CV: Heart with regular rate and rhythm, no peripheral edema  Abd: Abdomen soft, nontender, nondistended, no organomegaly, bowel sounds present  Skin: no rashes or lesions  Neuro: lethargic. Doesn't follow commands consistently other than briefly flickering open eyes    Data Review:         Labs:     Recent Labs   Lab 11/23/24  1321 11/24/24  0659 11/25/24  0607   WBC 7.1 5.5 6.1   HGB 9.4* 8.8* 9.3*   MCV 91.0 94.3 93.4   .0* 116.0* 118.0*   NE 5.59 4.25 4.49   LYMABS 0.61* 0.57* 0.70*       Recent Labs   Lab 11/23/24  1321 11/24/24  0659 11/25/24  0607    140 140   K 5.5* 4.6 4.3    110 110   CO2 24.0 22.0 23.0   BUN 44* 33* 24*   CREATSERUM 1.43* 0.93 0.94   CA 11.8* 9.9 9.6   MG  --   --  2.1   * 104* 108*       Recent Labs   Lab 11/23/24  1321   ALT 10   AST 15   ALB 4.2       Recent Labs   Lab 11/25/24  0607   PCT 0.09*       No results for input(s): \"CRP\", \"ERICH\", \"LDH\", \"DDIMER\" in the last 168  hours.    Recent Labs   Lab 11/26/24  1124 11/26/24  1654 11/26/24  2124 11/27/24  0545 11/27/24  1154   PGLU 167* 103* 124* 104* 169*       Meds:   Scheduled Medication:   enoxaparin  40 mg Subcutaneous Nightly    insulin aspart  1-10 Units Subcutaneous TID AC and HS    aspirin  81 mg Oral Daily    atorvastatin  40 mg Oral Nightly    busPIRone  15 mg Oral BID    cholecalciferol  4,000 Units Oral Daily    DULoxetine  60 mg Oral BID    pantoprazole  40 mg Oral QAM AC    ferrous sulfate  325 mg Oral Daily with breakfast    memantine  10 mg Oral BID    rivastigmine  1 patch Transdermal Nightly    cyanocobalamin  1,000 mcg Oral Daily     Continuous Infusing Medication:      PRN Medication:  acetaminophen    acetaminophen **OR** [DISCONTINUED] HYDROcodone-acetaminophen **OR** [DISCONTINUED] HYDROcodone-acetaminophen    polyethylene glycol (PEG 3350)    sennosides    bisacodyl    ondansetron    metoclopramide    melatonin    glucose **OR** glucose **OR** glucose-vitamin C **OR** dextrose **OR** glucose **OR** glucose **OR** glucose-vitamin C       Microbiology:    Hospital Encounter on 11/23/24   1. Urine Culture, Routine     Status: None    Collection Time: 11/23/24  1:41 PM    Specimen: Urine, clean catch   Result Value Ref Range    Urine Culture No Growth at 18-24 hrs. N/A       Lab Results   Component Value Date    COVID19 Not Detected 11/25/2024    COVID19 Not Detected 11/23/2024    COVID19 Not Detected 10/28/2024        Assessment/Plan:   83 yo woman with h/o htn/hl, dementia, psvt who presented with fatigue, lethargy.  Recent outpt treatment of UTI.     Lethargy/AMS  - initially suspected infectious process but workup neg thus far  - urine culture from this admit 11/23 ntd  - sars/flu/rsv neg  - cxr neg  - ct head neg  - ammonia, abg okay. Resp viral panel neg, procal not significantly elevated.  - neuro consulted- mri and eeg without acute abn    UTIs  - treated recently as outpt. Grew proteus from 11/13 with  mixed resistance pattern. 11/23 urine culture with mixed mehul.  - repeat urine culture 11/23 here is ntd  - stopping ceftriaxone as cultures neg.     DK  Hyperkalemia  - resolved with IVF.     Anxiety  Depression  Dementia  -buspirone  -duloxetine  -memantine  -rivastigmine  -do not suspect these meds playing a direct role in presentation     GERD  -pantoprazole     HTN  -sbp stable     HLD  -atorvastatin      DANNIE     Weakness  -PT/OT saw pt. Felt to benefit from [facility]. Will need continued work with PT to see if she can improve her mobility prior to dc     Quality:  DVT Prophylaxis: scd, lovenox  CODE status: Full per chart  Goldstein: none        Kristy Briceno MD  [facility] Hospitalist  Pager: [phone]\

## 2024-11-27 NOTE — PLAN OF CARE
Pt A&Ox1, no complaints of pain, VSS, on CPAP at night. Pt changed and repositioned. Pt resting in bed, safety precautions in place.     Problem: Diabetes/Glucose Control  Goal: Glucose maintained within prescribed range  Description: INTERVENTIONS:  - Monitor Blood Glucose as ordered  - Assess for signs and symptoms of hyperglycemia and hypoglycemia  - Administer ordered medications to maintain glucose within target range  - Assess barriers to adequate nutritional intake and initiate nutrition consult as needed  - Instruct patient on self management of diabetes  Outcome: Progressing     Problem: CARDIOVASCULAR - ADULT  Goal: Maintains optimal cardiac output and hemodynamic stability  Description: INTERVENTIONS:  - Monitor vital signs, rhythm, and trends  - Monitor for bleeding, hypotension and signs of decreased cardiac output  - Evaluate effectiveness of vasoactive medications to optimize hemodynamic stability  - Monitor arterial and/or venous puncture sites for bleeding and/or hematoma  - Assess quality of pulses, skin color and temperature  - Assess for signs of decreased coronary artery perfusion - ex. Angina  - Evaluate fluid balance, assess for edema, trend weights  Outcome: Progressing  Goal: Absence of cardiac arrhythmias or at baseline  Description: INTERVENTIONS:  - Continuous cardiac monitoring, monitor vital signs, obtain 12 lead EKG if indicated  - Evaluate effectiveness of antiarrhythmic and heart rate control medications as ordered  - Initiate emergency measures for life threatening arrhythmias  - Monitor electrolytes and administer replacement therapy as ordered  Outcome: Progressing     Problem: METABOLIC/FLUID AND ELECTROLYTES - ADULT  Goal: Glucose maintained within prescribed range  Description: INTERVENTIONS:  - Monitor Blood Glucose as ordered  - Assess for signs and symptoms of hyperglycemia and hypoglycemia  - Administer ordered medications to maintain glucose within target range  - Assess  barriers to adequate nutritional intake and initiate nutrition consult as needed  - Instruct patient on self management of diabetes  Outcome: Progressing  Goal: Electrolytes maintained within normal limits  Description: INTERVENTIONS:  - Monitor labs and rhythm and assess patient for signs and symptoms of electrolyte imbalances  - Administer electrolyte replacement as ordered  - Monitor response to electrolyte replacements, including rhythm and repeat lab results as appropriate  - Fluid restriction as ordered  - Instruct patient on fluid and nutrition restrictions as appropriate  Outcome: Progressing     Problem: SKIN/TISSUE INTEGRITY - ADULT  Goal: Skin integrity remains intact  Description: INTERVENTIONS  - Assess and document risk factors for pressure ulcer development  - Assess and document skin integrity  - Monitor for areas of redness and/or skin breakdown  - Initiate interventions, skin care algorithm/standards of care as needed  Outcome: Progressing  Goal: Incision(s), wounds(s) or drain site(s) healing without S/S of infection  Description: INTERVENTIONS:  - Assess and document risk factors for pressure ulcer development  - Assess and document skin integrity  - Assess and document dressing/incision, wound bed, drain sites and surrounding tissue  - Implement wound care per orders  - Initiate isolation precautions as appropriate  - Initiate Pressure Ulcer prevention bundle as indicated  Outcome: Progressing  Goal: Oral mucous membranes remain intact  Description: INTERVENTIONS  - Assess oral mucosa and hygiene practices  - Implement preventative oral hygiene regimen  - Implement oral medicated treatments as ordered  Outcome: Progressing     Problem: NEUROLOGICAL - ADULT  Goal: Achieves stable or improved neurological status  Description: INTERVENTIONS  - Assess for and report changes in neurological status  - Initiate measures to prevent increased intracranial pressure  - Maintain blood pressure and fluid  volume within ordered parameters to optimize cerebral perfusion and minimize risk of hemorrhage  - Monitor temperature, glucose, and sodium. Initiate appropriate interventions as ordered  Outcome: Progressing  Goal: Achieves maximal functionality and self care  Description: INTERVENTIONS  - Monitor swallowing and airway patency with patient fatigue and changes in neurological status  - Encourage and assist patient to increase activity and self care with guidance from PT/OT  - Encourage visually impaired, hearing impaired and aphasic patients to use assistive/communication devices  Outcome: Progressing     Problem: Impaired Functional Mobility  Goal: Achieve highest/safest level of mobility/gait  Description: Interventions:  - Assess patient's functional ability and stability  - Promote increasing activity/tolerance for mobility and gait  - Educate and engage patient/family in tolerated activity level and precautions  Outcome: Progressing     Problem: Impaired Cognition  Goal: Patient will exhibit improved attention, thought processing and/or memory  Description: Interventions:  Outcome: Progressing

## 2024-11-27 NOTE — SPIRITUAL CARE NOTE
Spiritual Care Visit Note    Patient Name: Isabella Addison Date of Spiritual Care Visit: 24   : 1940 Primary Dx: Weakness generalized       Referred By:      Spiritual Care Taxonomy:    Intended Effects: Demonstrate caring and concern    Methods: Collaborate with care team member;Explore spiritual/Hoahaoism beliefs    Interventions: Active listening;Ask guided questions;Explain  role    Visit Type/Summary:     - Spiritual Care: Consulted with RN prior to visit. Offered empathic listening and emotional support. Patient and family expressed appreciation for  visit. Provided information regarding how to contact Spiritual Care and left a Spiritual Care information card.  remains available as needed for follow up. Patient stated that she has been  for 68 years and has two children. Patient's  stated that they use to attend a [employer] but don't anymore because of health problems.Patient is supported by her  and children.     Spiritual Care support can be requested via an Epic consult. For urgent/immediate needs, please contact the On Call  at: [facility]: ext [phone]           Chaplain Resident Dalia Dawson MA

## 2024-11-27 NOTE — PLAN OF CARE
Patient is alert and oriented to self. No complaints of pain. Medications given per MAR. IVF discontinued. PT worked with the patient. Patient spent most of day in the chair. Safety precautions in place. Call light within reach.      Problem: Diabetes/Glucose Control  Goal: Glucose maintained within prescribed range  Description: INTERVENTIONS:  - Monitor Blood Glucose as ordered  - Assess for signs and symptoms of hyperglycemia and hypoglycemia  - Administer ordered medications to maintain glucose within target range  - Assess barriers to adequate nutritional intake and initiate nutrition consult as needed  - Instruct patient on self management of diabetes  Outcome: Progressing     Problem: CARDIOVASCULAR - ADULT  Goal: Maintains optimal cardiac output and hemodynamic stability  Description: INTERVENTIONS:  - Monitor vital signs, rhythm, and trends  - Monitor for bleeding, hypotension and signs of decreased cardiac output  - Evaluate effectiveness of vasoactive medications to optimize hemodynamic stability  - Monitor arterial and/or venous puncture sites for bleeding and/or hematoma  - Assess quality of pulses, skin color and temperature  - Assess for signs of decreased coronary artery perfusion - ex. Angina  - Evaluate fluid balance, assess for edema, trend weights  Outcome: Progressing  Goal: Absence of cardiac arrhythmias or at baseline  Description: INTERVENTIONS:  - Continuous cardiac monitoring, monitor vital signs, obtain 12 lead EKG if indicated  - Evaluate effectiveness of antiarrhythmic and heart rate control medications as ordered  - Initiate emergency measures for life threatening arrhythmias  - Monitor electrolytes and administer replacement therapy as ordered  Outcome: Progressing     Problem: RESPIRATORY - ADULT  Goal: Achieves optimal ventilation and oxygenation  Description: INTERVENTIONS:  - Assess for changes in respiratory status  - Assess for changes in mentation and behavior  - Position to  facilitate oxygenation and minimize respiratory effort  - Oxygen supplementation based on oxygen saturation or ABGs  - Provide Smoking Cessation handout, if applicable  - Encourage broncho-pulmonary hygiene including cough, deep breathe, Incentive Spirometry  - Assess the need for suctioning and perform as needed  - Assess and instruct to report SOB or any respiratory difficulty  - Respiratory Therapy support as indicated  - Manage/alleviate anxiety  - Monitor for signs/symptoms of CO2 retention  Outcome: Progressing     Problem: GENITOURINARY - ADULT  Goal: Absence of urinary retention  Description: INTERVENTIONS:  - Assess patient’s ability to void and empty bladder  - Monitor intake/output and perform bladder scan as needed  - Follow urinary retention protocol/standard of care  - Consider collaborating with pharmacy to review patient's medication profile  - Implement strategies to promote bladder emptying  Outcome: Progressing     Problem: METABOLIC/FLUID AND ELECTROLYTES - ADULT  Goal: Glucose maintained within prescribed range  Description: INTERVENTIONS:  - Monitor Blood Glucose as ordered  - Assess for signs and symptoms of hyperglycemia and hypoglycemia  - Administer ordered medications to maintain glucose within target range  - Assess barriers to adequate nutritional intake and initiate nutrition consult as needed  - Instruct patient on self management of diabetes  Outcome: Progressing  Goal: Electrolytes maintained within normal limits  Description: INTERVENTIONS:  - Monitor labs and rhythm and assess patient for signs and symptoms of electrolyte imbalances  - Administer electrolyte replacement as ordered  - Monitor response to electrolyte replacements, including rhythm and repeat lab results as appropriate  - Fluid restriction as ordered  - Instruct patient on fluid and nutrition restrictions as appropriate  Outcome: Progressing     Problem: SKIN/TISSUE INTEGRITY - ADULT  Goal: Skin integrity remains  intact  Description: INTERVENTIONS  - Assess and document risk factors for pressure ulcer development  - Assess and document skin integrity  - Monitor for areas of redness and/or skin breakdown  - Initiate interventions, skin care algorithm/standards of care as needed  Outcome: Progressing  Goal: Incision(s), wounds(s) or drain site(s) healing without S/S of infection  Description: INTERVENTIONS:  - Assess and document risk factors for pressure ulcer development  - Assess and document skin integrity  - Assess and document dressing/incision, wound bed, drain sites and surrounding tissue  - Implement wound care per orders  - Initiate isolation precautions as appropriate  - Initiate Pressure Ulcer prevention bundle as indicated  Outcome: Progressing  Goal: Oral mucous membranes remain intact  Description: INTERVENTIONS  - Assess oral mucosa and hygiene practices  - Implement preventative oral hygiene regimen  - Implement oral medicated treatments as ordered  Outcome: Progressing     Problem: NEUROLOGICAL - ADULT  Goal: Achieves stable or improved neurological status  Description: INTERVENTIONS  - Assess for and report changes in neurological status  - Initiate measures to prevent increased intracranial pressure  - Maintain blood pressure and fluid volume within ordered parameters to optimize cerebral perfusion and minimize risk of hemorrhage  - Monitor temperature, glucose, and sodium. Initiate appropriate interventions as ordered  Outcome: Progressing  Goal: Achieves maximal functionality and self care  Description: INTERVENTIONS  - Monitor swallowing and airway patency with patient fatigue and changes in neurological status  - Encourage and assist patient to increase activity and self care with guidance from PT/OT  - Encourage visually impaired, hearing impaired and aphasic patients to use assistive/communication devices  Outcome: Progressing     Problem: Impaired Functional Mobility  Goal: Achieve highest/safest  level of mobility/gait  Description: Interventions:  - Assess patient's functional ability and stability  - Promote increasing activity/tolerance for mobility and gait  - Educate and engage patient/family in tolerated activity level and precautions  Outcome: Progressing     Problem: Impaired Cognition  Goal: Patient will exhibit improved attention, thought processing and/or memory  Description: Interventions:  Outcome: Progressing

## 2024-11-27 NOTE — CM/SW NOTE
SW met with patient's  at bedside to discuss DC planning. SW provided him with [facility] choice list in the event that he is agreeable to going to [facility] placement after DC. He requested SW highlight facilities that specialize in Memory Care. SW highlighted facilities.     Patient's  stated that he really wants to make sure that she can walk before he commits to her going to rehab. SW stressed that in the event that patient is not able to walk but has been cleared for DC that the [facility] option would likely be the safest option for her.     PT arrived to meet with patient. SW will follow up for verdict regarding [facility] choice. Patient would need insurance authorization for rehab.     SW will continue to follow for plan of care changes and remain available for any additional DC needs or concerns.     Trish Pascual MSW, LSW  Discharge Planner   [phone]

## 2024-11-27 NOTE — PHYSICAL THERAPY NOTE
PHYSICAL THERAPY TREATMENT NOTE - INPATIENT    Room Number: 431/431-A     Session: 2     Number of Visits to Meet Established Goals: 5    Presenting Problem: admit with fatigue, weakness, AMS  Co-Morbidities : anxiety, depression, GERD, HTN, HLD, IBS, alzheimers, chf, osteoporosis, paroxysmal SVT  History related to current admission: Patient is a 84 year old female admitted on 11/23/2024 from home with weakness, fatigue, AMS.     HOME SITUATION  Type of Home: House  Home Layout: Two level  Stairs to Enter : 1        Stairs to Bedroom: 13         Lives With: Spouse    Drives: No   Patient Regularly Uses: Rolling walker       Prior Level of [place]: pt spouse at bedside providing information. Prior to admission, pt was ambulating with RW and was able to negotiate stairs to her bedroom with supervision from spouse. Pt spouse providing roughly CGA/SBA for toileting. Just prior to hospitalization, pt spouse reports with incr weakness, he had the pt staying on the main level of the home. Has tub shower with tub transfer bench. Spouse assists with lower body dressing.    PHYSICAL THERAPY ASSESSMENT   Patient demonstrates limited progress this session, goals  remain in progress.      Patient is requiring minimal assist and moderate assist as a result of the following impairments: decreased functional strength, decreased endurance/aerobic capacity, pain, impaired   balance, impaired motor planning, decreased muscular endurance, cognitive deficits ( ), and decreased compliance/participation.     Patient continues to function below baseline with bed mobility, transfers, gait, maintaining seated position, and standing prolonged periods.  Next session anticipate patient to progress bed mobility, transfers, gait, maintaining seated position, and standing prolonged periods.  Physical Therapy will continue to follow patient for duration of hospitalization.    Patient continues to benefit from continued skilled PT services:  to promote return to prior level of function and safety with continuous assistance and gradual rehabilitative therapy .    PLAN DURING HOSPITALIZATION  Nursing Mobility Recommendation : 1 Assist (2 people, SPT with RW)  PT Device Recommendation: Mechanical lift  PT Treatment Plan: Bed mobility;Body mechanics;Endurance;Energy conservation;Patient education;Family education;Gait training;Neuromuscular re-educate;Range of motion;Strengthening;Transfer training;Stair training;Balance training;Stoop training  Frequency (Obs): 3x/week     CURRENT GOALS     Goal #1 Patient is able to perform rolling L and R with modA      Goal #2 Patient is able to  complete HEP with assist from caregiver/family      Goal #3 Patient is will follow 75% of commands for meaningful participation and carryover in therapy      Goal #4 New goal: patient to amb with RW and cga 25 feet.      Goal #5 New goal: patient to transfer from bed to chair with cga.    Goal #6     Goal Comments: Goals established on 11/24/2024 11/27/2024 all goals ongoing    SUBJECTIVE  \"I can't!\"    OBJECTIVE  Precautions: Bed/chair alarm    WEIGHT BEARING RESTRICTION     PAIN ASSESSMENT   Rating: Unable to rate  Location: neck and back  Management Techniques: Activity promotion;Relaxation;Repositioning    BALANCE                                                                                                                       Static Sitting: Fair -  Dynamic Sitting: Poor +           Static Standing: Poor +  Dynamic Standing: Poor    ACTIVITY TOLERANCE                         O2 WALK       AM-PAC '6-Clicks' INPATIENT SHORT FORM - BASIC MOBILITY  How much difficulty does the patient currently have...  Patient Difficulty: Turning over in bed (including adjusting bedclothes, sheets and blankets)?: A Lot   Patient Difficulty: Sitting down on and standing up from a chair with arms (e.g., wheelchair, bedside commode, etc.): A Lot   Patient Difficulty: Moving from lying on  back to sitting on the side of the bed?: A Lot   How much help from another person does the patient currently need...   Help from Another: Moving to and from a bed to a chair (including a wheelchair)?: A Lot   Help from Another: Need to walk in hospital room?: A Lot   Help from Another: Climbing 3-5 steps with a railing?: Total     AM-PAC Score:  Raw Score: 11   Approx Degree of Impairment: 72.57%   Standardized Score (AM-PAC Scale): 33.86   CMS Modifier (G-Code): CL    FUNCTIONAL ABILITY STATUS  Gait Assessment   Functional Mobility/Gait Assessment  Gait Assistance: Moderate assistance  Distance (ft):  (several steps from bed to chair)  Assistive Device: Rolling walker  Pattern: Shuffle (head down, flexed posture, attempting to sit prior to being aligned with surface)    Skilled Therapy Provided: Per RN matthias to work with pt. Pt received in supine and was agreeable to PT session with encouragement.     Bed Mobility:  Rolling: NT   Supine<>Sit: max A    Sit<>Supine: NT     Transfer Mobility:  Sit<>Stand: mod-max A    Stand<>Sit: mod-max A    Gait: Pt took several steps from bed to chair with RW and max A. Pt attempting to sit prematurely and assist at the buttocks required.     Pt completed 6 sit to/from stand reps (3 from the bed and 3 from the chair). Pt needed consistent cuing for upright posture and to not sit prematurely. Attempted to have pt march in standing, but pt unable to do so.     Therapist's Comments: Pt and spouse educated on role of therapy, goals for session, safety, fall prevention, and activity recommendations.       THERAPEUTIC EXERCISES  Lower Extremity Alternating marching  LAQ     Upper Extremity N/a     Position Sitting     Repetitions   5   Sets   3     Patient End of Session: Up in chair;Needs met;RN aware of session/findings;Call light within reach;All patient questions and concerns addressed;Hospital anti-slip socks;Alarm set;Family present;Discussed recommendations with case  manager/    PT Session Time: 25 minutes  Therapeutic Activity: 15 minutes  Therapeutic Exercise: 10 minutes

## 2024-11-27 NOTE — PAYOR COMM NOTE
CONTINUED STAY REVIEW    Payor: [employer] MED[employer] ADV PPO  Subscriber #:  [id number]  Authorization Number: [id number]    Admit date: 11/23/24  Admit time:  5:36 PM    REVIEW DOCUMENTATION: 11/26    [facility]ist note     PCP: Jorge Lr MD     Chief Complaint:  F/u fatigue/weakness     SUBJECTIVE:  More alert this morning after some prodding. Interactive and opening eyes and staying awake. Pattern of wake/sleep is pretty typical per pt's . She has been eating well.      OBJECTIVE:  Temp:  [98.4 °F (36.9 °C)-98.8 °F (37.1 °C)] 98.6 °F (37 °C)  Pulse:  [79-96] 79  Resp:  [14-18] 16  BP: (112-145)/(65-81) 141/72  SpO2:  [95 %-100 %] 100 %     Intake/Output:     Intake/Output Summary (Last 24 hours) at 11/26/2024 0827  Last data filed at 11/26/2024 0541      Gross per 24 hour   Intake 1341 ml   Output --   Net 1341 ml              Last 3 Weights   11/23/24 1745 153 lb 10.6 oz (69.7 kg)   10/28/24 2021 153 lb 10.6 oz (69.7 kg)   06/28/23 0351 153 lb 9.6 oz (69.7 kg)   06/27/23 2357 150 lb (68 kg)         Exam  Gen: No acute distress  HEENT: anicteric sclera, MMM  Pulm: Lungs clear, normal respiratory effort  CV: Heart with regular rate and rhythm, no peripheral edema  Abd: Abdomen soft, nontender, nondistended, no organomegaly, bowel sounds present  Skin: no rashes or lesions  Neuro: lethargic. Doesn't follow commands consistently other than briefly flickering open eyes     Data Review:          Labs:            Recent Labs   Lab 11/23/24  1321 11/24/24  0659 11/25/24  0607   WBC 7.1 5.5 6.1   HGB 9.4* 8.8* 9.3*   MCV 91.0 94.3 93.4   .0* 116.0* 118.0*   NE 5.59 4.25 4.49   LYMABS 0.61* 0.57* 0.70*               Recent Labs   Lab 11/23/24  1321 11/24/24  0659 11/25/24  0607    140 140   K 5.5* 4.6 4.3    110 110   CO2 24.0 22.0 23.0   BUN 44* 33* 24*   CREATSERUM 1.43* 0.93 0.94   CA 11.8* 9.9 9.6   MG  --   --  2.1   * 104* 108*             Recent Labs   Lab 11/23/24  1321    ALT 10   AST 15   ALB 4.2             Recent Labs   Lab 11/25/24  0607   PCT 0.09*         No results for input(s): \"CRP\", \"ERICH\", \"LDH\", \"DDIMER\" in the last 168 hours.             Recent Labs   Lab 11/25/24  0506 11/25/24  1113 11/25/24  1631 11/25/24  2121 11/26/24  0553   PGLU 109* 154* 99 131* 119*         Meds:   Scheduled Medication:  Scheduled Medications    enoxaparin  40 mg Subcutaneous Nightly    cefTRIAXone  1 g Intravenous Q24H    insulin aspart  1-10 Units Subcutaneous TID AC and HS    aspirin  81 mg Oral Daily    atorvastatin  40 mg Oral Nightly    busPIRone  15 mg Oral BID    cholecalciferol  4,000 Units Oral Daily    DULoxetine  60 mg Oral BID    pantoprazole  40 mg Oral QAM AC    ferrous sulfate  325 mg Oral Daily with breakfast    memantine  10 mg Oral BID    rivastigmine  1 patch Transdermal Nightly    cyanocobalamin  1,000 mcg Oral Daily         Continuous Infusing Medication:  Medication Infusions    sodium chloride 50 mL/hr at 11/23/24 1830         PRN Medication:  PRN Medications     acetaminophen    acetaminophen **OR** HYDROcodone-acetaminophen **OR** HYDROcodone-acetaminophen    polyethylene glycol (PEG 3350)    sennosides    bisacodyl    ondansetron    metoclopramide    melatonin    glucose **OR** glucose **OR** glucose-vitamin C **OR** dextrose **OR** glucose **OR** glucose **OR** glucose-vitamin C            Microbiology:           Hospital Encounter on 11/23/24   1. Urine Culture, Routine     Status: None     Collection Time: 11/23/24  1:41 PM     Specimen: Urine, clean catch   Result Value Ref Range     Urine Culture No Growth at 18-24 hrs. N/A               Lab Results   Component Value Date     COVID19 Not Detected 11/25/2024     COVID19 Not Detected 11/23/2024     COVID19 Not Detected 10/28/2024          Assessment/Plan:   85 yo woman with h/o htn/hl, dementia, psvt who presented with fatigue, lethargy.  Recent outpt treatment of UTI.      Lethargy/AMS  - initially suspected  infectious process but workup neg thus far  - urine culture from this admit 11/23 ntd  - sars/flu/rsv neg  - cxr neg  - ct head neg  - ammonia, abg okay. Resp viral panel neg, procal not significantly elevated.  - neuro consulted- mri pending. EEG with slowing.     UTIs  - treated recently as outpt. Grew proteus from 11/13 with mixed resistance pattern. 11/23 urine culture with mixed mehul.  - repeat urine culture 11/23 here is ntd  - stopping ceftriaxone as cultures neg.     DK  Hyperkalemia  - resolved with IVF. Pt taking good po so will stop ivf     Anxiety  Depression  Dementia  -buspirone  -duloxetine  -memantine  -rivastigmine  -do not suspect these meds playing a direct role in presentation     GERD  -pantoprazole     HTN  -sbp stable     HLD  -atorvastatin      DANNIE     Weakness  -PT/OT rec home with home PT     Quality:  DVT Prophylaxis: scd, lovenox  CODE status: Full per chart  [place]: none     If pt remains improved and MRI okay, may be able to dc tomorrow.        MD Jacqueline Andino Hospitalist       MEDICATIONS ADMINISTERED IN LAST 1 DAY:  aspirin  tab 81 mg       Date Action Dose Route User    11/26/2024 0901 Given 81 mg Oral Ernestine Herman RN          atorvastatin (Lipitor) tab 40 mg       Date Action Dose Route User    11/26/2024 2017 Given 40 mg Oral Irineo Oconnor          busPIRone (Buspar) tab 15 mg       Date Action Dose Route User    11/26/2024 2017 Given 15 mg Oral GiulianocichIrineo    11/26/2024 0901 Given 15 mg Oral Ernestine Herman RN          cefTRIAXone (Rocephin) 1 g in sodium chloride 0.9% 100 mL IVPB-ADDV       Date Action Dose Route User    11/26/2024 0900 New Bag 1 g Intravenous Ernestine Herman RN          DULoxetine (Cymbalta) DR cap 60 mg       Date Action Dose Route User    11/26/2024 2017 Given 60 mg Oral JancichIrineo    11/26/2024 0901 Given 60 mg Oral Ernestine Herman RN          enoxaparin (Lovenox) 40 MG/0.4ML SUBQ injection 40 mg       Date Action  Dose Route User    11/26/2024 2016 Given 40 mg Subcutaneous (Right Lower Abdomen) Irineo Oconnor          memantine (Namenda) tab 10 mg       Date Action Dose Route User    11/26/2024 2017 Given 10 mg Oral Irineo Oconnor    11/26/2024 0901 Given 10 mg Oral Ernestine Herman, SAULO          rivastigmine (Exelon) 13.3 MG/24HR patch 1 patch       Date Action Dose Route User    11/26/2024 2100 Patch Applied 1 patch Transdermal (Right Upper Arm) Irineo Oconnor          cyanocobalamin (Vitamin B12) tab 1,000 mcg       Date Action Dose Route User    11/26/2024 0901 Given 1,000 mcg Oral Ernestine Herman RN          cholecalciferol (Vitamin D3) tab 4,000 Units       Date Action Dose Route User    11/26/2024 0901 Given 4,000 Units Oral Ernestine Herman RN            Vitals (last day)       Date/Time Temp Pulse Resp BP SpO2 Weight O2 Device O2 Flow Rate (L/min) [facility]    11/27/24 0813 97.9 °F (36.6 °C) 78 16 140/73 100 % -- CPAP 4 L/min TT    11/27/24 0610 99.8 °F (37.7 °C) 87 15 140/70 100 % -- CPAP 4 L/min GL    11/27/24 0155 -- 87 -- -- 100 % -- -- -- CU    11/27/24 0009 98.9 °F (37.2 °C) 94 15 135/78 100 % -- CPAP 4 L/min GL    11/26/24 2200 -- -- -- -- -- -- CPAP 4 L/min CU    11/26/24 2017 99.5 °F (37.5 °C) 87 14 120/61 98 % -- -- -- GL    11/26/24 1048 97.8 °F (36.6 °C) 81 16 119/58 98 % -- None (Room air) -- ES    11/26/24 0735 98.6 °F (37 °C) 79 16 141/72 100 % -- -- -- ES    11/26/24 0640 98.4 °F (36.9 °C) 79 15 145/69 100 % -- -- -- KL

## 2024-11-28 LAB
GLUCOSE BLD-MCNC: 127 MG/DL (ref 70–99)
GLUCOSE BLD-MCNC: 142 MG/DL (ref 70–99)
GLUCOSE BLD-MCNC: 146 MG/DL (ref 70–99)
GLUCOSE BLD-MCNC: 157 MG/DL (ref 70–99)

## 2024-11-28 NOTE — PLAN OF CARE
Patient is alert and orientated x1, VSS, RA, afebrile and does not complain of pain. Patient had MRI of brain this morning with negative results per report. Patient spouse requested home with home health. PT/OT worked with patient and recommends rehab. Call light and safety precautions in place.     Problem: Patient/Family Goals  Goal: Patient/Family Long Term Goal  Description: Patient's Long Term Goal:     Interventions:  - See additional Care Plan goals for specific interventions  Outcome: Progressing  Goal: Patient/Family Short Term Goal  Description: Patient's Short Term Goal:     Interventions:   - See additional Care Plan goals for specific interventions  Outcome: Progressing     Problem: Diabetes/Glucose Control  Goal: Glucose maintained within prescribed range  Description: INTERVENTIONS:  - Monitor Blood Glucose as ordered  - Assess for signs and symptoms of hyperglycemia and hypoglycemia  - Administer ordered medications to maintain glucose within target range  - Assess barriers to adequate nutritional intake and initiate nutrition consult as needed  - Instruct patient on self management of diabetes  Outcome: Progressing     Problem: CARDIOVASCULAR - ADULT  Goal: Maintains optimal cardiac output and hemodynamic stability  Description: INTERVENTIONS:  - Monitor vital signs, rhythm, and trends  - Monitor for bleeding, hypotension and signs of decreased cardiac output  - Evaluate effectiveness of vasoactive medications to optimize hemodynamic stability  - Monitor arterial and/or venous puncture sites for bleeding and/or hematoma  - Assess quality of pulses, skin color and temperature  - Assess for signs of decreased coronary artery perfusion - ex. Angina  - Evaluate fluid balance, assess for edema, trend weights  Outcome: Progressing  Goal: Absence of cardiac arrhythmias or at baseline  Description: INTERVENTIONS:  - Continuous cardiac monitoring, monitor vital signs, obtain 12 lead EKG if indicated  -  Evaluate effectiveness of antiarrhythmic and heart rate control medications as ordered  - Initiate emergency measures for life threatening arrhythmias  - Monitor electrolytes and administer replacement therapy as ordered  Outcome: Progressing     Problem: RESPIRATORY - ADULT  Goal: Achieves optimal ventilation and oxygenation  Description: INTERVENTIONS:  - Assess for changes in respiratory status  - Assess for changes in mentation and behavior  - Position to facilitate oxygenation and minimize respiratory effort  - Oxygen supplementation based on oxygen saturation or ABGs  - Provide Smoking Cessation handout, if applicable  - Encourage broncho-pulmonary hygiene including cough, deep breathe, Incentive Spirometry  - Assess the need for suctioning and perform as needed  - Assess and instruct to report SOB or any respiratory difficulty  - Respiratory Therapy support as indicated  - Manage/alleviate anxiety  - Monitor for signs/symptoms of CO2 retention  Outcome: Progressing     Problem: GENITOURINARY - ADULT  Goal: Absence of urinary retention  Description: INTERVENTIONS:  - Assess patient’s ability to void and empty bladder  - Monitor intake/output and perform bladder scan as needed  - Follow urinary retention protocol/standard of care  - Consider collaborating with pharmacy to review patient's medication profile  - Implement strategies to promote bladder emptying  Outcome: Progressing     Problem: METABOLIC/FLUID AND ELECTROLYTES - ADULT  Goal: Glucose maintained within prescribed range  Description: INTERVENTIONS:  - Monitor Blood Glucose as ordered  - Assess for signs and symptoms of hyperglycemia and hypoglycemia  - Administer ordered medications to maintain glucose within target range  - Assess barriers to adequate nutritional intake and initiate nutrition consult as needed  - Instruct patient on self management of diabetes  Outcome: Progressing  Goal: Electrolytes maintained within normal limits  Description:  INTERVENTIONS:  - Monitor labs and rhythm and assess patient for signs and symptoms of electrolyte imbalances  - Administer electrolyte replacement as ordered  - Monitor response to electrolyte replacements, including rhythm and repeat lab results as appropriate  - Fluid restriction as ordered  - Instruct patient on fluid and nutrition restrictions as appropriate  Outcome: Progressing     Problem: SKIN/TISSUE INTEGRITY - ADULT  Goal: Skin integrity remains intact  Description: INTERVENTIONS  - Assess and document risk factors for pressure ulcer development  - Assess and document skin integrity  - Monitor for areas of redness and/or skin breakdown  - Initiate interventions, skin care algorithm/standards of care as needed  Outcome: Progressing  Goal: Incision(s), wounds(s) or drain site(s) healing without S/S of infection  Description: INTERVENTIONS:  - Assess and document risk factors for pressure ulcer development  - Assess and document skin integrity  - Assess and document dressing/incision, wound bed, drain sites and surrounding tissue  - Implement wound care per orders  - Initiate isolation precautions as appropriate  - Initiate Pressure Ulcer prevention bundle as indicated  Outcome: Progressing  Goal: Oral mucous membranes remain intact  Description: INTERVENTIONS  - Assess oral mucosa and hygiene practices  - Implement preventative oral hygiene regimen  - Implement oral medicated treatments as ordered  Outcome: Progressing     Problem: NEUROLOGICAL - ADULT  Goal: Achieves stable or improved neurological status  Description: INTERVENTIONS  - Assess for and report changes in neurological status  - Initiate measures to prevent increased intracranial pressure  - Maintain blood pressure and fluid volume within ordered parameters to optimize cerebral perfusion and minimize risk of hemorrhage  - Monitor temperature, glucose, and sodium. Initiate appropriate interventions as ordered  Outcome: Progressing  Goal:  Achieves maximal functionality and self care  Description: INTERVENTIONS  - Monitor swallowing and airway patency with patient fatigue and changes in neurological status  - Encourage and assist patient to increase activity and self care with guidance from PT/OT  - Encourage visually impaired, hearing impaired and aphasic patients to use assistive/communication devices  Outcome: Progressing     Problem: Impaired Functional Mobility  Goal: Achieve highest/safest level of mobility/gait  Description: Interventions:  - Assess patient's functional ability and stability  - Promote increasing activity/tolerance for mobility and gait  - Educate and engage patient/family in tolerated activity level and precautions  Outcome: Progressing     Problem: Impaired Cognition  Goal: Patient will exhibit improved attention, thought processing and/or memory  Description: Interventions:  Outcome: Progressing

## 2024-11-28 NOTE — PROGRESS NOTES
.[facility] Hospitalist note    PCP: Jorge Lr MD    Chief Complaint:  F/u fatigue/weakness    SUBJECTIVE:  Awake and eating breakfast while being fed by PCT. Denies complaints this morning.    OBJECTIVE:  Temp:  [97.8 °F (36.6 °C)-98.8 °F (37.1 °C)] 98.8 °F (37.1 °C)  Pulse:  [78-94] 85  Resp:  [16-20] 20  BP: (102-140)/(61-79) 137/79  SpO2:  [94 %-100 %] 98 %    Intake/Output:    Intake/Output Summary (Last 24 hours) at 11/28/2024 0708  Last data filed at 11/28/2024 0439  Gross per 24 hour   Intake --   Output 1150 ml   Net -1150 ml       Last 3 Weights   11/23/24 1745 153 lb 10.6 oz (69.7 kg)   10/28/24 2021 153 lb 10.6 oz (69.7 kg)   06/28/23 0351 153 lb 9.6 oz (69.7 kg)   06/27/23 2357 150 lb (68 kg)       Exam  Gen: No acute distress  HEENT: anicteric sclera, MMM  Pulm: Lungs clear, normal respiratory effort  CV: Heart with regular rate and rhythm, no peripheral edema  Abd: Abdomen soft, nontender, nondistended, no organomegaly, bowel sounds present  Skin: no rashes or lesions  Neuro: awake, interactive    Data Review:         Labs:     Recent Labs   Lab 11/23/24  1321 11/24/24  0659 11/25/24  0607   WBC 7.1 5.5 6.1   HGB 9.4* 8.8* 9.3*   MCV 91.0 94.3 93.4   .0* 116.0* 118.0*   NE 5.59 4.25 4.49   LYMABS 0.61* 0.57* 0.70*       Recent Labs   Lab 11/23/24  1321 11/24/24  0659 11/25/24  0607    140 140   K 5.5* 4.6 4.3    110 110   CO2 24.0 22.0 23.0   BUN 44* 33* 24*   CREATSERUM 1.43* 0.93 0.94   CA 11.8* 9.9 9.6   MG  --   --  2.1   * 104* 108*       Recent Labs   Lab 11/23/24  1321   ALT 10   AST 15   ALB 4.2       Recent Labs   Lab 11/25/24  0607   PCT 0.09*       No results for input(s): \"CRP\", \"ERICH\", \"LDH\", \"DDIMER\" in the last 168 hours.    Recent Labs   Lab 11/27/24  0545 11/27/24  1154 11/27/24  1727 11/27/24  2112 11/28/24  0431   PGLU 104* 169* 149* 153* 127*       Meds:   Scheduled Medication:   enoxaparin  40 mg Subcutaneous Nightly    insulin aspart  1-10 Units  Subcutaneous TID AC and HS    aspirin  81 mg Oral Daily    atorvastatin  40 mg Oral Nightly    busPIRone  15 mg Oral BID    cholecalciferol  4,000 Units Oral Daily    DULoxetine  60 mg Oral BID    pantoprazole  40 mg Oral QAM AC    ferrous sulfate  325 mg Oral Daily with breakfast    memantine  10 mg Oral BID    rivastigmine  1 patch Transdermal Nightly    cyanocobalamin  1,000 mcg Oral Daily     Continuous Infusing Medication:      PRN Medication:  acetaminophen    acetaminophen **OR** [DISCONTINUED] HYDROcodone-acetaminophen **OR** [DISCONTINUED] HYDROcodone-acetaminophen    polyethylene glycol (PEG 3350)    sennosides    bisacodyl    ondansetron    metoclopramide    melatonin    glucose **OR** glucose **OR** glucose-vitamin C **OR** dextrose **OR** glucose **OR** glucose **OR** glucose-vitamin C       Microbiology:    Hospital Encounter on 11/23/24   1. Urine Culture, Routine     Status: None    Collection Time: 11/23/24  1:41 PM    Specimen: Urine, clean catch   Result Value Ref Range    Urine Culture No Growth at 18-24 hrs. N/A       Lab Results   Component Value Date    COVID19 Not Detected 11/25/2024    COVID19 Not Detected 11/23/2024    COVID19 Not Detected 10/28/2024        Assessment/Plan:   83 yo woman with h/o htn/hl, dementia, psvt who presented with fatigue, lethargy.  Recent outpt treatment of UTI.     Lethargy/AMS  - initially suspected infectious process but workup neg thus far  - urine culture from this admit 11/23 ntd  - sars/flu/rsv neg  - cxr neg  - ct head neg  - ammonia, abg okay. Resp viral panel neg, procal not significantly elevated.  - neuro consulted- mri and eeg without acute abn    UTIs  - treated recently as outpt. Grew proteus from 11/13 with mixed resistance pattern. 11/23 urine culture with mixed mehul.  - repeat urine culture 11/23 here is ntd  - stopping ceftriaxone as cultures neg.     DK  Hyperkalemia  - resolved with IVF.      Anxiety  Depression  Dementia  -buspirone  -duloxetine  -memantine  -rivastigmine  -do not suspect these meds playing a direct role in presentation     GERD  -pantoprazole     HTN  -sbp stable     HLD  -atorvastatin      DANNIE     Weakness  -PT/OT saw pt. Felt to benefit from ADRIAN. Will need continued work with PT to see if she can improve her mobility prior to dc     Quality:  DVT Prophylaxis: scd, lovenox  CODE status: Full per chart  Goldstein: none        Kristy Briceno MD  [facility] Hospitalist  Pager: [phone]\

## 2024-11-28 NOTE — PLAN OF CARE
Patient alert and oriented x1. VSS. Afebrile. CPAP on at night. No c/o pain at this time. No BG coverage provided per parameters. See MAR. Meds administered crushed with apple sauce. Safety precautions continued. Call light within reach.   Problem: Diabetes/Glucose Control  Goal: Glucose maintained within prescribed range  Description: INTERVENTIONS:  - Monitor Blood Glucose as ordered  - Assess for signs and symptoms of hyperglycemia and hypoglycemia  - Administer ordered medications to maintain glucose within target range  - Assess barriers to adequate nutritional intake and initiate nutrition consult as needed  - Instruct patient on self management of diabetes  Outcome: Progressing     Problem: CARDIOVASCULAR - ADULT  Goal: Maintains optimal cardiac output and hemodynamic stability  Description: INTERVENTIONS:  - Monitor vital signs, rhythm, and trends  - Monitor for bleeding, hypotension and signs of decreased cardiac output  - Evaluate effectiveness of vasoactive medications to optimize hemodynamic stability  - Monitor arterial and/or venous puncture sites for bleeding and/or hematoma  - Assess quality of pulses, skin color and temperature  - Assess for signs of decreased coronary artery perfusion - ex. Angina  - Evaluate fluid balance, assess for edema, trend weights  Outcome: Progressing  Goal: Absence of cardiac arrhythmias or at baseline  Description: INTERVENTIONS:  - Continuous cardiac monitoring, monitor vital signs, obtain 12 lead EKG if indicated  - Evaluate effectiveness of antiarrhythmic and heart rate control medications as ordered  - Initiate emergency measures for life threatening arrhythmias  - Monitor electrolytes and administer replacement therapy as ordered  Outcome: Progressing     Problem: RESPIRATORY - ADULT  Goal: Achieves optimal ventilation and oxygenation  Description: INTERVENTIONS:  - Assess for changes in respiratory status  - Assess for changes in mentation and behavior  - Position  to facilitate oxygenation and minimize respiratory effort  - Oxygen supplementation based on oxygen saturation or ABGs  - Provide Smoking Cessation handout, if applicable  - Encourage broncho-pulmonary hygiene including cough, deep breathe, Incentive Spirometry  - Assess the need for suctioning and perform as needed  - Assess and instruct to report SOB or any respiratory difficulty  - Respiratory Therapy support as indicated  - Manage/alleviate anxiety  - Monitor for signs/symptoms of CO2 retention  Outcome: Progressing     Problem: GENITOURINARY - ADULT  Goal: Absence of urinary retention  Description: INTERVENTIONS:  - Assess patient’s ability to void and empty bladder  - Monitor intake/output and perform bladder scan as needed  - Follow urinary retention protocol/standard of care  - Consider collaborating with pharmacy to review patient's medication profile  - Implement strategies to promote bladder emptying  Outcome: Progressing     Problem: METABOLIC/FLUID AND ELECTROLYTES - ADULT  Goal: Glucose maintained within prescribed range  Description: INTERVENTIONS:  - Monitor Blood Glucose as ordered  - Assess for signs and symptoms of hyperglycemia and hypoglycemia  - Administer ordered medications to maintain glucose within target range  - Assess barriers to adequate nutritional intake and initiate nutrition consult as needed  - Instruct patient on self management of diabetes  Outcome: Progressing  Goal: Electrolytes maintained within normal limits  Description: INTERVENTIONS:  - Monitor labs and rhythm and assess patient for signs and symptoms of electrolyte imbalances  - Administer electrolyte replacement as ordered  - Monitor response to electrolyte replacements, including rhythm and repeat lab results as appropriate  - Fluid restriction as ordered  - Instruct patient on fluid and nutrition restrictions as appropriate  Outcome: Progressing

## 2024-11-29 LAB
ANION GAP SERPL CALC-SCNC: 7 MMOL/L (ref 0–18)
BUN BLD-MCNC: 23 MG/DL (ref 9–23)
CALCIUM BLD-MCNC: 9.4 MG/DL (ref 8.7–10.4)
CHLORIDE SERPL-SCNC: 112 MMOL/L (ref 98–112)
CO2 SERPL-SCNC: 25 MMOL/L (ref 21–32)
CREAT BLD-MCNC: 0.86 MG/DL
EGFRCR SERPLBLD CKD-EPI 2021: 66 ML/MIN/1.73M2 (ref 60–?)
ERYTHROCYTE [DISTWIDTH] IN BLOOD BY AUTOMATED COUNT: 15.3 %
GLUCOSE BLD-MCNC: 105 MG/DL (ref 70–99)
GLUCOSE BLD-MCNC: 119 MG/DL (ref 70–99)
GLUCOSE BLD-MCNC: 120 MG/DL (ref 70–99)
GLUCOSE BLD-MCNC: 146 MG/DL (ref 70–99)
GLUCOSE BLD-MCNC: 175 MG/DL (ref 70–99)
HCT VFR BLD AUTO: 23.8 %
HGB BLD-MCNC: 7.3 G/DL
HGB BLD-MCNC: 7.7 G/DL
MCH RBC QN AUTO: 27.1 PG (ref 26–34)
MCHC RBC AUTO-ENTMCNC: 30.7 G/DL (ref 31–37)
MCV RBC AUTO: 88.5 FL
OSMOLALITY SERPL CALC.SUM OF ELEC: 302 MOSM/KG (ref 275–295)
PLATELET # BLD AUTO: 200 10(3)UL (ref 150–450)
POTASSIUM SERPL-SCNC: 4.2 MMOL/L (ref 3.5–5.1)
RBC # BLD AUTO: 2.69 X10(6)UL
SODIUM SERPL-SCNC: 144 MMOL/L (ref 136–145)
WBC # BLD AUTO: 4.5 X10(3) UL (ref 4–11)

## 2024-11-29 NOTE — PROGRESS NOTES
.[facility] Hospitalist note    PCP: Jorge Lr MD    Chief Complaint:  F/u fatigue/weakness    SUBJECTIVE:   at bedside, patient feels generally well.    OBJECTIVE:  Temp:  [96.9 °F (36.1 °C)-99.1 °F (37.3 °C)] 98.9 °F (37.2 °C)  Pulse:  [] 86  Resp:  [16-18] 18  BP: (112-136)/(59-76) 122/59  SpO2:  [93 %-99 %] 93 %    Intake/Output:    Intake/Output Summary (Last 24 hours) at 11/29/2024 0802  Last data filed at 11/28/2024 1800  Gross per 24 hour   Intake 0 ml   Output --   Net 0 ml       Last 3 Weights   11/23/24 1745 153 lb 10.6 oz (69.7 kg)   10/28/24 2021 153 lb 10.6 oz (69.7 kg)   06/28/23 0351 153 lb 9.6 oz (69.7 kg)   06/27/23 2357 150 lb (68 kg)       Exam  Gen: No acute distress  HEENT: anicteric sclera, MMM  Pulm: Lungs clear, normal respiratory effort  CV: Heart with regular rate and rhythm, no peripheral edema  Abd: Abdomen soft, nontender, nondistended, no organomegaly, bowel sounds present  Skin: no rashes or lesions  Neuro: awake, interactive    Data Review:         Labs:     Recent Labs   Lab 11/23/24  1321 11/24/24  0659 11/25/24  0607 11/29/24  0515   WBC 7.1 5.5 6.1 4.5   HGB 9.4* 8.8* 9.3* 7.3*   MCV 91.0 94.3 93.4 88.5   .0* 116.0* 118.0* 200.0   NE 5.59 4.25 4.49  --    LYMABS 0.61* 0.57* 0.70*  --        Recent Labs   Lab 11/23/24  1321 11/24/24  0659 11/25/24  0607 11/29/24  0515    140 140 144   K 5.5* 4.6 4.3 4.2    110 110 112   CO2 24.0 22.0 23.0 25.0   BUN 44* 33* 24* 23   CREATSERUM 1.43* 0.93 0.94 0.86   CA 11.8* 9.9 9.6 9.4   MG  --   --  2.1  --    * 104* 108* 105*       Recent Labs   Lab 11/23/24  1321   ALT 10   AST 15   ALB 4.2       Recent Labs   Lab 11/25/24  0607   PCT 0.09*       No results for input(s): \"CRP\", \"ERICH\", \"LDH\", \"DDIMER\" in the last 168 hours.    Recent Labs   Lab 11/28/24  0431 11/28/24  1150 11/28/24  1654 11/28/24  2115 11/29/24  0515   PGLU 127* 142* 157* 146* 119*       Meds:   Scheduled Medication:   enoxaparin   40 mg Subcutaneous Nightly    insulin aspart  1-10 Units Subcutaneous TID AC and HS    aspirin  81 mg Oral Daily    atorvastatin  40 mg Oral Nightly    busPIRone  15 mg Oral BID    cholecalciferol  4,000 Units Oral Daily    DULoxetine  60 mg Oral BID    pantoprazole  40 mg Oral QAM AC    ferrous sulfate  325 mg Oral Daily with breakfast    memantine  10 mg Oral BID    rivastigmine  1 patch Transdermal Nightly    cyanocobalamin  1,000 mcg Oral Daily     Continuous Infusing Medication:      PRN Medication:  acetaminophen    acetaminophen **OR** [DISCONTINUED] HYDROcodone-acetaminophen **OR** [DISCONTINUED] HYDROcodone-acetaminophen    polyethylene glycol (PEG 3350)    sennosides    bisacodyl    ondansetron    metoclopramide    melatonin    glucose **OR** glucose **OR** glucose-vitamin C **OR** dextrose **OR** glucose **OR** glucose **OR** glucose-vitamin C       Microbiology:    Hospital Encounter on 11/23/24   1. Urine Culture, Routine     Status: None    Collection Time: 11/23/24  1:41 PM    Specimen: Urine, clean catch   Result Value Ref Range    Urine Culture No Growth at 18-24 hrs. N/A       Lab Results   Component Value Date    COVID19 Not Detected 11/25/2024    COVID19 Not Detected 11/23/2024    COVID19 Not Detected 10/28/2024        Assessment/Plan:   83 yo woman with h/o htn/hl, dementia, psvt who presented with fatigue, lethargy.  Recent outpt treatment of UTI.     Lethargy/AMS/weakness  - initially suspected infectious process but workup neg thus far  - urine culture from this admit 11/23 ntd  - sars/flu/rsv neg  - cxr neg  - ct head neg  - ammonia, abg okay. Resp viral panel neg, procal not significantly elevated.  - neuro consulted- mri and eeg without acute abn    Anemia  - hgb 7 today, drop from 9 range earlier. Will repeat this afternoon.     UTIs  - treated recently as outpt. Grew proteus from 11/13 with mixed resistance pattern. 11/23 urine culture with mixed mehul.  - repeat urine culture 11/23 here  is ntd  - stopping ceftriaxone as cultures neg.     DK  Hyperkalemia  - resolved with IVF.     Anxiety  Depression  Dementia  -buspirone  -duloxetine  -memantine  -rivastigmine  -do not suspect these meds playing a direct role in presentation     GERD  -pantoprazole     HTN  -sbp stable     HLD  -atorvastatin      DANNIE     Weakness  -PT/OT saw pt. Felt to benefit from ADRIAN. SW involved    Quality:  DVT Prophylaxis: scd, lovenox  CODE status: Full per chart  Goldstein: none        Kristy Briceno MD  [facility] Hospitalist  Pager: [phone]\

## 2024-11-29 NOTE — PHYSICAL THERAPY NOTE
PHYSICAL THERAPY TREATMENT NOTE - INPATIENT    Room Number: 431/431-A     Session: 3    Number of Visits to Meet Established Goals: 5    Presenting Problem: admit with fatigue, weakness, AMS  Co-Morbidities : anxiety, depression, GERD, HTN, HLD, IBS, alzheimers, chf, osteoporosis, paroxysmal SVT  History related to current admission: Patient is a 84 year old female admitted on 11/23/2024 from home with weakness, fatigue, AMS.     HOME SITUATION  Type of Home: House  Home Layout: Two level  Stairs to Enter : 1        Stairs to Bedroom: 13         Lives With: Spouse    Drives: No   Patient Regularly Uses: Rolling walker       Prior Level of [place]: pt spouse at bedside providing information. Prior to admission, pt was ambulating with RW and was able to negotiate stairs to her bedroom with supervision from spouse. Pt spouse providing roughly CGA/SBA for toileting. Just prior to hospitalization, pt spouse reports with incr weakness, he had the pt staying on the main level of the home. Has tub shower with tub transfer bench. Spouse assists with lower body dressing.    PHYSICAL THERAPY ASSESSMENT   Patient demonstrates limited progress this session, goals  remain in progress.      Patient is requiring minimal assist and moderate assist as a result of the following impairments: decreased functional strength, decreased endurance/aerobic capacity, pain, impaired seated and standing balance, impaired motor planning, decreased muscular endurance, cognitive deficits ( ), and decreased compliance/participation.     Patient continues to function below baseline with bed mobility, transfers, gait, maintaining seated position, and standing prolonged periods.  Next session anticipate patient to progress bed mobility, transfers, gait, maintaining seated position, and standing prolonged periods.  Physical Therapy will continue to follow patient for duration of hospitalization.    Patient continues to benefit from continued  skilled PT services: to promote return to prior level of function and safety with continuous assistance and gradual rehabilitative therapy .    PLAN DURING HOSPITALIZATION  Nursing Mobility Recommendation : Lift Equipment  PT Device Recommendation: Mechanical lift  PT Treatment Plan: Bed mobility;Body mechanics;Endurance;Energy conservation;Patient education;Family education;Gait training;Range of motion;Strengthening;Stair training;Transfer training;Balance training  Frequency (Obs): 3-5x/week     CURRENT GOALS     Goal #1 Patient is able to perform rolling L and R with modA      Goal #2 Patient is able to  complete HEP with assist from caregiver/family      Goal #3 Patient is will follow 75% of commands for meaningful participation and carryover in therapy      Goal #4 New goal: patient to amb with RW and cga 25 feet.      Goal #5 New goal: patient to transfer from bed to chair with cga.    Goal #6     Goal Comments: Goals established on 11/24/2024 11/27/2024 all goals ongoing    SUBJECTIVE  \"I can't!\"    OBJECTIVE  Precautions: Bed/chair alarm    WEIGHT BEARING RESTRICTION     PAIN ASSESSMENT   Rating: Unable to rate  Location: neck and back  Management Techniques: Activity promotion;Relaxation;Repositioning    BALANCE                                                                                                                       Static Sitting: Good  Dynamic Sitting: Fair           Static Standing: Fair -  Dynamic Standing: Poor +    ACTIVITY TOLERANCE; Poor        O2 WALK       AM-PAC '6-Clicks' INPATIENT SHORT FORM - BASIC MOBILITY  How much difficulty does the patient currently have...  Patient Difficulty: Turning over in bed (including adjusting bedclothes, sheets and blankets)?: A Lot   Patient Difficulty: Sitting down on and standing up from a chair with arms (e.g., wheelchair, bedside commode, etc.): A Lot   Patient Difficulty: Moving from lying on back to sitting on the side of the bed?: A Lot    How much help from another person does the patient currently need...   Help from Another: Moving to and from a bed to a chair (including a wheelchair)?: A Lot   Help from Another: Need to walk in hospital room?: Total   Help from Another: Climbing 3-5 steps with a railing?: Total     AM-PAC Score:  Raw Score: 10   Approx Degree of Impairment: 76.75%   Standardized Score (AM-PAC Scale): 32.29   CMS Modifier (G-Code): CL    FUNCTIONAL ABILITY STATUS  Gait Assessment   Functional Mobility/Gait Assessment  Gait Assistance: Moderate assistance  Distance (ft): 3  Assistive Device: Rolling walker  Pattern: Shuffle    Skilled Therapy Provided: Per RN okay to work with pt. Pt received in supine and was agreeable to PT session with encouragement.     Bed Mobility:  Rolling:min A   Supine<>Sit: max A    Sit<>Supine: NT     Transfer Mobility:  Sit<>Stand: mod-max A    Stand<>Sit: mod-max A    Gait: Pt took several steps from bed to chair with RW and mod A.  Pt completed 6 sit to/from stand reps (3 from the bed and 3 from the chair). Pt needed consistent cuing for upright posture and to not sit prematurely.     Therapist's Comments:   Pt and spouse educated on role of therapy, goals for session, safety, fall prevention, and activity recommendations.   Standing balance and tolerance training while nursing does villa-care  Task done easier initially and towards the end pt requires more assist and needs more cueing   at   Addressed all issues and concerns. Nursing is aware of this visit.          THERAPEUTIC EXERCISES  Lower Extremity SLR, heel slides   Upper Extremity N/a     Position supine     Repetitions   10   Sets   1     Patient End of Session: Up in chair;Needs met;Call light within reach;RN aware of session/findings;All patient questions and concerns addressed;Alarm set    PT Session Time: 30 minutes  Therapeutic Activity: 10 minutes  Therapeutic Exercise: 10 minutes   Gt trainin minutes

## 2024-11-29 NOTE — PLAN OF CARE
Sleeps most of the time, managed to eat for breakfast and lunch.   at bedside, patient unable to feed herself, needs assistance with every meal, has good appetite, no signs of aspirations noted. Seen by MD, repeat hemogram was ordered, latest HBG were 7.3.   prefers to discharge home VS ADRIAN.   1600 up with PT, did not do well on her feet, had difficulties following cues.  Repeat hemoglobin 7.7  Problem: Diabetes/Glucose Control  Goal: Glucose maintained within prescribed range  Description: INTERVENTIONS:  - Monitor Blood Glucose as ordered  - Assess for signs and symptoms of hyperglycemia and hypoglycemia  - Administer ordered medications to maintain glucose within target range  - Assess barriers to adequate nutritional intake and initiate nutrition consult as needed  - Instruct patient on self management of diabetes  Outcome: Progressing     Problem: METABOLIC/FLUID AND ELECTROLYTES - ADULT  Goal: Glucose maintained within prescribed range  Description: INTERVENTIONS:  - Monitor Blood Glucose as ordered  - Assess for signs and symptoms of hyperglycemia and hypoglycemia  - Administer ordered medications to maintain glucose within target range  - Assess barriers to adequate nutritional intake and initiate nutrition consult as needed  - Instruct patient on self management of diabetes  Outcome: Progressing  Goal: Electrolytes maintained within normal limits  Description: INTERVENTIONS:  - Monitor labs and rhythm and assess patient for signs and symptoms of electrolyte imbalances  - Administer electrolyte replacement as ordered  - Monitor response to electrolyte replacements, including rhythm and repeat lab results as appropriate  - Fluid restriction as ordered  - Instruct patient on fluid and nutrition restrictions as appropriate  Outcome: Progressing     Problem: NEUROLOGICAL - ADULT  Goal: Achieves stable or improved neurological status  Description: INTERVENTIONS  - Assess for and report changes in  neurological status  - Initiate measures to prevent increased intracranial pressure  - Maintain blood pressure and fluid volume within ordered parameters to optimize cerebral perfusion and minimize risk of hemorrhage  - Monitor temperature, glucose, and sodium. Initiate appropriate interventions as ordered  Outcome: Progressing     Problem: NEUROLOGICAL - ADULT  Goal: Achieves maximal functionality and self care  Description: INTERVENTIONS  - Monitor swallowing and airway patency with patient fatigue and changes in neurological status  - Encourage and assist patient to increase activity and self care with guidance from PT/OT  - Encourage visually impaired, hearing impaired and aphasic patients to use assistive/communication devices  Outcome: Not Progressing     Problem: Impaired Functional Mobility  Goal: Achieve highest/safest level of mobility/gait  Description: Interventions:  - Assess patient's functional ability and stability  - Promote increasing activity/tolerance for mobility and gait  - Educate and engage patient/family in tolerated activity level and precautions    Outcome: Not Progressing

## 2024-11-29 NOTE — CM/SW NOTE
Prior Authorization - Destination  Destination Type: Skilled nursing facility  Service Provider: PAC pend  Payer Communication Destination Comments: [facility] ID [id number]  Prior Authorization Status: Submitted/Pending      Carey Javier DSC

## 2024-11-29 NOTE — PLAN OF CARE
Pt received A&Ox1, drowsy. VSS. RA, CPAP at night. Tele. Afebrile. Denies pain. Medications given per MAR. Call light within reach. Fall precautions in place.     Problem: Diabetes/Glucose Control  Goal: Glucose maintained within prescribed range  Description: INTERVENTIONS:  - Monitor Blood Glucose as ordered  - Assess for signs and symptoms of hyperglycemia and hypoglycemia  - Administer ordered medications to maintain glucose within target range  - Assess barriers to adequate nutritional intake and initiate nutrition consult as needed  - Instruct patient on self management of diabetes  Outcome: Progressing     Problem: CARDIOVASCULAR - ADULT  Goal: Maintains optimal cardiac output and hemodynamic stability  Description: INTERVENTIONS:  - Monitor vital signs, rhythm, and trends  - Monitor for bleeding, hypotension and signs of decreased cardiac output  - Evaluate effectiveness of vasoactive medications to optimize hemodynamic stability  - Monitor arterial and/or venous puncture sites for bleeding and/or hematoma  - Assess quality of pulses, skin color and temperature  - Assess for signs of decreased coronary artery perfusion - ex. Angina  - Evaluate fluid balance, assess for edema, trend weights  Outcome: Progressing  Goal: Absence of cardiac arrhythmias or at baseline  Description: INTERVENTIONS:  - Continuous cardiac monitoring, monitor vital signs, obtain 12 lead EKG if indicated  - Evaluate effectiveness of antiarrhythmic and heart rate control medications as ordered  - Initiate emergency measures for life threatening arrhythmias  - Monitor electrolytes and administer replacement therapy as ordered  Outcome: Progressing     Problem: RESPIRATORY - ADULT  Goal: Achieves optimal ventilation and oxygenation  Description: INTERVENTIONS:  - Assess for changes in respiratory status  - Assess for changes in mentation and behavior  - Position to facilitate oxygenation and minimize respiratory effort  - Oxygen  supplementation based on oxygen saturation or ABGs  - Provide Smoking Cessation handout, if applicable  - Encourage broncho-pulmonary hygiene including cough, deep breathe, Incentive Spirometry  - Assess the need for suctioning and perform as needed  - Assess and instruct to report SOB or any respiratory difficulty  - Respiratory Therapy support as indicated  - Manage/alleviate anxiety  - Monitor for signs/symptoms of CO2 retention  Outcome: Progressing     Problem: METABOLIC/FLUID AND ELECTROLYTES - ADULT  Goal: Glucose maintained within prescribed range  Description: INTERVENTIONS:  - Monitor Blood Glucose as ordered  - Assess for signs and symptoms of hyperglycemia and hypoglycemia  - Administer ordered medications to maintain glucose within target range  - Assess barriers to adequate nutritional intake and initiate nutrition consult as needed  - Instruct patient on self management of diabetes  Outcome: Progressing  Goal: Electrolytes maintained within normal limits  Description: INTERVENTIONS:  - Monitor labs and rhythm and assess patient for signs and symptoms of electrolyte imbalances  - Administer electrolyte replacement as ordered  - Monitor response to electrolyte replacements, including rhythm and repeat lab results as appropriate  - Fluid restriction as ordered  - Instruct patient on fluid and nutrition restrictions as appropriate  Outcome: Progressing     Problem: SKIN/TISSUE INTEGRITY - ADULT  Goal: Skin integrity remains intact  Description: INTERVENTIONS  - Assess and document risk factors for pressure ulcer development  - Assess and document skin integrity  - Monitor for areas of redness and/or skin breakdown  - Initiate interventions, skin care algorithm/standards of care as needed  Outcome: Progressing  Goal: Incision(s), wounds(s) or drain site(s) healing without S/S of infection  Description: INTERVENTIONS:  - Assess and document risk factors for pressure ulcer development  - Assess and document  skin integrity  - Assess and document dressing/incision, wound bed, drain sites and surrounding tissue  - Implement wound care per orders  - Initiate isolation precautions as appropriate  - Initiate Pressure Ulcer prevention bundle as indicated  Outcome: Progressing  Goal: Oral mucous membranes remain intact  Description: INTERVENTIONS  - Assess oral mucosa and hygiene practices  - Implement preventative oral hygiene regimen  - Implement oral medicated treatments as ordered  Outcome: Progressing     Problem: NEUROLOGICAL - ADULT  Goal: Achieves stable or improved neurological status  Description: INTERVENTIONS  - Assess for and report changes in neurological status  - Initiate measures to prevent increased intracranial pressure  - Maintain blood pressure and fluid volume within ordered parameters to optimize cerebral perfusion and minimize risk of hemorrhage  - Monitor temperature, glucose, and sodium. Initiate appropriate interventions as ordered  Outcome: Progressing  Goal: Achieves maximal functionality and self care  Description: INTERVENTIONS  - Monitor swallowing and airway patency with patient fatigue and changes in neurological status  - Encourage and assist patient to increase activity and self care with guidance from PT/OT  - Encourage visually impaired, hearing impaired and aphasic patients to use assistive/communication devices  Outcome: Progressing     Problem: Impaired Functional Mobility  Goal: Achieve highest/safest level of mobility/gait  Description: Interventions:  - Assess patient's functional ability and stability  - Promote increasing activity/tolerance for mobility and gait  - Educate and engage patient/family in tolerated activity level and precautions  - Recommend use of chair position in bed 3 times per day  Outcome: Progressing     Problem: Impaired Cognition  Goal: Patient will exhibit improved attention, thought processing and/or memory  Description: Interventions:  - Encourage use of  hearing aids  Outcome: Progressing

## 2024-11-29 NOTE — CM/SW NOTE
Sw met with pt and  ( pt sleeping ).   has not made [facility] choice yet- plans to tour facilities tomorrow with daughter.  SW asked [facility] to start insurance auth for [facility]- await response if we are able to start the auth.  PASSR needs to be done. [facility] review requested.    Tangela Mukherjee LCSW  /Discharge Planner

## 2024-11-29 NOTE — PLAN OF CARE
Pt received A&Ox1, drowsy. Hx dementia. Afebrile. VSS. Denies pain. Fair appetite, 1:1 feed. Pills tolerated in applesauce. Dc planning in progress.  at bedside. Fall precautions in place. Call light in reach.

## 2024-11-30 LAB
BILIRUB UR QL STRIP.AUTO: NEGATIVE
COLOR UR AUTO: YELLOW
GLUCOSE BLD-MCNC: 139 MG/DL (ref 70–99)
GLUCOSE BLD-MCNC: 153 MG/DL (ref 70–99)
GLUCOSE BLD-MCNC: 157 MG/DL (ref 70–99)
GLUCOSE BLD-MCNC: 175 MG/DL (ref 70–99)
GLUCOSE UR STRIP.AUTO-MCNC: NORMAL MG/DL
KETONES UR STRIP.AUTO-MCNC: NEGATIVE MG/DL
LEUKOCYTE ESTERASE UR QL STRIP.AUTO: 25
NITRITE UR QL STRIP.AUTO: NEGATIVE
PH UR STRIP.AUTO: 5.5 [PH] (ref 5–8)
PROT UR STRIP.AUTO-MCNC: 20 MG/DL
SP GR UR STRIP.AUTO: >1.03 (ref 1–1.03)
UROBILINOGEN UR STRIP.AUTO-MCNC: NORMAL MG/DL

## 2024-11-30 NOTE — PROGRESS NOTES
.[facility] note    PCP: Jorge Lr MD    Chief Complaint:  F/u fatigue/weakness    SUBJECTIVE:  Pt's  at bedside, concerned that she feels warm. Temp up to 100 overnight.  Also concerned that she still is a bit more lethargic at times, had a BM in bed which she doesn't do at home typically.     OBJECTIVE:  Temp:  [98.4 °F (36.9 °C)-100 °F (37.8 °C)] 98.5 °F (36.9 °C)  Pulse:  [] 100  Resp:  [17-20] 18  BP: (105-144)/(65-82) 121/68  SpO2:  [93 %-96 %] 95 %    Intake/Output:    Intake/Output Summary (Last 24 hours) at 11/30/2024 1442  Last data filed at 11/30/2024 1000  Gross per 24 hour   Intake 120 ml   Output 500 ml   Net -380 ml       Last 3 Weights   11/23/24 1745 153 lb 10.6 oz (69.7 kg)   10/28/24 2021 153 lb 10.6 oz (69.7 kg)   06/28/23 0351 153 lb 9.6 oz (69.7 kg)   06/27/23 2357 150 lb (68 kg)       Exam  Gen: No acute distress  HEENT: anicteric sclera, MMM  Pulm: Lungs clear, normal respiratory effort  CV: Heart with regular rate and rhythm, no peripheral edema  Abd: Abdomen soft, nontender, nondistended, no organomegaly, bowel sounds present  Skin: no rashes or lesions  Neuro: awake, interactive    Data Review:         Labs:     Recent Labs   Lab 11/24/24  0659 11/25/24  0607 11/29/24  0515 11/29/24  1507   WBC 5.5 6.1 4.5  --    HGB 8.8* 9.3* 7.3* 7.7*   MCV 94.3 93.4 88.5  --    .0* 118.0* 200.0  --    NE 4.25 4.49  --   --    LYMABS 0.57* 0.70*  --   --        Recent Labs   Lab 11/24/24  0659 11/25/24  0607 11/29/24  0515    140 144   K 4.6 4.3 4.2    110 112   CO2 22.0 23.0 25.0   BUN 33* 24* 23   CREATSERUM 0.93 0.94 0.86   CA 9.9 9.6 9.4   MG  --  2.1  --    * 108* 105*       No results for input(s): \"ALT\", \"AST\", \"ALB\", \"AMYLASE\", \"LIPASE\" in the last 168 hours.    Invalid input(s): \"ALPHOS\", \"TBIL\", \"DBIL\", \"TPROT\"      Recent Labs   Lab 11/25/24  0607   PCT 0.09*       No results for input(s): \"CRP\", \"ERICH\", \"LDH\", \"DDIMER\" in the last 168  hours.    Recent Labs   Lab 11/29/24  1155 11/29/24  1627 11/29/24  2059 11/30/24  0442 11/30/24  1232   PGLU 120* 175* 146* 139* 153*       Meds:   Scheduled Medication:   enoxaparin  40 mg Subcutaneous Nightly    insulin aspart  1-10 Units Subcutaneous TID AC and HS    aspirin  81 mg Oral Daily    atorvastatin  40 mg Oral Nightly    busPIRone  15 mg Oral BID    cholecalciferol  4,000 Units Oral Daily    DULoxetine  60 mg Oral BID    pantoprazole  40 mg Oral QAM AC    ferrous sulfate  325 mg Oral Daily with breakfast    memantine  10 mg Oral BID    rivastigmine  1 patch Transdermal Nightly    cyanocobalamin  1,000 mcg Oral Daily     Continuous Infusing Medication:      PRN Medication:  acetaminophen    acetaminophen **OR** [DISCONTINUED] HYDROcodone-acetaminophen **OR** [DISCONTINUED] HYDROcodone-acetaminophen    polyethylene glycol (PEG 3350)    sennosides    bisacodyl    ondansetron    metoclopramide    melatonin    glucose **OR** glucose **OR** glucose-vitamin C **OR** dextrose **OR** glucose **OR** glucose **OR** glucose-vitamin C       Microbiology:    Hospital Encounter on 11/23/24   1. Urine Culture, Routine     Status: None    Collection Time: 11/23/24  1:41 PM    Specimen: Urine, clean catch   Result Value Ref Range    Urine Culture No Growth at 18-24 hrs. N/A       Lab Results   Component Value Date    COVID19 Not Detected 11/25/2024    COVID19 Not Detected 11/23/2024    COVID19 Not Detected 10/28/2024        Assessment/Plan:   83 yo woman with h/o htn/hl, dementia, psvt who presented with fatigue, lethargy.  Recent outpt treatment of UTI.     Lethargy/AMS/weakness  - initially suspected infectious process but workup neg thus far  - urine culture from this admit 11/23 ntd  - sars/flu/rsv neg  - cxr neg  - ct head neg  - ammonia, abg okay. Resp viral panel neg, procal not significantly elevated.  - neuro consulted- mri and eeg without acute abn  - given low grade temp will repeat urine (pt also having gi  incontinence in bed so would be at risk of this)    Anemia  - hgb 7 and then 7.7 on repeat. Will repeat for tomorrow labs.    UTIs  - treated recently as outpt. Grew proteus from 11/13 with mixed resistance pattern. 11/23 urine culture with mixed mehul.  - repeat urine culture 11/23 here is ntd  - stopping ceftriaxone as cultures neg.  - repeat UA as above     DK  Hyperkalemia  - resolved with IVF.     Anxiety  Depression  Dementia  -buspirone  -duloxetine  -memantine  -rivastigmine  -do not suspect these meds playing a direct role in presentation     GERD  -pantoprazole     HTN  -sbp stable     HLD  -atorvastatin      DANNIE     Weakness  -PT/OT saw pt. Felt to benefit from [facility]. SW involved. Pt's  was hoping that pt would improve enough with mobility to go home while in the hospital- discussed that with her age/relatively low functioning/dementia, she may need a longer time to improve with more regular rehab activity than can be offered in the hospital where emphasis is more on evaluation.  Daughter touring facilities today.    Quality:  DVT Prophylaxis: scd, lovenox  CODE status: Full per chart  Goldstein: none        Kristy Briceno MD  [facility]  Pager: [phone]\

## 2024-11-30 NOTE — PLAN OF CARE
Pt oriented to self. Sleeping for most of the day but easy arousable. VSS. Temp 100.0 F recorded once.   No complain of pain N/V/D.   The husban on the side. Feeding pt and keeping kompany.   Pt up to the chair. Cheri Laughlintrent used.   Will continue plan of care.     Problem: Impaired Functional Mobility  Goal: Achieve highest/safest level of mobility/gait  Description: Interventions:  - Assess patient's functional ability and stability  - Promote increasing activity/tolerance for mobility and gait  - Educate and engage patient/family in tolerated activity level and precautions  Outcome: Progressing     Problem: SKIN/TISSUE INTEGRITY - ADULT  Goal: Skin integrity remains intact  Description: INTERVENTIONS  - Assess and document risk factors for pressure ulcer development  - Assess and document skin integrity  - Monitor for areas of redness and/or skin breakdown  - Initiate interventions, skin care algorithm/standards of care as needed  Outcome: Progressing  Goal: Incision(s), wounds(s) or drain site(s) healing without S/S of infection  Description: INTERVENTIONS:  - Assess and document risk factors for pressure ulcer development  - Assess and document skin integrity  - Assess and document dressing/incision, wound bed, drain sites and surrounding tissue  - Implement wound care per orders  - Initiate isolation precautions as appropriate  - Initiate Pressure Ulcer prevention bundle as indicated  Outcome: Progressing  Goal: Oral mucous membranes remain intact  Description: INTERVENTIONS  - Assess oral mucosa and hygiene practices  - Implement preventative oral hygiene regimen  - Implement oral medicated treatments as ordered  Outcome: Progressing

## 2024-11-30 NOTE — PLAN OF CARE
Pt received A&Ox1, drowsy. VSS. RA. Tele. Afebrile. Denies pain. Medications given per MAR. Call light within reach. Fall precautions in place.     Problem: Diabetes/Glucose Control  Goal: Glucose maintained within prescribed range  Description: INTERVENTIONS:  - Monitor Blood Glucose as ordered  - Assess for signs and symptoms of hyperglycemia and hypoglycemia  - Administer ordered medications to maintain glucose within target range  - Assess barriers to adequate nutritional intake and initiate nutrition consult as needed  - Instruct patient on self management of diabetes  Outcome: Progressing     Problem: CARDIOVASCULAR - ADULT  Goal: Maintains optimal cardiac output and hemodynamic stability  Description: INTERVENTIONS:  - Monitor vital signs, rhythm, and trends  - Monitor for bleeding, hypotension and signs of decreased cardiac output  - Evaluate effectiveness of vasoactive medications to optimize hemodynamic stability  - Monitor arterial and/or venous puncture sites for bleeding and/or hematoma  - Assess quality of pulses, skin color and temperature  - Assess for signs of decreased coronary artery perfusion - ex. Angina  - Evaluate fluid balance, assess for edema, trend weights  Outcome: Progressing  Goal: Absence of cardiac arrhythmias or at baseline  Description: INTERVENTIONS:  - Continuous cardiac monitoring, monitor vital signs, obtain 12 lead EKG if indicated  - Evaluate effectiveness of antiarrhythmic and heart rate control medications as ordered  - Initiate emergency measures for life threatening arrhythmias  - Monitor electrolytes and administer replacement therapy as ordered  Outcome: Progressing     Problem: RESPIRATORY - ADULT  Goal: Achieves optimal ventilation and oxygenation  Description: INTERVENTIONS:  - Assess for changes in respiratory status  - Assess for changes in mentation and behavior  - Position to facilitate oxygenation and minimize respiratory effort  - Oxygen supplementation based on  oxygen saturation or ABGs  - Provide Smoking Cessation handout, if applicable  - Encourage broncho-pulmonary hygiene including cough, deep breathe, Incentive Spirometry  - Assess the need for suctioning and perform as needed  - Assess and instruct to report SOB or any respiratory difficulty  - Respiratory Therapy support as indicated  - Manage/alleviate anxiety  - Monitor for signs/symptoms of CO2 retention  Outcome: Progressing     Problem: GENITOURINARY - ADULT  Goal: Absence of urinary retention  Description: INTERVENTIONS:  - Assess patient’s ability to void and empty bladder  - Monitor intake/output and perform bladder scan as needed  - Follow urinary retention protocol/standard of care  - Consider collaborating with pharmacy to review patient's medication profile  - Implement strategies to promote bladder emptying  Outcome: Progressing     Problem: METABOLIC/FLUID AND ELECTROLYTES - ADULT  Goal: Glucose maintained within prescribed range  Description: INTERVENTIONS:  - Monitor Blood Glucose as ordered  - Assess for signs and symptoms of hyperglycemia and hypoglycemia  - Administer ordered medications to maintain glucose within target range  - Assess barriers to adequate nutritional intake and initiate nutrition consult as needed  - Instruct patient on self management of diabetes  Outcome: Progressing  Goal: Electrolytes maintained within normal limits  Description: INTERVENTIONS:  - Monitor labs and rhythm and assess patient for signs and symptoms of electrolyte imbalances  - Administer electrolyte replacement as ordered  - Monitor response to electrolyte replacements, including rhythm and repeat lab results as appropriate  - Fluid restriction as ordered  - Instruct patient on fluid and nutrition restrictions as appropriate  Outcome: Progressing     Problem: SKIN/TISSUE INTEGRITY - ADULT  Goal: Skin integrity remains intact  Description: INTERVENTIONS  - Assess and document risk factors for pressure ulcer  development  - Assess and document skin integrity  - Monitor for areas of redness and/or skin breakdown  - Initiate interventions, skin care algorithm/standards of care as needed  Outcome: Progressing  Goal: Incision(s), wounds(s) or drain site(s) healing without S/S of infection  Description: INTERVENTIONS:  - Assess and document risk factors for pressure ulcer development  - Assess and document skin integrity  - Assess and document dressing/incision, wound bed, drain sites and surrounding tissue  - Implement wound care per orders  - Initiate isolation precautions as appropriate  - Initiate Pressure Ulcer prevention bundle as indicated  Outcome: Progressing  Goal: Oral mucous membranes remain intact  Description: INTERVENTIONS  - Assess oral mucosa and hygiene practices  - Implement preventative oral hygiene regimen  - Implement oral medicated treatments as ordered  Outcome: Progressing     Problem: NEUROLOGICAL - ADULT  Goal: Achieves stable or improved neurological status  Description: INTERVENTIONS  - Assess for and report changes in neurological status  - Initiate measures to prevent increased intracranial pressure  - Maintain blood pressure and fluid volume within ordered parameters to optimize cerebral perfusion and minimize risk of hemorrhage  - Monitor temperature, glucose, and sodium. Initiate appropriate interventions as ordered  Outcome: Progressing  Goal: Achieves maximal functionality and self care  Description: INTERVENTIONS  - Monitor swallowing and airway patency with patient fatigue and changes in neurological status  - Encourage and assist patient to increase activity and self care with guidance from PT/OT  - Encourage visually impaired, hearing impaired and aphasic patients to use assistive/communication devices  Outcome: Progressing     Problem: Impaired Functional Mobility  Goal: Achieve highest/safest level of mobility/gait  Description: Interventions:  - Assess patient's functional ability  and stability  - Promote increasing activity/tolerance for mobility and gait  - Educate and engage patient/family in tolerated activity level and precautions  - Recommend use of sit-stand lift for transfers  Outcome: Progressing     Problem: Impaired Cognition  Goal: Patient will exhibit improved attention, thought processing and/or memory  Description: Interventions:  - Minimize distractions in the room when full attention is required  Outcome: Progressing

## 2024-12-01 LAB
ANTIBODY SCREEN: NEGATIVE
DEPRECATED HBV CORE AB SER IA-ACNC: 101 NG/ML
ERYTHROCYTE [DISTWIDTH] IN BLOOD BY AUTOMATED COUNT: 15.3 %
GLUCOSE BLD-MCNC: 119 MG/DL (ref 70–99)
GLUCOSE BLD-MCNC: 126 MG/DL (ref 70–99)
GLUCOSE BLD-MCNC: 136 MG/DL (ref 70–99)
GLUCOSE BLD-MCNC: 143 MG/DL (ref 70–99)
HCT VFR BLD AUTO: 23.5 %
HGB BLD-MCNC: 7.3 G/DL
HGB BLD-MCNC: 9 G/DL
MCH RBC QN AUTO: 27.5 PG (ref 26–34)
MCHC RBC AUTO-ENTMCNC: 31.1 G/DL (ref 31–37)
MCV RBC AUTO: 88.7 FL
PLATELET # BLD AUTO: 262 10(3)UL (ref 150–450)
PLATELETS.RETICULATED NFR BLD AUTO: 0.9 % (ref 0–7)
RBC # BLD AUTO: 2.65 X10(6)UL
RH BLOOD TYPE: NEGATIVE
WBC # BLD AUTO: 5.2 X10(3) UL (ref 4–11)

## 2024-12-01 PROCEDURE — 30233N1 TRANSFUSION OF NONAUTOLOGOUS RED BLOOD CELLS INTO PERIPHERAL VEIN, PERCUTANEOUS APPROACH: ICD-10-PCS | Performed by: HOSPITALIST

## 2024-12-01 RX ORDER — ACETAMINOPHEN 325 MG/1
650 TABLET ORAL ONCE
Status: COMPLETED | OUTPATIENT
Start: 2024-12-01 | End: 2024-12-01

## 2024-12-01 RX ORDER — SODIUM CHLORIDE 9 MG/ML
INJECTION, SOLUTION INTRAVENOUS ONCE
Status: COMPLETED | OUTPATIENT
Start: 2024-12-01 | End: 2024-12-01

## 2024-12-01 NOTE — PLAN OF CARE
Up sitting in the chair. Drowsy wakes up intermittently.Pleasant & compliant w/ meds & treatment.Fatigued & weak. Has poor oral intake . Spouse assist patient during meals. Assisted patient to the chair w/ behzad steady. Fall precautions observed. Hgb 7.3. 1 unit PRBc being transfused at this time as ordered. Tolerating w/ no adverse reactions. Spouse at  bedside & was updated of plan by attending & RN. Kept patient clean,dry & comfortable.

## 2024-12-01 NOTE — CM/SW NOTE
12/01/24 1711   Choice of Post-Acute Provider   Informed patient of right to choose their preferred provider Yes   List of appropriate post-acute services provided to patient/family with quality data Yes   Patient/family choice [facility]   Information given to Spouse/Significant other     SUNIL spoke with pt's spouse Minh via phone regarding [facility] choice. Pt's spouse stated he has selected [facility].    [facility] reserved in [facility]. Insurance authorization will be needed prior to discharge. SUNIL will continue to follow.     LUCAS Felix  Discharge Planner

## 2024-12-02 VITALS
DIASTOLIC BLOOD PRESSURE: 70 MMHG | OXYGEN SATURATION: 95 % | RESPIRATION RATE: 14 BRPM | TEMPERATURE: 98 F | BODY MASS INDEX: 28 KG/M2 | SYSTOLIC BLOOD PRESSURE: 128 MMHG | HEART RATE: 87 BPM | WEIGHT: 153.69 LBS

## 2024-12-02 LAB
BLOOD TYPE BARCODE: 9500
ERYTHROCYTE [DISTWIDTH] IN BLOOD BY AUTOMATED COUNT: 15.3 %
GLUCOSE BLD-MCNC: 114 MG/DL (ref 70–99)
GLUCOSE BLD-MCNC: 115 MG/DL (ref 70–99)
GLUCOSE BLD-MCNC: 131 MG/DL (ref 70–99)
HCT VFR BLD AUTO: 28.3 %
HGB BLD-MCNC: 8.8 G/DL
IRON SATN MFR SERPL: 10 %
IRON SERPL-MCNC: 25 UG/DL
MCH RBC QN AUTO: 27.8 PG (ref 26–34)
MCHC RBC AUTO-ENTMCNC: 31.1 G/DL (ref 31–37)
MCV RBC AUTO: 89.3 FL
PLATELET # BLD AUTO: 234 10(3)UL (ref 150–450)
RBC # BLD AUTO: 3.17 X10(6)UL
TOTAL IRON BINDING CAPACITY: 243 UG/DL (ref 250–425)
TRANSFERRIN SERPL-MCNC: 184 MG/DL (ref 250–380)
UNIT VOLUME: 250 ML
WBC # BLD AUTO: 5.3 X10(3) UL (ref 4–11)

## 2024-12-02 NOTE — DIETARY NOTE
[facility]   part of [facility]   CLINICAL NUTRITION    Isabella Addison     Admitting diagnosis:  Weakness generalized [R53.1]  DK (acute kidney injury) (HCC) [N17.9]    Ht:    Wt: 69.7 kg (153 lb 10.6 oz).   Body mass index is 28.1 kg/m².  IBW: 50 kg    Wt Readings from Last 6 Encounters:   11/23/24 69.7 kg (153 lb 10.6 oz)   10/28/24 69.7 kg (153 lb 10.6 oz)   06/28/23 69.7 kg (153 lb 9.6 oz)   04/05/23 65.6 kg (144 lb 10 oz)   03/11/23 67.1 kg (148 lb)   01/11/23 64.4 kg (142 lb)        Labs/Meds reviewed    Diet:       Procedures    Regular/General diet Is Patient on Accuchecks? Yes     Percent Meals Eaten (last 3 days)       Date/Time Percent Meals Eaten (%)    11/30/24 1000 80 %    11/30/24 1232 100 %    11/30/24 1700 100 %    11/30/24 2000 50 %    12/01/24 1430 100 %    12/01/24 1900 90 %              Pt chart reviewed d/t LOS. 85 y/o female with PMH GERD, HTN, irritable bowel syndrome, dementia presents with weakness and AMS.  feeding pt breakfast at time of visit.  reports pt has good appetite PTA. Pt eating eggs, rust, orange juice and yogurt for breakfast.  reports pt eating ~75% meals. No weight loss per EMR review. Answered all questions at this time. Will follow per protocol.    Patient reports good appetite at this time.  Nursing notes reports Percent Meals Eaten (%): 90 % intake for last meal.  Tolerating po diet without diarrhea, emesis, or constipation.   No significant weight changes noted.     Patient is at low nutrition risk at this time.    Please consult if patient status changes or nutrition issues arise.      Bing Lewis, MS, RDN, LDN  Clinical Dietitian

## 2024-12-02 NOTE — CM/SW NOTE
CM called [facility] ([phone]) and spoke to Zachariah JOSHIfor PAC insurance authorization status update; CM informed request is still in review, determination expected later today.  CM provided [place] with direct contact information for follow up.     1625: Insurance authorization letter received and attached to june rizzo.     Laura Gomez, RN Case Manager [id number]

## 2024-12-02 NOTE — CONGREGATE LIVING REVIEW
[facility] Living Authorization    The [facility] Review Committee has reviewed this case and the patient IS APPROVED for discharge to a facility for Short Term Skilled once the following procedure is followed:     - The physician discharge instructions (contained within the MAH[facility] note for SNF) must inlcude the below appropriate and approved COVID instructions to the facility    For questions regarding CLRC approval process, please contact the CM assigned to the case.  For questions regarding RN discharge workflow, please contact the unit Clinical Leader.  
normal

## 2024-12-02 NOTE — PLAN OF CARE
[facility] care 0730.  Alert and oriented x2. Dementia. Confused and Frogetful  [place]- Hearing aids at bedside.   Reg diet  PIV saline locked.  IV iron administered.   Accu checks  Meds with apple sauce.   CPAP at night  Reg diet  Electrolyte Cardiac protocol  Exelon patch to right arm  Lovenox for VTE prophylaxis  Jake, Briefed.   Denies pain.   Cheri Steady.  Continue to monitor pt.       Problem: Diabetes/Glucose Control  Goal: Glucose maintained within prescribed range  Description: INTERVENTIONS:  - Monitor Blood Glucose as ordered  - Assess for signs and symptoms of hyperglycemia and hypoglycemia  - Administer ordered medications to maintain glucose within target range  - Assess barriers to adequate nutritional intake and initiate nutrition consult as needed  - Instruct patient on self management of diabetes  Outcome: Progressing     Problem: CARDIOVASCULAR - ADULT  Goal: Maintains optimal cardiac output and hemodynamic stability  Description: INTERVENTIONS:  - Monitor vital signs, rhythm, and trends  - Monitor for bleeding, hypotension and signs of decreased cardiac output  - Evaluate effectiveness of vasoactive medications to optimize hemodynamic stability  - Monitor arterial and/or venous puncture sites for bleeding and/or hematoma  - Assess quality of pulses, skin color and temperature  - Assess for signs of decreased coronary artery perfusion - ex. Angina  - Evaluate fluid balance, assess for edema, trend weights  Outcome: Progressing  Goal: Absence of cardiac arrhythmias or at baseline  Description: INTERVENTIONS:  - Continuous cardiac monitoring, monitor vital signs, obtain 12 lead EKG if indicated  - Evaluate effectiveness of antiarrhythmic and heart rate control medications as ordered  - Initiate emergency measures for life threatening arrhythmias  - Monitor electrolytes and administer replacement therapy as ordered  Outcome: Progressing     Problem: RESPIRATORY - ADULT  Goal: Achieves optimal  ventilation and oxygenation  Description: INTERVENTIONS:  - Assess for changes in respiratory status  - Assess for changes in mentation and behavior  - Position to facilitate oxygenation and minimize respiratory effort  - Oxygen supplementation based on oxygen saturation or ABGs  - Provide Smoking Cessation handout, if applicable  - Encourage broncho-pulmonary hygiene including cough, deep breathe, Incentive Spirometry  - Assess the need for suctioning and perform as needed  - Assess and instruct to report SOB or any respiratory difficulty  - Respiratory Therapy support as indicated  - Manage/alleviate anxiety  - Monitor for signs/symptoms of CO2 retention  Outcome: Progressing     Problem: GENITOURINARY - ADULT  Goal: Absence of urinary retention  Description: INTERVENTIONS:  - Assess patient’s ability to void and empty bladder  - Monitor intake/output and perform bladder scan as needed  - Follow urinary retention protocol/standard of care  - Consider collaborating with pharmacy to review patient's medication profile  - Implement strategies to promote bladder emptying  Outcome: Progressing     Problem: METABOLIC/FLUID AND ELECTROLYTES - ADULT  Goal: Glucose maintained within prescribed range  Description: INTERVENTIONS:  - Monitor Blood Glucose as ordered  - Assess for signs and symptoms of hyperglycemia and hypoglycemia  - Administer ordered medications to maintain glucose within target range  - Assess barriers to adequate nutritional intake and initiate nutrition consult as needed  - Instruct patient on self management of diabetes  Outcome: Progressing  Goal: Electrolytes maintained within normal limits  Description: INTERVENTIONS:  - Monitor labs and rhythm and assess patient for signs and symptoms of electrolyte imbalances  - Administer electrolyte replacement as ordered  - Monitor response to electrolyte replacements, including rhythm and repeat lab results as appropriate  - Fluid restriction as ordered  -  Instruct patient on fluid and nutrition restrictions as appropriate  Outcome: Progressing     Problem: SKIN/TISSUE INTEGRITY - ADULT  Goal: Skin integrity remains intact  Description: INTERVENTIONS  - Assess and document risk factors for pressure ulcer development  - Assess and document skin integrity  - Monitor for areas of redness and/or skin breakdown  - Initiate interventions, skin care algorithm/standards of care as needed  Outcome: Progressing  Goal: Incision(s), wounds(s) or drain site(s) healing without S/S of infection  Description: INTERVENTIONS:  - Assess and document risk factors for pressure ulcer development  - Assess and document skin integrity  - Assess and document dressing/incision, wound bed, drain sites and surrounding tissue  - Implement wound care per orders  - Initiate isolation precautions as appropriate  - Initiate Pressure Ulcer prevention bundle as indicated  Outcome: Progressing  Goal: Oral mucous membranes remain intact  Description: INTERVENTIONS  - Assess oral mucosa and hygiene practices  - Implement preventative oral hygiene regimen  - Implement oral medicated treatments as ordered  Outcome: Progressing     Problem: NEUROLOGICAL - ADULT  Goal: Achieves stable or improved neurological status  Description: INTERVENTIONS  - Assess for and report changes in neurological status  - Initiate measures to prevent increased intracranial pressure  - Maintain blood pressure and fluid volume within ordered parameters to optimize cerebral perfusion and minimize risk of hemorrhage  - Monitor temperature, glucose, and sodium. Initiate appropriate interventions as ordered  Outcome: Progressing  Goal: Achieves maximal functionality and self care  Description: INTERVENTIONS  - Monitor swallowing and airway patency with patient fatigue and changes in neurological status  - Encourage and assist patient to increase activity and self care with guidance from PT/OT  - Encourage visually impaired, hearing  impaired and aphasic patients to use assistive/communication devices  Outcome: Progressing     Problem: Impaired Functional Mobility  Goal: Achieve highest/safest level of mobility/gait  Description: Interventions:  - Assess patient's functional ability and stability  - Promote increasing activity/tolerance for mobility and gait  - Educate and engage patient/family in tolerated activity level and precautions    Outcome: Progressing     Problem: Impaired Cognition  Goal: Patient will exhibit improved attention, thought processing and/or memory  Description: Interventions:    Outcome: Progressing

## 2024-12-02 NOTE — CM/SW NOTE
12/02/24 1600   Discharge disposition   Expected discharge disposition subacute   Discharge transportation [facility]     SW noted that patient is medically cleared for DC and that her insurance authorization is approved for admission today to [facility]. SW confirmed with [facility] that they are able to accept today. RN informed family and reported that they are agreeable to DC.     [facility] [phone] has been requested to arrange ambulance for  time of 7pm. PCS form completed.    [facility] [phone]     SW will continue to follow for plan of care changes and remain available for any additional DC needs or concerns.     Trish Pascual MSW, LSW  Discharge Planner   [phone]

## 2024-12-02 NOTE — PLAN OF CARE
Patient alert x1, drowsy. Open eyes at times, then back to sleep again. Remains afebrile. On CPAP at night. No SOB. No complaints of pain. No nausea and vomiting. Able to swallow due meds mixed with apple sauce without difficulty. Hgb monitored, 9.0 after 1PRBC. Fall precautions in place. Call light within reach. Needs attended. Will continue Plan of care.    Problem: Diabetes/Glucose Control  Goal: Glucose maintained within prescribed range  Description: INTERVENTIONS:  - Monitor Blood Glucose as ordered  - Assess for signs and symptoms of hyperglycemia and hypoglycemia  - Administer ordered medications to maintain glucose within target range  - Assess barriers to adequate nutritional intake and initiate nutrition consult as needed  - Instruct patient on self management of diabetes  Outcome: Progressing     Problem: CARDIOVASCULAR - ADULT  Goal: Maintains optimal cardiac output and hemodynamic stability  Description: INTERVENTIONS:  - Monitor vital signs, rhythm, and trends  - Monitor for bleeding, hypotension and signs of decreased cardiac output  - Evaluate effectiveness of vasoactive medications to optimize hemodynamic stability  - Monitor arterial and/or venous puncture sites for bleeding and/or hematoma  - Assess quality of pulses, skin color and temperature  - Assess for signs of decreased coronary artery perfusion - ex. Angina  - Evaluate fluid balance, assess for edema, trend weights  Outcome: Progressing     Problem: RESPIRATORY - ADULT  Goal: Achieves optimal ventilation and oxygenation  Description: INTERVENTIONS:  - Assess for changes in respiratory status  - Assess for changes in mentation and behavior  - Position to facilitate oxygenation and minimize respiratory effort  - Oxygen supplementation based on oxygen saturation or ABGs  - Provide Smoking Cessation handout, if applicable  - Encourage broncho-pulmonary hygiene including cough, deep breathe, Incentive Spirometry  - Assess the need for  suctioning and perform as needed  - Assess and instruct to report SOB or any respiratory difficulty  - Respiratory Therapy support as indicated  - Manage/alleviate anxiety  - Monitor for signs/symptoms of CO2 retention  Outcome: Progressing     Problem: METABOLIC/FLUID AND ELECTROLYTES - ADULT  Goal: Glucose maintained within prescribed range  Description: INTERVENTIONS:  - Monitor Blood Glucose as ordered  - Assess for signs and symptoms of hyperglycemia and hypoglycemia  - Administer ordered medications to maintain glucose within target range  - Assess barriers to adequate nutritional intake and initiate nutrition consult as needed  - Instruct patient on self management of diabetes  Outcome: Progressing

## 2024-12-02 NOTE — PROGRESS NOTES
[facility] Hospitalist note    PCP: Jorge Lr MD    Chief Complaint:  F/u fatigue/weakness    SUBJECTIVE:  Tmax 99.7, temps decreasing today  However more lethargic still. Has not been staying awake consistently    OBJECTIVE:  Temp:  [97.6 °F (36.4 °C)-99.7 °F (37.6 °C)] 98.2 °F (36.8 °C)  Pulse:  [] 87  Resp:  [18-24] 18  BP: (105-137)/(59-97) 118/68  SpO2:  [92 %-96 %] 95 %    Intake/Output:    Intake/Output Summary (Last 24 hours) at 12/1/2024 2117  Last data filed at 12/1/2024 1920  Gross per 24 hour   Intake 779.25 ml   Output 575 ml   Net 204.25 ml       Last 3 Weights   11/23/24 1745 153 lb 10.6 oz (69.7 kg)   10/28/24 2021 153 lb 10.6 oz (69.7 kg)   06/28/23 0351 153 lb 9.6 oz (69.7 kg)   06/27/23 2357 150 lb (68 kg)       Exam  Gen: No acute distress  HEENT: anicteric sclera, MMM  Pulm: Lungs clear, normal respiratory effort  CV: Heart with regular rate and rhythm, no peripheral edema  Abd: Abdomen soft, nontender, nondistended, no organomegaly, bowel sounds present  Skin: no rashes or lesions  Neuro: awake, interactive    Data Review:         Labs:     Recent Labs   Lab 11/25/24  0607 11/29/24  0515 11/29/24  1507 12/01/24  0708 12/01/24  2030   WBC 6.1 4.5  --  5.2  --    HGB 9.3* 7.3* 7.7* 7.3* 9.0*   MCV 93.4 88.5  --  88.7  --    .0* 200.0  --  262.0  --    NE 4.49  --   --   --   --    LYMABS 0.70*  --   --   --   --        Recent Labs   Lab 11/25/24  0607 11/29/24  0515    144   K 4.3 4.2    112   CO2 23.0 25.0   BUN 24* 23   CREATSERUM 0.94 0.86   CA 9.6 9.4   MG 2.1  --    * 105*       No results for input(s): \"ALT\", \"AST\", \"ALB\", \"AMYLASE\", \"LIPASE\" in the last 168 hours.    Invalid input(s): \"ALPHOS\", \"TBIL\", \"DBIL\", \"TPROT\"      Recent Labs   Lab 11/25/24  0607   PCT 0.09*       Recent Labs   Lab 11/29/24  0515   ERICH 101       Recent Labs   Lab 11/30/24  2112 12/01/24  0453 12/01/24  1154 12/01/24  1554 12/01/24  2108   PGLU 175* 126* 119* 136* 143*        Meds:   Scheduled Medication:   [START ON 12/2/2024] sodium ferric gluconate  125 mg Intravenous Once    enoxaparin  40 mg Subcutaneous Nightly    insulin aspart  1-10 Units Subcutaneous TID AC and HS    aspirin  81 mg Oral Daily    atorvastatin  40 mg Oral Nightly    busPIRone  15 mg Oral BID    cholecalciferol  4,000 Units Oral Daily    DULoxetine  60 mg Oral BID    pantoprazole  40 mg Oral QAM AC    ferrous sulfate  325 mg Oral Daily with breakfast    memantine  10 mg Oral BID    rivastigmine  1 patch Transdermal Nightly    cyanocobalamin  1,000 mcg Oral Daily     Continuous Infusing Medication:      PRN Medication:  acetaminophen    acetaminophen **OR** [DISCONTINUED] HYDROcodone-acetaminophen **OR** [DISCONTINUED] HYDROcodone-acetaminophen    polyethylene glycol (PEG 3350)    sennosides    bisacodyl    ondansetron    metoclopramide    melatonin    glucose **OR** glucose **OR** glucose-vitamin C **OR** dextrose **OR** glucose **OR** glucose **OR** glucose-vitamin C       Microbiology:    Hospital Encounter on 11/23/24   1. Urine Culture, Routine     Status: None    Collection Time: 11/23/24  1:41 PM    Specimen: Urine, clean catch   Result Value Ref Range    Urine Culture No Growth at 18-24 hrs. N/A       Lab Results   Component Value Date    COVID19 Not Detected 11/25/2024    COVID19 Not Detected 11/23/2024    COVID19 Not Detected 10/28/2024        Assessment/Plan:   83 yo woman with h/o htn/hl, dementia, psvt who presented with fatigue, lethargy.  Recent outpt treatment of UTI.     Lethargy/AMS/weakness  - initially suspected infectious process but workup neg thus far  - urine culture from this admit 11/23 ntd  - sars/flu/rsv neg  - cxr neg  - ct head neg  - ammonia, abg okay. Resp viral panel neg, procal not significantly elevated.  - neuro consulted- mri and eeg without acute abn  - ?anemia playing a role. Pt had h/o gi bleed in past but having brown stool here. On iron. Will give dose iv iron and 1  unit hgb as hgb is significantly below pt's usual baseline closer to 9 range.  - repeat ua also ordered, appears fairly normal. Await urine culture.    Anemia  - hgb has consistently been in 7 range despite oral iron. No overt bleeding on exam but did have avm on previous scope.   - will give 1 unit prbc  - dose of iv iron    UTIs  - treated recently as outpt. Grew proteus from 11/13 with mixed resistance pattern. 11/23 urine culture with mixed mehul.  - repeat urine culture 11/23 here is ntd  - stopping ceftriaxone as cultures neg.  - repeat UA as above, appears okay but urine culture pending     DK  Hyperkalemia  - resolved with IVF.     Anxiety  Depression  Dementia  -buspirone  -duloxetine  -memantine  -rivastigmine  -do not suspect these meds playing a direct role in presentation     GERD  -pantoprazole     HTN  -sbp stable     HLD  -atorvastatin      DANNIE     Weakness  -PT/OT saw pt. Felt to benefit from ADRIAN. Family has now agreed to this. However, she does seem more lethargic today. I'm hoping that she can perk up as much as a couple of days ago to be able to participate with therapies.  I did discuss with pt's  that eventually dementia could contribute to declining appetite, weakness, etc and if we are not finding reversible etiologies, we may have to consider this. He expressed understanding. Will cont to address with him.     Quality:  DVT Prophylaxis: scd, lovenox  CODE status: Full per chart  [place]: none        Kristy Briceno MD  [facility] Hospitalist  Pager: [phone]\

## 2024-12-02 NOTE — PAYOR COMM NOTE
CONTINUED STAY REVIEW    Payor: [employer] MED[employer] ADV PPO  Subscriber #:  [id number]  Authorization Number: [id number]    Admit date: 11/23/24  Admit time:  5:36 PM    REVIEW DOCUMENTATION: 11/29  PCP: Jorge Lr MD     Chief Complaint:  F/u fatigue/weakness     SUBJECTIVE:   at bedside, patient feels generally well.     OBJECTIVE:  Temp:  [96.9 °F (36.1 °C)-99.1 °F (37.3 °C)] 98.9 °F (37.2 °C)  Pulse:  [] 86  Resp:  [16-18] 18  BP: (112-136)/(59-76) 122/59  SpO2:  [93 %-99 %] 93 %     Intake/Output:     Intake/Output Summary (Last 24 hours) at 11/29/2024 0802  Last data filed at 11/28/2024 1800      Gross per 24 hour   Intake 0 ml   Output --   Net 0 ml              Last 3 Weights   11/23/24 1745 153 lb 10.6 oz (69.7 kg)   10/28/24 2021 153 lb 10.6 oz (69.7 kg)   06/28/23 0351 153 lb 9.6 oz (69.7 kg)   06/27/23 2357 150 lb (68 kg)         Exam  Gen: No acute distress  HEENT: anicteric sclera, MMM  Pulm: Lungs clear, normal respiratory effort  CV: Heart with regular rate and rhythm, no peripheral edema  Abd: Abdomen soft, nontender, nondistended, no organomegaly, bowel sounds present  Skin: no rashes or lesions  Neuro: awake, interactive     Data Review:          Labs:             Recent Labs   Lab 11/23/24  1321 11/24/24  0659 11/25/24  0607 11/29/24  0515   WBC 7.1 5.5 6.1 4.5   HGB 9.4* 8.8* 9.3* 7.3*   MCV 91.0 94.3 93.4 88.5   .0* 116.0* 118.0* 200.0   NE 5.59 4.25 4.49  --    LYMABS 0.61* 0.57* 0.70*  --                 Recent Labs   Lab 11/23/24  1321 11/24/24  0659 11/25/24  0607 11/29/24  0515    140 140 144   K 5.5* 4.6 4.3 4.2    110 110 112   CO2 24.0 22.0 23.0 25.0   BUN 44* 33* 24* 23   CREATSERUM 1.43* 0.93 0.94 0.86   CA 11.8* 9.9 9.6 9.4   MG  --   --  2.1  --    * 104* 108* 105*             Recent Labs   Lab 11/23/24  1321   ALT 10   AST 15   ALB 4.2             Recent Labs   Lab 11/25/24  0607   PCT 0.09*         No results for input(s): \"CRP\",  \"ERICH\", \"LDH\", \"DDIMER\" in the last 168 hours.             Recent Labs   Lab 11/28/24  0431 11/28/24  1150 11/28/24  1654 11/28/24  2115 11/29/24  0515   PGLU 127* 142* 157* 146* 119*         Meds:   Scheduled Medication:  Scheduled Medications    enoxaparin  40 mg Subcutaneous Nightly    insulin aspart  1-10 Units Subcutaneous TID AC and HS    aspirin  81 mg Oral Daily    atorvastatin  40 mg Oral Nightly    busPIRone  15 mg Oral BID    cholecalciferol  4,000 Units Oral Daily    DULoxetine  60 mg Oral BID    pantoprazole  40 mg Oral QAM AC    ferrous sulfate  325 mg Oral Daily with breakfast    memantine  10 mg Oral BID    rivastigmine  1 patch Transdermal Nightly    cyanocobalamin  1,000 mcg Oral Daily         Continuous Infusing Medication:  Medication Infusions            PRN Medication:  PRN Medications     acetaminophen    acetaminophen **OR** [DISCONTINUED] HYDROcodone-acetaminophen **OR** [DISCONTINUED] HYDROcodone-acetaminophen    polyethylene glycol (PEG 3350)    sennosides    bisacodyl    ondansetron    metoclopramide    melatonin    glucose **OR** glucose **OR** glucose-vitamin C **OR** dextrose **OR** glucose **OR** glucose **OR** glucose-vitamin C            Microbiology:           Hospital Encounter on 11/23/24   1. Urine Culture, Routine     Status: None     Collection Time: 11/23/24  1:41 PM     Specimen: Urine, clean catch   Result Value Ref Range     Urine Culture No Growth at 18-24 hrs. N/A               Lab Results   Component Value Date     COVID19 Not Detected 11/25/2024     COVID19 Not Detected 11/23/2024     COVID19 Not Detected 10/28/2024          Assessment/Plan:   85 yo woman with h/o htn/hl, dementia, psvt who presented with fatigue, lethargy.  Recent outpt treatment of UTI.      Lethargy/AMS/weakness  - initially suspected infectious process but workup neg thus far  - urine culture from this admit 11/23 ntd  - sars/flu/rsv neg  - cxr neg  - ct head neg  - ammonia, abg okay. Resp viral  panel neg, procal not significantly elevated.  - neuro consulted- mri and eeg without acute abn     Anemia  - hgb 7 today, drop from 9 range earlier. Will repeat this afternoon.      UTIs  - treated recently as outpt. Grew proteus from 11/13 with mixed resistance pattern. 11/23 urine culture with mixed mehul.  - repeat urine culture 11/23 here is ntd  - stopping ceftriaxone as cultures neg.     DK  Hyperkalemia  - resolved with IVF.     Anxiety  Depression  Dementia  -buspirone  -duloxetine  -memantine  -rivastigmine  -do not suspect these meds playing a direct role in presentation     GERD  -pantoprazole     HTN  -sbp stable     HLD  -atorvastatin      DANNIE     Weakness  -PT/OT saw pt. Felt to benefit from ADRIAN. SW involved     Quality:  DVT Prophylaxis: scd, lovenox  CODE status: Full per chart  Goldstein: none           MD Jacqueline Andino Hospitalist      11/30/2024 Hospitalist Progress Note         [facility]ist note     PCP: Jorge Lr MD     Chief Complaint:  F/u fatigue/weakness     SUBJECTIVE:  Pt's  at bedside, concerned that she feels warm. Temp up to 100 overnight.  Also concerned that she still is a bit more lethargic at times, had a BM in bed which she doesn't do at home typically.      OBJECTIVE:  Temp:  [98.4 °F (36.9 °C)-100 °F (37.8 °C)] 98.5 °F (36.9 °C)  Pulse:  [] 100  Resp:  [17-20] 18  BP: (105-144)/(65-82) 121/68  SpO2:  [93 %-96 %] 95 %     Intake/Output:     Intake/Output Summary (Last 24 hours) at 11/30/2024 1442  Last data filed at 11/30/2024 1000      Gross per 24 hour   Intake 120 ml   Output 500 ml   Net -380 ml              Last 3 Weights   11/23/24 1745 153 lb 10.6 oz (69.7 kg)   10/28/24 2021 153 lb 10.6 oz (69.7 kg)   06/28/23 0351 153 lb 9.6 oz (69.7 kg)   06/27/23 2357 150 lb (68 kg)         Exam  Gen: No acute distress  HEENT: anicteric sclera, MMM  Pulm: Lungs clear, normal respiratory effort  CV: Heart with regular rate and rhythm, no peripheral  edema  Abd: Abdomen soft, nontender, nondistended, no organomegaly, bowel sounds present  Skin: no rashes or lesions  Neuro: awake, interactive     Data Review:          Labs:             Recent Labs   Lab 11/24/24  0659 11/25/24  0607 11/29/24  0515 11/29/24  1507   WBC 5.5 6.1 4.5  --    HGB 8.8* 9.3* 7.3* 7.7*   MCV 94.3 93.4 88.5  --    .0* 118.0* 200.0  --    NE 4.25 4.49  --   --    LYMABS 0.57* 0.70*  --   --                Recent Labs   Lab 11/24/24  0659 11/25/24  0607 11/29/24  0515    140 144   K 4.6 4.3 4.2    110 112   CO2 22.0 23.0 25.0   BUN 33* 24* 23   CREATSERUM 0.93 0.94 0.86   CA 9.9 9.6 9.4   MG  --  2.1  --    * 108* 105*         No results for input(s): \"ALT\", \"AST\", \"ALB\", \"AMYLASE\", \"LIPASE\" in the last 168 hours.     Invalid input(s): \"ALPHOS\", \"TBIL\", \"DBIL\", \"TPROT\"            Recent Labs   Lab 11/25/24  0607   PCT 0.09*         No results for input(s): \"CRP\", \"ERICH\", \"LDH\", \"DDIMER\" in the last 168 hours.             Recent Labs   Lab 11/29/24  1155 11/29/24  1627 11/29/24  2059 11/30/24  0442 11/30/24  1232   PGLU 120* 175* 146* 139* 153*         Meds:   Scheduled Medication:  Scheduled Medications    enoxaparin  40 mg Subcutaneous Nightly    insulin aspart  1-10 Units Subcutaneous TID AC and HS    aspirin  81 mg Oral Daily    atorvastatin  40 mg Oral Nightly    busPIRone  15 mg Oral BID    cholecalciferol  4,000 Units Oral Daily    DULoxetine  60 mg Oral BID    pantoprazole  40 mg Oral QAM AC    ferrous sulfate  325 mg Oral Daily with breakfast    memantine  10 mg Oral BID    rivastigmine  1 patch Transdermal Nightly    cyanocobalamin  1,000 mcg Oral Daily         Continuous Infusing Medication:  Medication Infusions            PRN Medication:  PRN Medications     acetaminophen    acetaminophen **OR** [DISCONTINUED] HYDROcodone-acetaminophen **OR** [DISCONTINUED] HYDROcodone-acetaminophen    polyethylene glycol (PEG 3350)    sennosides    bisacodyl     ondansetron    metoclopramide    melatonin    glucose **OR** glucose **OR** glucose-vitamin C **OR** dextrose **OR** glucose **OR** glucose **OR** glucose-vitamin C            Microbiology:           Hospital Encounter on 11/23/24   1. Urine Culture, Routine     Status: None     Collection Time: 11/23/24  1:41 PM     Specimen: Urine, clean catch   Result Value Ref Range     Urine Culture No Growth at 18-24 hrs. N/A               Lab Results   Component Value Date     COVID19 Not Detected 11/25/2024     COVID19 Not Detected 11/23/2024     COVID19 Not Detected 10/28/2024          Assessment/Plan:   85 yo woman with h/o htn/hl, dementia, psvt who presented with fatigue, lethargy.  Recent outpt treatment of UTI.      Lethargy/AMS/weakness  - initially suspected infectious process but workup neg thus far  - urine culture from this admit 11/23 ntd  - sars/flu/rsv neg  - cxr neg  - ct head neg  - ammonia, abg okay. Resp viral panel neg, procal not significantly elevated.  - neuro consulted- mri and eeg without acute abn  - given low grade temp will repeat urine (pt also having gi incontinence in bed so would be at risk of this)     Anemia  - hgb 7 and then 7.7 on repeat. Will repeat for tomorrow labs.     UTIs  - treated recently as outpt. Grew proteus from 11/13 with mixed resistance pattern. 11/23 urine culture with mixed mehul.  - repeat urine culture 11/23 here is ntd  - stopping ceftriaxone as cultures neg.  - repeat UA as above     DK  Hyperkalemia  - resolved with IVF.     Anxiety  Depression  Dementia  -buspirone  -duloxetine  -memantine  -rivastigmine  -do not suspect these meds playing a direct role in presentation     GERD  -pantoprazole     HTN  -sbp stable     HLD  -atorvastatin      DANNIE     Weakness  -PT/OT saw pt. Felt to benefit from ADRIAN. SW involved. Pt's  was hoping that pt would improve enough with mobility to go home while in the hospital- discussed that with her age/relatively low  functioning/dementia, she may need a longer time to improve with more regular rehab activity than can be offered in the hospital where emphasis is more on evaluation.  Daughter touring facilities today.     Quality:  DVT Prophylaxis: scd, lovenox  CODE status: Full per chart  Goldstein: none           MD Jacqueline Andino Hospitalist      12/1/2024 Hospitalist Progress Note     .[facility] Hospitalist note     PCP: Jorge Lr MD     Chief Complaint:  F/u fatigue/weakness     SUBJECTIVE:  Tmax 99.7, temps decreasing today  However more lethargic still. Has not been staying awake consistently     OBJECTIVE:  Temp:  [97.6 °F (36.4 °C)-99.7 °F (37.6 °C)] 98.2 °F (36.8 °C)  Pulse:  [] 87  Resp:  [18-24] 18  BP: (105-137)/(59-97) 118/68  SpO2:  [92 %-96 %] 95 %     Intake/Output:     Intake/Output Summary (Last 24 hours) at 12/1/2024 2117  Last data filed at 12/1/2024 1920      Gross per 24 hour   Intake 779.25 ml   Output 575 ml   Net 204.25 ml              Last 3 Weights   11/23/24 1745 153 lb 10.6 oz (69.7 kg)   10/28/24 2021 153 lb 10.6 oz (69.7 kg)   06/28/23 0351 153 lb 9.6 oz (69.7 kg)   06/27/23 2357 150 lb (68 kg)         Exam  Gen: No acute distress  HEENT: anicteric sclera, MMM  Pulm: Lungs clear, normal respiratory effort  CV: Heart with regular rate and rhythm, no peripheral edema  Abd: Abdomen soft, nontender, nondistended, no organomegaly, bowel sounds present  Skin: no rashes or lesions  Neuro: awake, interactive     Data Review:          Labs:              Recent Labs   Lab 11/25/24  0607 11/29/24  0515 11/29/24  1507 12/01/24  0708 12/01/24 2030   WBC 6.1 4.5  --  5.2  --    HGB 9.3* 7.3* 7.7* 7.3* 9.0*   MCV 93.4 88.5  --  88.7  --    .0* 200.0  --  262.0  --    NE 4.49  --   --   --   --    LYMABS 0.70*  --   --   --   --               Recent Labs   Lab 11/25/24  0607 11/29/24  0515    144   K 4.3 4.2    112   CO2 23.0 25.0   BUN 24* 23   CREATSERUM 0.94 0.86   CA 9.6 9.4    MG 2.1  --    * 105*         No results for input(s): \"ALT\", \"AST\", \"ALB\", \"AMYLASE\", \"LIPASE\" in the last 168 hours.     Invalid input(s): \"ALPHOS\", \"TBIL\", \"DBIL\", \"TPROT\"            Recent Labs   Lab 11/25/24  0607   PCT 0.09*             Recent Labs   Lab 11/29/24  0515   ERICH 101                 Recent Labs   Lab 11/30/24  2112 12/01/24  0453 12/01/24  1154 12/01/24  1554 12/01/24  2108   PGLU 175* 126* 119* 136* 143*         Meds:   Scheduled Medication:  Scheduled Medications    [START ON 12/2/2024] sodium ferric gluconate  125 mg Intravenous Once    enoxaparin  40 mg Subcutaneous Nightly    insulin aspart  1-10 Units Subcutaneous TID AC and HS    aspirin  81 mg Oral Daily    atorvastatin  40 mg Oral Nightly    busPIRone  15 mg Oral BID    cholecalciferol  4,000 Units Oral Daily    DULoxetine  60 mg Oral BID    pantoprazole  40 mg Oral QAM AC    ferrous sulfate  325 mg Oral Daily with breakfast    memantine  10 mg Oral BID    rivastigmine  1 patch Transdermal Nightly    cyanocobalamin  1,000 mcg Oral Daily         Continuous Infusing Medication:  Medication Infusions            PRN Medication:  PRN Medications     acetaminophen    acetaminophen **OR** [DISCONTINUED] HYDROcodone-acetaminophen **OR** [DISCONTINUED] HYDROcodone-acetaminophen    polyethylene glycol (PEG 3350)    sennosides    bisacodyl    ondansetron    metoclopramide    melatonin    glucose **OR** glucose **OR** glucose-vitamin C **OR** dextrose **OR** glucose **OR** glucose **OR** glucose-vitamin C            Microbiology:           Hospital Encounter on 11/23/24   1. Urine Culture, Routine     Status: None     Collection Time: 11/23/24  1:41 PM     Specimen: Urine, clean catch   Result Value Ref Range     Urine Culture No Growth at 18-24 hrs. N/A               Lab Results   Component Value Date     COVID19 Not Detected 11/25/2024     COVID19 Not Detected 11/23/2024     COVID19 Not Detected 10/28/2024          Assessment/Plan:   83 yo  woman with h/o htn/hl, dementia, psvt who presented with fatigue, lethargy.  Recent outpt treatment of UTI.      Lethargy/AMS/weakness  - initially suspected infectious process but workup neg thus far  - urine culture from this admit 11/23 ntd  - sars/flu/rsv neg  - cxr neg  - ct head neg  - ammonia, abg okay. Resp viral panel neg, procal not significantly elevated.  - neuro consulted- mri and eeg without acute abn  - ?anemia playing a role. Pt had h/o gi bleed in past but having brown stool here. On iron. Will give dose iv iron and 1 unit hgb as hgb is significantly below pt's usual baseline closer to 9 range.  - repeat ua also ordered, appears fairly normal. Await urine culture.     Anemia  - hgb has consistently been in 7 range despite oral iron. No overt bleeding on exam but did have avm on previous scope.   - will give 1 unit prbc  - dose of iv iron     UTIs  - treated recently as outpt. Grew proteus from 11/13 with mixed resistance pattern. 11/23 urine culture with mixed mehul.  - repeat urine culture 11/23 here is ntd  - stopping ceftriaxone as cultures neg.  - repeat UA as above, appears okay but urine culture pending     DK  Hyperkalemia  - resolved with IVF.     Anxiety  Depression  Dementia  -buspirone  -duloxetine  -memantine  -rivastigmine  -do not suspect these meds playing a direct role in presentation     GERD  -pantoprazole     HTN  -sbp stable     HLD  -atorvastatin      DANNIE     Weakness  -PT/OT saw pt. Felt to benefit from ADRIAN. Family has now agreed to this. However, she does seem more lethargic today. I'm hoping that she can perk up as much as a couple of days ago to be able to participate with therapies.  I did discuss with pt's  that eventually dementia could contribute to declining appetite, weakness, etc and if we are not finding reversible etiologies, we may have to consider this. He expressed understanding. Will cont to address with him.      Quality:  DVT Prophylaxis: scd,  lovenox  CODE status: Full per chart  Goldstein: none           Kristy Briceno MD             MEDICATIONS ADMINISTERED IN LAST 1 DAY:  Transfuse RBC       Date Action Dose Route User    12/1/2024 1611 New Bag (none) Intravenous Luna Gonzalez, SAULO          aspirin DR tab 81 mg       Date Action Dose Route User    12/2/2024 0926 Given 81 mg Oral Jovan, Jodi          atorvastatin (Lipitor) tab 40 mg       Date Action Dose Route User    12/1/2024 2024 Given 40 mg Oral Carmelita Trejo RN          busPIRone (Buspar) tab 15 mg       Date Action Dose Route User    12/2/2024 0927 Given 15 mg Oral Jovan, Jodi    12/1/2024 2023 Given 15 mg Oral Carmelita Trejo RN          DULoxetine (Cymbalta) DR cap 60 mg       Date Action Dose Route User    12/2/2024 0926 Given 60 mg Oral Jovan, Jodi    12/1/2024 2023 Given 60 mg Oral Carmelita Trejo RN          enoxaparin (Lovenox) 40 MG/0.4ML SUBQ injection 40 mg       Date Action Dose Route User    12/1/2024 2023 Given 40 mg Subcutaneous (Right Lower Abdomen) Carmelita Trejo RN          ferrous sulfate DR tab 325 mg       Date Action Dose Route User    12/2/2024 0927 Given 325 mg Oral Jodi Aldana          memantine (Namenda) tab 10 mg       Date Action Dose Route User    12/2/2024 0927 Given 10 mg Oral Jovan, Jodi    12/1/2024 2023 Given 10 mg Oral Carmelita Trejo RN          pantoprazole (Protonix) DR tab 40 mg       Date Action Dose Route User    12/2/2024 0530 Given 40 mg Oral Carmelita Trejo RN          rivastigmine (Exelon) 13.3 MG/24HR patch 1 patch       Date Action Dose Route User    12/1/2024 2101 Patch Applied 1 patch Transdermal (Right Upper Arm) Carmelita Trejo RN          sodium ferric gluconate (Ferrlecit) 125 mg in sodium chloride 0.9% 100mL IVPB premix       Date Action Dose Route User    12/2/2024 0930 New Bag 125 mg Intravenous JovanJodi          cyanocobalamin (Vitamin B12) tab 1,000 mcg       Date Action Dose Route User     12/2/2024 0926 Given 1,000 mcg Oral Jodi Aldana          cholecalciferol (Vitamin D3) tab 4,000 Units       Date Action Dose Route User    12/2/2024 0926 Given 4,000 Units Oral Jodi Aldana            Vitals (last day)       Date/Time Temp Pulse Resp BP SpO2 Weight O2 Device O2 Flow Rate (L/min) [facility]    12/02/24 1130 98.8 °F (37.1 °C) 102 -- 142/83 92 % -- None (Room air) --     12/02/24 0726 98.8 °F (37.1 °C) 86 -- 143/81 100 % -- -- -- ES    12/02/24 0400 98 °F (36.7 °C) 85 14 149/80 97 % -- -- --     12/02/24 0000 98.2 °F (36.8 °C) 76 14 136/63 100 % -- CPAP 2 L/min     12/01/24 2000 98.2 °F (36.8 °C) 87 18 118/68 95 % -- None (Room air) --     12/01/24 1900 -- 93 -- 133/66 96 % -- -- --     12/01/24 1755 97.6 °F (36.4 °C) 80 20 122/68 96 % -- -- -- MO    12/01/24 1710 98.2 °F (36.8 °C) 80 20 121/97 94 % -- Nasal cannula 2 L/min MO    12/01/24 1640 97.8 °F (36.6 °C) 92 -- 108/60 -- -- -- -- AS    12/01/24 1550 -- 105 -- 105/70 95 % -- -- -- MO    12/01/24 1530 98.7 °F (37.1 °C) 103 20 110/66 95 % -- Nasal cannula 2 L/min MO    12/01/24 1430 -- 97 -- -- 95 % -- -- -- MO    12/01/24 1345 -- 103 -- -- 94 % -- -- -- MO    12/01/24 1200 98.8 °F (37.1 °C) 96 19 121/96 92 % -- -- -- MO    12/01/24 0800 97.6 °F (36.4 °C) 90 20 137/71 93 % -- None (Room air) -- MO    12/01/24 0455 99.7 °F (37.6 °C) 102 24 125/59 93 % -- None (Room air) -- PW    12/01/24 0013 99.1 °F (37.3 °C) 106 20 131/76 95 % -- None (Room air) -- PW          CIWA Scores (since admission)       None          Blood Transfusion Record       Product Unit Status Volume Start End            Transfuse RBC       24  [id number]  [id number] Completed 12/01/24 2017 299.25 mL 12/01/24 1611 12/01/24 1920

## 2024-12-02 NOTE — CM/SW NOTE
Prior Authorization - Destination  Destination Type: Skilled nursing facility  Service Provider: MBM  - Nap  Payer Communication Destination Comments: Milana charles [id number]  Prior Authorization Status: Approved  Prior Authorization Start Date: 12/02/24 (12/2 to 12/6)      Carey Javier, DSC

## 2024-12-02 NOTE — CM/SW NOTE
SW was notified by Department  that patient's insurance authorization remains pending from [employer].     SW will continue to follow for plan of care changes and remain available for any additional DC needs or concerns.     Trish Pascual MSW, LSW  Discharge Planner   [phone]

## 2024-12-03 ENCOUNTER — EXTERNAL FACILITY (OUTPATIENT)
Dept: FAMILY MEDICINE CLINIC | Facility: CLINIC | Age: 84
End: 2024-12-03

## 2024-12-03 DIAGNOSIS — R41.82 ALTERED MENTAL STATUS, UNSPECIFIED ALTERED MENTAL STATUS TYPE: ICD-10-CM

## 2024-12-03 DIAGNOSIS — I10 ESSENTIAL HYPERTENSION: ICD-10-CM

## 2024-12-03 DIAGNOSIS — R53.1 WEAKNESS GENERALIZED: Primary | ICD-10-CM

## 2024-12-03 DIAGNOSIS — G47.33 OSA (OBSTRUCTIVE SLEEP APNEA): ICD-10-CM

## 2024-12-03 DIAGNOSIS — K21.9 GASTROESOPHAGEAL REFLUX DISEASE, UNSPECIFIED WHETHER ESOPHAGITIS PRESENT: ICD-10-CM

## 2024-12-03 DIAGNOSIS — G30.1 LATE ONSET ALZHEIMER'S DISEASE WITHOUT BEHAVIORAL DISTURBANCE (HCC): ICD-10-CM

## 2024-12-03 DIAGNOSIS — D64.9 ANEMIA, UNSPECIFIED TYPE: ICD-10-CM

## 2024-12-03 DIAGNOSIS — N30.00 ACUTE CYSTITIS WITHOUT HEMATURIA: ICD-10-CM

## 2024-12-03 DIAGNOSIS — F02.80 LATE ONSET ALZHEIMER'S DISEASE WITHOUT BEHAVIORAL DISTURBANCE (HCC): ICD-10-CM

## 2024-12-03 DIAGNOSIS — N17.9 AKI (ACUTE KIDNEY INJURY) (HCC): ICD-10-CM

## 2024-12-03 NOTE — PROGRESS NOTES
NURSING DISCHARGE NOTE    Discharged Rehab facility via Ambulance.  Accompanied by Support staff  Belongings Taken by patient/family.  PIV discontinued.  Report given to Vikram at [facility],   Ambulance coming to pick patient up at 1900.

## 2024-12-04 NOTE — PROGRESS NOTES
Isabella Addison Author: Adi Brock MD     1940 MRN [id number]   Last Hospital  Admission 24      Last Hospital Discharge 24 PCP Jorge Lr MD   Hospital of Discharge  [facility]        CC --admitted to [facility] from [facility] subacute rehab, altered mental status generalized weakness    H.P.I Isabella Addison is a 84 year old female who was brought into the emergency room with generalized weakness, patient has history of advanced dementia and has been getting weak, patient actually had a fall 2 days prior to admission to the hospital.  She was just treated for UTI prior to admission  Initially suspected for infectious process for her weakness workup was negative for UTI with cultures negative, COVID flu and RSV were also found to be negative, her ammonia levels were normal respiratory viral panel were negative  Neurology was consulted, patient underwent MRI and EEG without any abnormalities  Patient was also found to be anemic and her hemoglobin was consistently in 7 range despite taking iron, patient received iron infusion as well as 1 unit of packed red blood cells.  Her antibiotics that were given initially to treat her UTI were stopped  Patient was given IV fluids to treat her acute kidney insufficiency and hyperkalemia  Patient was evaluated by PT/OT and subacute rehab was recommended  Patient's overall condition improved and she was transferred to [facility]  According to the  as his dementia is getting worse and patient forgets to use her walker she had near falls few times.  Discharge diagnosis  Altered mental status  Generalized weakness  UTI  Anemia  Acute kidney insufficiency  Advanced dementia  Gastroesophageal reflux  Hypertension  Hyperlipidemia  Obstructive sleep apnea    Patient seen in her room today  She is sitting comfortably on the wheelchair  Denies any pain  Patient is not very  interactive,  Discussed with her  wants more support during the rehab and more attention to the patient, she is currently on regular floor.  We will move her to the dementia floor where she will get more attention from the nursing staff  She is high risk for falls          Past Medical History:    ANXIETY    Anxiety    Arrhythmia    pt unaware    BACK PAIN    LS & C-SPINE DZ W/ PAIN    Chronic rhinitis    Depression    Encephalopathy acute    Esophageal reflux    GERD    W/ PHAN's    High blood pressure    High cholesterol    Hypercholesterolemia    HYPERLIPIDEMIA    Hypertension    Irritable bowel syndrome with constipation    Kidney disease    frequent infections; pt states frequent UTIs    Late onset Alzheimer's disease without behavioral disturbance (HCC)    Migraines    NSTEMI (non-ST elevated myocardial infarction) (HCC)    Ophthalmic migraine    DANNIE (obstructive sleep apnea)    AHI 11 RDI 13 REM AHI 0 Supine AHI 11 non-supine AHI 0 Sao2 Ye 83%     OSTEOPOROSIS    Other and unspecified hyperlipidemia    OTHER DISEASES    PSVT, pt unaware    OTHER DISEASES    Barrott's Esoghagus    Reflux    barretts    Sleep apnea    wears a mouth gaurd    UTI (lower urinary tract infection)    no UTI currently as of 5/5/16     Past Surgical History:   Procedure Laterality Date    Benign biopsy left  ?    stereotactic core biopsy    Benign biopsy right  ?    stereotactic core biopsy    Colonoscopy  11/01/2008    RECHECK 5 YRS    Colonoscopy N/A 07/19/2021    Procedure: COLONOSCOPY;  Surgeon: Manish Rodriguez MD;  Location: [facility] ENDOSCOPY    Colonoscopy,biopsy  01/08/2014    Procedure: ESOPHAGOGASTRODUODENOSCOPY, COLONOSCOPY, POSSIBLE BIOPSY, POSSIBLE POLYPECTOMY 46440,52271;  Surgeon: Manish Rodriguez MD;  Location: [facility]    Hysterectomy      Optx dstl radl x-artic fx/epiphysl sep Left 10/06/2014    Procedure: OPEN REDUCTION INTERNAL FIXATION ARM;  Surgeon: Augustina Banuelos MD;  Location: [facility]     Other surgical history      URETHRAL SUSPENSION    Other surgical history      STEROTACTIC BX's BOTH BREASTS    Other surgical history  2008    EGD = PHAN'S    Other surgical history N/A 2016    Procedure: ENDOSCOPIC ULTRASOUND (EUS);  Surgeon: Glenn Chou MD;  Location:  ENDOSCOPY    Patient documented not to have experienced any of the following events  2014    Procedure: ESOPHAGOGASTRODUODENOSCOPY, COLONOSCOPY, POSSIBLE BIOPSY, POSSIBLE POLYPECTOMY 80459,53792;  Surgeon: Manish Rodriguez MD;  Location: [facility]    Patient documented not to have experienced any of the following events Left 10/06/2014    Procedure: OPEN REDUCTION INTERNAL FIXATION ARM;  Surgeon: Augustina Banuelos MD;  Location: [facility]    Patient with preoperative order for iv antibiotic surgical site infect Left 10/06/2014    Procedure: OPEN REDUCTION INTERNAL FIXATION ARM;  Surgeon: Augustina Banuelos MD;  Location: [facility]    Patient withough preoperative order for iv antibiotic surgical site infection prophylaxis.  2014    Procedure: ESOPHAGOGASTRODUODENOSCOPY, COLONOSCOPY, POSSIBLE BIOPSY, POSSIBLE POLYPECTOMY 58440,99427;  Surgeon: Manish Rodriguez MD;  Location: [facility]    Upper gi endoscopy,biopsy  2014    Procedure: ESOPHAGOGASTRODUODENOSCOPY, COLONOSCOPY, POSSIBLE BIOPSY, POSSIBLE POLYPECTOMY 37982,71472;  Surgeon: Manihs Rodriguez MD;  Location: [facility]    Vena cava filter       Family History   Problem Relation Age of Onset    Heart Disorder Father     Other (Other) Mother         ENCEPHALITIS    Hypertension Brother     Diabetes Brother      Social History     Socioeconomic History    Marital status:    Tobacco Use    Smoking status: Former     Current packs/day: 0.00     Average packs/day: 1 pack/day for 15.0 years (15.0 ttl pk-yrs)     Types: Cigarettes     Start date: 1963     Quit date: 1978     Years since quittin.5     Smokeless tobacco: Never   Vaping Use    Vaping status: Never Used   Substance and Sexual Activity    Alcohol use: Not Currently    Drug use: No     Social Drivers of Health     Financial Resource Strain: Low Risk  (10/26/2023)    Received from [facility]    Financial Resource Strain     In the past year, have you or any family members you live with been unable to get any of the following when it was really needed? Check all that apply.: None   Food Insecurity: No Food Insecurity (11/23/2024)    Food Insecurity     Food Insecurity: Never true   Transportation Needs: No Transportation Needs (11/23/2024)    Transportation Needs     Lack of Transportation: No   Social Connections: Low Risk  (10/27/2023)    Received from [facility]    Social Connections     How often do you see or talk to people that you care about and feel close to? (For example: talking to friends on the phone, visiting friends or family, going to Zoroast[employer] or club meetings): 5 or more times a week   Housing Stability: Low Risk  (11/23/2024)    Housing Stability     Housing Instability: No       ALLERGIES:  Allergies[1]    CODE STATUS:  Full Code    CURRENT MEDICATIONS   Current Outpatient Medications   Medication Sig Dispense Refill    busPIRone 15 MG Oral Tab Take 1 tablet (15 mg total) by mouth in the morning and 1 tablet (15 mg total) before bedtime.      Esomeprazole Magnesium 20 MG Oral Capsule Delayed Release Take 1 capsule (20 mg total) by mouth 2 (two) times daily with meals.      Potassium Chloride ER 10 MEQ Oral Tab CR Take 1 tablet (10 mEq total) by mouth daily.      aspirin 81 MG Oral Tab EC Take 1 tablet (81 mg total) by mouth daily.  0    acetaminophen 500 MG Oral Tab Take 1 tablet (500 mg total) by mouth every 4 (four) hours as needed for Pain.  0    sacubitril-valsartan 24-26 MG Oral Tab Take 1 tablet by mouth 2 (two) times daily. 120 tablet 5    rivastigmine 13.3  MG/24HR Transdermal Patch 24 Hr Place 1 patch onto the skin daily.      memantine (NAMENDA XR) 28 MG Oral Capsule SR 24 Hr Take 1 capsule (28 mg total) by mouth daily. 30 capsule 5    atorvastatin 40 MG Oral Tab Take 1 tablet (40 mg total) by mouth nightly. 90 tablet 3    ferrous sulfate 325 (65 FE) MG Oral Tab EC Take 1 tablet (325 mg total) by mouth daily with breakfast.      Vitamin B-12 1000 MCG Oral Tab Take 1 tablet (1,000 mcg total) by mouth daily.      DULoxetine HCl 60 MG Oral Cap DR Particles Take 1 capsule (60 mg total) by mouth 2 (two) times daily.      Cholecalciferol (VITAMIN D3) 2000 UNIT Oral Cap Take 2 capsules (4,000 Units total) by mouth daily. 2 capsules daily      CALCIUM 600 + D OR Take 2 tablets by mouth daily.         REVIEW OF SYSTEMS:  Review of Systems:   Constitutional: No fevers, chills, but has been feeling tired  ENT: No mouth pain, neck pain, running nose, headaches or swollen glands.  Skin: No rashes, pruritus or skin changes,  Respiratory: Denies cough, wheezing or shortness of breath.  CV: Denies chest pain, palpitations, orthopnea, PND or dizziness.  Musculoskeletal: No joint pain, stiffness or swelling.  GI: No nausea, vomiting or diarrhea. No blood in stools.  Neurologic: No seizures, tremors, weakness or numbness    VITALS:    Current Vitals     BP: 142/66 mmHg  12/3/2024 08:14    Temp:97.1 °F  12/3/2024 08:14  Pulse:77 bpm  12/3/2024 08:14  Weight:153 Lbs  12/2/2024 22:07  Resp:18 Breaths/min  12/3/2024 08:14  BS:134 mg/dL  12/3/2024 12:12  O2:96 %  12/2/2024 21:14  Pain:0  12/3/2024 12:1    PHYSICAL EXAM:  GENERAL: well developed, well nourished, in no apparent distress  SKIN: no rashes, no suspicious lesions  Wound; no open wounds  HEENT: atraumatic, normocephalic, ears and throat are clear  EYES: PERRLA, EOMI, conjunctiva are clear  NECK: supple, no adenopathy, no bruits  CHEST: no chest tenderness  LUNGS: clear to auscultation  CARDIO: RRR without murmur  GI: good BS's, no  masses, HSM or tenderness  : deferred  RECTAL: deferred  MUSCULOSKELETAL: back is not tender, FROM of the back  EXTREMITIES: no cyanosis, clubbing or edema  NEURO: O alert awake oriented x 2 cranial nerves are intact, motor and sensory are grossly intact      DIAGNOSTICS REVIEWED AT THIS VISIT:  Lab Results   Component Value Date     (H) 11/29/2024    BUN 23 11/29/2024    BUNCREA 13.0 02/14/2022    CREATSERUM 0.86 11/29/2024    ANIONGAP 7 11/29/2024    GFR 85 08/03/2016    GFRNAA 53 (L) 05/19/2022    GFRAA 61 05/19/2022    CA 9.4 11/29/2024    OSMOCALC 302 (H) 11/29/2024    ALKPHO 67 11/23/2024    AST 15 11/23/2024    ALT 10 11/23/2024    BILT 0.3 11/23/2024    TP 7.6 11/23/2024    ALB 4.2 11/23/2024    GLOBULIN 3.4 11/23/2024    AGRATIO 1.8 01/07/2014     11/29/2024    K 4.2 11/29/2024     11/29/2024    CO2 25.0 11/29/2024       Lab Results   Component Value Date    WBC 5.3 12/02/2024    RBC 3.17 (L) 12/02/2024    HGB 8.8 (L) 12/02/2024    HCT 28.3 (L) 12/02/2024    .0 12/02/2024    MCV 89.3 12/02/2024    MCH 27.8 12/02/2024    MCHC 31.1 12/02/2024    RDW 15.3 12/02/2024    NEPRELIM 4.49 11/25/2024    NEPERCENT 73.5 11/25/2024    LYPERCENT 11.5 11/25/2024    MOPERCENT 13.4 11/25/2024    EOPERCENT 0.8 11/25/2024    BAPERCENT 0.3 11/25/2024    NE 4.49 11/25/2024    LYMABS 0.70 (L) 11/25/2024    MOABSO 0.82 11/25/2024    EOABSO 0.05 11/25/2024    BAABSO 0.02 11/25/2024     ASSESSMENT AND PLAN:      Diagnoses and all orders for this visit:    Weakness generalized  - PT to work on ambulation, gait, balance, strength, endurance, transfers, safety  - OT to work on fine motor skills, ADLs, hygiene, toileting, transfers, safety  Altered mental status, unspecified altered mental status type  Possibly multifactorial including UTI, anemia, kidney insufficiency  Acute cystitis without hematuria  Finished the course of antibiotics in the hospital  Will recheck her UA in a week  Anemia, unspecified  type  Current hemoglobin 8.8  Status post transfusion, 1 unit  Status post iron infusion  Check weekly CBCs  DK (acute kidney injury) (HCC)  Resolved with IV fluids in the hospital recent BUN and creatinine were 13 and 0.8  Late onset Alzheimer's disease without behavioral disturbance (HCC)  We will move the patient to dementia unit  Continue Aricept  Memantine  Rivastigmine patch daily  Gastroesophageal reflux disease, unspecified whether esophagitis present  On esomeprazole 20 mg twice daily  Essential hypertension  Under control  Will continue her Entresto  DANNIE (obstructive sleep apnea)  CPAP at night  - 24h nursing for medication administration, bowel/bladder care, pain/sleep assessment  - Physician supervision for multiple medical comorbidities, fall risk, DVT risk, infection risk, pain management  - Psych for adjustment to disability and cognitive deficits  - Social work/: for discharge planning needs and access to community resources as needed     -Hospital medications reviewed reconciled and restarted   Check the labs in the morning, CBC CMP TSH, vitamin D, UA    Time spent at appointment today is 95 minutes including preparing to see patient, reviewing test results, performing medically appropriate examination and evaluation and coordinating care, counseling and educating patient/family, ordering medications and testing, and documenting clinical information in EMR.       Adi Brock MD   [facility]  [address]  [facility] [address]    Electronically signed      This dictation was performed with a verbal recognition program (DRAGON) and it was checked for errors. It is possible that there are still dictated errors within this office note. If so, please bring any errors to my attention for an addendum. All efforts were made to ensure that this office note is accurate        [1]   Allergies  Allergen Reactions    Ciprofloxacin UNKNOWN     Burning of espohagus and upset  stomach form cipro    Penicillins HIVES and UNKNOWN    Radiology Contrast Iodinated Dyes RASH     Diffuse erythema post study  -- Occurred in 1980's?,    Amoxil UNKNOWN     Cant remember    Biaxin [Clarithromycin]      Cant remember    Cefuroxime Axetil      Cant remember    Doxycycline Calcium      Cant remember    Sulfa Antibiotics HIVES

## 2024-12-05 NOTE — PAYOR COMM NOTE
Discharge Notification    Patient Name: RAKEL DE JESUS  Payor: [employer] MEDICARE ADV PPO  Subscriber #: [id number]  Authorization Number: [id number]  Admit Date/Time: 11/23/2024 12:52 PM  Discharge Date/Time: 12/2/2024 7:54 PM

## 2024-12-06 NOTE — DISCHARGE SUMMARY
.[facility] Internal Medicine Discharge Summary    Patient ID:  Isabella Addison  [id number]  84 year old  1/19/1940    Admit date: 11/23/2024  Discharge date and time: 12/2/2024  Attending Physician: Kristy Briceno MD  Primary Care Physician: Jorge Lr MD     Admit Dx: Weakness generalized [R53.1]  DK (acute kidney injury) (HCC) [N17.9]    Primary Diagnosis at discharge from Hospital: Other: AMS; no TCM follow-up needed     Secondary Diagnoses:  Dementia  ?recent UTI  Iron def anemia    Risk of readmission: Isabella Addison has High Risk of readmission after discharge from the hospital.    Discharged Condition: fair    Disposition: SNF    Important follow up:  Jorge Lr MD  [address] [facility]  [address]  [place] [address]  [phone]    Follow up          Reason for admission and hospital course:   85 yo woman with h/o htn/hl, dementia, psvt who presented with fatigue, lethargy.  Recent outpt treatment of UTI.      Lethargy/AMS/weakness  - initially suspected infectious process but workup neg thus far  - urine culture from this admit 11/23 ntd  - sars/flu/rsv neg  - cxr neg  - ct head neg  - ammonia, abg okay. Resp viral panel neg, procal not significantly elevated.  - neuro consulted- mri and eeg without acute abn  - ?anemia playing a role. Pt had h/o gi bleed in past but having brown stool here. On oral iron. Also gave iv iron and 1 unit prbc with appropriate response.  - repeat ua and urine culture also neg     Anemia  - hgb has consistently been in 7 range despite oral iron. No overt bleeding on exam but did have avm on previous scope- per notes at that time had more overt black stool at that time- here has been brown.   - responded well to 1 unit prbc  - also given IV iron     UTIs  - treated recently as outpt. Grew proteus from 11/13 with mixed resistance pattern. 11/23 urine culture with mixed mehul.  - repeat urine culture 11/23 here is ntd  - stopping ceftriaxone as cultures  neg.  - repeated UA yet again and urine culture was neg.     DK  Hyperkalemia  - resolved with IVF.     Anxiety  Depression  Dementia  -buspirone  -duloxetine  -memantine  -rivastigmine  -do not suspect these meds playing a direct role in presentation        Weakness  -PT/OT saw pt. Felt to benefit from [facility]. She still had some waxing/waning lethargy prior to dc.  I did discuss with pt's  that eventually dementia could contribute to declining appetite, weakness, etc and if we are not finding reversible etiologies, we may have to consider this. He expressed understanding. Will see how she does at the [facility].    Day of discharge Exam  Vitals:    12/02/24 1600   BP: 128/70   Pulse: 87   Resp:    Temp: 98.2 °F (36.8 °C)     Exam on day of discharge:  Gen: No acute distress, alert and oriented  CV: RRR, +s1/s2  Lungs: CTAB, good respiratory effort  Abdomen: s/nt/nd  Ext: Moves all 4 extremities, no c/c/e  Neuro: CN Intact, no focal deficits    Discharge meds     Medication List        CONTINUE taking these medications      acetaminophen 500 MG Tabs  Commonly known as: Tylenol Extra Strength     aspirin 81 MG Tbec     atorvastatin 40 MG Tabs  Commonly known as: Lipitor  Take 1 tablet (40 mg total) by mouth nightly.     busPIRone 15 MG Tabs  Commonly known as: Buspar     CALCIUM 600 + D OR     cholecalciferol 50 MCG (2000 UT) Caps  Commonly known as: Vitamin D3     cyanocobalamin 1000 MCG Tabs  Commonly known as: Vitamin B12     DULoxetine 60 MG Cpep  Commonly known as: Cymbalta     Esomeprazole Magnesium 20 MG Cpdr  Commonly known as: NEXIUM     ferrous sulfate 325 (65 FE) MG Tbec     memantine ER 28 MG Cp24  Commonly known as: Namenda XR  Take 1 capsule (28 mg total) by mouth daily.     Potassium Chloride ER 10 MEQ Tbcr     rivastigmine 13.3 MG/24HR Pt24  Commonly known as: Exelon     sacubitril-valsartan 24-26 MG Tabs  Commonly known as: Entresto  Take 1 tablet by mouth 2 (two) times daily.            STOP taking  these medications      cefpodoxime 200 MG Tabs  Commonly known as: Vantin            Consults: IP CONSULT TO SOCIAL WORK  IP CONSULT TO SOCIAL WORK  IP CONSULT TO NEUROLOGY  Radiology: MRI BRAIN (CPT=70551)    Result Date: 11/27/2024  PROCEDURE:  MRI BRAIN (CPT=70551)  COMPARISON:  [facility] , MR, MRI BRAIN MRA HEAD+MRA NECK (ALL W+WO) (CPT=70553/49010/00607), 1/15/2023, 5:29 PM.  INDICATIONS:  ams  TECHNIQUE:  MRI of the brain was performed with multi-planar T1, T2-weighted images with FLAIR sequences and diffusion weighted images without infusion.  PATIENT STATED HISTORY: (As transcribed by Technologist)  Altered mental status.    FINDINGS:  Motion degraded examination.  Moderate diffuse volume loss.  There is no midline shift or mass effect.  The basal cisterns are patent.   There is no acute intracranial hemorrhage or extra-axial fluid collection identified.  Moderate to severe chronic small vessel ischemic disease with punctate chronic infarcts at the basal ganglia, thalami, and cerebellar hemispheres.  There is no restricted diffusion to suggest acute ischemia/infarction.  There is a punctate focus of gradient susceptibility at the right cerebellum which likely represents mineralization or remote microhemorrhage.  A moderate amount of fluid is seen within the right mastoid air cells.  Mild to moderate mucosal thickening within the ethmoid sinus.  Mild mucosal thickening within the sphenoid and maxillary sinuses.  The expected major intracranial flow voids are present.            CONCLUSION:  1. No acute infarct, acute intracranial hemorrhage, or hydrocephalus. 2. Moderate to severe chronic small vessel ischemic disease with punctate chronic infarcts at the basal ganglia, thalami, and cerebellar hemispheres.   LOCATION:  [facility]   Dictated by (CST): Stromberg, LeRoy, MD on 11/27/2024 at 10:14 AM     Finalized by (CST): Stromberg, LeRoy, MD on 11/27/2024 at 10:20 AM       CT BRAIN OR HEAD (CPT=70450)    Result Date:  11/23/2024            PROCEDURE:  CT BRAIN OR HEAD (25745)  COMPARISON:  [facility] , CT, CT BRAIN OR HEAD (96275), 10/28/2024, 4:18 PM.  INDICATIONS:  ams  TECHNIQUE:  Noncontrast CT scanning is performed through the brain. Dose reduction techniques were used. Dose information is transmitted to the ACR (American College of Radiology) NRDR (National Radiology Data Registry) which includes the Dose Index Registry.  PATIENT STATED HISTORY: (As transcribed by Technologist)  Patient is here for AMS.    FINDINGS: No evidence of intracranial hemorrhage or extra-axial fluid collection. Lucencies in the deep periventricular white matter are likely sequelae of chronic small vessel ischemic disease. Prominence of the sulci is noted. No mass effect. Visualized portions of paranasal sinuses are unremarkable. Visualized portions of the mastoid air cells are unremarkable. Visualized portions of the orbits are unremarkable. IMPRESSION: Global parenchymal volume loss is noted.  Sequelae of chronic small vessel ischemic disease is noted. No evidence of intracranial hemorrhage or extra-axial fluid collection.  No significant change since prior exam.    LOCATION:  [facility]   Dictated by (CST): Brett Singer MD on 11/23/2024 at 3:33 PM     Finalized by (CST): Brett Singer MD on 11/23/2024 at 3:34 PM       XR CHEST AP PORTABLE  (CPT=71045)    Result Date: 11/23/2024  PROCEDURE:  XR CHEST AP PORTABLE  (CPT=71045)  TECHNIQUE:  AP chest radiograph was obtained.  COMPARISON:  [facility] , XR, XR CHEST AP PORTABLE  (CPT=71045), 10/28/2024, 4:43 PM.  INDICATIONS:  ams  PATIENT STATED HISTORY: (As transcribed by Technologist)  Patient offered no additional history at this time.    FINDINGS:  Perihilar streaky opacity peribronchial cuffing can be seen with bronchitis.  There is no airspace consolidation.  There is stable cardiomegaly.  No pleural effusions.            CONCLUSION:  Perihilar streaky opacity peribronchial cuffing can be seen  with bronchitis.   LOCATION:  [facility]      Dictated by (CST): David French MD on 11/23/2024 at 2:45 PM     Finalized by (CST): David French MD on 11/23/2024 at 2:46 PM       Operative Procedures:   Activity: activity as tolerated  Diet: regular diet  Wound Care: none needed  Code Status: Full Code  O2: none  Total Time Coordinating Care: 35 minutes Patient had opportunity to ask questions and state understand and agree with therapeutic plan as outlined    I reconciled current and discharge medications on day of discharge.

## 2025-01-10 ENCOUNTER — HOSPITAL ENCOUNTER (INPATIENT)
Facility: HOSPITAL | Age: 85
LOS: 3 days | Discharge: NURSING, REHAB OR LONG-TERM CARE | End: 2025-01-14
Attending: EMERGENCY MEDICINE | Admitting: HOSPITALIST
Payer: MEDICARE

## 2025-01-10 ENCOUNTER — APPOINTMENT (OUTPATIENT)
Dept: CT IMAGING | Facility: HOSPITAL | Age: 85
End: 2025-01-10
Attending: EMERGENCY MEDICINE
Payer: MEDICARE

## 2025-01-10 DIAGNOSIS — K92.2 GI BLEED: ICD-10-CM

## 2025-01-10 DIAGNOSIS — K92.2 GASTROINTESTINAL HEMORRHAGE, UNSPECIFIED GASTROINTESTINAL HEMORRHAGE TYPE: Primary | ICD-10-CM

## 2025-01-10 DIAGNOSIS — D64.9 ANEMIA, UNSPECIFIED TYPE: ICD-10-CM

## 2025-01-10 LAB
ALBUMIN SERPL-MCNC: 4.1 G/DL (ref 3.2–4.8)
ALBUMIN/GLOB SERPL: 1.6 {RATIO} (ref 1–2)
ALP LIVER SERPL-CCNC: 71 U/L
ALT SERPL-CCNC: 11 U/L
ANION GAP SERPL CALC-SCNC: 5 MMOL/L (ref 0–18)
ANTIBODY SCREEN: NEGATIVE
APTT PPP: 25.7 SECONDS (ref 23–36)
AST SERPL-CCNC: 17 U/L (ref ?–34)
BASOPHILS # BLD AUTO: 0.02 X10(3) UL (ref 0–0.2)
BASOPHILS NFR BLD AUTO: 0.3 %
BILIRUB SERPL-MCNC: 0.3 MG/DL (ref 0.2–1.1)
BUN BLD-MCNC: 27 MG/DL (ref 9–23)
CALCIUM BLD-MCNC: 9.3 MG/DL (ref 8.7–10.4)
CHLORIDE SERPL-SCNC: 102 MMOL/L (ref 98–112)
CO2 SERPL-SCNC: 30 MMOL/L (ref 21–32)
CREAT BLD-MCNC: 1.21 MG/DL
EGFRCR SERPLBLD CKD-EPI 2021: 44 ML/MIN/1.73M2 (ref 60–?)
EOSINOPHIL # BLD AUTO: 0.23 X10(3) UL (ref 0–0.7)
EOSINOPHIL NFR BLD AUTO: 3.8 %
ERYTHROCYTE [DISTWIDTH] IN BLOOD BY AUTOMATED COUNT: 20.3 %
GLOBULIN PLAS-MCNC: 2.6 G/DL (ref 2–3.5)
GLUCOSE BLD-MCNC: 132 MG/DL (ref 70–99)
HCT VFR BLD AUTO: 21.4 %
HGB BLD-MCNC: 6.5 G/DL
IMM GRANULOCYTES # BLD AUTO: 0.07 X10(3) UL (ref 0–1)
IMM GRANULOCYTES NFR BLD: 1.2 %
INR BLD: 1.21 (ref 0.8–1.2)
LIPASE SERPL-CCNC: 72 U/L (ref 12–53)
LYMPHOCYTES # BLD AUTO: 1.31 X10(3) UL (ref 1–4)
LYMPHOCYTES NFR BLD AUTO: 21.9 %
MCH RBC QN AUTO: 28.5 PG (ref 26–34)
MCHC RBC AUTO-ENTMCNC: 30.4 G/DL (ref 31–37)
MCV RBC AUTO: 93.9 FL
MONOCYTES # BLD AUTO: 0.57 X10(3) UL (ref 0.1–1)
MONOCYTES NFR BLD AUTO: 9.5 %
NEUTROPHILS # BLD AUTO: 3.78 X10 (3) UL (ref 1.5–7.7)
NEUTROPHILS # BLD AUTO: 3.78 X10(3) UL (ref 1.5–7.7)
NEUTROPHILS NFR BLD AUTO: 63.3 %
OSMOLALITY SERPL CALC.SUM OF ELEC: 291 MOSM/KG (ref 275–295)
PLATELET # BLD AUTO: 231 10(3)UL (ref 150–450)
POTASSIUM SERPL-SCNC: 3.5 MMOL/L (ref 3.5–5.1)
PROT SERPL-MCNC: 6.7 G/DL (ref 5.7–8.2)
PROTHROMBIN TIME: 15.5 SECONDS (ref 11.6–14.8)
RBC # BLD AUTO: 2.28 X10(6)UL
RH BLOOD TYPE: NEGATIVE
SARS-COV-2 RNA RESP QL NAA+PROBE: NOT DETECTED
SODIUM SERPL-SCNC: 137 MMOL/L (ref 136–145)
WBC # BLD AUTO: 6 X10(3) UL (ref 4–11)

## 2025-01-10 PROCEDURE — 74176 CT ABD & PELVIS W/O CONTRAST: CPT | Performed by: EMERGENCY MEDICINE

## 2025-01-10 PROCEDURE — 30233N1 TRANSFUSION OF NONAUTOLOGOUS RED BLOOD CELLS INTO PERIPHERAL VEIN, PERCUTANEOUS APPROACH: ICD-10-PCS | Performed by: INTERNAL MEDICINE

## 2025-01-10 RX ORDER — ONDANSETRON 2 MG/ML
4 INJECTION INTRAMUSCULAR; INTRAVENOUS ONCE
Status: DISCONTINUED | OUTPATIENT
Start: 2025-01-10 | End: 2025-01-10

## 2025-01-10 RX ORDER — BUMETANIDE 1 MG/1
1 TABLET ORAL
COMMUNITY

## 2025-01-10 RX ORDER — SENNA AND DOCUSATE SODIUM 50; 8.6 MG/1; MG/1
1 TABLET, FILM COATED ORAL 2 TIMES DAILY
COMMUNITY

## 2025-01-11 PROBLEM — D64.9 ANEMIA: Status: ACTIVE | Noted: 2025-01-11

## 2025-01-11 LAB
ANION GAP SERPL CALC-SCNC: 4 MMOL/L (ref 0–18)
BASOPHILS # BLD AUTO: 0.02 X10(3) UL (ref 0–0.2)
BASOPHILS NFR BLD AUTO: 0.5 %
BILIRUB UR QL STRIP.AUTO: NEGATIVE
BUN BLD-MCNC: 20 MG/DL (ref 9–23)
CALCIUM BLD-MCNC: 9 MG/DL (ref 8.7–10.4)
CHLORIDE SERPL-SCNC: 106 MMOL/L (ref 98–112)
CO2 SERPL-SCNC: 30 MMOL/L (ref 21–32)
COLOR UR AUTO: YELLOW
CREAT BLD-MCNC: 0.91 MG/DL
EGFRCR SERPLBLD CKD-EPI 2021: 61 ML/MIN/1.73M2 (ref 60–?)
EOSINOPHIL # BLD AUTO: 0.12 X10(3) UL (ref 0–0.7)
EOSINOPHIL NFR BLD AUTO: 3 %
ERYTHROCYTE [DISTWIDTH] IN BLOOD BY AUTOMATED COUNT: 22.2 %
GLUCOSE BLD-MCNC: 107 MG/DL (ref 70–99)
GLUCOSE BLD-MCNC: 108 MG/DL (ref 70–99)
GLUCOSE BLD-MCNC: 111 MG/DL (ref 70–99)
GLUCOSE BLD-MCNC: 129 MG/DL (ref 70–99)
GLUCOSE BLD-MCNC: 138 MG/DL (ref 70–99)
GLUCOSE BLD-MCNC: 98 MG/DL (ref 70–99)
GLUCOSE UR STRIP.AUTO-MCNC: NORMAL MG/DL
HCT VFR BLD AUTO: 25.1 %
HCT VFR BLD AUTO: 26.2 %
HGB BLD-MCNC: 6.6 G/DL
HGB BLD-MCNC: 8 G/DL
HGB BLD-MCNC: 8 G/DL
IMM GRANULOCYTES # BLD AUTO: 0.05 X10(3) UL (ref 0–1)
IMM GRANULOCYTES NFR BLD: 1.3 %
KETONES UR STRIP.AUTO-MCNC: NEGATIVE MG/DL
LEUKOCYTE ESTERASE UR QL STRIP.AUTO: 500
LYMPHOCYTES # BLD AUTO: 0.74 X10(3) UL (ref 1–4)
LYMPHOCYTES NFR BLD AUTO: 18.6 %
MAGNESIUM SERPL-MCNC: 2.1 MG/DL (ref 1.6–2.6)
MCH RBC QN AUTO: 27.9 PG (ref 26–34)
MCHC RBC AUTO-ENTMCNC: 31.9 G/DL (ref 31–37)
MCV RBC AUTO: 87.5 FL
MONOCYTES # BLD AUTO: 0.37 X10(3) UL (ref 0.1–1)
MONOCYTES NFR BLD AUTO: 9.3 %
NEUTROPHILS # BLD AUTO: 2.67 X10 (3) UL (ref 1.5–7.7)
NEUTROPHILS # BLD AUTO: 2.67 X10(3) UL (ref 1.5–7.7)
NEUTROPHILS NFR BLD AUTO: 67.3 %
OSMOLALITY SERPL CALC.SUM OF ELEC: 293 MOSM/KG (ref 275–295)
PH UR STRIP.AUTO: 7 [PH] (ref 5–8)
PLATELET # BLD AUTO: 172 10(3)UL (ref 150–450)
PLATELET MORPHOLOGY: NORMAL
POTASSIUM SERPL-SCNC: 3.3 MMOL/L (ref 3.5–5.1)
PROT UR STRIP.AUTO-MCNC: 20 MG/DL
RBC # BLD AUTO: 2.87 X10(6)UL
SODIUM SERPL-SCNC: 140 MMOL/L (ref 136–145)
SP GR UR STRIP.AUTO: 1.01 (ref 1–1.03)
UROBILINOGEN UR STRIP.AUTO-MCNC: NORMAL MG/DL
WBC # BLD AUTO: 4 X10(3) UL (ref 4–11)
WBC #/AREA URNS AUTO: >50 /HPF

## 2025-01-11 RX ORDER — METOCLOPRAMIDE HYDROCHLORIDE 5 MG/ML
5 INJECTION INTRAMUSCULAR; INTRAVENOUS EVERY 8 HOURS PRN
Status: DISCONTINUED | OUTPATIENT
Start: 2025-01-11 | End: 2025-01-14

## 2025-01-11 RX ORDER — POTASSIUM CHLORIDE 1.5 G/1.58G
40 POWDER, FOR SOLUTION ORAL ONCE
Status: COMPLETED | OUTPATIENT
Start: 2025-01-11 | End: 2025-01-11

## 2025-01-11 RX ORDER — POTASSIUM CHLORIDE 750 MG/1
10 TABLET, EXTENDED RELEASE ORAL DAILY
Status: DISCONTINUED | OUTPATIENT
Start: 2025-01-11 | End: 2025-01-14

## 2025-01-11 RX ORDER — MELATONIN
1000 DAILY
Status: DISCONTINUED | OUTPATIENT
Start: 2025-01-11 | End: 2025-01-14

## 2025-01-11 RX ORDER — MEMANTINE HYDROCHLORIDE 10 MG/1
10 TABLET ORAL 2 TIMES DAILY
Status: DISCONTINUED | OUTPATIENT
Start: 2025-01-11 | End: 2025-01-14

## 2025-01-11 RX ORDER — ATORVASTATIN CALCIUM 40 MG/1
40 TABLET, FILM COATED ORAL NIGHTLY
Status: DISCONTINUED | OUTPATIENT
Start: 2025-01-11 | End: 2025-01-14

## 2025-01-11 RX ORDER — CHOLECALCIFEROL (VITAMIN D3) 50 MCG
4000 TABLET ORAL DAILY
Status: DISCONTINUED | OUTPATIENT
Start: 2025-01-11 | End: 2025-01-14

## 2025-01-11 RX ORDER — POTASSIUM CHLORIDE 750 MG/1
10 TABLET, EXTENDED RELEASE ORAL DAILY
Status: DISCONTINUED | OUTPATIENT
Start: 2025-01-11 | End: 2025-01-11 | Stop reason: SDUPTHER

## 2025-01-11 RX ORDER — SODIUM CHLORIDE 9 MG/ML
INJECTION, SOLUTION INTRAVENOUS CONTINUOUS
Status: ACTIVE | OUTPATIENT
Start: 2025-01-11 | End: 2025-01-11

## 2025-01-11 RX ORDER — RIVASTIGMINE 13.3 MG/24H
1 PATCH, EXTENDED RELEASE TRANSDERMAL DAILY
Status: DISCONTINUED | OUTPATIENT
Start: 2025-01-11 | End: 2025-01-14

## 2025-01-11 RX ORDER — PANTOPRAZOLE SODIUM 20 MG/1
20 TABLET, DELAYED RELEASE ORAL
Status: DISCONTINUED | OUTPATIENT
Start: 2025-01-11 | End: 2025-01-11

## 2025-01-11 RX ORDER — SODIUM CHLORIDE 9 MG/ML
INJECTION, SOLUTION INTRAVENOUS ONCE
Status: COMPLETED | OUTPATIENT
Start: 2025-01-11 | End: 2025-01-11

## 2025-01-11 RX ORDER — BUSPIRONE HYDROCHLORIDE 5 MG/1
15 TABLET ORAL 2 TIMES DAILY
Status: DISCONTINUED | OUTPATIENT
Start: 2025-01-11 | End: 2025-01-14

## 2025-01-11 RX ORDER — SODIUM CHLORIDE 9 MG/ML
INJECTION, SOLUTION INTRAVENOUS CONTINUOUS
Status: DISCONTINUED | OUTPATIENT
Start: 2025-01-11 | End: 2025-01-14

## 2025-01-11 RX ORDER — ONDANSETRON 2 MG/ML
4 INJECTION INTRAMUSCULAR; INTRAVENOUS EVERY 6 HOURS PRN
Status: DISCONTINUED | OUTPATIENT
Start: 2025-01-11 | End: 2025-01-14

## 2025-01-11 RX ORDER — FERROUS SULFATE 325(65) MG
325 TABLET, DELAYED RELEASE (ENTERIC COATED) ORAL
Status: DISCONTINUED | OUTPATIENT
Start: 2025-01-11 | End: 2025-01-14

## 2025-01-11 RX ORDER — DULOXETIN HYDROCHLORIDE 30 MG/1
60 CAPSULE, DELAYED RELEASE ORAL 2 TIMES DAILY
Status: DISCONTINUED | OUTPATIENT
Start: 2025-01-11 | End: 2025-01-14

## 2025-01-11 RX ORDER — SODIUM CHLORIDE 9 MG/ML
INJECTION, SOLUTION INTRAVENOUS ONCE
Status: DISCONTINUED | OUTPATIENT
Start: 2025-01-11 | End: 2025-01-14

## 2025-01-11 RX ORDER — ACETAMINOPHEN 500 MG
500 TABLET ORAL EVERY 4 HOURS PRN
Status: DISCONTINUED | OUTPATIENT
Start: 2025-01-11 | End: 2025-01-14

## 2025-01-11 NOTE — PLAN OF CARE
Patient alert x1 to self, answered questions and is pleasant. Took all AM meds without difficulty. Tolerating clear liquid diet. Denies pain. Voiding via purewick.   Protonix gtt infusing per orders, IVF per orders.         Plan: clear liquid diet, NPO at 0000 for EGD tomorrow.      1138: Pt's daughter requesting phone call from [facility] Hospitalist. Hospitalist notified and stated will call daughter.

## 2025-01-11 NOTE — SPIRITUAL CARE NOTE
Spiritual Care Visit Note    Patient Name: Isabella Addison Date of Spiritual Care Visit: 25   : 1940 Primary Dx: Gastrointestinal hemorrhage, unspecified gastrointestinal hemorrhage type   Referred By: Referral From: Other (Comment) (Ed rounds)    Spiritual Care Taxonomy:    Intended Effects: Establish rapport and connectedness    Methods: Offer support    Interventions: Acknowledge current situation    Visit Type/Summary:  Isabella was awake and being prepared by transport to be admitted when  arrived at her room.  She was confused and unable to articulate much, but seemed in good spirits.  She praised the care she was being given.   - Spiritual Care: Consulted with RN prior to visit.  remains available as needed for follow up.    Spiritual Care support can be requested via an Epic consult. For urgent/immediate needs, please contact the On Call  at: Edward: [phone]    Mckenzie Reece MDiv.  Staff

## 2025-01-11 NOTE — ED PROVIDER NOTES
Patient Seen in: [facility] Emergency Department      History     Chief Complaint   Patient presents with    GI Bleeding     PT from [place] with a low Hgb. unknown source     Stated Complaint: PT from [place] with a low Hgb.  unknown source    Subjective:   HPI      Patient is a 84-year-old female presents emergency room with a history of low hemoglobin she comes from [facility].  The patient's hemoglobin noted to be around 8 typically.  The patient does take Eliquis but this was been held recently as per patient's  giving history at the bedside.  Patient found to have a hemoglobin less than 7 and was sent to the ER for further evaluation.  The patient denies history of any abdominal pain.  The patient reportedly was scheduled to see a GI doctor, Dr. Rodriguez, next week.  The patient has been able to eat and drink without difficulty as per patient's family giving history at the bedside.    Objective:     No pertinent past medical history.            No pertinent past surgical history.              No pertinent social history.                Physical Exam     ED Triage Vitals   BP 01/10/25 1850 118/61   Pulse 01/10/25 1850 103   Resp 01/10/25 1850 14   Temp 01/10/25 1850 97.9 °F (36.6 °C)   Temp src 01/10/25 1850 Oral   SpO2 01/10/25 1850 100 %   O2 Device 01/10/25 1930 None (Room air)       Current Vitals:   Vital Signs  BP: 109/59  Pulse: 79  Resp: 18  Temp: 98.3 °F (36.8 °C)  Temp src: Temporal  MAP (mmHg): 80    Oxygen Therapy  SpO2: 96 %  O2 Device: None (Room air)        Physical Exam  GENERAL: Well-developed, well-nourished female Sitting up breathing easily in no apparent distress.  Patient is nontoxic in appearance.  HEENT: Head is normocephalic, atraumatic. Pupils are 4 mm equally round and reactive to light.  Conjunctivae are somewhat pale oropharynx is clear. Mucous membranes are moist.  NECK: No stridor.  LUNGS: Clear to auscultation bilaterally with no wheeze. There is good  equal air entry bilaterally.  HEART: Regular rate and rhythm. Normal S1, S2 no S3, or S4. No murmur.  ABDOMEN: There is no focal tenderness to palpation appreciated anywhere throughout the abdomen. There is no guarding, no rebound, no mass, and no organomegaly appreciated. There is normoactive bowel sounds.    RECTAL: Exam done with female chaperone at the bedside showed evidence of dark stool which is Hemoccult positive.  There is no gross blood appreciated.  EXTREMITIES: There is no cyanosis, clubbing, however there is 2+ edema appreciated in both lower extremities which is improved per patient's family compared to previous. Pulses are 2+ and equal in all 4 extremities.  NEURO: Patient is awake, alert and oriented to her norm per family at the bedside. Motor strength is 5 over 5 in all 4 extremities. There are no gross motor or sensory deficits appreciated. Cranial nerves II through XII are grossly intact.          ED Course     Labs Reviewed   COMP METABOLIC PANEL (14) - Abnormal; Notable for the following components:       Result Value    Glucose 132 (*)     BUN 27 (*)     Creatinine 1.21 (*)     eGFR-Cr 44 (*)     All other components within normal limits   CBC WITH DIFFERENTIAL WITH PLATELET - Abnormal; Notable for the following components:    RBC 2.28 (*)     HGB 6.5 (*)     HCT 21.4 (*)     MCHC 30.4 (*)     All other components within normal limits   LIPASE - Abnormal; Notable for the following components:    Lipase 72 (*)     All other components within normal limits   PROTHROMBIN TIME (PT) - Abnormal; Notable for the following components:    PT 15.5 (*)     INR 1.21 (*)     All other components within normal limits   PTT, ACTIVATED - Normal   RAPID SARS-COV-2 BY PCR - Normal   TYPE AND SCREEN    Narrative:     The following orders were created for panel order Type and screen.  Procedure                               Abnormality         Status                     ---------                                -----------         ------                     ABORH (Blood Type)[[id number]]                               Final result               Antibody Screen[[id number]]                                  Final result                 Please view results for these tests on the individual orders.   ABORH (BLOOD TYPE)   ANTIBODY SCREEN   PREPARE RBC   RAINBOW DRAW BLUE            CT ABDOMEN+PELVIS(CPT=74176)    Result Date: 1/10/2025  CONCLUSION:  Diffuse wall thickening of the stomach.  Recommend clinical correlation to exclude gastric infiltrative process or gastritis.  Large amount of stool in the right side of the colon and rectosigmoid colon.  No evidence of bowel obstruction.  Colonic diverticulosis.  Wall thickening of the urinary bladder is nonspecific.  Recommend clinical correlation to exclude cystitis.  2.9 x 2.3 cm cystic structure in the right adnexa likely ovarian in origin.  This is most suggestive of an ovarian cystic remnant.  Recommend sonographic follow-up.   LOCATION:  [id number]   Dictated by (CST): Manuela Freeman MD on 1/10/2025 at 8:19 PM     Finalized by (CST): Manuela Freeman MD on 1/10/2025 at 8:25 PM             MDM      21:22 patient resting comfortably in no apparent distress this time.  The patient will be admitted to the hospital at this time.  The patient's family made aware of the patient's findings on CT and the need for admission to the hospital and further workup.    Admission disposition: 1/10/2025  8:44 PM           Medical Decision Making  Patient had an IV line established blood work drawn including a CBC, chemistries, BUN/creatinine, and blood sugar showed evidence of a hemoglobin of 6.5 which is low with a previous hemoglobin of 8.3 on 7 December and for which the patient was transfused a unit of packed red blood cells here in the ER.  Patient BUN/creatinine were 27 and 1.21.  Liver function test found to be negative.  INR is found to be 1.21.  COVID test found to be negative.  Patient placed  on continuous pulse ox and cardiac monitor.  Patient with no other new complaints at this time.  The patient was transfused 1 unit of packed red blood cells here in the ER.  Patient's case has been discussed with the duly hospitalist as well as Dr. Lyman from  who wanted the patient to receive IV Protonix which was given here in the ER as well as given a unit of packed red blood cells which was also given here in the ER.  The patient will be admitted to the hospital for further workup at this time.  Patient's family are in agreement with the plan.    Disposition and Plan     Clinical Impression:  1. Gastrointestinal hemorrhage, unspecified gastrointestinal hemorrhage type    2. Anemia, unspecified type         Disposition:  Admit  1/10/2025  8:44 pm    Follow-up:  No follow-up provider specified.        Medications Prescribed:  Current Discharge Medication List              Supplementary Documentation:         Hospital Problems       Present on Admission  Date Reviewed: 11/23/2024            ICD-10-CM Noted POA    * (Principal) Gastrointestinal hemorrhage, unspecified gastrointestinal hemorrhage type K92.2 1/10/2025 Unknown

## 2025-01-11 NOTE — PROGRESS NOTES
Some prior gi eval    W/ Dr Rodriguez Date of Procedure: 7/19/2021     Preoperative Diagnosis: anemia with component of iron deficiency. History of Huerta's esophagus.   Postoperative Diagnosis:                                                       1.) Mild to moderate patchy gastritis throughout the stomach -- biopsies obtained.                               2.) Single AVM in 2nd portion of duodenum without active bleeding -- cauterized with APC.                               3.) Minimally irregular Z-line without strong evidence for Huerta's esophagus -- biopsies from GE-junction obtained.                               4.) Moderate Left sided and Mild Right sided diverticulosis.                               5.) Internal hemorrhoids.       Egd Dr Mitchell 3/30/23 for anemia, duodenal avms mild oozing s/p APC            Current issues: anemia    Reviewed w/ Dr Hernandez    Also called and left vm for daughter    I also spoke w/ pt's  and pt's nurse at [place] ([facility]) where pt lives    Pt was on eliquis, last dose 4 pm today, 5 mg.  Pt also on omeprazole and daily ASA 81 mg.  No nsaids.    Pt was noted to have low hg on routine check (per [facility])    Per [facility]/, no overt gi bleed, diarrhea, urinary bleeding, other overt gi symptoms, hematuria, or other overt bleeding    CT w/ thickening of the stomach, other findings as well (see report)    Hg 8.8 on 12/2/24, today 6.5 and bun/cr 27/1.21    Heme (+) stool per ER      Risk of  egd and colonoscopy including prep, age related risks, sedation, bleeding, perforation, infection, miss rate or issues with accuracy, etc and possible morbidity/mortalty discussed.  We discussed risk, benefit, alternatives, limitations as well as not having the procedures.  All questions answered, spouse demonstrated understanding.        1 abnl ct stomach  2 acute blood loss anemia    No overt bleeding, eliquis was today. On asa/ppi as well.  Ct w/ gastric thickening  -serial  hg  -ppi drip  -getting transfused  --npo for now  -would tentatively plan endoscopy once eliquis has been held , can re-assess the timing if bleeding starts or clinically changes      Rylan Meier MD  [facility]

## 2025-01-11 NOTE — H&P
SUMMER  HOSPITALIST  History and Physical     Isabella Addison Patient Status:  Inpatient    1940 MRN [id number]   Location [facility] 3SW-A Attending Primitivo Lawrence MD   Hosp Day # 0 PCP Jorge Lr MD     Chief Complaint:   Gi bleeding    History of Present Illness: Isabella Addison is a 84 year old female with PMH sig for depression, anxiety, GERD, barretts, 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  Comes from [facility] with hgb less than 7. Pt at baseline has hgb around 8. Was sent to the ER when labs were checked. No abd pain. Pt was scheduled to see GI next week. She does take eliquis but it was being held recently. In the ED routine labs showed INR 1.2, hgb 6.5, creat 1.21. VSS. 1 unit prbc ordered. GI consulted, wanted pt on IV protonix. CT A/P showed diffuse thickening of stomach. The pt was admitted for further care and management. Overnight repeat hgb 6.6.     Past Medical History:  Past Medical History:    ANXIETY    Anxiety    Arrhythmia    pt unaware    BACK PAIN    LS & C-SPINE DZ W/ PAIN    Chronic rhinitis    Depression    Encephalopathy acute    Esophageal reflux    GERD    W/ PHAN's    High blood pressure    High cholesterol    Hypercholesterolemia    HYPERLIPIDEMIA    Hypertension    Irritable bowel syndrome with constipation    Kidney disease    frequent infections; pt states frequent UTIs    Late onset Alzheimer's disease without behavioral disturbance (HCC)    Migraines    NSTEMI (non-ST elevated myocardial infarction) (HCC)    Ophthalmic migraine    DANNIE (obstructive sleep apnea)    AHI 11 RDI 13 REM AHI 0 Supine AHI 11 non-supine AHI 0 Sao2 Ye 83%     OSTEOPOROSIS    Other and unspecified hyperlipidemia    OTHER DISEASES    PSVT, pt unaware    OTHER DISEASES    Barrott's Esoghagus    Reflux    barretts    Sleep apnea    wears a mouth gaurd    UTI (lower urinary tract infection)    no UTI  currently as of 5/5/16        Past Surgical History:   Past Surgical History:   Procedure Laterality Date    Benign biopsy left  ?    stereotactic core biopsy    Benign biopsy right  ?    stereotactic core biopsy    Colonoscopy  11/01/2008    RECHECK 5 YRS    Colonoscopy N/A 07/19/2021    Procedure: COLONOSCOPY;  Surgeon: Manish Rodriguez MD;  Location: [facility] ENDOSCOPY    Colonoscopy,biopsy  01/08/2014    Procedure: ESOPHAGOGASTRODUODENOSCOPY, COLONOSCOPY, POSSIBLE BIOPSY, POSSIBLE POLYPECTOMY 16631,75981;  Surgeon: Manish Rodriguez MD;  Location: [facility]    Hysterectomy      Optx dstl radl x-artic fx/epiphysl sep Left 10/06/2014    Procedure: OPEN REDUCTION INTERNAL FIXATION ARM;  Surgeon: Augustina Banuelos MD;  Location: [facility]    Other surgical history      URETHRAL SUSPENSION    Other surgical history      STEROTACTIC BX's BOTH BREASTS    Other surgical history  05/01/2008    EGD = PHAN'S    Other surgical history N/A 05/17/2016    Procedure: ENDOSCOPIC ULTRASOUND (EUS);  Surgeon: Glenn Chou MD;  Location:  ENDOSCOPY    Patient documented not to have experienced any of the following events  01/08/2014    Procedure: ESOPHAGOGASTRODUODENOSCOPY, COLONOSCOPY, POSSIBLE BIOPSY, POSSIBLE POLYPECTOMY 90184,61600;  Surgeon: Manish Rodriguez MD;  Location: [facility]    Patient documented not to have experienced any of the following events Left 10/06/2014    Procedure: OPEN REDUCTION INTERNAL FIXATION ARM;  Surgeon: Augustina Banuelos MD;  Location: [facility]    Patient with preoperative order for iv antibiotic surgical site infect Left 10/06/2014    Procedure: OPEN REDUCTION INTERNAL FIXATION ARM;  Surgeon: Augustina Banuelos MD;  Location: [facility]    Patient withough preoperative order for iv antibiotic surgical site infection prophylaxis.  01/08/2014    Procedure: ESOPHAGOGASTRODUODENOSCOPY, COLONOSCOPY, POSSIBLE BIOPSY, POSSIBLE POLYPECTOMY 75486,08966;  Surgeon: Manish Rodriguez  MD JUDY;  Location: [facility]    Upper gi endoscopy,biopsy  01/08/2014    Procedure: ESOPHAGOGASTRODUODENOSCOPY, COLONOSCOPY, POSSIBLE BIOPSY, POSSIBLE POLYPECTOMY 58973,30259;  Surgeon: Manish Rodriguez MD;  Location: [facility]    Vena cava filter         Social History:  reports that she quit smoking about 46 years ago. Her smoking use included cigarettes. She started smoking about 61 years ago. She has a 15 pack-year smoking history. She has never used smokeless tobacco. She reports that she does not currently use alcohol. She reports that she does not use drugs.    Family History:   Family History   Problem Relation Age of Onset    Heart Disorder Father     Other (Other) Mother         ENCEPHALITIS    Hypertension Brother     Diabetes Brother         Allergies: Allergies[1]    Medications:  Medications Ordered Prior to Encounter[2]    Review of Systems:   A comprehensive 14 point review of systems was completed.    Pertinent positives and negatives noted in the HPI.    Physical Exam:    /80 (BP Location: Left arm)   Pulse 79   Temp 98.6 °F (37 °C) (Axillary)   Resp 18   Wt 175 lb (79.4 kg)   SpO2 99%   BMI 32.01 kg/m²   General: No acute distress. Alert and oriented x 3.  HEENT: Normocephalic atraumatic. Moist mucous membranes. EOM-I. PERRLA. Anicteric.  Neck: No lymphadenopathy. No JVD. No carotid bruits.  Respiratory: Clear to auscultation bilaterally. No wheezes. No rhonchi.  Cardiovascular: S1, S2. Regular rate and rhythm. No murmurs, rubs or gallops. Equal pulses.   Chest and Back: No tenderness or deformity.  Abdomen: Soft, nontender, nondistended.  Positive bowel sounds. No rebound, guarding or organomegaly.  Neurologic: No focal neurological deficits. CNII-XII grossly intact.  Musculoskeletal: Moves all extremities.  Extremities: No edema or cyanosis.  Integument: No rashes or lesions.   Psychiatric: Appropriate mood and affect.      Diagnostic Data:      Labs:  Recent  Labs   Lab 01/10/25  1850 01/11/25  0159   WBC 6.0  --    HGB 6.5* 6.6*   MCV 93.9  --    .0  --    INR 1.21*  --        Recent Labs   Lab 01/10/25  1850   *   BUN 27*   CREATSERUM 1.21*   CA 9.3   ALB 4.1      K 3.5      CO2 30.0   ALKPHO 71   AST 17   ALT 11   BILT 0.3   TP 6.7       Estimated Creatinine Clearance: 26.4 mL/min (A) (based on SCr of 1.21 mg/dL (H)).    Recent Labs   Lab 01/10/25  1850   PTP 15.5*   INR 1.21*       COVID-19 Lab Results    COVID-19  Lab Results   Component Value Date    COVID19 Not Detected 01/10/2025    COVID19 Not Detected 11/25/2024    COVID19 Not Detected 11/23/2024       Pro-Calcitonin  No results for input(s): \"PCT\" in the last 168 hours.    Cardiac  No results for input(s): \"TROP\", \"PBNP\" in the last 168 hours.    Creatinine Kinase  No results for input(s): \"CK\" in the last 168 hours.    Inflammatory Markers  No results for input(s): \"CRP\", \"ERICH\", \"LDH\", \"DDIMER\" in the last 168 hours.    No results for input(s): \"TROP\", \"TROPHS\", \"CK\" in the last 168 hours.    Imaging: Imaging data reviewed in Epic.      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    GI bleeding - likely upper  Hx of AVM  GERD  Barretts  -pt has hx of AVM sp ablation in 03/2023  -CT shows thickening of stomach  -no overt bleeding  -was on eliquis, asa - those on hold  -PPI drip  -serial hgb >> hgb 6.6 >> 6.5 >> sp 2 units prbc >> latest hgb 8.0  -CLD >> NPO at MD  -plan for EGD >> tomorrow  -GI consulted >> apprec recs >> dw Dr Meier    Hypokalemia  -replaced  -monitor    HTN  DL  CAD  -resume home meds  -hold eliquis    Hx of DVT sp IVC filter  -on eliquis  -holding in light of low hgb    DANNIE  -cpap at night    Alzheimers dementia  -resume home meds    Depression  Anxiety  -resume home  meds            Quality:  DVT Prophylaxis: eliquis - on hold, scds  CODE status: full code  Goldstein: no  If COVID testing is negative, may discontinue isolation: yes     Plan of care discussed with  and daughter seperately, all questions answered.     30 min spent d/w daughter  25 min spent d/w   30 spent in direct pt care, d/w GI, chart review and labs review      Viv Woodruff Hospitalist  Pager [phone]  Answering Service number: [phone]          **Certification      PHYSICIAN Certification of Need for Inpatient Hospitalization - Initial Certification    Patient will require inpatient services that will reasonably be expected to span two midnight's based on the clinical documentation in H+P.   Based on patients current state of illness, I anticipate that, after discharge, patient will require TBD.                  [1]   Allergies  Allergen Reactions    Ciprofloxacin UNKNOWN     Burning of espohagus and upset stomach form cipro    Penicillins HIVES and UNKNOWN    Radiology Contrast Iodinated Dyes RASH     Diffuse erythema post study  -- Occurred in 1980's?,    Amoxil UNKNOWN     Cant remember    Biaxin [Clarithromycin]      Cant remember    Cefuroxime Axetil      Cant remember    Doxycycline Calcium      Cant remember    Sulfa Antibiotics HIVES   [2]   Current Facility-Administered Medications on File Prior to Encounter   Medication Dose Route Frequency Provider Last Rate Last Admin    [COMPLETED] sodium chloride 0.9% infusion   Intravenous Once Krisyt Briceno MD 10 mL/hr at 12/01/24 1542 New Bag at 12/01/24 1542    [COMPLETED] acetaminophen (Tylenol) tab 650 mg  650 mg Oral Once Kristy Briceno MD   650 mg at 12/01/24 1541    [COMPLETED] sodium ferric gluconate (Ferrlecit) 125 mg in sodium chloride 0.9% 100mL IVPB premix  125 mg Intravenous Once Kristy Briceno  mL/hr at 12/02/24 0930 125 mg at 12/02/24 0930    [COMPLETED] sodium chloride 0.9 % IV bolus 250 mL  250 mL Intravenous  Once Abhijit Jauregui MD   Stopped at 24 1506    [] sodium chloride 0.9% infusion   Intravenous Continuous Abhijit Jauregui MD   Stopped at 24 1745    [COMPLETED] sodium chloride 0.9% infusion   Intravenous Once Abhijit Jauregui MD 83 mL/hr at 24 1542 New Bag at 24 1542    [COMPLETED] cefTRIAXone (Rocephin) 1 g in sodium chloride 0.9% 100 mL IVPB-ADDV  1 g Intravenous Once Andrew Chavarria DO   Stopped at 10/28/24 1741     Current Outpatient Medications on File Prior to Encounter   Medication Sig Dispense Refill    bumetanide 1 MG Oral Tab Take 1 tablet (1 mg total) by mouth BID (Diuretic).      apixaban 5 MG Oral Tab Take 1 tablet (5 mg total) by mouth 2 (two) times daily.      senna-docusate 8.6-50 MG Oral Tab Take 1 tablet by mouth in the morning and 1 tablet before bedtime.      busPIRone 15 MG Oral Tab Take 1 tablet (15 mg total) by mouth in the morning and 1 tablet (15 mg total) before bedtime.      Esomeprazole Magnesium 20 MG Oral Capsule Delayed Release Take 1 capsule (20 mg total) by mouth 2 (two) times daily with meals.      aspirin 81 MG Oral Tab EC Take 1 tablet (81 mg total) by mouth daily.  0    acetaminophen 500 MG Oral Tab Take 1 tablet (500 mg total) by mouth every 4 (four) hours as needed for Pain.  0    sacubitril-valsartan 24-26 MG Oral Tab Take 1 tablet by mouth 2 (two) times daily. 120 tablet 5    rivastigmine 13.3 MG/24HR Transdermal Patch 24 Hr Place 1 patch onto the skin daily.      memantine (NAMENDA XR) 28 MG Oral Capsule SR 24 Hr Take 1 capsule (28 mg total) by mouth daily. 30 capsule 5    atorvastatin 40 MG Oral Tab Take 1 tablet (40 mg total) by mouth nightly. 90 tablet 3    ferrous sulfate 325 (65 FE) MG Oral Tab EC Take 1 tablet (325 mg total) by mouth 3 (three) times daily with meals.      Vitamin B-12 1000 MCG Oral Tab Take 1 tablet (1,000 mcg total) by mouth daily.      DULoxetine HCl 60 MG Oral Cap DR Particles Take 1 capsule (60 mg total) by mouth 2 (two)  times daily.      Cholecalciferol (VITAMIN D3) 2000 UNIT Oral Cap Take 2 capsules (4,000 Units total) by mouth daily. 2 capsules daily      CALCIUM 600 + D OR Take 2 tablets by mouth daily.

## 2025-01-11 NOTE — ED QUICK NOTES
Orders for admission, patient is aware of plan and ready to go upstairs. Any questions, please call ED RN navdeep at extension [phone].     Patient Covid vaccination status: Fully vaccinated     COVID Test Ordered in ED: Rapid SARS-CoV-2 by PCR    COVID Suspicion at Admission: N/A    Running Infusions:      Mental Status/LOC at time of transport: a/0x1 per norm    Other pertinent information:   CIWA score: N/A   NIH score:  N/A

## 2025-01-11 NOTE — PLAN OF CARE
NURSING ADMISSION NOTE      Patient admitted via  bed  Oriented to room.  Safety precautions initiated.  Bed in low position.  Call light in reach.  Alert and Oriented x1. On RA. VSS. Tele-NS. Denies pain at this time. Denies N/T. Voiding freely via purewick. NPO. Denies N/V. Call light within reach at this time.  2-Person Skin check done with JOAQUIN Mills. BLE redness with swelling, noted in flowsheets. Otherwise skin WNL.     Plan: Serial Hgb/Labs, NPO, ppi drip, endoscopy once eliquis held    0239: Paged hospitalist regarding critical hemoglobin 6.5, orders for 1prbc

## 2025-01-11 NOTE — CM/SW NOTE
Pt admits from [facility] where she has been since previous admission in December. Return referral sent in [place]. Pt admits due to low hgb.     SW/CM to remain available.    LUCAS Jackson

## 2025-01-11 NOTE — CONSULTS
[facility]  Report of Consultation    Isabella Addison Patient Status:  Inpatient    1940 MRN [id number]   Location [facility] 3SW-A Attending Primitivo Lawrence MD   Hosp Day # 0 PCP Jorge Lr MD     Reason for Consultation:  Anemia  Abnl ct    Isabella Addison is a a(n) 84 year old female.      Some prior gi eval     W/ Dr Rodriguez Date of Procedure: 2021     Preoperative Diagnosis: anemia with component of iron deficiency. History of Huerta's esophagus.   Postoperative Diagnosis:                                                       1.) Mild to moderate patchy gastritis throughout the stomach -- biopsies obtained.                               2.) Single AVM in 2nd portion of duodenum without active bleeding -- cauterized with APC.                               3.) Minimally irregular Z-line without strong evidence for Huerta's esophagus -- biopsies from GE-junction obtained.                               4.) Moderate Left sided and Mild Right sided diverticulosis.                               5.) Internal hemorrhoids.         Egd Dr Mitchell 3/30/23 for anemia, duodenal avms mild oozing s/p APC                 Current issues: anemia     Reviewed w/ Dr Hernandez 1/10     Also called and left vm for daughter last night and again today     I also spoke w/ pt's  and pt's nurse at [place] ([facility]) where pt lives on 1/10     Pt was on eliquis, last dose 4 pm 1/10, 5 mg.  Pt also on omeprazole and daily ASA 81 mg.  No nsaids.     Pt was noted to have low hg on routine check (per [facility])     Per vil/, no overt gi bleed, diarrhea, urinary bleeding, other overt gi symptoms, hematuria, or other overt bleeding     CT w/ thickening of the stomach, other findings as well (see report)     Hg 8.8 on 24, today 6.5 and bun/cr 27/1.21     Heme (+) stool per ER    Pt w/ dementia, can not supply history    Called and spoke again w/  today.      Again risk of  egd and  colonoscopy including prep, age related, sedation, bleeding, perforation, infection, miss rate or issues with accuracy, etc and possible morbidity/mortalty discussed.  We discussed risk, benefit, alternatives, limitations as well as not having the procedures.  All questions answered,  demonstrated understanding.      Patient Active Problem List   Diagnosis    Pure hypercholesterolemia    Other abnormal glucose    Esophageal reflux    Dysthymic disorder    Encephalopathy acute    Osteoporosis    Inguinal hernia    Edema    Vitamin D deficiency    Recurrent UTI    Dry eye syndrome    Essential hypertension    Paroxysmal supraventricular tachycardia (HCC)    Irritable bowel syndrome with constipation    Lumbar spinal stenosis    B12 deficiency    DANNIE (obstructive sleep apnea)    Sicca, unspecified type ([facility])    Thrombocytopenia ([facility])    Iron deficiency anemia, unspecified iron deficiency anemia type    Huerta's esophagus determined by endoscopy    Late onset Alzheimer's disease without behavioral disturbance ([facility])    Close contact within last 14 days with patient with respiratory symptoms    Routine general medical examination at a health care facility    Chronic right-sided low back pain with right-sided sciatica    Prediabetes    Well controlled diabetes mellitus ([facility])    NSTEMI (non-ST elevated myocardial infarction) ([facility])    Bradyarrhythmia    Weakness generalized    Closed displaced intertrochanteric fracture of right femur, initial encounter ([facility])    Elevated troponin    Anemia, unspecified type    Syncope and collapse    Urinary tract infection without hematuria, site unspecified    Acute cystitis without hematuria    Diarrhea, unspecified type    Weakness    Melena    Syncope, near    Sedated due to medication    Dementia, unspecified dementia severity, unspecified dementia type, unspecified whether behavioral, psychotic, or mood disturbance or anxiety ([facility])    Urinary tract infection with hematuria,  site unspecified    AMS (altered mental status)    DK (acute kidney injury) (HCC)    Gastrointestinal hemorrhage, unspecified gastrointestinal hemorrhage type    Anemia         History:  Past Medical History:    ANXIETY    Anxiety    Arrhythmia    pt unaware    BACK PAIN    LS & C-SPINE DZ W/ PAIN    Chronic rhinitis    Depression    Encephalopathy acute    Esophageal reflux    GERD    W/ PHAN's    High blood pressure    High cholesterol    Hypercholesterolemia    HYPERLIPIDEMIA    Hypertension    Irritable bowel syndrome with constipation    Kidney disease    frequent infections; pt states frequent UTIs    Late onset Alzheimer's disease without behavioral disturbance (HCC)    Migraines    NSTEMI (non-ST elevated myocardial infarction) ([facility])    Ophthalmic migraine    DANNIE (obstructive sleep apnea)    AHI 11 RDI 13 REM AHI 0 Supine AHI 11 non-supine AHI 0 Sao2 Ye 83%     OSTEOPOROSIS    Other and unspecified hyperlipidemia    OTHER DISEASES    PSVT, pt unaware    OTHER DISEASES    Barrott's Esoghagus    Reflux    barretts    Sleep apnea    wears a mouth gaurd    UTI (lower urinary tract infection)    no UTI currently as of 5/5/16       Past Surgical History:   Procedure Laterality Date    Benign biopsy left  ?    stereotactic core biopsy    Benign biopsy right  ?    stereotactic core biopsy    Colonoscopy  11/01/2008    RECHECK 5 YRS    Colonoscopy N/A 07/19/2021    Procedure: COLONOSCOPY;  Surgeon: Manish Rodriguez MD;  Location: [facility] ENDOSCOPY    Colonoscopy,biopsy  01/08/2014    Procedure: ESOPHAGOGASTRODUODENOSCOPY, COLONOSCOPY, POSSIBLE BIOPSY, POSSIBLE POLYPECTOMY 13422,40884;  Surgeon: Manish Rodriguez MD;  Location: [facility] SURGICAL [facility]    Hysterectomy      Optx dstl radl x-artic fx/epiphysl sep Left 10/06/2014    Procedure: OPEN REDUCTION INTERNAL FIXATION ARM;  Surgeon: Augustina Banuelos MD;  Location: [facility]    Other surgical history      URETHRAL SUSPENSION    Other surgical history       STEROTACTIC BX's BOTH BREASTS    Other surgical history  05/01/2008    EGD = PHAN'S    Other surgical history N/A 05/17/2016    Procedure: ENDOSCOPIC ULTRASOUND (EUS);  Surgeon: Glenn Chou MD;  Location:  ENDOSCOPY    Patient documented not to have experienced any of the following events  01/08/2014    Procedure: ESOPHAGOGASTRODUODENOSCOPY, COLONOSCOPY, POSSIBLE BIOPSY, POSSIBLE POLYPECTOMY [id number];  Surgeon: Manish Rodriguez MD;  Location: [facility]    Patient documented not to have experienced any of the following events Left 10/06/2014    Procedure: OPEN REDUCTION INTERNAL FIXATION ARM;  Surgeon: Augustina Banuelos MD;  Location: [facility]    Patient with preoperative order for iv antibiotic surgical site infect Left 10/06/2014    Procedure: OPEN REDUCTION INTERNAL FIXATION ARM;  Surgeon: Augustina Banuelos MD;  Location: [facility]    Patient withough preoperative order for iv antibiotic surgical site infection prophylaxis.  01/08/2014    Procedure: ESOPHAGOGASTRODUODENOSCOPY, COLONOSCOPY, POSSIBLE BIOPSY, POSSIBLE POLYPECTOMY 99942,17302;  Surgeon: Manish Rodriguez MD;  Location: [facility]    Upper gi endoscopy,biopsy  01/08/2014    Procedure: ESOPHAGOGASTRODUODENOSCOPY, COLONOSCOPY, POSSIBLE BIOPSY, POSSIBLE POLYPECTOMY 64048,86308;  Surgeon: Manish Rodriguez MD;  Location: [facility]    Vena cava filter         Family History   Problem Relation Age of Onset    Heart Disorder Father     Other (Other) Mother         ENCEPHALITIS    Hypertension Brother     Diabetes Brother         reports that she quit smoking about 46 years ago. Her smoking use included cigarettes. She started smoking about 61 years ago. She has a 15 pack-year smoking history. She has never used smokeless tobacco. She reports that she does not currently use alcohol. She reports that she does not use drugs.    Allergies:  Allergies[1]    Medications:  Current Facility-Administered  Medications   Medication Dose Route Frequency    acetaminophen (Tylenol Extra Strength) tab 500 mg  500 mg Oral Q4H PRN    atorvastatin (Lipitor) tab 40 mg  40 mg Oral Nightly    busPIRone (Buspar) tab 15 mg  15 mg Oral BID    cholecalciferol (Vitamin D3) tab 4,000 Units  4,000 Units Oral Daily    DULoxetine (Cymbalta) DR cap 60 mg  60 mg Oral BID    ferrous sulfate DR tab 325 mg  325 mg Oral Daily with breakfast    memantine (Namenda) tab 10 mg  10 mg Oral BID    rivastigmine (Exelon) 13.3 MG/24HR patch 1 patch  1 patch Transdermal Daily    cyanocobalamin (Vitamin B12) tab 1,000 mcg  1,000 mcg Oral Daily    sodium chloride 0.9% infusion   Intravenous Continuous    acetaminophen (Tylenol Extra Strength) tab 500 mg  500 mg Oral Q4H PRN    ondansetron (Zofran) 4 MG/2ML injection 4 mg  4 mg Intravenous Q6H PRN    metoclopramide (Reglan) 5 mg/mL injection 5 mg  5 mg Intravenous Q8H PRN    influenza virus trivalent high dose PF (Fluzone HD) 0.5 mL injection (ages >/= 65 years) 0.5 mL  0.5 mL Intramuscular Prior to discharge    potassium chloride (Klor-Con M10) tab 10 mEq  10 mEq Oral Daily    insulin aspart (NovoLOG) 100 Units/mL FlexPen 1-5 Units  1-5 Units Subcutaneous TID AC and HS    sodium chloride 0.9% infusion   Intravenous Once    pantoprazole (Protonix) 80 mg in sodium chloride 0.9% 100 mL infusion  8 mg/hr Intravenous Continuous       Medications Ordered Prior to Encounter[2]      Review of Systems:  As above, pt unable to give hx         PHYSICAL EXAM   /80 (BP Location: Left arm)   Pulse 79   Temp 98.6 °F (37 °C) (Axillary)   Resp 18   Wt 175 lb (79.4 kg)   SpO2 99%   BMI 32.01 kg/m²     GENERAL: in no apparent distress, elderly appearing  HEENT: normocephalic, mucous membranes moist.  EYES: no scleral icterus  NECK:non tender, supple  RESPIRATORY: clear to percussion and auscultation  CARDIOVASCULAR: reg rate     ABDOMEN: normal, active bowel sounds, no masses, HSM or tenderness, soft,  nondistended, no G/R/R   RECTAL: in presence of RN (Anca). Firm brown stool in vault    EXTREMITIES: (+) le edema  NEURO:  Oriented to person only  BACK: no CVA tenderness  PSYCH: difficult to evaluate         LAB/IMAGING RESULTS:     Lab Results   Component Value Date    PTT 25.7 01/10/2025    INR 1.21 01/10/2025     Recent Labs   Lab 01/10/25  1850   *   BUN 27*   CREATSERUM 1.21*   CA 9.3      K 3.5      CO2 30.0       Recent Labs   Lab 01/10/25  1850 01/11/25  0159   RBC 2.28*  --    HGB 6.5* 6.6*   HCT 21.4*  --    MCV 93.9  --    MCH 28.5  --    MCHC 30.4*  --    RDW 20.3  --    NEPRELIM 3.78  --    WBC 6.0  --    .0  --        Recent Labs   Lab 01/10/25  1850   ALKPHO 71   BILT 0.3   TP 6.7   ALB 4.1   ALT 11   AST 17       Recent Labs   Lab 01/10/25  1850   LIP 72*           ASSESSMENT AND PLAN:          1 abnl ct stomach  2 acute blood loss anemia     No overt bleeding, eliquis was 1/10. On asa/ppi as well.  Ct w/ gastric thickening    Hg after transfusion this morning still pending    Firm stool on MERCED this morning     agreeable to egd, did not want to attempt cscope/prep for now    Multiple options for how to proceed were discussed in detail with pt/family, they are agreeable to the following plan ...  -serial hg  -ppi drip for now  -await post transfusion labs  --clears now, npo mn  -would tentatively plan endoscopy tomorrow for over 24 hrs of eliquis has been held , can re-assess the timing if bleeding starts or clinically changes              demonstrated understanding of risks of morbidity/mortality if diagnoses and/or treatments were delayed balanced against risks of further investigation and/or treatment.       --the  demonstrated understanding of the results and recommendations and options and the risk/benefit/limitations and alternatives to the plan.  All of their questions and concerns were addressed and were agreeable to the plan as  listed      Rylan Meier MD  2025  6:03 AM         [1]   Allergies  Allergen Reactions    Ciprofloxacin UNKNOWN     Burning of espohagus and upset stomach form cipro    Penicillins HIVES and UNKNOWN    Radiology Contrast Iodinated Dyes RASH     Diffuse erythema post study  -- Occurred in ?,    Amoxil UNKNOWN     Cant remember    Biaxin [Clarithromycin]      Cant remember    Cefuroxime Axetil      Cant remember    Doxycycline Calcium      Cant remember    Sulfa Antibiotics HIVES   [2]   Current Facility-Administered Medications on File Prior to Encounter   Medication Dose Route Frequency Provider Last Rate Last Admin    [COMPLETED] sodium chloride 0.9% infusion   Intravenous Once Kristy Briceno MD 10 mL/hr at 24 1542 New Bag at 24 1542    [COMPLETED] acetaminophen (Tylenol) tab 650 mg  650 mg Oral Once Kristy Briceno MD   650 mg at 24 1541    [COMPLETED] sodium ferric gluconate (Ferrlecit) 125 mg in sodium chloride 0.9% 100mL IVPB premix  125 mg Intravenous Once Kristy Briceno  mL/hr at 24 0930 125 mg at 24 0930    [COMPLETED] sodium chloride 0.9 % IV bolus 250 mL  250 mL Intravenous Once Abhijit Jauregui MD   Stopped at 24 1506    [] sodium chloride 0.9% infusion   Intravenous Continuous Abhijit Jauregui MD   Stopped at 24 1745    [COMPLETED] sodium chloride 0.9% infusion   Intravenous Once Abhijit Jauregui MD 83 mL/hr at 24 1542 New Bag at 24 1542    [COMPLETED] cefTRIAXone (Rocephin) 1 g in sodium chloride 0.9% 100 mL IVPB-ADDV  1 g Intravenous Once Andrew Chavarria DO   Stopped at 10/28/24 1741     Current Outpatient Medications on File Prior to Encounter   Medication Sig Dispense Refill    bumetanide 1 MG Oral Tab Take 1 tablet (1 mg total) by mouth BID (Diuretic).      apixaban 5 MG Oral Tab Take 1 tablet (5 mg total) by mouth 2 (two) times daily.      senna-docusate 8.6-50 MG Oral Tab Take 1 tablet by mouth in the morning and 1 tablet  before bedtime.      busPIRone 15 MG Oral Tab Take 1 tablet (15 mg total) by mouth in the morning and 1 tablet (15 mg total) before bedtime.      Esomeprazole Magnesium 20 MG Oral Capsule Delayed Release Take 1 capsule (20 mg total) by mouth 2 (two) times daily with meals.      aspirin 81 MG Oral Tab EC Take 1 tablet (81 mg total) by mouth daily.  0    acetaminophen 500 MG Oral Tab Take 1 tablet (500 mg total) by mouth every 4 (four) hours as needed for Pain.  0    sacubitril-valsartan 24-26 MG Oral Tab Take 1 tablet by mouth 2 (two) times daily. 120 tablet 5    rivastigmine 13.3 MG/24HR Transdermal Patch 24 Hr Place 1 patch onto the skin daily.      memantine (NAMENDA XR) 28 MG Oral Capsule SR 24 Hr Take 1 capsule (28 mg total) by mouth daily. 30 capsule 5    atorvastatin 40 MG Oral Tab Take 1 tablet (40 mg total) by mouth nightly. 90 tablet 3    ferrous sulfate 325 (65 FE) MG Oral Tab EC Take 1 tablet (325 mg total) by mouth 3 (three) times daily with meals.      Vitamin B-12 1000 MCG Oral Tab Take 1 tablet (1,000 mcg total) by mouth daily.      DULoxetine HCl 60 MG Oral Cap DR Particles Take 1 capsule (60 mg total) by mouth 2 (two) times daily.      Cholecalciferol (VITAMIN D3) 2000 UNIT Oral Cap Take 2 capsules (4,000 Units total) by mouth daily. 2 capsules daily      CALCIUM 600 + D OR Take 2 tablets by mouth daily.

## 2025-01-12 ENCOUNTER — ANESTHESIA (OUTPATIENT)
Dept: ENDOSCOPY | Facility: HOSPITAL | Age: 85
End: 2025-01-12
Payer: MEDICARE

## 2025-01-12 ENCOUNTER — ANESTHESIA EVENT (OUTPATIENT)
Dept: ENDOSCOPY | Facility: HOSPITAL | Age: 85
End: 2025-01-12
Payer: MEDICARE

## 2025-01-12 LAB
ANION GAP SERPL CALC-SCNC: 5 MMOL/L (ref 0–18)
BASOPHILS # BLD AUTO: 0.02 X10(3) UL (ref 0–0.2)
BASOPHILS NFR BLD AUTO: 0.6 %
BLOOD TYPE BARCODE: 9500
BLOOD TYPE BARCODE: 9500
BUN BLD-MCNC: 9 MG/DL (ref 9–23)
CALCIUM BLD-MCNC: 8.9 MG/DL (ref 8.7–10.4)
CHLORIDE SERPL-SCNC: 111 MMOL/L (ref 98–112)
CO2 SERPL-SCNC: 26 MMOL/L (ref 21–32)
CREAT BLD-MCNC: 0.76 MG/DL
EGFRCR SERPLBLD CKD-EPI 2021: 76 ML/MIN/1.73M2 (ref 60–?)
EOSINOPHIL # BLD AUTO: 0.13 X10(3) UL (ref 0–0.7)
EOSINOPHIL NFR BLD AUTO: 3.7 %
ERYTHROCYTE [DISTWIDTH] IN BLOOD BY AUTOMATED COUNT: 22.8 %
GLUCOSE BLD-MCNC: 112 MG/DL (ref 70–99)
GLUCOSE BLD-MCNC: 113 MG/DL (ref 70–99)
GLUCOSE BLD-MCNC: 94 MG/DL (ref 70–99)
GLUCOSE BLD-MCNC: 97 MG/DL (ref 70–99)
GLUCOSE BLD-MCNC: 99 MG/DL (ref 70–99)
HCT VFR BLD AUTO: 27.1 %
HGB BLD-MCNC: 8 G/DL
HGB BLD-MCNC: 8.2 G/DL
HGB BLD-MCNC: 8.3 G/DL
HGB BLD-MCNC: 8.3 G/DL
IMM GRANULOCYTES # BLD AUTO: 0.03 X10(3) UL (ref 0–1)
IMM GRANULOCYTES NFR BLD: 0.9 %
LYMPHOCYTES # BLD AUTO: 0.59 X10(3) UL (ref 1–4)
LYMPHOCYTES NFR BLD AUTO: 17 %
MCH RBC QN AUTO: 27.4 PG (ref 26–34)
MCHC RBC AUTO-ENTMCNC: 30.6 G/DL (ref 31–37)
MCV RBC AUTO: 89.4 FL
MONOCYTES # BLD AUTO: 0.42 X10(3) UL (ref 0.1–1)
MONOCYTES NFR BLD AUTO: 12.1 %
NEUTROPHILS # BLD AUTO: 2.29 X10 (3) UL (ref 1.5–7.7)
NEUTROPHILS # BLD AUTO: 2.29 X10(3) UL (ref 1.5–7.7)
NEUTROPHILS NFR BLD AUTO: 65.7 %
OSMOLALITY SERPL CALC.SUM OF ELEC: 293 MOSM/KG (ref 275–295)
PLATELET # BLD AUTO: 174 10(3)UL (ref 150–450)
POTASSIUM SERPL-SCNC: 3.2 MMOL/L (ref 3.5–5.1)
POTASSIUM SERPL-SCNC: 3.2 MMOL/L (ref 3.5–5.1)
RBC # BLD AUTO: 3.03 X10(6)UL
SODIUM SERPL-SCNC: 142 MMOL/L (ref 136–145)
UNIT VOLUME: 350 ML
UNIT VOLUME: 350 ML
WBC # BLD AUTO: 3.5 X10(3) UL (ref 4–11)

## 2025-01-12 PROCEDURE — 0DB68ZX EXCISION OF STOMACH, VIA NATURAL OR ARTIFICIAL OPENING ENDOSCOPIC, DIAGNOSTIC: ICD-10-PCS | Performed by: INTERNAL MEDICINE

## 2025-01-12 PROCEDURE — 0W3P8ZZ CONTROL BLEEDING IN GASTROINTESTINAL TRACT, VIA NATURAL OR ARTIFICIAL OPENING ENDOSCOPIC: ICD-10-PCS | Performed by: INTERNAL MEDICINE

## 2025-01-12 DEVICE — REPLAY HEMOSTASIS CLIP, 11MM SPAN
Type: IMPLANTABLE DEVICE | Site: DUODENAL | Status: FUNCTIONAL
Brand: REPLAY

## 2025-01-12 RX ORDER — SODIUM CHLORIDE, SODIUM LACTATE, POTASSIUM CHLORIDE, CALCIUM CHLORIDE 600; 310; 30; 20 MG/100ML; MG/100ML; MG/100ML; MG/100ML
INJECTION, SOLUTION INTRAVENOUS CONTINUOUS
Status: DISCONTINUED | OUTPATIENT
Start: 2025-01-12 | End: 2025-01-14

## 2025-01-12 RX ORDER — LIDOCAINE HYDROCHLORIDE 10 MG/ML
INJECTION, SOLUTION EPIDURAL; INFILTRATION; INTRACAUDAL; PERINEURAL AS NEEDED
Status: DISCONTINUED | OUTPATIENT
Start: 2025-01-12 | End: 2025-01-12 | Stop reason: SURG

## 2025-01-12 RX ORDER — SODIUM CHLORIDE, SODIUM LACTATE, POTASSIUM CHLORIDE, CALCIUM CHLORIDE 600; 310; 30; 20 MG/100ML; MG/100ML; MG/100ML; MG/100ML
INJECTION, SOLUTION INTRAVENOUS CONTINUOUS PRN
Status: DISCONTINUED | OUTPATIENT
Start: 2025-01-12 | End: 2025-01-12 | Stop reason: SURG

## 2025-01-12 RX ORDER — NALOXONE HYDROCHLORIDE 0.4 MG/ML
0.08 INJECTION, SOLUTION INTRAMUSCULAR; INTRAVENOUS; SUBCUTANEOUS ONCE AS NEEDED
Status: DISCONTINUED | OUTPATIENT
Start: 2025-01-12 | End: 2025-01-12 | Stop reason: HOSPADM

## 2025-01-12 RX ADMIN — SODIUM CHLORIDE, SODIUM LACTATE, POTASSIUM CHLORIDE, CALCIUM CHLORIDE: 600; 310; 30; 20 INJECTION, SOLUTION INTRAVENOUS at 11:46:00

## 2025-01-12 RX ADMIN — LIDOCAINE HYDROCHLORIDE 5 ML: 10 INJECTION, SOLUTION EPIDURAL; INFILTRATION; INTRACAUDAL; PERINEURAL at 11:50:00

## 2025-01-12 RX ADMIN — SODIUM CHLORIDE, SODIUM LACTATE, POTASSIUM CHLORIDE, CALCIUM CHLORIDE: 600; 310; 30; 20 INJECTION, SOLUTION INTRAVENOUS at 12:13:00

## 2025-01-12 NOTE — PLAN OF CARE
A/o x self. RA/. Elem with bilateral hearing aides  took them home to charge them. Tele:NSR. CLD denies n/v. NPO at midnight. IV Protonix continuous gtt. PW in place voiding. LBM 1/10. BLE edema noted. POC updated with spouse at bedside. All safety measures in place. Call light within reach instructed pt to call for help or assistance.

## 2025-01-12 NOTE — OCCUPATIONAL THERAPY NOTE
OCCUPATIONAL THERAPY EVALUATION - INPATIENT     Room Number: 360/360-A  Evaluation Date: 1/12/2025  Type of Evaluation: Initial  Presenting Problem: anemia, GI bleed    Physician Order: IP Consult to Occupational Therapy  Reason for Therapy: ADL/IADL Dysfunction and Discharge Planning    OCCUPATIONAL THERAPY ASSESSMENT   Patient is currently functioning below baseline with toileting, lower body dressing, transfers, static standing balance, dynamic standing balance, and functional standing tolerance. Prior to admission, patient's baseline is total assist for LB dressing to/from knees and rear pericare, supervision/setup for other ADLs and functional transfers until recent admission 11/23-12/2/24.  Patient is requiring grossly mod-max assist for transfers and standing ADLs as a result of the following impairments: decreased functional strength, decreased endurance, pain, impaired sitting/standing balance, decreased muscular endurance, cognitive deficits (Alzheimer's), decreased insight to deficits, and decreased safety awareness. Occupational Therapy will continue to follow for duration of hospitalization.    Patient will benefit from continued skilled OT Services to promote return to prior level of function and safety with continuous assistance and gradual rehabilitative therapy    History Related to Current Admission: Patient is a 84 year old female admitted on 1/10/2025 from [facility] with Presenting Problem: anemia, GI bleed. Co-Morbidities : Alzheimer's, depression, anxiety, HTN, CAD    Recent admission 11/23-12/2 for fatigue, weakness, AMS, discharged to [facility]    WEIGHT BEARING RESTRICTION       Recommendations for nursing staff:   Transfers: 2-person for safety  Toileting location: commode    EVALUATION SESSION:  Patient Start of Session: supine    FUNCTIONAL TRANSFER ASSESSMENT  Sit to Stand: Edge of Bed  Edge of Bed: Moderate Assist    BED MOBILITY  Supine to Sit : Maximum Assist    BALANCE ASSESSMENT     FUNCTIONAL  ADL ASSESSMENT  LB Dressing Seated: Dependent (total for sock management)    ACTIVITY TOLERANCE:                          O2 SATURATIONS       COGNITION  Overall Cognitive Status:  Impaired  Attention Span:  difficulty attending to directions and difficulty dividing attention  Orientation Level:  oriented to person  Following Commands:  follows one-step commands inconsistently, follows one step commands with increased time, and follows one step commands with repetition  Initiation: cues to initiate tasks  Safety Judgement:  decreased awareness of need for assistance and decreased awareness of need for safety  Awareness of Errors:  decreased awareness of errors   Awareness of Deficits:  decreased awareness of deficits    Upper Extremity   ROM: within functional limits   Strength: not formally assessed but appears within functional limits     EDUCATION PROVIDED  Patient Education : Role of Occupational Therapy; Functional Transfer Techniques; Fall Prevention; Posture/Positioning; Edema Reduction  Patient's Response to Education: Requires Further Education; Demonstrates Poor Carry Over to Information ( also present with good understanding)    Equipment used: RW  Demonstrates functional use, Would benefit from additional trial      Therapist comments: Patient received in supine, pleasantly confused but agreeable to therapy, supportive  at bedside. Educated on safety precautions. Sitting EOB to L side of bed in preparation for seated ADLs with mod-max assist and greatly increased time; standing via RW and mod-max assist with cues for hand placement, increased time; noted heavy BUE reliance on RW, unable to safely unweight either UE from RW to simulate standing ADLs; functional mobility several steps towards bedside chair on L side with RW, mod-max assist, increased time, and cues for sequencing; sitting to bedside chair in preparation for commode transfers with mod assist to control descent and cues for  safe hand placement. Will continue to follow.       Patient End of Session: Up in chair;Needs met;Call light within reach;RN aware of session/findings;All patient questions and concerns addressed;Hospital anti-slip socks;Alarm set;Family present    OCCUPATIONAL PROFILE    HOME SITUATION  Type of Home: House  Home Layout: Two level  Lives With: Family;Spouse (daughter's family)    Toilet and Equipment: Comfort height toilet  Shower/Tub and Equipment: Tub-shower combo;Tub transfer bench       Occupation/Status: retired     Drives: No       Prior Level of Function: Per report of  at bedside as patient is a poor historian, until November admission, patient was supervision for functional mobility with RW, all transfers, and most ADLs;  only assisted patient with LB dressing to knees and rear pericare for thoroughness; since then at [facility], he reports staff has been providing increased assist with ADLs, and bringing her into bathroom with wheelchair for toilet transfers.    SUBJECTIVE   \"Am I doing the right thing?\"    PAIN ASSESSMENT  Rating: Unable to rate  Location: \"all over\", BLE  Management Techniques: Activity promotion;Relaxation;Repositioning    OBJECTIVE  Precautions: Bed/chair alarm  Fall Risk: High fall risk    ASSESSMENTS    AM-PAC ‘6-Clicks’ Inpatient Daily Activity Short Form  -   Putting on and taking off regular lower body clothing?: Total  -   Bathing (including washing, rinsing, drying)?: A Lot  -   Toileting, which includes using toilet, bedpan or urinal? : Total  -   Putting on and taking off regular upper body clothing?: A Little  -   Taking care of personal grooming such as brushing teeth?: A Little  -   Eating meals?: A Little    AM-PAC Score:  Score: 13  Approx Degree of Impairment: 63.03%  Standardized Score (AM-PAC Scale): 32.03    ADDITIONAL TESTS     NEUROLOGICAL FINDINGS      COGNITION ASSESSMENTS     PLAN  OT Device Recommendations: TBD  OT Treatment Plan: Balance activities;ADL  training;Functional transfer training;Endurance training;Patient/Family education;Patient/Family training;Compensatory technique education  Rehab Potential : Good  Frequency: 3x/week  Number of Visits to Meet Established Goals: 5    ADL GOALS   Patient will perform lower body dressing to/from waist supervision  Patient will perform toileting (front pericare and clothing management) with supervision    FUNCTIONAL TRANSFER GOALS   Patient will perform supine to sit with supervision  Patient will perform sit to supine with supervision  Patient will transfer to toilet with supervision    Patient Evaluation Complexity Level:   Occupational Profile/Medical History LOW - Brief history including review of medical or therapy records    Specific performance deficits impacting engagement in ADL/IADL LOW  1 - 3 performance deficits    Client Assessment/Performance Deficits LOW - No comorbidities nor modifications of tasks    Clinical Decision Making LOW - Analysis of occupational profile, problem-focused assessments, limited treatment options    Overall Complexity LOW     OT Session Time: 25 minutes  Therapeutic Activity: 10 minutes

## 2025-01-12 NOTE — PROGRESS NOTES
[facility]   part of [facility] Hospitalist Progress Note     Isabella Addison Patient Status:  Inpatient    1940 MRN [id number]   Location [facility] 3SW-A Attending Primitivo Lawrence MD   Hosp Day # 1 PCP Jorge Lr MD     Chief Complaint:   Gi bleeding    Subjective:     No issues overnight  Plan for EGD this morning  Afebrile      Objective:    Review of Systems:   10 point ROS completed and was negative, except for pertinent positive and negatives stated in subjective.    Vital signs:  Temp:  [97.8 °F (36.6 °C)-98.6 °F (37 °C)] 97.9 °F (36.6 °C)  Pulse:  [74-90] 78  Resp:  [13-20] 13  BP: (126-145)/(59-76) 143/67  SpO2:  [92 %-100 %] 96 %    Physical Exam:    General: No acute distress.   HEENT:  EOMI, PERRLA, OP clear  Respiratory: Clear to auscultation bilaterally. No wheezes. No rhonchi.  Cardiovascular: S1, S2. Regular rate and rhythm. No murmurs.  Abdomen: Soft, nontender, nondistended.  Positive bowel sounds. No rebound or guarding.  Extremities: No edema.  Neuro:  Grossly non focal, no motor deficits.        Diagnostic Data:    Labs:  Recent Labs   Lab 01/10/25  1850 25  0159 25  0831 25  1653 25  0018   WBC 6.0  --  4.0  --   --    HGB 6.5* 6.6* 8.0* 8.0* 8.0*   MCV 93.9  --  87.5  --   --    .0  --  172.0  --   --    INR 1.21*  --   --   --   --        Recent Labs   Lab 01/10/25  1850 25  0831   * 108*   BUN 27* 20   CREATSERUM 1.21* 0.91   CA 9.3 9.0   ALB 4.1  --     140   K 3.5 3.3*    106   CO2 30.0 30.0   ALKPHO 71  --    AST 17  --    ALT 11  --    BILT 0.3  --    TP 6.7  --        Estimated Creatinine Clearance: 35.1 mL/min (based on SCr of 0.91 mg/dL).    Recent Labs   Lab 01/10/25  1850   PTP 15.5*   INR 1.21*            COVID-19 Lab Results    COVID-19  Lab Results   Component Value Date    COVID19 Not Detected 01/10/2025    COVID19 Not Detected 2024    COVID19 Not  Detected 11/23/2024       Pro-Calcitonin  No results for input(s): \"PCT\" in the last 168 hours.    Cardiac  No results for input(s): \"TROP\", \"PBNP\" in the last 168 hours.    Creatinine Kinase  No results for input(s): \"CK\" in the last 168 hours.    Inflammatory Markers  No results for input(s): \"CRP\", \"ERICH\", \"LDH\", \"DDIMER\" in the last 168 hours.    Imaging: Imaging data reviewed in Epic.    Medications:    ceFAZolin  2 g Intravenous Q8H    atorvastatin  40 mg Oral Nightly    busPIRone  15 mg Oral BID    cholecalciferol  4,000 Units Oral Daily    DULoxetine  60 mg Oral BID    ferrous sulfate  325 mg Oral Daily with breakfast    memantine  10 mg Oral BID    rivastigmine  1 patch Transdermal Daily    cyanocobalamin  1,000 mcg Oral Daily    potassium chloride  10 mEq Oral Daily    insulin aspart  1-5 Units Subcutaneous TID AC and HS    sodium chloride   Intravenous Once       Assessment & Plan:    Isabella Addison is a 84 year old female with PMH sig for depression, anxiety, GERD, barretts, 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     GI bleeding - likely upper  Hx of AVM  GERD  Barretts  -pt has hx of AVM sp ablation in 03/2023  -CT shows thickening of stomach  -no overt bleeding  -was on eliquis, asa - those on hold  -PPI drip  -serial hgb >> hgb 6.6 >> 6.5 >> sp 2 units prbc >> latest hgb 8.0  -AM labs pending  -CLD >> NPO  -plan for EGD >> TODAY  -GI consulted >> apprec recs >> dw Dr Meier    UTI  -UA appears infected  -urine cx pending  -starting ancef  -per daughter - UA was checked at facility and pt was supposed to have started an abx  -on outpt chart review cannot find that any abx was started, cannot find urine cx results     Hypokalemia  -replaced  -monitor     HTN  DL  CAD  -resume home meds  -hold eliquis     Hx of DVT sp IVC filter  -on eliquis  -holding in light of low hgb     DANNIE  -cpap at night     Alzheimers dementia  -resume home  meds     Depression  Anxiety  -resume home meds           Quality:  DVT Prophylaxis: eliquis - on hold, scds  CODE status: full code  Goldstein: no  If COVID testing is negative, may discontinue isolation: yes      Plan of care discussed with  and daughter seperately, all questions answered.     DISPO:  Cont inpt care  EGD today        Viv Fortune MD  [facility]ist  Pager [phone]  Answering Service number: [phone]

## 2025-01-12 NOTE — OPERATIVE REPORT
ENDOSCOPY OPERATIVE REPORT    Patient Name:  Isabella Addison  Medical Record #: [id number]  YOB: 1940  Date of Procedure: 1/12/2025    Preoperative Diagnosis:  abnl CT of stomach.   Acute blood loss anemia.  History of duodenal AVM    Postoperative Diagnosis:  There was a small schatzki's ring above a small hiatal hernia.  Mild gastritis.  3 amvs (2-4 mm) in duodenum, no bleeding, s/p APC and clipping    Procedure Performed: Esophagogastroduodenoscopy with biopsy, clipping, and ablation of lesion                 Anesthesia Given: MAC          Endoscopist:   Rylan Meier MD        Procedure:   After the patient was interviewed and the procedure again discussed and questions addressed, the patient was brought to the GI Lab and monitoring of the B/P, pulse, and pulse oximetry was performed. The patient was then placed in the left lateral decubitus position and sedated with divided doses of IV medication; continuous vital signs were monitored throughout the procedure.    A time-out procedure was performed, history and physical were reviewed, medical reconciliation was complete, informed consent was obtained.     The videogastroscope was intubated into the esophagus and advanced into the duodenum under directed vision without difficulty. Then withdrawn. A thorough exam was performed.    The patient tolerated the procedure well.       Findings:      Egd done with and without cap    The esophagus mucosa had an overall normal appearance until distal esophagus. The Z-line occurred at the top of the gastric folds.      The gastric lumen was then entered and views of the gastric mucosa as well as retroverted views of the fundus had a mild gastritis, diffuse. No ulcers or avms.  Biopsies from the prepyloric area, antrum, body, and fundus were taken for histology and for H. Pylori..     A normal pylorus was passed and the duodenal bulb entered, the duodenal bulb and post-bulbar duodenum were obtained.   Three avms (2 mm, 4 mm, 4 mm) noted post bulbar duodenum, none were bleeding, all were ablated with APC and then 1 clip placed across each site.  No bleeding prior/after treatment noted    The procedure was tolerated well and upon completion and throughtout the vital signs were stable.      Specimens:  See above      Condition on discharge from procedure:  good      PLAN:    --may have other AVMs or other lesions that are deep than what can be evaluated on this exam or that may become present on anticoagulation    -- did not want colonoscopy     --given her age, he was interested in monitoring and treatment for anemia.  Would consider more frequent hemoglobin checks to assess for stability when on anticoagulation and possible ongoing/changing iron therapy as well.  Will defer to hospitalist     --would restart eliquis tonight    --okay for clears, adv as tolerated        Will sign off for now, please call w/ questions     Discussed w/ Dr Fortune after egd    Discussed findings, options, plan w/ , he was agreeable to plan as listed      Rylan Meier MD  [facility]

## 2025-01-12 NOTE — ANESTHESIA POSTPROCEDURE EVALUATION
[facility]    Isabella Addison Patient Status:  Inpatient   Age/Gender 84 year old female MRN [id number]   Location [facility] ENDOSCOPY [facility] Attending Primitivo Lawrence MD   Hosp Day # 1 PCP Jorge Lr MD       Anesthesia Post-op Note    ESOPHAGOGASTRODUODENOSCOPY (EGD) WITH APC CAUTERIZATION, ENDO CLIPS X 3, BIOPSIES    Procedure Summary       Date: 01/12/25 Room / Location:  ENDOSCOPY 03 / [facility] END[facility]    Anesthesia Start: 1146 Anesthesia Stop: 1215    Procedure: ESOPHAGOGASTRODUODENOSCOPY (EGD) WITH APC CAUTERIZATION, ENDO CLIPS X 3, BIOPSIES Diagnosis:       GI bleed      (DUODENAL AVM, GASTRITIS, HIATAL HERNIA)    Surgeons: Rylan Meier MD Anesthesiologist: Tito Paz MD    Anesthesia Type: MAC ASA Status: 3            Anesthesia Type: MAC    Vitals Value Taken Time   /64 01/12/25 1223   Temp  01/12/25 1224   Pulse 71 01/12/25 1224   Resp 18 01/12/25 1218   SpO2 100 % 01/12/25 1224   Vitals shown include unfiled device data.        Patient Location: Endoscopy    Anesthesia Type: MAC    Airway Patency: patent    Postop Pain Control: adequate    Mental Status: preanesthetic baseline    Nausea/Vomiting: none    Cardiopulmonary/Hydration status: stable euvolemic    Complications: no apparent anesthesia related complications    Postop vital signs: stable    Dental Exam: Unchanged from Preop    Patient to be discharged home when criteria met.

## 2025-01-12 NOTE — PHYSICAL THERAPY NOTE
PHYSICAL THERAPY EVALUATION - INPATIENT     Room Number: 360/360-A  Evaluation Date: 1/12/2025  Type of Evaluation: Initial  Physician Order: PT Eval and Treat    Presenting Problem: GI bleed  Co-Morbidities : Alzheimer's, depression, anxiety, HTN, CAD  Reason for Therapy: Mobility Dysfunction and Discharge Planning    PHYSICAL THERAPY ASSESSMENT   Patient is a 84 year old female admitted 1/10/2025 for GI bleed.  Prior to admission, patient's baseline is SBA/min A in her mobilities and is ambulatory with RW at home, navigating stairs with assist prior to initial hosp admission 2 wks ago. Pt has been in [facility] PTA this time with limited mobilities requiring significant assistance from staff.  Patient is currently functioning below baseline with bed mobility, transfers, gait, stair negotiation, maintaining seated position, and standing prolonged periods.  Patient is requiring moderate assist and maximum assist as a result of the following impairments: decreased functional strength, decreased endurance/aerobic capacity, impaired standing balance, impaired motor planning, and cognitive deficits (0x1).  Physical Therapy will continue to follow for duration of hospitalization.    Patient will benefit from continued skilled PT Services to promote return to prior level of function and safety with continuous assistance and gradual rehabilitative therapy .    PLAN DURING HOSPITALIZATION  Nursing Mobility Recommendation : 1 Assist  PT Device Recommendation: Rolling walker  PT Treatment Plan: Bed mobility;Body mechanics;Patient education;Gait training;Strengthening;Stair training;Transfer training;Balance training;Range of motion;Family education  Rehab Potential : Good  Frequency (Obs): 3-5x/week     CURRENT GOALS    Goal #1 Patient is able to demonstrate supine - sit EOB @ level: minimum assistance     Goal #2 Patient is able to demonstrate transfers Sit to/from Stand at assistance level: minimum assistance     Goal #3 Patient  is able to ambulate 50 feet with assist device: walker - rolling at assistance level: minimum assistance     Goal #4    Goal #5    Goal #6    Goal Comments: Goals established on 1/12/2025      PHYSICAL THERAPY MEDICAL/SOCIAL HISTORY  History related to current admission: Patient is a 84 year old female admitted on 1/10/2025 from [facility] for GI bleed.     HOME SITUATION  Type of Home: House  Home Layout: Two level  Stairs to Enter : 2        Stairs to Bedroom: 14    Railing: Yes    Lives With: Family;Spouse (daughter's family)    Drives: No          Prior Level of [place]: Prior to admission, patient's baseline is SBA/min A in her mobilities and is ambulatory with RW at home, navigating stairs with assist prior to initial hosp admission 2 wks ago. Pt has been in [facility] PTA this time with limited mobilities requiring significant assistance from staff    SUBJECTIVE  Pain everywhere. I want to lay down    OBJECTIVE  Precautions: Bed/chair alarm  Fall Risk: High fall risk    WEIGHT BEARING RESTRICTION     PAIN ASSESSMENT  Rating: Unable to rate  Location: all through her body       COGNITION  Overall Cognitive Status:  Impaired    RANGE OF MOTION AND STRENGTH ASSESSMENT  Upper extremity ROM and strength are within functional limits     Lower extremity ROM is within functional limits     Lower extremity strength is grossly graded 3+/5    BALANCE  Static Sitting: Fair  Dynamic Sitting: Fair -  Static Standing: Fair -  Dynamic Standing: Fair -    ACTIVITY TOLERANCE: poor      AM-PAC '6-Clicks' INPATIENT SHORT FORM - BASIC MOBILITY  How much difficulty does the patient currently have...  Patient Difficulty: Turning over in bed (including adjusting bedclothes, sheets and blankets)?: A Lot   Patient Difficulty: Sitting down on and standing up from a chair with arms (e.g., wheelchair, bedside commode, etc.): A Lot   Patient Difficulty: Moving from lying on back to sitting on the side of the bed?: A Lot   How much help from  another person does the patient currently need...   Help from Another: Moving to and from a bed to a chair (including a wheelchair)?: A Lot   Help from Another: Need to walk in hospital room?: Total   Help from Another: Climbing 3-5 steps with a railing?: Total     AM-PAC Score:  Raw Score: 10   Approx Degree of Impairment: 76.75%   Standardized Score (AM-PAC Scale): 32.29   CMS Modifier (G-Code): CL    FUNCTIONAL ABILITY STATUS  Gait Assessment   Functional Mobility/Gait Assessment  Gait Assistance: Moderate assistance  Distance (ft): 2  Assistive Device: Rolling walker  Pattern: Shuffle (slight posterior lean)    Skilled Therapy Provided     Bed Mobility:  Rolling: mod A  Supine to sit: mod/max A   Sit to supine: NT     Transfer Mobility:  Sit to stand: mod A   Stand to sit: mod A  Gait = mod A    Therapist's Comments:   Mobilities as above   at bs  Had EGD earlier  B LE edema noted although per  has been better  AAROM on B LE   All task with extra time and cueing  Needs 100% cueing with task  Left on the recliner    Exercise/Education Provided:  Bed mobility  Body mechanics  Energy conservation  Functional activity tolerated  Gait training  Posture  ROM  Transfer training    Patient End of Session: Up in chair;Needs met;Call light within reach;RN aware of session/findings;All patient questions and concerns addressed      Patient Evaluation Complexity Level:  History Moderate - 1 or 2 personal factors and/or co-morbidities   Examination of body systems Moderate - addressing a total of 3 or more elements   Clinical Presentation  Moderate - Evolving   Clinical Decision Making Moderate Complexity       PT Session Time: 30 minutes  Gait Trainin minutes  Therapeutic Activity: 15 minutes    Therapeutic Exercise: 10 minutes

## 2025-01-12 NOTE — PROGRESS NOTES
I also spoke w/  and daughter (speaker phone) about results and limitations of egd.  We discussed small bowel eval or colonoscopy and limitations and risks of those studies as well    Currently they did not want to attempt cscope/prep or other endoscopy    --daughter/ still agreeable for now w/ monitoring hg and iron/blood as needed. They want to f/u w/ Dr Rodriguez as outpt      Rylan Meier MD  [facility]

## 2025-01-12 NOTE — PROGRESS NOTES
[facility]  Progress Note    Isabella Addison Patient Status:  Inpatient    1940 MRN [id number]   Location [facility] 3SW-A Attending Primitivo Lawrence MD   Hosp Day # 1 PCP Jorge Lr MD     Subjective:  Isabella Addison is a(n) 84 year old female.         Current complaints: no overt bleeding    Risk endoscopy and eval reviewed w/  today (at bedside)      Current Facility-Administered Medications   Medication Dose Route Frequency    ceFAZolin (Ancef) 1 g in dextrose 5% 100mL IVPB-ADD  1 g Intravenous Q8H    acetaminophen (Tylenol Extra Strength) tab 500 mg  500 mg Oral Q4H PRN    atorvastatin (Lipitor) tab 40 mg  40 mg Oral Nightly    busPIRone (Buspar) tab 15 mg  15 mg Oral BID    cholecalciferol (Vitamin D3) tab 4,000 Units  4,000 Units Oral Daily    DULoxetine (Cymbalta) DR cap 60 mg  60 mg Oral BID    ferrous sulfate DR tab 325 mg  325 mg Oral Daily with breakfast    memantine (Namenda) tab 10 mg  10 mg Oral BID    rivastigmine (Exelon) 13.3 MG/24HR patch 1 patch  1 patch Transdermal Daily    cyanocobalamin (Vitamin B12) tab 1,000 mcg  1,000 mcg Oral Daily    sodium chloride 0.9% infusion   Intravenous Continuous    acetaminophen (Tylenol Extra Strength) tab 500 mg  500 mg Oral Q4H PRN    ondansetron (Zofran) 4 MG/2ML injection 4 mg  4 mg Intravenous Q6H PRN    metoclopramide (Reglan) 5 mg/mL injection 5 mg  5 mg Intravenous Q8H PRN    influenza virus trivalent high dose PF (Fluzone HD) 0.5 mL injection (ages >/= 65 years) 0.5 mL  0.5 mL Intramuscular Prior to discharge    potassium chloride (Klor-Con M10) tab 10 mEq  10 mEq Oral Daily    insulin aspart (NovoLOG) 100 Units/mL FlexPen 1-5 Units  1-5 Units Subcutaneous TID AC and HS    sodium chloride 0.9% infusion   Intravenous Once    pantoprazole (Protonix) 80 mg in sodium chloride 0.9% 100 mL infusion  8 mg/hr Intravenous Continuous        Objective:    /74 (BP Location: Left arm)   Pulse 80   Temp 97.6 °F  (36.4 °C) (Oral)   Resp 15   Wt 175 lb (79.4 kg)   SpO2 93%   BMI 32.01 kg/m²   General appearance: NAD  Lungs: clear to auscultation bilaterally  Heart: reg rate    Abdomen: soft, non tender/non distended.     Extremities: no le edema  Neurologic: Grossly normal, oriented x 1        Labs:     Recent Labs   Lab 01/10/25  1850 01/11/25  0159 01/11/25  0831 01/11/25  1653 01/12/25  0018   RBC 2.28*  --  2.87*  --   --    HGB 6.5*   < > 8.0* 8.0* 8.0*   HCT 21.4*  --  25.1* 26.2*  --    MCV 93.9  --  87.5  --   --    MCH 28.5  --  27.9  --   --    MCHC 30.4*  --  31.9  --   --    RDW 20.3  --  22.2  --   --    NEPRELIM 3.78  --  2.67  --   --    WBC 6.0  --  4.0  --   --    .0  --  172.0  --   --     < > = values in this interval not displayed.                  )    Lab Results   Component Value Date    PGLU 113 01/12/2025           Assessment and Plan:  Patient Active Problem List   Diagnosis    Pure hypercholesterolemia    Other abnormal glucose    Esophageal reflux    Dysthymic disorder    Encephalopathy acute    Osteoporosis    Inguinal hernia    Edema    Vitamin D deficiency    Recurrent UTI    Dry eye syndrome    Essential hypertension    Paroxysmal supraventricular tachycardia (HCC)    Irritable bowel syndrome with constipation    Lumbar spinal stenosis    B12 deficiency    DANNIE (obstructive sleep apnea)    Sicca, unspecified type (HCC)    Thrombocytopenia (HCC)    Iron deficiency anemia, unspecified iron deficiency anemia type    Huerta's esophagus determined by endoscopy    Late onset Alzheimer's disease without behavioral disturbance (HCC)    Close contact within last 14 days with patient with respiratory symptoms    Routine general medical examination at a health care facility    Chronic right-sided low back pain with right-sided sciatica    Prediabetes    Well controlled diabetes mellitus (HCC)    NSTEMI (non-ST elevated myocardial infarction) ([facility])    Bradyarrhythmia    Weakness generalized     Closed displaced intertrochanteric fracture of right femur, initial encounter ([facility])    Elevated troponin    Anemia, unspecified type    Syncope and collapse    Urinary tract infection without hematuria, site unspecified    Acute cystitis without hematuria    Diarrhea, unspecified type    Weakness    Melena    Syncope, near    Sedated due to medication    Dementia, unspecified dementia severity, unspecified dementia type, unspecified whether behavioral, psychotic, or mood disturbance or anxiety (HCC)    Urinary tract infection with hematuria, site unspecified    AMS (altered mental status)    DK (acute kidney injury) ([facility])    Gastrointestinal hemorrhage, unspecified gastrointestinal hemorrhage type    Anemia         1 abnl ct stomach  2 acute blood loss anemia  3 hx of duodenal avm      No overt bleeding, eliquis was 1/10. On asa/ppi as well.  Ct w/ gastric thickening     Hg after transfusion has been stable        --egd shortly  --ppi     --  demonstrated understanding of the results and recommendations and risks, benefits, limitations, and alternatives to the plan. All of their questions and concerns were addressed and were agreeable to the plan as listed      Rylan Meier MD

## 2025-01-12 NOTE — OCCUPATIONAL THERAPY NOTE
OT orders received, chart reviewed. Attempted to see patient for OT eval this am, however patient with EGD pending later this am.  at bedside politely requesting therapy return following procedure. Will reattempt as able and as patient appropriate. RN notified.

## 2025-01-12 NOTE — PLAN OF CARE
EGD completed today, tolerating clear liquid diet. Advance as tolerated. Denies pain or discomfort. No difficulty swallowing. Plan for PT/OT. Spouse at bedside.

## 2025-01-12 NOTE — ANESTHESIA PREPROCEDURE EVALUATION
PRE-OP EVALUATION    Patient Name: Isabella Addison    Admit Diagnosis: Gastrointestinal hemorrhage, unspecified gastrointestinal hemorrhage type [K92.2]  Anemia, unspecified type [D64.9]    Pre-op Diagnosis: GI bleed [K92.2]    ESOPHAGOGASTRODUODENOSCOPY (EGD)    Anesthesia Procedure: ESOPHAGOGASTRODUODENOSCOPY (EGD)    Surgeons and Role:     * Rylan Meier MD - Primary    Pre-op vitals reviewed.  Temp: 97.6 °F (36.4 °C)  Pulse: 80  Resp: 15  BP: 146/74  SpO2: 93 %  Body mass index is 32.01 kg/m².    Current medications reviewed.  Hospital Medications:  • ceFAZolin (Ancef) 1 g in dextrose 5% 100mL IVPB-ADD  1 g Intravenous Q8H   • potassium chloride 40 mEq in 250mL sodium chloride 0.9% IVPB premix  40 mEq Intravenous Once   • acetaminophen (Tylenol Extra Strength) tab 500 mg  500 mg Oral Q4H PRN   • atorvastatin (Lipitor) tab 40 mg  40 mg Oral Nightly   • busPIRone (Buspar) tab 15 mg  15 mg Oral BID   • cholecalciferol (Vitamin D3) tab 4,000 Units  4,000 Units Oral Daily   • DULoxetine (Cymbalta) DR cap 60 mg  60 mg Oral BID   • ferrous sulfate DR tab 325 mg  325 mg Oral Daily with breakfast   • memantine (Namenda) tab 10 mg  10 mg Oral BID   • rivastigmine (Exelon) 13.3 MG/24HR patch 1 patch  1 patch Transdermal Daily   • cyanocobalamin (Vitamin B12) tab 1,000 mcg  1,000 mcg Oral Daily   • sodium chloride 0.9% infusion   Intravenous Continuous   • acetaminophen (Tylenol Extra Strength) tab 500 mg  500 mg Oral Q4H PRN   • ondansetron (Zofran) 4 MG/2ML injection 4 mg  4 mg Intravenous Q6H PRN   • metoclopramide (Reglan) 5 mg/mL injection 5 mg  5 mg Intravenous Q8H PRN   • [] sodium chloride 0.9% infusion   Intravenous Continuous   • influenza virus trivalent high dose PF (Fluzone HD) 0.5 mL injection (ages >/= 65 years) 0.5 mL  0.5 mL Intramuscular Prior to discharge   • potassium chloride (Klor-Con M10) tab 10 mEq  10 mEq Oral Daily   • insulin aspart (NovoLOG) 100 Units/mL FlexPen 1-5 Units  1-5  Units Subcutaneous TID AC and HS   • [COMPLETED] sodium chloride 0.9% infusion   Intravenous Once   • sodium chloride 0.9% infusion   Intravenous Once   • [COMPLETED] potassium chloride (Klor-Con) 20 MEQ oral powder 40 mEq  40 mEq Oral Once   • [COMPLETED] pantoprazole (Protonix) 40 mg in sodium chloride 0.9% PF 10 mL IV push  40 mg Intravenous Once   • pantoprazole (Protonix) 80 mg in sodium chloride 0.9% 100 mL infusion  8 mg/hr Intravenous Continuous       Outpatient Medications:   Prescriptions Prior to Admission[1]    Allergies: Ciprofloxacin, Penicillins, Radiology contrast iodinated dyes, Amoxil, Biaxin [clarithromycin], Cefuroxime axetil, Doxycycline calcium, and Sulfa antibiotics      Anesthesia Evaluation    Patient summary reviewed.    Anesthetic Complications  (-) history of anesthetic complications         GI/Hepatic/Renal  Comment: CT abdomen reviewed - There is wall thickening of the stomach which is incompletely distended.    (+) GERD                           Cardiovascular                (+) obesity  (+) hypertension       (+) past MI           (+) dysrhythmias and SVT                  Endo/Other               (+) anemia                   Pulmonary                    (+) sleep apnea       Neuro/Psych      (+) depression           (+) neuromuscular disease             Dementia       Past Surgical History:   Procedure Laterality Date   • Benign biopsy left  ?    stereotactic core biopsy   • Benign biopsy right  ?    stereotactic core biopsy   • Colonoscopy  11/01/2008    RECHECK 5 YRS   • Colonoscopy N/A 07/19/2021    Procedure: COLONOSCOPY;  Surgeon: Manish Rodriguez MD;  Location: [facility] ENDOSCOPY   • Colonoscopy,biopsy  01/08/2014    Procedure: ESOPHAGOGASTRODUODENOSCOPY, COLONOSCOPY, POSSIBLE BIOPSY, POSSIBLE POLYPECTOMY [id number];  Surgeon: Manish Rodriguez MD;  Location: [facility] SURGICAL [facility]   • Hysterectomy     • Optx dstl radl x-artic fx/epiphysl sep Left 10/06/2014    Procedure: OPEN REDUCTION  INTERNAL FIXATION ARM;  Surgeon: Augustina Banuelos MD;  Location: [facility]   • Other surgical history      URETHRAL SUSPENSION   • Other surgical history      STEROTACTIC BX's BOTH BREASTS   • Other surgical history  2008    EGD = PHAN'S   • Other surgical history N/A 2016    Procedure: ENDOSCOPIC ULTRASOUND (EUS);  Surgeon: Glenn Chou MD;  Location: [facility]   • Patient documented not to have experienced any of the following events  2014    Procedure: ESOPHAGOGASTRODUODENOSCOPY, COLONOSCOPY, POSSIBLE BIOPSY, POSSIBLE POLYPECTOMY [id number];  Surgeon: Manish Rodriguez MD;  Location: [facility]   • Patient documented not to have experienced any of the following events Left 10/06/2014    Procedure: OPEN REDUCTION INTERNAL FIXATION ARM;  Surgeon: Augustina Banuelos MD;  Location: [facility]   • Patient with preoperative order for iv antibiotic surgical site infect Left 10/06/2014    Procedure: OPEN REDUCTION INTERNAL FIXATION ARM;  Surgeon: Augustina Banuelos MD;  Location: [facility]   • Patient withough preoperative order for iv antibiotic surgical site infection prophylaxis.  2014    Procedure: ESOPHAGOGASTRODUODENOSCOPY, COLONOSCOPY, POSSIBLE BIOPSY, POSSIBLE POLYPECTOMY [id number];  Surgeon: Manish Rodriguez MD;  Location: [facility]   • Upper gi endoscopy,biopsy  2014    Procedure: ESOPHAGOGASTRODUODENOSCOPY, COLONOSCOPY, POSSIBLE BIOPSY, POSSIBLE POLYPECTOMY [id number];  Surgeon: Manish Rodriguez MD;  Location: [facility]   • Vena cava filter       Social History     Socioeconomic History   • Marital status:    Tobacco Use   • Smoking status: Former     Current packs/day: 0.00     Average packs/day: 1 pack/day for 15.0 years (15.0 ttl pk-yrs)     Types: Cigarettes     Start date: 1963     Quit date: 1978     Years since quittin.6   • Smokeless tobacco: Never   Vaping Use   • Vaping status: Never Used    Substance and Sexual Activity   • Alcohol use: Not Currently   • Drug use: No     History   Drug Use No     Available pre-op labs reviewed.  Lab Results   Component Value Date    WBC 3.5 (L) 01/12/2025    RBC 3.03 (L) 01/12/2025    HGB 8.2 (L) 01/12/2025    HGB 8.3 (L) 01/12/2025    HCT 27.1 (L) 01/12/2025    MCV 89.4 01/12/2025    MCH 27.4 01/12/2025    MCHC 30.6 (L) 01/12/2025    RDW 22.8 01/12/2025    .0 01/12/2025     Lab Results   Component Value Date     01/12/2025    K 3.2 (L) 01/12/2025    K 3.2 (L) 01/12/2025     01/12/2025    CO2 26.0 01/12/2025    BUN 9 01/12/2025    CREATSERUM 0.76 01/12/2025    GLU 99 01/12/2025    CA 8.9 01/12/2025     Lab Results   Component Value Date    INR 1.21 (H) 01/10/2025         Airway      Mallampati: II  Mouth opening: >3 FB  TM distance: > 6 cm   Cardiovascular    Cardiovascular exam normal.         Dental             Pulmonary    Pulmonary exam normal.                 Other findings        ASA: 3   Plan: MAC  NPO status verified and patient meets guidelines.        Comment: Back up GA.  at bedside, signed informed consent. All questions answered.   Plan/risks discussed with: patient and spouse            Present on Admission:  **None**             [1]   Medications Prior to Admission   Medication Sig Dispense Refill Last Dose/Taking   • bumetanide 1 MG Oral Tab Take 1 tablet (1 mg total) by mouth BID (Diuretic).   Taking   • apixaban 5 MG Oral Tab Take 1 tablet (5 mg total) by mouth 2 (two) times daily.   Taking   • senna-docusate 8.6-50 MG Oral Tab Take 1 tablet by mouth in the morning and 1 tablet before bedtime.   Taking   • busPIRone 15 MG Oral Tab Take 1 tablet (15 mg total) by mouth in the morning and 1 tablet (15 mg total) before bedtime.      • Esomeprazole Magnesium 20 MG Oral Capsule Delayed Release Take 1 capsule (20 mg total) by mouth 2 (two) times daily with meals.      • aspirin 81 MG Oral Tab EC Take 1 tablet (81 mg total) by  mouth daily.  0    • acetaminophen 500 MG Oral Tab Take 1 tablet (500 mg total) by mouth every 4 (four) hours as needed for Pain.  0    • sacubitril-valsartan 24-26 MG Oral Tab Take 1 tablet by mouth 2 (two) times daily. 120 tablet 5    • rivastigmine 13.3 MG/24HR Transdermal Patch 24 Hr Place 1 patch onto the skin daily.      • memantine (NAMENDA XR) 28 MG Oral Capsule SR 24 Hr Take 1 capsule (28 mg total) by mouth daily. 30 capsule 5    • atorvastatin 40 MG Oral Tab Take 1 tablet (40 mg total) by mouth nightly. 90 tablet 3    • ferrous sulfate 325 (65 FE) MG Oral Tab EC Take 1 tablet (325 mg total) by mouth 3 (three) times daily with meals.      • Vitamin B-12 1000 MCG Oral Tab Take 1 tablet (1,000 mcg total) by mouth daily.      • DULoxetine HCl 60 MG Oral Cap DR Particles Take 1 capsule (60 mg total) by mouth 2 (two) times daily.      • Cholecalciferol (VITAMIN D3) 2000 UNIT Oral Cap Take 2 capsules (4,000 Units total) by mouth daily. 2 capsules daily      • CALCIUM 600 + D OR Take 2 tablets by mouth daily.

## 2025-01-12 NOTE — PHYSICAL THERAPY NOTE
PT order received and chart reviewed. From [facility] NIX. Plan for GD at 10.  requested to hold PT eval until after. Will  f/u. Nursing is aware

## 2025-01-13 ENCOUNTER — APPOINTMENT (OUTPATIENT)
Dept: ULTRASOUND IMAGING | Facility: HOSPITAL | Age: 85
End: 2025-01-13
Attending: INTERNAL MEDICINE
Payer: MEDICARE

## 2025-01-13 ENCOUNTER — APPOINTMENT (OUTPATIENT)
Age: 85
End: 2025-01-13
Attending: INTERNAL MEDICINE
Payer: MEDICARE

## 2025-01-13 LAB
DEPRECATED HBV CORE AB SER IA-ACNC: 30 NG/ML
GLUCOSE BLD-MCNC: 104 MG/DL (ref 70–99)
GLUCOSE BLD-MCNC: 110 MG/DL (ref 70–99)
GLUCOSE BLD-MCNC: 125 MG/DL (ref 70–99)
HGB BLD-MCNC: 8 G/DL
HGB BLD-MCNC: 8.2 G/DL
HGB BLD-MCNC: 8.8 G/DL
HGB RETIC QN AUTO: 30.6 PG (ref 28.2–36.6)
IMM RETICS NFR: 0.25 RATIO (ref 0.1–0.3)
POTASSIUM SERPL-SCNC: 3.6 MMOL/L (ref 3.5–5.1)
RETICS # AUTO: 192 X10(3) UL (ref 22.5–147.5)
RETICS/RBC NFR AUTO: 6 %
TSI SER-ACNC: 3.69 UIU/ML (ref 0.55–4.78)

## 2025-01-13 PROCEDURE — 99223 1ST HOSP IP/OBS HIGH 75: CPT | Performed by: INTERNAL MEDICINE

## 2025-01-13 PROCEDURE — 93970 EXTREMITY STUDY: CPT | Performed by: INTERNAL MEDICINE

## 2025-01-13 RX ORDER — ENOXAPARIN SODIUM 100 MG/ML
40 INJECTION SUBCUTANEOUS DAILY
Qty: 36 ML | Refills: 1 | Status: SHIPPED | OUTPATIENT
Start: 2025-01-13 | End: 2025-01-14

## 2025-01-13 RX ORDER — ENOXAPARIN SODIUM 100 MG/ML
40 INJECTION SUBCUTANEOUS EVERY 24 HOURS
Status: DISCONTINUED | OUTPATIENT
Start: 2025-01-13 | End: 2025-01-14

## 2025-01-13 NOTE — PROGRESS NOTES
Neuro: Oriented to self.  Vital signs stable  Respiratory: On room air. Continuous pulse oximeter.   Cardiac: Tele-NSR  GI: Abdomen soft, nondistended. Last BM: 1/10  : Incontinent- purewick in place  Integumentary: redness/edema on BLE  Drains: purewick  Pain: Denies any pain at this time  Activity: Rolls side to side  Diet: Soft diet- ADAT, order, set, and feed. QID accuchecks  IV Fluids: infusing per order. Protonix gtt @ 10 mL/hr  All appropriate safety measures in place. All questions and concerns addressed.

## 2025-01-13 NOTE — CM/SW NOTE
01/13/25 1200   CM/SW Referral Data   Referral Source Social Work (self-referral)   Reason for Referral Discharge planning   Informant Spouse/Significant Other;Daughter;EMR;Clinical Staff Member   Patient Info   Patient's Current Mental Status at Time of Assessment Confused or unable to complete assessment   Discharge Needs   Anticipated D/C needs Subacute rehab;Transportation services   Services Requested   Submitted to [facility] Yes   PASRR Level 1 Submitted No - Current within 90 days   Choice of Post-Acute Provider   Informed patient of right to choose their preferred provider Yes   List of appropriate post-acute services provided to patient/family with quality data No - Declined list   Patient/family choice [facility]   Information given to Spouse/Significant other;Daughter       Patient is an 85 y/o woman admitted with GIB.  Noted pt admitted from [facility] in [place].  Facility has accepted pt for readmission.  PT/OT indicating pt would benefit from inpatient rehab setting at [place].  New [employer] insurance auth requested by [facility].  Message sent to [facility] for review.    Met with pt, her  and dtr at bedside to discuss DC planning.  Pt sleeping and did not participate.  Pt's /dtr confirmed preference for pt's return to [facility] at [place].  Pt's dtr stated that pt initially admitted there at the beginning of December and has been receiving ADRIAN.  However, insurance did end coverage for ADRIAN (despite appeal) and so most recently pt has been private pay.      Pt's family confirmed preference that pt returns to [place] under insurance coverage for ADRIAN rather than as private pay. Discussed pending insurance auth.  Plan to update pt/family of status once response received from insurance.  / to remain available for support and/or discharge planning.     [facility]  P:[phone]  F:[phone]    Clarisa Cordova [facility]  Discharge Planner  [phone]

## 2025-01-13 NOTE — PROGRESS NOTES
[facility]   part of [facility] Hospitalist Progress Note     Isabella Addison Patient Status:  Inpatient    1940 MRN [id number]   Location [facility] 3SW-A Attending Primitivo Lawrence MD   Hosp Day # 2 PCP Jorge Lr MD     Chief Complaint:   Gi bleeding    Subjective:     No issues overnight  Sp EGD yest >> mild gastritis, 3 AVMs in duodenum sp APC and clipping  Afebrile  Hgb 8.2 this am, stable for 36 hrs  Family at bedside  Daughter requesting ONC and URO assessment    Objective:    Review of Systems:   10 point ROS completed and was negative, except for pertinent positive and negatives stated in subjective.    Vital signs:  Temp:  [97.7 °F (36.5 °C)-98.7 °F (37.1 °C)] 98.5 °F (36.9 °C)  Pulse:  [] 85  Resp:  [15-20] 15  BP: (111-179)/(53-86) 155/66  SpO2:  [93 %-100 %] 100 %    Physical Exam:    General: No acute distress.   HEENT:  EOMI, PERRLA, OP clear  Respiratory: Clear to auscultation bilaterally. No wheezes. No rhonchi.  Cardiovascular: S1, S2. Regular rate and rhythm. No murmurs.  Abdomen: Soft, nontender, nondistended.  Positive bowel sounds. No rebound or guarding.  Extremities: No edema.  Neuro:  Grossly non focal, no motor deficits.        Diagnostic Data:    Labs:  Recent Labs   Lab 01/10/25  1850 25  0159 25  0831 25  1653 25  0018 25  0845 25  1607 25  0514 25  0757   WBC 6.0  --  4.0  --   --  3.5*  --   --   --    HGB 6.5*   < > 8.0*   < > 8.0* 8.3*  8.2* 8.3* 8.0* 8.2*   MCV 93.9  --  87.5  --   --  89.4  --   --   --    .0  --  172.0  --   --  174.0  --   --   --    INR 1.21*  --   --   --   --   --   --   --   --     < > = values in this interval not displayed.       Recent Labs   Lab 01/10/25  1850 25  0831 25  0845 25  0514   * 108* 99  --    BUN 27* 20 9  --    CREATSERUM 1.21* 0.91 0.76  --    CA 9.3 9.0 8.9  --    ALB 4.1  --   --   --    NA  137 140 142  --    K 3.5 3.3* 3.2*  3.2* 3.6    106 111  --    CO2 30.0 30.0 26.0  --    ALKPHO 71  --   --   --    AST 17  --   --   --    ALT 11  --   --   --    BILT 0.3  --   --   --    TP 6.7  --   --   --        Estimated Creatinine Clearance: 42 mL/min (based on SCr of 0.76 mg/dL).    Recent Labs   Lab 01/10/25  1850   PTP 15.5*   INR 1.21*            COVID-19 Lab Results    COVID-19  Lab Results   Component Value Date    COVID19 Not Detected 01/10/2025    COVID19 Not Detected 11/25/2024    COVID19 Not Detected 11/23/2024       Pro-Calcitonin  No results for input(s): \"PCT\" in the last 168 hours.    Cardiac  No results for input(s): \"TROP\", \"PBNP\" in the last 168 hours.    Creatinine Kinase  No results for input(s): \"CK\" in the last 168 hours.    Inflammatory Markers  No results for input(s): \"CRP\", \"ERICH\", \"LDH\", \"DDIMER\" in the last 168 hours.    Imaging: Imaging data reviewed in Epic.    Medications:    ceFAZolin  1 g Intravenous Q8H    atorvastatin  40 mg Oral Nightly    busPIRone  15 mg Oral BID    cholecalciferol  4,000 Units Oral Daily    DULoxetine  60 mg Oral BID    ferrous sulfate  325 mg Oral Daily with breakfast    memantine  10 mg Oral BID    rivastigmine  1 patch Transdermal Daily    cyanocobalamin  1,000 mcg Oral Daily    potassium chloride  10 mEq Oral Daily    insulin aspart  1-5 Units Subcutaneous TID AC and HS    sodium chloride   Intravenous Once       Assessment & Plan:    Isabella Addison is a 84 year old female with PMH sig for depression, anxiety, GERD, barretts, 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     GI bleeding - likely upper  Hx of AVM  GERD  Barretts  -pt has hx of AVM sp ablation in 03/2023  -CT shows thickening of stomach  -no overt bleeding  -was on eliquis, asa - those on hold >> resume when OK per GI  -PPI drip  -serial hgb >> hgb 6.6 >> 6.5 >> sp 2 units prbc >> latest hgb 8.0  -AM labs  pending  -CLD >> NPO  -plan for EGD >> TODAY  -GI consulted >> apprec recs >> shayla Meier  -Oncology consult requested    UTI  -UA appears infected  -urine cx > 100k ecoli, pan sens  -on IV ancef >> will transition to po in DC to complete 7 days  total of abx  -per daughter - UA was checked at facility and pt was supposed to have started an abx  -on outpt chart review cannot find that any abx was started, cannot find urine cx results  -Urology consult req >> shayla ARIAS     Hypokalemia  -replaced  -monitor     HTN  DL  CAD  -resume home meds  -hold eliquis     Hx of DVT sp IVC filter  -on eliquis  -holding in light of low hgb     DANNIE  -cpap at night     Alzheimers dementia  -resume home meds     Depression  Anxiety  -resume home meds           Quality:  DVT Prophylaxis: eliquis - on hold, scds  CODE status: full code  Goldstein: no  If COVID testing is negative, may discontinue isolation: yes      Plan of care discussed with  and daughter seperately, all questions answered.     DISPO:  Cont inpt care  EGD today        Viv Fortune MD  [facility]  Pager [phone]  Answering Service number: [phone]

## 2025-01-13 NOTE — CONSULTS
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     Baseline Hb is around 8 g/dL. Upon admission patient found to have INR 1.2, Hb 6.5 g/dL, Cr 1.21. CT A/P with diffuse thickening of stomach. She received total 2 u PRBC, After which Hb has been consistently 8.0 - 8.2 g/dL.     GI service evaluated patient and performed EGD 25 which showed small schatzki's ring above a small hiatal hernia.  Mild gastritis.  3 amvs (2-4 mm) in duodenum, no bleeding, s/p APC and clipping. Dr. Meier (GI) spoke to  about procedure explaining she may have other AVM's other lesions deeper than what can be evaluated on this exam or that may become present on anticoagulation.  relayed that he did not want colonoscopy given her age; he was interested in monitoring and treatment for anemia. Hematology was consulted.     Per imaging review, US Venous doppler B/L Leg 4/3/23 with no evidence of DVT in either leg. IVC filter was placed 4/3/23 due to inability to anticoagulate at the time (contraindication) - given active GI bleed at the time.     Today at bedside both patient's daughter and  Tigre are present. As patient has Alzheimer's , her family is providing history. Apparently patient was recently placed on Eliquis 5 mg PO BID at her facility [facility], for bilateral LE DVT starting ~24. There is no record of this report. Of note,  patient was continued on aspirin 81 mg daily at [facility] (which she has been taking for her hx of NSTEMI s/p SAMARIA 2022). Patient's ECOG is 4, dependent on others for assistance. She gets up with assistance but spends > 50% of the day in bed, as she has become very deconditioned lately. She has HFrEF.     At bedside she currently denies dyspnea at rest, pain, or lower extremity swelling.     History:  Past Medical History:    ANXIETY    Anxiety    Arrhythmia    pt unaware    BACK PAIN    LS & C-SPINE DZ W/ PAIN    Chronic rhinitis    Depression    Encephalopathy acute    Esophageal reflux    GERD    W/ PHAN's    High blood pressure    High cholesterol    Hypercholesterolemia    HYPERLIPIDEMIA    Hypertension    Irritable bowel syndrome with constipation    Kidney disease    frequent infections; pt states frequent UTIs    Late onset Alzheimer's disease without behavioral disturbance (HCC)    Migraines    NSTEMI (non-ST elevated myocardial infarction) (HCC)    Ophthalmic migraine    DANNIE (obstructive sleep apnea)    AHI 11 RDI 13 REM AHI 0 Supine AHI 11 non-supine AHI 0 Sao2 Ye 83%     OSTEOPOROSIS    Other and unspecified hyperlipidemia    OTHER DISEASES    PSVT, pt unaware    OTHER DISEASES    Barrott's Esoghagus    Reflux    barretts    Sleep apnea    wears a mouth gaurd    UTI (lower urinary tract infection)    no UTI currently as of 5/5/16     Past Surgical History:   Procedure Laterality Date    Benign biopsy left  ?    stereotactic core biopsy    Benign biopsy right  ?    stereotactic core biopsy    Colonoscopy  11/01/2008    RECHECK 5 YRS    Colonoscopy N/A 07/19/2021    Procedure: COLONOSCOPY;  Surgeon: Manish Rodriugez MD;  Location: [facility] ENDOSCOPY    Colonoscopy,biopsy  01/08/2014    Procedure: ESOPHAGOGASTRODUODENOSCOPY, COLONOSCOPY, POSSIBLE BIOPSY, POSSIBLE POLYPECTOMY [id number];  Surgeon: Manish Rodriguez MD;  [facility] [place]    Hysterectomy      Optx dstl radl x-artic fx/epiphysl sep  Left 10/06/2014    Procedure: OPEN REDUCTION INTERNAL FIXATION ARM;  Surgeon: Augustina Banuelos MD;  Location: [facility]    Other surgical history      URETHRAL SUSPENSION    Other surgical history      STEROTACTIC BX's BOTH BREASTS    Other surgical history  05/01/2008    EGD = PHAN'S    Other surgical history N/A 05/17/2016    Procedure: ENDOSCOPIC ULTRASOUND (EUS);  Surgeon: Glenn Chou MD;  Location:  ENDOSCOPY    Patient documented not to have experienced any of the following events  01/08/2014    Procedure: ESOPHAGOGASTRODUODENOSCOPY, COLONOSCOPY, POSSIBLE BIOPSY, POSSIBLE POLYPECTOMY [id number];  Surgeon: Manish Rodriguez MD;  Location: [facility]    Patient documented not to have experienced any of the following events Left 10/06/2014    Procedure: OPEN REDUCTION INTERNAL FIXATION ARM;  Surgeon: Augustina Banuelos MD;  Location: [facility]    Patient with preoperative order for iv antibiotic surgical site infect Left 10/06/2014    Procedure: OPEN REDUCTION INTERNAL FIXATION ARM;  Surgeon: Augustina Banuelos MD;  Location: [facility]    Patient withough preoperative order for iv antibiotic surgical site infection prophylaxis.  01/08/2014    Procedure: ESOPHAGOGASTRODUODENOSCOPY, COLONOSCOPY, POSSIBLE BIOPSY, POSSIBLE POLYPECTOMY [id number];  Surgeon: Manish Rodriguez MD;  Location: [facility]    Upper gi endoscopy,biopsy  01/08/2014    Procedure: ESOPHAGOGASTRODUODENOSCOPY, COLONOSCOPY, POSSIBLE BIOPSY, POSSIBLE POLYPECTOMY [id number];  Surgeon: Manish Rodriguez MD;  Location: [facility]    Vena cava filter       Family History   Problem Relation Age of Onset    Heart Disorder Father     Other (Other) Mother         ENCEPHALITIS    Hypertension Brother     Diabetes Brother       reports that she quit smoking about 46 years ago. Her smoking use included cigarettes. She started smoking about 61 years ago. She has a 15 pack-year smoking history. She has never  used smokeless tobacco. She reports that she does not currently use alcohol. She reports that she does not use drugs.    Allergies:  Allergies[1]    Medications:    Current Facility-Administered Medications:     ceFAZolin (Ancef) 1 g in dextrose 5% 100mL IVPB-ADD, 1 g, Intravenous, Q8H    lactated ringers infusion, , Intravenous, Continuous    acetaminophen (Tylenol Extra Strength) tab 500 mg, 500 mg, Oral, Q4H PRN    atorvastatin (Lipitor) tab 40 mg, 40 mg, Oral, Nightly    busPIRone (Buspar) tab 15 mg, 15 mg, Oral, BID    cholecalciferol (Vitamin D3) tab 4,000 Units, 4,000 Units, Oral, Daily    DULoxetine (Cymbalta) DR cap 60 mg, 60 mg, Oral, BID    ferrous sulfate DR tab 325 mg, 325 mg, Oral, Daily with breakfast    memantine (Namenda) tab 10 mg, 10 mg, Oral, BID    rivastigmine (Exelon) 13.3 MG/24HR patch 1 patch, 1 patch, Transdermal, Daily    cyanocobalamin (Vitamin B12) tab 1,000 mcg, 1,000 mcg, Oral, Daily    sodium chloride 0.9% infusion, , Intravenous, Continuous    acetaminophen (Tylenol Extra Strength) tab 500 mg, 500 mg, Oral, Q4H PRN    ondansetron (Zofran) 4 MG/2ML injection 4 mg, 4 mg, Intravenous, Q6H PRN    metoclopramide (Reglan) 5 mg/mL injection 5 mg, 5 mg, Intravenous, Q8H PRN    influenza virus trivalent high dose PF (Fluzone HD) 0.5 mL injection (ages >/= 65 years) 0.5 mL, 0.5 mL, Intramuscular, Prior to discharge    potassium chloride (Klor-Con M10) tab 10 mEq, 10 mEq, Oral, Daily    insulin aspart (NovoLOG) 100 Units/mL FlexPen 1-5 Units, 1-5 Units, Subcutaneous, TID AC and HS    sodium chloride 0.9% infusion, , Intravenous, Once    pantoprazole (Protonix) 80 mg in sodium chloride 0.9% 100 mL infusion, 8 mg/hr, Intravenous, Continuous    Review of Systems:  A 12-point review of systems was done with pertinent positives and negatives per the HPI.    Physical Exam:    Vital Signs: /84 (BP Location: Left arm)   Pulse 95   Temp 98.8 °F (37.1 °C) (Oral)   Resp 18   Wt 79.4 kg (175 lb)    SpO2 93%   BMI 32.01 kg/m²    General: Patient is alert and oriented x 1-2 (baseline), not in acute distress.    HEENT: EOMs intact. Anicteric sclera. Pink conjunctiva. Oropharynx is clear.    Neck: No JVD. No palpable lymphadenopathy. Neck is supple.   Chest: Clear to auscultation.  No rales or wheezes.   Heart: Regular rate and rhythm.    Abdomen: Soft, non tender with good bowel sounds. No hepatosplenomegaly.   Extremities: Non pitting BLE edema; mechanical compression in place on BLE legs   Neurological: Grossly intact.    Lymphatics: There is no palpable lymphadenopathy throughout in the cervical,            supraclavicular, axillary, or inguinal regions.   Psych/Depression: Able to recite her name but has baseline Alzheimer's; unable to provide history    Laboratory Data:      Recent Results (from the past 24 hours)   POCT Glucose    Collection Time: 01/12/25  5:33 PM   Result Value Ref Range    POC Glucose 94 70 - 99 mg/dL   POCT Glucose    Collection Time: 01/12/25  9:19 PM   Result Value Ref Range    POC Glucose 112 (H) 70 - 99 mg/dL   Hemoglobin    Collection Time: 01/13/25  5:14 AM   Result Value Ref Range    HGB 8.0 (L) 12.0 - 16.0 g/dL   Potassium    Collection Time: 01/13/25  5:14 AM   Result Value Ref Range    Potassium 3.6 3.5 - 5.1 mmol/L   POCT Glucose    Collection Time: 01/13/25  5:41 AM   Result Value Ref Range    POC Glucose 110 (H) 70 - 99 mg/dL   Hemoglobin    Collection Time: 01/13/25  7:57 AM   Result Value Ref Range    HGB 8.2 (L) 12.0 - 16.0 g/dL   POCT Glucose    Collection Time: 01/13/25 12:09 PM   Result Value Ref Range    POC Glucose 104 (H) 70 - 99 mg/dL       Imaging:  CT A/P 1/10/25  CONCLUSION:  Diffuse wall thickening of the stomach.  Recommend clinical correlation to exclude gastric infiltrative process or gastritis.      Large amount of stool in the right side of the colon and rectosigmoid colon.  No evidence of bowel obstruction.      Colonic diverticulosis.      Wall  thickening of the urinary bladder is nonspecific.  Recommend clinical correlation to exclude cystitis.      2.9 x 2.3 cm cystic structure in the right adnexa likely ovarian in origin.  This is most suggestive of an ovarian cystic remnant.  Recommend sonographic follow-up.         Impression and Plan:    Patient Active Problem List   Diagnosis    Pure hypercholesterolemia    Other abnormal glucose    Esophageal reflux    Dysthymic disorder    Encephalopathy acute    Osteoporosis    Inguinal hernia    Edema    Vitamin D deficiency    Recurrent UTI    Dry eye syndrome    Essential hypertension    Paroxysmal supraventricular tachycardia ([facility])    Irritable bowel syndrome with constipation    Lumbar spinal stenosis    B12 deficiency    DANNIE (obstructive sleep apnea)    Sicca, unspecified type ([facility])    Thrombocytopenia ([facility])    Iron deficiency anemia, unspecified iron deficiency anemia type    Huerta's esophagus determined by endoscopy    Late onset Alzheimer's disease without behavioral disturbance ([facility])    Close contact within last 14 days with patient with respiratory symptoms    Routine general medical examination at a health care facility    Chronic right-sided low back pain with right-sided sciatica    Prediabetes    Well controlled diabetes mellitus ([facility])    NSTEMI (non-ST elevated myocardial infarction) ([facility])    Bradyarrhythmia    Weakness generalized    Closed displaced intertrochanteric fracture of right femur, initial encounter ([facility])    Elevated troponin    Anemia, unspecified type    Syncope and collapse    Urinary tract infection without hematuria, site unspecified    Acute cystitis without hematuria    Diarrhea, unspecified type    Weakness    Melena    Syncope, near    Sedated due to medication    Dementia, unspecified dementia severity, unspecified dementia type, unspecified whether behavioral, psychotic, or mood disturbance or anxiety ([facility])    Urinary tract infection with hematuria, site unspecified    AMS  (altered mental status)    DK (acute kidney injury) (HCC)    Gastrointestinal hemorrhage, unspecified gastrointestinal hemorrhage type    Anemia       Isabella Addison is a 84 year old female with PMHX of GERD, Huerta's esophagus, 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     Acute on Chronic Anemia - likely secondary to blood loss given history of GI bleed, hx of bleeding duodenal AVM's (April '23), and recent anticoagulation + antiplatelet therapy    - Patient was admitted for acute blood loss anemia with  INR 1.2, Hb 6.5 g/dL, Cr 1.21. CT A/P with diffuse thickening of stomach. She received total 2 u PRBC after which Hb went up to 8.2 g/dL    - GI service evaluated patient and performed EGD 1/12/25 which showed small schatzki's ring above a small hiatal hernia.  Mild gastritis.  3 amvs (2-4 mm) in duodenum, no bleeding, s/p APC and clipping. Dr. Meier (GI) spoke to  about procedure explaining she may have other AVM's other lesions deeper than what can be evaluated on this exam or that may become present on anticoagulation.  relayed that he did not want colonoscopy given her age; he was interested in monitoring and treatment for anemia. Hematology was consulted.     -Apparently patient was recently placed on Eliquis 5 mg PO BID at her facility [facility], for bilateral LE DVT starting ~12/31/24. There is no record of this report. Of note, patient was continued on aspirin 81 mg daily at [facility] (which she has been taking for her hx of NSTEMI s/p SAMARIA 2022).     - Patient reportedly on daily ferrous sulfate which was changed to 3x daily ferrous sulfate at [facility]?    Plan:  -Patient's HAS-BLED score is high (3 points - high risk for major bleeding). Given her major bleeding risk and known history of GI  bleeding, her anticoagulation dose needs to be carefully selected. I would prefer that she discontinues aspirin 81 mg while she's on anticoagulation. I spoke to Dr. Hess today who agreed with plan -- [facility] is consulted and will see patient 1/14 morning to fully evaluate and document recommendations regarding aspirin use as well.   - US Venous Duplexes bilateral lower extremities ordered to assess current clot burden.   If patient has isolated distal DVT, if she has no risk factors for proximal extension, she would not warrant full dose anticoagulation.   If patient has proximal DVT or large distal DVT that would require full dose anticoagulation, I would first start with prophylactic lovenox 40 mg subcutaenous daily x 3-4 days (to see how she tolerates it), then uptitrate to full dose (lovenox 1 mg/kg BID). This would help ensure that she tolerates anticoagulation without further signs of blood loss anemia  -  Regardless of her current lower extremity clot burden, I suggest daily prophylactic lovenox (40 mg sc every day) indefinitely, if patient is able to tolerate it without signs/symptoms of bleeding. This recommendation is based on the fact that she's chronically immobile with ECOG of 4. Her stasis is a known risk factor for developing DVT/PE.   I started lovenox 40 mg nightly 1/13/25  Test script for lovenox sent to [facility]. Please ensure it is covered by insurance. She should continue this at [facility].    - iron studies, B12, folate, soluble transferrin receptor ordered for tomorrow morning. Will follow up and see if she needs IV iron outpatient.   On discharge, can continue ferrous sulfate once daily for now (if tolerating without side effects)    All of  and daughter's questions were answered in detail. I scheduled outpatient follow up with me on 1/20/25 at 8:30 am (clinic information in discharge tab).    I spent >50 minutes today face to face and in conversation with the  patient and family as well as on coordination of care.  >50% of the time was spent counseling the patient on the diagnosis, prognosis, and/or management of anticoagulation.      Will continue to follow patient while she is still admitted.       Xiomy Zuleta D.O.  [facility]           [1]   Allergies  Allergen Reactions    Ciprofloxacin UNKNOWN     Burning of espohagus and upset stomach form cipro    Penicillins HIVES and UNKNOWN    Radiology Contrast Iodinated Dyes RASH     Diffuse erythema post study  -- Occurred in 1980's?,    Amoxil UNKNOWN     Cant remember    Biaxin [Clarithromycin]      Cant remember    Cefuroxime Axetil      Cant remember    Doxycycline Calcium      Cant remember    Sulfa Antibiotics HIVES

## 2025-01-13 NOTE — DISCHARGE INSTRUCTIONS
You’ve been diagnosed with a Urinary Tract Infection or UTI.    Urinary tract infections can affect your general health and your quality of life.  Some UTI’s can be prevented.  Here are some suggestions that my help prevent frequent UTI’s.    Good Hygiene: Always wipe front to back.  Don’t use perfumed soaps.  Plain soaps like Ivory are the best.  Don’t use washcloths.  Place soap directly on your hands and clean the genital area.  Rinse thoroughly.  Soap can be very irritating to the vaginal mucosa.    Urination: Urinate every 2-3 hours during the day.  Empty your bladder just before going to bed and  first thing when you wake up.  Make sure you go to the bathroom when you have a strong urge. Don't hover over the toilet to urinate.  The bladder may not be completely emptying when using this technique.  Sometimes you may need to \"double-void\".  After you finish urinating, stand up, then sit back down to urinate again.    Clothing: Wear cotton underwear. Change daily.  Avoid tight jeans.      Coats: Sometimes UTI’s are related to intercourse.  Wash genital area before and rinse afterwards.  Empty your bladder before and after intercourse.  Use of vaginal lubricants may be helpful.  Condoms and some lubricants may be irritating to the vaginal mucosa.  Spermicide should be avoided.  Talk to your doctor, you may require a suppressive antibiotic to take after intercourse.    Supplements: Take Vitamin C 500 or 1000mg daily.  Cranberry pills 400mg daily or if symptomatic 400mg two times per day.  Eat yogurt or consider taking a probiotic.  D-mannose is another supplement that can help prevent infections.  This one is harder to find, but can be found at stores such as [employer], [employer], or a specialized vitamin store. Hydration - have to stay in the recommend fluid intake from your medical team. Water is preferable.     Constipation: Constipation has been linked to UTI’s.  You should be having a soft BM  daily.  Dietary fiber should be between 20-25 grams daily.  Flaxseed, All-Bran cereal, Fiber One bars, fruits and vegetables are a good sources of fiber.  Alternatively, Metamucil can be used daily.  Gentle laxatives and stool softeners may be added on a daily or as needed basis if necessary (Miralax, Colace, Pericolace).     Vaginal creams and moisturizers: It may be recommended you try vaginal creams, moisturizers or lubricants as a prevention method.  Over-the-Counter products:  Replens:  1 applicator three times per week  Luvena prebiotic vaginal moisturizer and lubricant:  package of 6.  1 tube every three days at bedtime.  Helps restore pH balance, decrease vaginal dryness, odor and itchiness.  KY liquibeads  KY silk - moisture enhancer  MeAgain Vaginal Moisturizer.  8 per package.  Use 1 daily for 7 days then 1 daily two times per week.  Astroglide    Prescription medications: Your doctor may recommend daily or as needed prescription medications including antibiotics to prevent urinary tract infection as well.  ---------------  Doctor has asked you to schedule a Cystoscopy    This is a procedure done in the office.  There is no preparation needed on your part.    A Cystoscopy is a test that allows your doctor to look the inside the bladder and the urethra using a thin, lighted instrument called a cystoscope.  The cystoscope is inserted into your urethra and slowly advanced into the bladder. A cystoscopy allows your doctor to look at areas of your bladder and urethra that usually do not show up well on X-rays. Tiny surgical instruments can be inserted through the cystoscope that allow your doctor to remove samples of tissue (biopsy) or samples of urine.  Small bladder stones and some small growths can be removed during cystoscopy. This may eliminate the need for more extensive surgery.   Why It Is Done  Find the cause of symptoms such as blood in the urine (hematuria), painful urination (dysuria), urinary  incontinence, urinary frequency or hesitancy, an inability to pass urine (retention), or a sudden and overwhelming need to urinate (urgency).   Find the cause of problems of the urinary tract, such as frequent, repeated urinary tract infections or urinary tract infections that do not respond to treatment.   Look for problems in the urinary tract, such as blockage in the urethra caused by an enlarged prostate, kidney stones, or tumors.   Evaluate problems that cannot be seen on X-ray or to further investigate problems detected by ultrasound or during intravenous pyelography, such as kidney stones or tumors.   Remove tissue samples for biopsy.   Remove foreign objects.   Treat urinary tract problems. For example, cystoscopy can be done to remove urinary tract stones or growths, treat bleeding in the bladder, relieve blockages in the urethra, or treat or remove tumors.   How To Prepare  You may eat and drink normally before the procedure. You may be asked to give a urine sample at the time of your procedure. Please do not urinate before.  How It Is Done  A cystoscopy is performed by a urologist, with one or more assistants. The test is done in a special testing room in the doctor's office.  You will undress from the waist down, and be given a paper drape to cover yourself. You will lie on your back on a special table. Females will have their knees bent and feet placed in stirrups. Males will lay flat on the table, legs straight. Your genital area is cleaned with an antiseptic solution.  A local anesthetic jelly will be inserted into the urethra. No needles are used.   After the anesthetic takes effect, a well-lubricated cystoscope is inserted into your urethra and slowly advanced into your bladder. If your urethra has a spot that is too narrow to allow the scope to pass, other smaller instruments are inserted first to gradually enlarge the opening.  After the cystoscope is inside your bladder, sterile water runs  through the scope to help expand your bladder and to create a clear view. Tiny instruments may be inserted through the scope to collect tissue samples for a biopsy if necessary; the tissue samples are sent to the laboratory for analysis.  Cautery may be used if bleeding occurs from a specimen site.  The cystoscope is usually in your bladder for only 1-2 minutes. But the entire test with preparation may take up to 20 minutes or longer.  You will be able to get up immediately following the procedure and proceed with normal daily activities.   After the test  You may need to urinate frequently. You may experience some burning during urination for a day or two. Drink lots of fluids to help minimize the burning and help prevent a urinary tract infection. You may also see a tinge of blood in the urine for 2-3 days. Some patients experience pain for a few days afterwards.  This is normal.  If the pain is intolerable please contact our office or go to the nearest Emergency Room/Immediate Care.   You will be given an instruction sheet following your cystoscopy with post procedure instructions.   Results  Your doctor may be able to talk to you about the results during the cystoscopy. If a biopsy is preformed, the results usually take several days to become available. You will be called promptly with results.

## 2025-01-13 NOTE — CONSULTS
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      Comes from [facility]     Recently placed on eliquis after [place] for bilateral LE DVT starting ~24.  On aspirin - hx of NSTEMI s/p SAMARIA      Presents to ED with dark stools and low hgb.     Underwent EGD with biopsy, clipping, and ablation of lesion 25    Urine culture 25: >100K CFU/mL E. Coli    Previous urine culture results from  to current  23: 50-100K CFU/mL mixed urogenital mehul, >100K CFU/mL candida glabrata  23: 10-50K CFU/mL Candida glabrata  1/15/23: no growth  3/11/23: >100K CFU/mL E. coli  3/29/23: >100K CFU/mL E. Coli  23: >100K CFU/mL E. coli  10/28/24: 50-99K CFU/mL E. Coli  24: no growth  24: 10-50K CFU/mL multiple species present - probable contamination    Abdominal/pelvic CT scan 1/10/25:  KIDNEYS:  No hydronephrosis or nephrolithiasis. Slight circumferential wall thickening of the urinary bladder.  Recommend clinical correlation to exclude cystitis.     Daughter,  at bedside.    Daughter reports her clothing has been wet - not getting to bathroom enough at facility.       Has incontinence    Not consistent with cranberry tablets - just got this recently    Seen previously by Dr. Sr for recurrent UTI, incomplete bladder emptying  Last office visit 19  Previously on methenamine hippurate - discontinued to avoid polypharmacy  Previously on abx  prophlactic  Previously on estrace cream years ago  History of CDIFF    Office cystoscopy 1/20/12: negative    Retropubic urethral suspension 1983    History:  Past Medical History:    ANXIETY    Anxiety    Arrhythmia    pt unaware    BACK PAIN    LS & C-SPINE DZ W/ PAIN    Chronic rhinitis    Depression    Encephalopathy acute    Esophageal reflux    GERD    W/ PHAN's    High blood pressure    High cholesterol    Hypercholesterolemia    HYPERLIPIDEMIA    Hypertension    Irritable bowel syndrome with constipation    Kidney disease    frequent infections; pt states frequent UTIs    Late onset Alzheimer's disease without behavioral disturbance (HCC)    Migraines    NSTEMI (non-ST elevated myocardial infarction) (HCC)    Ophthalmic migraine    DANNIE (obstructive sleep apnea)    AHI 11 RDI 13 REM AHI 0 Supine AHI 11 non-supine AHI 0 Sao2 Ye 83%     OSTEOPOROSIS    Other and unspecified hyperlipidemia    OTHER DISEASES    PSVT, pt unaware    OTHER DISEASES    Barrott's Esoghagus    Reflux    barretts    Sleep apnea    wears a mouth gaurd    UTI (lower urinary tract infection)    no UTI currently as of 5/5/16     Past Surgical History:   Procedure Laterality Date    Benign biopsy left  ?    stereotactic core biopsy    Benign biopsy right  ?    stereotactic core biopsy    Colonoscopy  11/01/2008    RECHECK 5 YRS    Colonoscopy N/A 07/19/2021    Procedure: COLONOSCOPY;  Surgeon: Manish Rodriguez MD;  Location: [facility] ENDOSCOPY    Colonoscopy,biopsy  01/08/2014    Procedure: ESOPHAGOGASTRODUODENOSCOPY, COLONOSCOPY, POSSIBLE BIOPSY, POSSIBLE POLYPECTOMY [id number];  Surgeon: Manish Rodriguez MD;  [place] [facility]    Hysterectomy      Optx dstl radl x-artic fx/epiphysl sep Left 10/06/2014    Procedure: OPEN REDUCTION INTERNAL FIXATION ARM;  Surgeon: Augustina Banuelos MD;  Location: [facility]    Other surgical history      URETHRAL SUSPENSION    Other surgical history      STEROTACTIC BX's BOTH BREASTS    Other  surgical history  05/01/2008    EGD = PHAN'S    Other surgical history N/A 05/17/2016    Procedure: ENDOSCOPIC ULTRASOUND (EUS);  Surgeon: Glenn Chou MD;  Location:  ENDOSCOPY    Patient documented not to have experienced any of the following events  01/08/2014    Procedure: ESOPHAGOGASTRODUODENOSCOPY, COLONOSCOPY, POSSIBLE BIOPSY, POSSIBLE POLYPECTOMY [id number];  Surgeon: Manish Rodriguez MD;  Location: [facility]    Patient documented not to have experienced any of the following events Left 10/06/2014    Procedure: OPEN REDUCTION INTERNAL FIXATION ARM;  Surgeon: Augustina Banuelos MD;  Location: [facility]    Patient with preoperative order for iv antibiotic surgical site infect Left 10/06/2014    Procedure: OPEN REDUCTION INTERNAL FIXATION ARM;  Surgeon: Augustina Banuelos MD;  Location: [facility]    Patient withough preoperative order for iv antibiotic surgical site infection prophylaxis.  01/08/2014    Procedure: ESOPHAGOGASTRODUODENOSCOPY, COLONOSCOPY, POSSIBLE BIOPSY, POSSIBLE POLYPECTOMY [id number];  Surgeon: Manish Rodriguez MD;  Location: [facility]    Upper gi endoscopy,biopsy  01/08/2014    Procedure: ESOPHAGOGASTRODUODENOSCOPY, COLONOSCOPY, POSSIBLE BIOPSY, POSSIBLE POLYPECTOMY [id number];  Surgeon: Manish Rodriguez MD;  Location: [facility]    Vena cava filter       Family History   Problem Relation Age of Onset    Heart Disorder Father     Other (Other) Mother         ENCEPHALITIS    Hypertension Brother     Diabetes Brother       reports that she quit smoking about 46 years ago. Her smoking use included cigarettes. She started smoking about 61 years ago. She has a 15 pack-year smoking history. She has never used smokeless tobacco. She reports that she does not currently use alcohol. She reports that she does not use drugs.    Allergies:  Allergies[1]    Medications:    Current Facility-Administered Medications:     ceFAZolin (Ancef) 1 g in dextrose  5% 100mL IVPB-ADD, 1 g, Intravenous, Q8H    lactated ringers infusion, , Intravenous, Continuous    acetaminophen (Tylenol Extra Strength) tab 500 mg, 500 mg, Oral, Q4H PRN    atorvastatin (Lipitor) tab 40 mg, 40 mg, Oral, Nightly    busPIRone (Buspar) tab 15 mg, 15 mg, Oral, BID    cholecalciferol (Vitamin D3) tab 4,000 Units, 4,000 Units, Oral, Daily    DULoxetine (Cymbalta) DR cap 60 mg, 60 mg, Oral, BID    ferrous sulfate DR tab 325 mg, 325 mg, Oral, Daily with breakfast    memantine (Namenda) tab 10 mg, 10 mg, Oral, BID    rivastigmine (Exelon) 13.3 MG/24HR patch 1 patch, 1 patch, Transdermal, Daily    cyanocobalamin (Vitamin B12) tab 1,000 mcg, 1,000 mcg, Oral, Daily    sodium chloride 0.9% infusion, , Intravenous, Continuous    acetaminophen (Tylenol Extra Strength) tab 500 mg, 500 mg, Oral, Q4H PRN    ondansetron (Zofran) 4 MG/2ML injection 4 mg, 4 mg, Intravenous, Q6H PRN    metoclopramide (Reglan) 5 mg/mL injection 5 mg, 5 mg, Intravenous, Q8H PRN    influenza virus trivalent high dose PF (Fluzone HD) 0.5 mL injection (ages >/= 65 years) 0.5 mL, 0.5 mL, Intramuscular, Prior to discharge    potassium chloride (Klor-Con M10) tab 10 mEq, 10 mEq, Oral, Daily    insulin aspart (NovoLOG) 100 Units/mL FlexPen 1-5 Units, 1-5 Units, Subcutaneous, TID AC and HS    sodium chloride 0.9% infusion, , Intravenous, Once    pantoprazole (Protonix) 80 mg in sodium chloride 0.9% 100 mL infusion, 8 mg/hr, Intravenous, Continuous    Review of Systems:  Pertinent items are noted in HPI.    Physical Exam:  /73 (BP Location: Left arm)   Pulse 89   Temp 98.7 °F (37.1 °C) (Axillary)   Resp 20   Wt 175 lb (79.4 kg)   SpO2 96%   BMI 32.01 kg/m²   GENERAL: patient resting in bed in no acute distress.    HEENT: Unremarkable.  NECK: Supple.  LUNGS: non-labored respirations.   ABDOMEN: The abdomen is soft and nontender without rebound or guarding.     BACK: Without CVA tenderness.       Laboratory Data:  Lab Results    Component Value Date    HGB 8.2 01/13/2025    K 3.6 01/13/2025    PGLU 110 01/13/2025       Hospital Encounter on 01/10/25   1. Urine Culture, Routine     Status: Abnormal    Collection Time: 01/11/25  1:53 PM    Specimen: Urine, clean catch   Result Value Ref Range    Urine Culture >100,000 CFU/ML Escherichia coli (A) N/A       Susceptibility    Escherichia coli -  (no method available)     Ampicillin 8 Sensitive      Cefazolin <=4 Sensitive      Ciprofloxacin <=0.25 Sensitive      Gentamicin <=1 Sensitive      Meropenem <=0.25 Sensitive      Levofloxacin <=0.12 Sensitive      Nitrofurantoin <=16 Sensitive      Piperacillin + Tazobactam <=4 Sensitive      Trimethoprim/Sulfa <=20 Sensitive       BUN/creat 1/12/25: 9/0.76    Imaging:   Abdominal/pelvic CT scan without contrast 1/10/25:  FINDINGS:    LUNG BASE:  Atelectasis.  LIVER:  Unremarkable.  BILIARY:  No biliary ductal dilatation.  PANCREAS:  Unremarkable.  SPLEEN:  Normal caliber.  KIDNEYS:  No hydronephrosis or nephrolithiasis.  ADRENALS:  Normal.  AORTA/VASCULAR:  No aneurysm.  RETROPERITONEUM:  No enlarged adenopathy.  BOWEL/MESENTERY:  There is wall thickening of the stomach which is incompletely distended.  This is nonspecific.  Recommend clinical correlation to exclude gastritis versus infiltrative process.  Large amount of stool in the right side of the colon and  rectosigmoid colon.  There is colonic diverticulosis.  No evidence of dilated bowel loops.  ABDOMINAL WALL:  Small fat containing umbilical hernia..  PELVIC ORGANS:  There is a 2.9 x 2.3 cm cystic structure arising from the right ovary which likely represents an ovarian cystic remnant.  Recommend ultrasound follow-up to assess for stability.  Slight circumferential wall thickening of the urinary  bladder.  Recommend clinical correlation to exclude cystitis.    BONES:  Postsurgical changes right hip.  Mild degenerative changes in the lower lumbar facets.                    Impression    CONCLUSION:  Diffuse wall thickening of the stomach.  Recommend clinical correlation to exclude gastric infiltrative process or gastritis.     Large amount of stool in the right side of the colon and rectosigmoid colon.  No evidence of bowel obstruction.     Colonic diverticulosis.     Wall thickening of the urinary bladder is nonspecific.  Recommend clinical correlation to exclude cystitis.     2.9 x 2.3 cm cystic structure in the right adnexa likely ovarian in origin.  This is most suggestive of an ovarian cystic remnant.  Recommend sonographic follow-up.     Impression:  Patient Active Problem List   Diagnosis    Pure hypercholesterolemia    Other abnormal glucose    Esophageal reflux    Dysthymic disorder    Encephalopathy acute    Osteoporosis    Inguinal hernia    Edema    Vitamin D deficiency    Recurrent UTI    Dry eye syndrome    Essential hypertension    Paroxysmal supraventricular tachycardia (HCC)    Irritable bowel syndrome with constipation    Lumbar spinal stenosis    B12 deficiency    DANNIE (obstructive sleep apnea)    Sicca, unspecified type ([facility])    Thrombocytopenia ([facility])    Iron deficiency anemia, unspecified iron deficiency anemia type    Huerta's esophagus determined by endoscopy    Late onset Alzheimer's disease without behavioral disturbance ([facility])    Close contact within last 14 days with patient with respiratory symptoms    Routine general medical examination at a health care facility    Chronic right-sided low back pain with right-sided sciatica    Prediabetes    Well controlled diabetes mellitus ([facility])    NSTEMI (non-ST elevated myocardial infarction) ([facility])    Bradyarrhythmia    Weakness generalized    Closed displaced intertrochanteric fracture of right femur, initial encounter ([facility])    Elevated troponin    Anemia, unspecified type    Syncope and collapse    Urinary tract infection without hematuria, site unspecified    Acute cystitis without hematuria    Diarrhea, unspecified type     Weakness    Melena    Syncope, near    Sedated due to medication    Dementia, unspecified dementia severity, unspecified dementia type, unspecified whether behavioral, psychotic, or mood disturbance or anxiety (HCC)    Urinary tract infection with hematuria, site unspecified    AMS (altered mental status)    DK (acute kidney injury) (HCC)    Gastrointestinal hemorrhage, unspecified gastrointestinal hemorrhage type    Anemia       RECURRENT UTI  Afebrile  Urine culture 1/11/25: >100K CFU/mL E. Coli  Abdominal/pelvic CT scan without contrast 1/10/25: KIDNEYS:  No hydronephrosis or nephrolithiasis. Slight circumferential wall thickening of the urinary   bladder.  Recommend clinical correlation to exclude cystitis.     Recommendations:  -Abx per attending  -Follow labs, temp  -UTI prevention reviewed with family - information provided in AVS  -Check PVR bladder scan to ensure patient emptying her bladder.  -Timed voiding, double voiding  - hygiene  -Avoid constipation - large amount of stool right side of colon and rectosigmoid colon on CT 1/10/25.   -Hydration - has fluid restriction. Family informed to stick within parameters of fluid restriction  -Defer estrace cream in light of clot history  -Consideration for office cystoscopy  -Urology follow-up after discharge    Will follow peripherally.    Above discussed with nurse, family, hospitalist.  Will update Dr. Veras.      Thank you for allowing me to participate in the care of your patient.    Mattie Juarez PA-C  [facility]  Department of Urology  1/13/2025  11:57 AM         [1]   Allergies  Allergen Reactions    Ciprofloxacin UNKNOWN     Burning of espohagus and upset stomach form cipro    Penicillins HIVES and UNKNOWN    Radiology Contrast Iodinated Dyes RASH     Diffuse erythema post study  -- Occurred in 1980's?,    Amoxil UNKNOWN     Cant remember    Biaxin [Clarithromycin]      Cant remember    Cefuroxime Axetil      Cant remember    Doxycycline  Calcium      Cant remember    Sulfa Antibiotics HIVES

## 2025-01-14 VITALS
WEIGHT: 175 LBS | SYSTOLIC BLOOD PRESSURE: 136 MMHG | TEMPERATURE: 99 F | HEART RATE: 67 BPM | OXYGEN SATURATION: 100 % | DIASTOLIC BLOOD PRESSURE: 60 MMHG | RESPIRATION RATE: 20 BRPM | BODY MASS INDEX: 32 KG/M2

## 2025-01-14 LAB
BASOPHILS # BLD AUTO: 0.02 X10(3) UL (ref 0–0.2)
BASOPHILS NFR BLD AUTO: 0.6 %
D DIMER PPP FEU-MCNC: 1.85 UG/ML FEU (ref ?–0.86)
EOSINOPHIL # BLD AUTO: 0.25 X10(3) UL (ref 0–0.7)
EOSINOPHIL NFR BLD AUTO: 7.2 %
ERYTHROCYTE [DISTWIDTH] IN BLOOD BY AUTOMATED COUNT: 21.2 %
FOLATE SERPL-MCNC: 13.6 NG/ML (ref 5.4–?)
GLUCOSE BLD-MCNC: 117 MG/DL (ref 70–99)
GLUCOSE BLD-MCNC: 119 MG/DL (ref 70–99)
GLUCOSE BLD-MCNC: 123 MG/DL (ref 70–99)
HCT VFR BLD AUTO: 27.3 %
HGB BLD-MCNC: 8.3 G/DL
HGB BLD-MCNC: 8.5 G/DL
HGB BLD-MCNC: 8.5 G/DL
IMM GRANULOCYTES # BLD AUTO: 0.02 X10(3) UL (ref 0–1)
IMM GRANULOCYTES NFR BLD: 0.6 %
IRON SATN MFR SERPL: 7 %
IRON SERPL-MCNC: 19 UG/DL
LYMPHOCYTES # BLD AUTO: 0.83 X10(3) UL (ref 1–4)
LYMPHOCYTES NFR BLD AUTO: 23.9 %
MCH RBC QN AUTO: 27.7 PG (ref 26–34)
MCHC RBC AUTO-ENTMCNC: 31.1 G/DL (ref 31–37)
MCV RBC AUTO: 88.9 FL
MONOCYTES # BLD AUTO: 0.37 X10(3) UL (ref 0.1–1)
MONOCYTES NFR BLD AUTO: 10.6 %
NEUTROPHILS # BLD AUTO: 1.99 X10 (3) UL (ref 1.5–7.7)
NEUTROPHILS # BLD AUTO: 1.99 X10(3) UL (ref 1.5–7.7)
NEUTROPHILS NFR BLD AUTO: 57.1 %
PLATELET # BLD AUTO: 166 10(3)UL (ref 150–450)
RBC # BLD AUTO: 3.07 X10(6)UL
TOTAL IRON BINDING CAPACITY: 266 UG/DL (ref 250–425)
TRANSFERRIN SERPL-MCNC: 199 MG/DL (ref 250–380)
VIT B12 SERPL-MCNC: 1062 PG/ML (ref 211–911)
WBC # BLD AUTO: 3.5 X10(3) UL (ref 4–11)

## 2025-01-14 PROCEDURE — 99233 SBSQ HOSP IP/OBS HIGH 50: CPT | Performed by: INTERNAL MEDICINE

## 2025-01-14 RX ORDER — PANTOPRAZOLE SODIUM 40 MG/1
40 TABLET, DELAYED RELEASE ORAL
Qty: 60 TABLET | Refills: 1 | Status: SHIPPED | OUTPATIENT
Start: 2025-01-14

## 2025-01-14 RX ORDER — ENOXAPARIN SODIUM 100 MG/ML
80 INJECTION SUBCUTANEOUS EVERY 12 HOURS SCHEDULED
Qty: 100 ML | Refills: 1 | Status: SHIPPED | OUTPATIENT
Start: 2025-01-14

## 2025-01-14 RX ORDER — NITROFURANTOIN 25; 75 MG/1; MG/1
100 CAPSULE ORAL 2 TIMES DAILY
Qty: 8 CAPSULE | Refills: 0 | Status: SHIPPED | OUTPATIENT
Start: 2025-01-14 | End: 2025-01-18

## 2025-01-14 RX ORDER — ENOXAPARIN SODIUM 100 MG/ML
80 INJECTION SUBCUTANEOUS EVERY 12 HOURS SCHEDULED
Status: DISCONTINUED | OUTPATIENT
Start: 2025-01-14 | End: 2025-01-14

## 2025-01-14 RX ORDER — PANTOPRAZOLE SODIUM 40 MG/1
40 TABLET, DELAYED RELEASE ORAL
Status: DISCONTINUED | OUTPATIENT
Start: 2025-01-14 | End: 2025-01-14

## 2025-01-14 NOTE — PROGRESS NOTES
[facility]   part of [facility] Hospitalist Progress Note     Isabella Addison Patient Status:  Inpatient    1940 MRN [id number]   Location [facility] 3SW-A Attending Primitivo Lawrence MD   Hosp Day # 3 PCP Jorge Lr MD     Chief Complaint:   Gi bleeding    Subjective:     No issues overnight  Sp EGD yest >> mild gastritis, 3 AVMs in duodenum sp APC and clipping  Afebrile  Hgb 8.5 this am, stable for 48 hrs  Family at bedside      Objective:    Review of Systems:   10 point ROS completed and was negative, except for pertinent positive and negatives stated in subjective.    Vital signs:  Temp:  [97.6 °F (36.4 °C)-98.8 °F (37.1 °C)] 98.6 °F (37 °C)  Pulse:  [67-95] 67  Resp:  [15-20] 20  BP: (127-159)/(66-84) 159/67  SpO2:  [93 %-100 %] 100 %    Physical Exam:    General: No acute distress.   HEENT:  EOMI, PERRLA, OP clear  Respiratory: Clear to auscultation bilaterally. No wheezes. No rhonchi.  Cardiovascular: S1, S2. Regular rate and rhythm. No murmurs.  Abdomen: Soft, nontender, nondistended.  Positive bowel sounds. No rebound or guarding.  Extremities: No edema.  Neuro:  Grossly non focal, no motor deficits.        Diagnostic Data:    Labs:  Recent Labs   Lab 01/10/25  1850 25  0159 25  0831 25  1653 25  0845 25  1607 25  0514 25  0757 25  1644 25  0439   WBC 6.0  --  4.0  --  3.5*  --   --   --   --  3.5*   HGB 6.5*   < > 8.0*   < > 8.3*  8.2* 8.3* 8.0* 8.2* 8.8* 8.5*  8.5*   MCV 93.9  --  87.5  --  89.4  --   --   --   --  88.9   .0  --  172.0  --  174.0  --   --   --   --  166.0   INR 1.21*  --   --   --   --   --   --   --   --   --     < > = values in this interval not displayed.       Recent Labs   Lab 01/10/25  1850 25  0831 25  0845 25  0514   * 108* 99  --    BUN 27* 20 9  --    CREATSERUM 1.21* 0.91 0.76  --    CA 9.3 9.0 8.9  --    ALB 4.1  --   --   --      140 142  --    K 3.5 3.3* 3.2*  3.2* 3.6    106 111  --    CO2 30.0 30.0 26.0  --    ALKPHO 71  --   --   --    AST 17  --   --   --    ALT 11  --   --   --    BILT 0.3  --   --   --    TP 6.7  --   --   --        Estimated Creatinine Clearance: 42 mL/min (based on SCr of 0.76 mg/dL).    Recent Labs   Lab 01/10/25  1850   PTP 15.5*   INR 1.21*            COVID-19 Lab Results    COVID-19  Lab Results   Component Value Date    COVID19 Not Detected 01/10/2025    COVID19 Not Detected 11/25/2024    COVID19 Not Detected 11/23/2024       Pro-Calcitonin  No results for input(s): \"PCT\" in the last 168 hours.    Cardiac  No results for input(s): \"TROP\", \"PBNP\" in the last 168 hours.    Creatinine Kinase  No results for input(s): \"CK\" in the last 168 hours.    Inflammatory Markers  Recent Labs   Lab 01/13/25  0514 01/14/25  0439   ERICH 30*  --    DDIMER  --  1.85*       Imaging: Imaging data reviewed in Epic.    Medications:    pantoprazole  40 mg Oral BID AC    enoxaparin  80 mg Subcutaneous 2 times per day    ceFAZolin  1 g Intravenous Q8H    atorvastatin  40 mg Oral Nightly    busPIRone  15 mg Oral BID    cholecalciferol  4,000 Units Oral Daily    DULoxetine  60 mg Oral BID    ferrous sulfate  325 mg Oral Daily with breakfast    memantine  10 mg Oral BID    rivastigmine  1 patch Transdermal Daily    cyanocobalamin  1,000 mcg Oral Daily    potassium chloride  10 mEq Oral Daily    insulin aspart  1-5 Units Subcutaneous TID AC and HS    sodium chloride   Intravenous Once       Assessment & Plan:    Isabella Addison is a 84 year old female with PMH sig for depression, anxiety, GERD, barretts, 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     GI bleeding - likely upper  Hx of AVM  GERD  Barretts  -pt has hx of AVM sp ablation in 03/2023  -CT shows thickening of stomach  -no overt bleeding  -sp EGD which showed mild gastritis and 3 AVMs in duodenum sp APC  and clipping  -was on eliquis, asa - those on hold >> eliquis resumed per GI, signed off  -PPI drip >> protonix po BID  -serial hgb 8.5 this am  -Oncology consult requested >> recs stopping asa while she's on AC, lovenox 40mg BID therapeutic doses  -US BLE to assess clot burden  -cardiology consulted > ok with stopping ASA    UTI  -UA appears infected  -urine cx > 100k ecoli, pan sens  -on IV ancef >> will transition to po macrobid based on sensitivites and abx allergy profile  -should complete total of 7 days abx  -per daughter - UA was checked at facility and pt was supposed to have started an abx  -on outpt chart review cannot find that any abx was started, cannot find urine cx results  -Urology consult req >> shayla ARIAS     Hypokalemia  -replaced  -monitor     HTN  DL  CAD  -resume home meds  -hold eliquis     Hx of DVT sp IVC filter  -on eliquis  -holding in light of low hgb     DANNIE  -cpap at night     Alzheimers dementia  -resume home meds     Depression  Anxiety  -resume home meds           Quality:  DVT Prophylaxis: eliquis - on hold, scds  CODE status: full code  Goldstein: no  If COVID testing is negative, may discontinue isolation: yes      Plan of care discussed with  and daughter seperately, all questions answered.     DISPO:  DC planning in process  Dc back today  ONC, URO, Cards following  GI signed off        [facility] Hospitalist  Pager [phone]  Answering Service number: [phone]

## 2025-01-14 NOTE — CM/SW NOTE
Prior Authorization - Destination  Destination Type: Skilled nursing facility  Service Provider: [facility]  Payer Communication Destination Comments: Milana charles [id number]  Prior Authorization Status: Approved  Prior Authorization Start Date: 01/14/25 1/14 TO 1/21.      Carey Javier, DSC

## 2025-01-14 NOTE — CONSULTS
[facility] Consultation    Isabella Addison Patient Status:  Inpatient    1940 MRN [id number]   Location [facility] 3SW-A Attending Primitivo Lawrence MD   Hosp Day # 3 PCP Jorge Lr MD     Reason for Consultation:  aspirin mgt      History of Present Illness:  Isabella Addison is a a(n) 84 year old female. She sees Dr. Gallegos for CAD, S/p NSTEMI and PCI LAD 2022. Last LVEF per echo, 2024: 55-60%, .  Uses statin, asa; ICM/HFrEF:TTE 2022 with EF 30-35%, apical akinesis; repeat  2024 with EF 55-60%%, mod AI. 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 She presented to [facility], 1/10/25 with dark stools and Hgb 6.5. transfused 2 u PRBC. . EGD 25: small schatzki's ring above a small hiatal hernia.  Mild gastritis.  3 amvs (2-4 mm) in duodenum, no bleeding, s/p APC and clipping. Apparently patient was recently placed on Eliquis 5 mg PO BID at her facility [place], for bilateral LE DVT starting ~24.     History:  Past Medical History:    ANXIETY    Anxiety    Arrhythmia    pt unaware    BACK PAIN    LS & C-SPINE DZ W/ PAIN    Chronic rhinitis    Depression    Encephalopathy acute    Esophageal reflux    GERD    W/ PHAN's    High blood pressure    High cholesterol    Hypercholesterolemia    HYPERLIPIDEMIA    Hypertension    Irritable bowel syndrome with constipation    Kidney disease    frequent infections; pt states frequent UTIs    Late onset Alzheimer's disease without behavioral disturbance (HCC)    Migraines    NSTEMI (non-ST elevated myocardial infarction) (HCC)    Ophthalmic migraine    DANNIE (obstructive sleep apnea)    AHI 11 RDI 13 REM AHI 0 Supine AHI 11 non-supine AHI 0 Sao2 Ye 83%     OSTEOPOROSIS    Other and unspecified hyperlipidemia    OTHER DISEASES    PSVT, pt unaware    OTHER DISEASES    Barrott's Esoghagus    Reflux     barretts    Sleep apnea    wears a mouth gaurd    UTI (lower urinary tract infection)    no UTI currently as of 5/5/16     Past Surgical History:   Procedure Laterality Date    Benign biopsy left  ?    stereotactic core biopsy    Benign biopsy right  ?    stereotactic core biopsy    Colonoscopy  11/01/2008    RECHECK 5 YRS    Colonoscopy N/A 07/19/2021    Procedure: COLONOSCOPY;  Surgeon: Manish Rodriguez MD;  Location: [facility] ENDOSCOPY    Colonoscopy,biopsy  01/08/2014    Procedure: ESOPHAGOGASTRODUODENOSCOPY, COLONOSCOPY, POSSIBLE BIOPSY, POSSIBLE POLYPECTOMY 53299,20738;  Surgeon: Manish Rodriguez MD;  Location: [facility]    Hysterectomy      Optx dstl radl x-artic fx/epiphysl sep Left 10/06/2014    Procedure: OPEN REDUCTION INTERNAL FIXATION ARM;  Surgeon: Augustina Banuelos MD;  Location: [facility]    Other surgical history      URETHRAL SUSPENSION    Other surgical history      STEROTACTIC BX's BOTH BREASTS    Other surgical history  05/01/2008    EGD = PHAN'S    Other surgical history N/A 05/17/2016    Procedure: ENDOSCOPIC ULTRASOUND (EUS);  Surgeon: Glenn Chou MD;  Location: [facility]    Patient documented not to have experienced any of the following events  01/08/2014    Procedure: ESOPHAGOGASTRODUODENOSCOPY, COLONOSCOPY, POSSIBLE BIOPSY, POSSIBLE POLYPECTOMY [id number];  Surgeon: Manish Rodriguez MD;  Location: [facility]    Patient documented not to have experienced any of the following events Left 10/06/2014    Procedure: OPEN REDUCTION INTERNAL FIXATION ARM;  Surgeon: Augustina Banuelos MD;  Location: [facility]    Patient with preoperative order for iv antibiotic surgical site infect Left 10/06/2014    Procedure: OPEN REDUCTION INTERNAL FIXATION ARM;  Surgeon: Augustina Banuelos MD;  Location: [facility]    Patient withough preoperative order for iv antibiotic surgical site infection prophylaxis.  01/08/2014    Procedure: ESOPHAGOGASTRODUODENOSCOPY, COLONOSCOPY,  POSSIBLE BIOPSY, POSSIBLE POLYPECTOMY [id number];  Surgeon: Manish Rdoriguez MD;  Location: [facility]    Upper gi endoscopy,biopsy  01/08/2014    Procedure: ESOPHAGOGASTRODUODENOSCOPY, COLONOSCOPY, POSSIBLE BIOPSY, POSSIBLE POLYPECTOMY 32797,75756;  Surgeon: Manish Rodriguez MD;  Location: [facility]    Vena cava filter       Family History   Problem Relation Age of Onset    Heart Disorder Father     Other (Other) Mother         ENCEPHALITIS    Hypertension Brother     Diabetes Brother          Allergies:  Allergies[1]    Medications:   pantoprazole  40 mg Oral BID AC    enoxaparin  80 mg Subcutaneous 2 times per day    ceFAZolin  1 g Intravenous Q8H    atorvastatin  40 mg Oral Nightly    busPIRone  15 mg Oral BID    cholecalciferol  4,000 Units Oral Daily    DULoxetine  60 mg Oral BID    ferrous sulfate  325 mg Oral Daily with breakfast    memantine  10 mg Oral BID    rivastigmine  1 patch Transdermal Daily    cyanocobalamin  1,000 mcg Oral Daily    potassium chloride  10 mEq Oral Daily    insulin aspart  1-5 Units Subcutaneous TID AC and HS    sodium chloride   Intravenous Once       Continuous Infusions:   lactated ringers      sodium chloride Stopped (01/11/25 1400)       Social History:   reports that she quit smoking about 46 years ago. Her smoking use included cigarettes. She started smoking about 61 years ago. She has a 15 pack-year smoking history. She has never used smokeless tobacco. She reports that she does not currently use alcohol. She reports that she does not use drugs.    Review of Systems:  All systems were reviewed and are negative except as described above in HPI.    Physical Exam:      Wt Readings from Last 3 Encounters:   01/11/25 175 lb (79.4 kg)   11/23/24 153 lb 10.6 oz (69.7 kg)   10/28/24 153 lb 10.6 oz (69.7 kg)       Vitals:    01/13/25 2100 01/14/25 0030 01/14/25 0529 01/14/25 0756   BP: 127/66 141/67 159/72 159/67   BP Location: Left arm Left arm Left arm Left  arm   Pulse: 87 79 73 67   Resp: 16 16 16 20   Temp: 98.1 °F (36.7 °C) 98.1 °F (36.7 °C) 97.9 °F (36.6 °C) 98.6 °F (37 °C)   TempSrc: Oral Oral Oral Oral   SpO2: 94% 93% 94% 100%   Weight:           Temp:  [97.6 °F (36.4 °C)-98.8 °F (37.1 °C)] 98.6 °F (37 °C)  Pulse:  [67-95] 67  Resp:  [15-20] 20  BP: (127-159)/(66-84) 159/67  SpO2:  [93 %-100 %] 100 %    Temp: 98.6 °F (37 °C)  Pulse: 67  Resp: 20  BP: 159/67      General:  Appears comfortable  HEENT: No focal deficits.  Neck: No JVD, carotids 2+ no bruits.  Cardiac: Regular S1S2.  No S3, S4, rub, click.  No murmur.  Lungs: Clear to auscultation and percussion.  Abdomen: Soft, non-tender.   Extremities: No LE edema.  No clubbing or cyanosis  Neurologic:  non-verbal  Skin: Warm and dry.     Labs:      HEM:  Recent Labs   Lab 01/10/25  1850 01/11/25  0159 01/11/25  0831 01/11/25  1653 01/12/25  0018 01/12/25  0845 01/12/25  1607 01/13/25  0514 01/13/25  0757 01/13/25  1644 01/14/25  0439 01/14/25  0744   WBC 6.0  --  4.0  --   --  3.5*  --   --   --   --  3.5*  --    HGB 6.5*   < > 8.0* 8.0*   < > 8.3*  8.2*   < > 8.0* 8.2* 8.8* 8.5*  8.5* 8.3*   HCT 21.4*  --  25.1* 26.2*  --  27.1*  --   --   --   --  27.3*  --    .0  --  172.0  --   --  174.0  --   --   --   --  166.0  --     < > = values in this interval not displayed.       Chem:  Recent Labs   Lab 01/10/25  1850 01/11/25  0831 01/12/25  0845 01/13/25  0514    140 142  --    K 3.5 3.3* 3.2*  3.2* 3.6    106 111  --    CO2 30.0 30.0 26.0  --    BUN 27* 20 9  --    CREATSERUM 1.21* 0.91 0.76  --    MG  --  2.1  --   --    ALT 11  --   --   --    AST 17  --   --   --    ALB 4.1  --   --   --        Recent Labs   Lab 01/10/25  1850   INR 1.21*                     Lab Results   Component Value Date    TROP 0.598 () 12/25/2019    TROP 0.696 () 12/25/2019    TROP 0.270 () 12/24/2019         Invalid input(s): \"PBNPML\"                 Telemetry:     Laboratories and  Data:  Diagnostics:    EKG, 1/14/2025:      CXR, 1/14/2025:            Impression:    1.  Recurrent GIB's while on eliquis (for DVT) and baby asa (for CAD and stent hx).  She has duodenal AVM's    2. CAD, s/p NSTEMI and PCI LAD 4/2022. Last LVEF per echo, 9/2024: 55-60%    3. Moderate AI    4. PE/DVT, s/p IVC filter 2023. Started on eliquis 10 days ago due to extensive LE DVT's    Recommend:    1.  Stop aspirin given recurring nature of GI bleeds and need for anti-coagulant for extensive DVT's    Will follow on prn basis      Puneet Pratt MD  1/14/2025  8:07 AM         [1]   Allergies  Allergen Reactions    Ciprofloxacin UNKNOWN     Burning of espohagus and upset stomach form cipro    Penicillins HIVES and UNKNOWN    Radiology Contrast Iodinated Dyes RASH     Diffuse erythema post study  -- Occurred in 1980's?,    Amoxil UNKNOWN     Cant remember    Biaxin [Clarithromycin]      Cant remember    Cefuroxime Axetil      Cant remember    Doxycycline Calcium      Cant remember    Sulfa Antibiotics HIVES

## 2025-01-14 NOTE — PAYOR COMM NOTE
--------------  ADMISSION REVIEW     Payor: [employer] MEDICARE ADV PPO  Subscriber #:  [id number]  Authorization Number: [id number]    Admit date: 1/11/25  Admit time:  1:37 AM       REVIEW DOCUMENTATION:     ED Provider Notes        ED Provider Notes signed by Goldy Bonilla MD at 1/10/2025  9:27 PM        Chief Complaint   Patient presents with    GI Bleeding     PT from [place] with a low Hgb. unknown source     Stated Complaint: PT from [place] with a low Hgb.  unknown source    Subjective:   HPI      Patient is a 84-year-old female presents emergency room with a history of low hemoglobin she comes from [facility].  The patient's hemoglobin noted to be around 8 typically.  The patient does take Eliquis but this was been held recently as per patient's  giving history at the bedside.  Patient found to have a hemoglobin less than 7 and was sent to the ER for further evaluation.  The patient denies history of any abdominal pain.  The patient reportedly was scheduled to see a GI doctor, Dr. Rodriguez, next week.  The patient has been able to eat and drink without difficulty as per patient's family giving history at the bedside.      Physical Exam     ED Triage Vitals   BP 01/10/25 1850 118/61   Pulse 01/10/25 1850 103   Resp 01/10/25 1850 14   Temp 01/10/25 1850 97.9 °F (36.6 °C)   Temp src 01/10/25 1850 Oral   SpO2 01/10/25 1850 100 %   O2 Device 01/10/25 1930 None (Room air)       Current Vitals:   Vital Signs  BP: 109/59  Pulse: 79  Resp: 18  Temp: 98.3 °F (36.8 °C)  Temp src: Temporal  MAP (mmHg): 80    Oxygen Therapy  SpO2: 96 %  O2 Device: None (Room air)        Physical Exam  GENERAL: Well-developed, well-nourished female Sitting up breathing easily in no apparent distress.  Patient is nontoxic in appearance.  HEENT: Head is normocephalic, atraumatic. Pupils are 4 mm equally round and reactive to light.  Conjunctivae are somewhat pale oropharynx is clear. Mucous membranes are moist.  NECK: No  stridor.  LUNGS: Clear to auscultation bilaterally with no wheeze. There is good equal air entry bilaterally.  HEART: Regular rate and rhythm. Normal S1, S2 no S3, or S4. No murmur.  ABDOMEN: There is no focal tenderness to palpation appreciated anywhere throughout the abdomen. There is no guarding, no rebound, no mass, and no organomegaly appreciated. There is normoactive bowel sounds.    RECTAL: Exam done with female chaperone at the bedside showed evidence of dark stool which is Hemoccult positive.  There is no gross blood appreciated.  EXTREMITIES: There is no cyanosis, clubbing, however there is 2+ edema appreciated in both lower extremities which is improved per patient's family compared to previous. Pulses are 2+ and equal in all 4 extremities.  NEURO: Patient is awake, alert and oriented to her norm per family at the bedside. Motor strength is 5 over 5 in all 4 extremities. There are no gross motor or sensory deficits appreciated. Cranial nerves II through XII are grossly intact.          ED Course     Labs Reviewed   COMP METABOLIC PANEL (14) - Abnormal; Notable for the following components:       Result Value    Glucose 132 (*)     BUN 27 (*)     Creatinine 1.21 (*)     eGFR-Cr 44 (*)     All other components within normal limits   CBC WITH DIFFERENTIAL WITH PLATELET - Abnormal; Notable for the following components:    RBC 2.28 (*)     HGB 6.5 (*)     HCT 21.4 (*)     MCHC 30.4 (*)     All other components within normal limits   LIPASE - Abnormal; Notable for the following components:    Lipase 72 (*)     All other components within normal limits   PROTHROMBIN TIME (PT) - Abnormal; Notable for the following components:    PT 15.5 (*)     INR 1.21 (*)     All other components within normal limits   PTT, ACTIVATED - Normal   RAPID SARS-COV-2 BY PCR - Normal   TYPE AND SCREEN    Narrative:     The following orders were created for panel order Type and screen.  Procedure                                Abnormality         Status                     ---------                               -----------         ------                     ABORH (Blood Type)[[id number]]                               Final result               Antibody Screen[[id number]]                                  Final result                 Please view results for these tests on the individual orders.   ABORH (BLOOD TYPE)   ANTIBODY SCREEN   PREPARE RBC   RAINBOW DRAW BLUE     CT ABDOMEN+PELVIS(CPT=74176)    Result Date: 1/10/2025  CONCLUSION:  Diffuse wall thickening of the stomach.  Recommend clinical correlation to exclude gastric infiltrative process or gastritis.  Large amount of stool in the right side of the colon and rectosigmoid colon.  No evidence of bowel obstruction.  Colonic diverticulosis.  Wall thickening of the urinary bladder is nonspecific.  Recommend clinical correlation to exclude cystitis.  2.9 x 2.3 cm cystic structure in the right adnexa likely ovarian in origin.  This is most suggestive of an ovarian cystic remnant.  Recommend sonographic follow-up.   LOCATION:  [facility]   Dictated by (CST): Manuela Freeman MD on 1/10/2025 at 8:19 PM     Finalized by (CST): Manuela Freeman MD on 1/10/2025 at 8:25 PM            MDM      21:22 patient resting comfortably in no apparent distress this time.  The patient will be admitted to the hospital at this time.  The patient's family made aware of the patient's findings on CT and the need for admission to the hospital and further workup.    Medical Decision Making  Patient had an IV line established blood work drawn including a CBC, chemistries, BUN/creatinine, and blood sugar showed evidence of a hemoglobin of 6.5 which is low with a previous hemoglobin of 8.3 on 7 December and for which the patient was transfused a unit of packed red blood cells here in the ER.  Patient BUN/creatinine were 27 and 1.21.  Liver function test found to be negative.  INR is found to be 1.21.  COVID test found to be  negative.  Patient placed on continuous pulse ox and cardiac monitor.  Patient with no other new complaints at this time.  The patient was transfused 1 unit of packed red blood cells here in the ER.  Patient's case has been discussed with the duly hospitalist as well as Dr. Lyman from GI who wanted the patient to receive IV Protonix which was given here in the ER as well as given a unit of packed red blood cells which was also given here in the ER.  The patient will be admitted to the hospital for further workup at this time.  Patient's family are in agreement with the plan.    Disposition and Plan     Clinical Impression:  1. Gastrointestinal hemorrhage, unspecified gastrointestinal hemorrhage type    2. 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  Comes from [facility] with hgb less than 7. Pt at baseline has hgb around 8. Was sent to the ER when labs were checked. No abd pain. Pt was scheduled to see GI next week. She does take eliquis but it was being held recently. In the ED routine labs showed INR 1.2, hgb 6.5, creat 1.21. VSS. 1 unit prbc ordered. GI consulted, wanted pt on IV protonix. CT A/P showed diffuse thickening of stomach. The pt was admitted for further care and management. Overnight repeat hgb 6.6.      Past Medical History:  Past Medical History       Past Medical History:    ANXIETY    Anxiety    Arrhythmia     pt unaware    BACK PAIN     LS & C-SPINE DZ W/ PAIN    Chronic rhinitis    Depression     Encephalopathy acute    Esophageal reflux    GERD     W/ PHAN's    High blood pressure    High cholesterol    Hypercholesterolemia    HYPERLIPIDEMIA    Hypertension    Irritable bowel syndrome with constipation    Kidney disease     frequent infections; pt states frequent UTIs    Late onset Alzheimer's disease without behavioral disturbance (HCC)    Migraines    NSTEMI (non-ST elevated myocardial infarction) (HCC)    Ophthalmic migraine    DANNIE (obstructive sleep apnea)     AHI 11 RDI 13 REM AHI 0 Supine AHI 11 non-supine AHI 0 Sao2 Ye 83%     OSTEOPOROSIS    Other and unspecified hyperlipidemia    OTHER DISEASES     PSVT, pt unaware    OTHER DISEASES     Barrott's Esoghagus    Reflux     barretts    Sleep apnea     wears a mouth gaurd    UTI (lower urinary tract infection)     no UTI currently as of 5/5/16            Past Surgical History:   Past Surgical History         Past Surgical History:   Procedure Laterality Date    Benign biopsy left   ?     stereotactic core biopsy    Benign biopsy right   ?     stereotactic core biopsy    Colonoscopy   11/01/2008     RECHECK 5 YRS    Colonoscopy N/A 07/19/2021     Procedure: COLONOSCOPY;  Surgeon: Manish Rodriguez MD;  Location: [facility] ENDOSCOPY    Colonoscopy,biopsy   01/08/2014     Procedure: ESOPHAGOGASTRODUODENOSCOPY, COLONOSCOPY, POSSIBLE BIOPSY, POSSIBLE POLYPECTOMY [id number];  Surgeon: Manish Rodriguez MD;  [place] [facility] [place] [facility]    Hysterectomy        Optx dstl radl x-artic fx/epiphysl sep Left 10/06/2014     Procedure: OPEN REDUCTION INTERNAL FIXATION ARM;  Surgeon: Augustina Banuelos MD;  Location: [facility]    Other surgical history         URETHRAL SUSPENSION    Other surgical history         STEROTACTIC BX's BOTH BREASTS    Other surgical history   05/01/2008     EGD = PHAN'S    Other surgical history N/A 05/17/2016     Procedure: ENDOSCOPIC ULTRASOUND (EUS);  Surgeon: Glenn Chou MD;  Location:  ENDOSCOPY    Patient documented not  to have experienced any of the following events   01/08/2014     Procedure: ESOPHAGOGASTRODUODENOSCOPY, COLONOSCOPY, POSSIBLE BIOPSY, POSSIBLE POLYPECTOMY [id number];  Surgeon: Manish Rodriguez MD;  Location: [facility]    Patient documented not to have experienced any of the following events Left 10/06/2014     Procedure: OPEN REDUCTION INTERNAL FIXATION ARM;  Surgeon: Augustina Banuelos MD;  Location: [facility]    Patient with preoperative order for iv antibiotic surgical site infect Left 10/06/2014     Procedure: OPEN REDUCTION INTERNAL FIXATION ARM;  Surgeon: Augustina Banuelos MD;  Location: [facility]    Patient withough preoperative order for iv antibiotic surgical site infection prophylaxis.   01/08/2014     Procedure: ESOPHAGOGASTRODUODENOSCOPY, COLONOSCOPY, POSSIBLE BIOPSY, POSSIBLE POLYPECTOMY 60033,67248;  Surgeon: Manish Rodriguez MD;  Location: [facility]    Upper gi endoscopy,biopsy   01/08/2014     Procedure: ESOPHAGOGASTRODUODENOSCOPY, COLONOSCOPY, POSSIBLE BIOPSY, POSSIBLE POLYPECTOMY 98712,06957;  Surgeon: Manish Rodriguez MD;  Location: [facility]    Vena cava filter             Physical Exam:    /80 (BP Location: Left arm)   Pulse 79   Temp 98.6 °F (37 °C) (Axillary)   Resp 18   Wt 175 lb (79.4 kg)   SpO2 99%   BMI 32.01 kg/m²   General: No acute distress. Alert and oriented x 3.  HEENT: Normocephalic atraumatic. Moist mucous membranes. EOM-I. PERRLA. Anicteric.  Neck: No lymphadenopathy. No JVD. No carotid bruits.  Respiratory: Clear to auscultation bilaterally. No wheezes. No rhonchi.  Cardiovascular: S1, S2. Regular rate and rhythm. No murmurs, rubs or gallops. Equal pulses.   Chest and Back: No tenderness or deformity.  Abdomen: Soft, nontender, nondistended.  Positive bowel sounds. No rebound, guarding or organomegaly.  Neurologic: No focal neurological deficits. CNII-XII grossly intact.  Musculoskeletal: Moves all extremities.  Extremities:  No edema or cyanosis.  Integument: No rashes or lesions.   Psychiatric: Appropriate mood and affect.    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     GI bleeding - likely upper  Hx of AVM  GERD  Barretts  -pt has hx of AVM sp ablation in 03/2023  -CT shows thickening of stomach  -no overt bleeding  -was on eliquis, asa - those on hold  -PPI drip  -serial hgb >> hgb 6.6 >> 6.5 >> sp 2 units prbc >> latest hgb 8.0  -CLD >> NPO at MD  -plan for EGD >> tomorrow  -GI consulted >> apprec recs >> dw Dr Meier     Hypokalemia  -replaced  -monitor     HTN  DL  CAD  -resume home meds  -hold eliquis     Hx of DVT sp IVC filter  -on eliquis  -holding in light of low hgb     DANNIE  -cpap at night     Alzheimers dementia  -resume home meds     Depression  Anxiety  -resume home meds      Quality:  DVT Prophylaxis: eliquis - on hold, scds  CODE status: full code  Goldstein: no  If COVID testing is negative, may discontinue isolation: yes       1/11 consult GI    Reason for Consultation:  Anemia  Abnl ct       Current issues: anemia     Reviewed w/ Dr Hernandez 1/10     Also called and left  for daughter last night and again today     I also spoke w/ pt's  and pt's nurse at [facility]) where pt lives on 1/10     Pt was on eliquis, last dose 4 pm 1/10, 5 mg.  Pt also on omeprazole and daily ASA 81 mg.  No nsaids.     Pt was noted to have low hg on routine check (per [facility])     Per [facility]/, no overt gi bleed, diarrhea, urinary bleeding, other overt gi symptoms, hematuria, or other overt bleeding     CT w/ thickening of the stomach, other findings as well (see report)     Hg 8.8 on 12/2/24, today 6.5 and bun/cr 27/1.21     Heme (+) stool per ER     Pt w/ dementia, can not supply history     Called and spoke again w/  today.        Again  risk of  egd and colonoscopy including prep, age related, sedation, bleeding, perforation, infection, miss rate or issues with accuracy, etc and possible morbidity/mortalty discussed.  We discussed risk, benefit, alternatives, limitations as well as not having the procedures.  All questions answered,  demonstrated understanding.            Patient Active Problem List   Diagnosis    Pure hypercholesterolemia    Other abnormal glucose    Esophageal reflux    Dysthymic disorder    Encephalopathy acute    Osteoporosis    Inguinal hernia    Edema    Vitamin D deficiency    Recurrent UTI    Dry eye syndrome    Essential hypertension    Paroxysmal supraventricular tachycardia ([facility])    Irritable bowel syndrome with constipation    Lumbar spinal stenosis    B12 deficiency    DANNIE (obstructive sleep apnea)    Sicca, unspecified type ([facility])    Thrombocytopenia ([facility])    Iron deficiency anemia, unspecified iron deficiency anemia type    Huerta's esophagus determined by endoscopy    Late onset Alzheimer's disease without behavioral disturbance ([facility])    Close contact within last 14 days with patient with respiratory symptoms    Routine general medical examination at a health care facility    Chronic right-sided low back pain with right-sided sciatica    Prediabetes    Well controlled diabetes mellitus ([facility])    NSTEMI (non-ST elevated myocardial infarction) ([facility])    Bradyarrhythmia    Weakness generalized    Closed displaced intertrochanteric fracture of right femur, initial encounter ([facility])    Elevated troponin    Anemia, unspecified type    Syncope and collapse    Urinary tract infection without hematuria, site unspecified    Acute cystitis without hematuria    Diarrhea, unspecified type    Weakness    Melena    Syncope, near    Sedated due to medication    Dementia, unspecified dementia severity, unspecified dementia type, unspecified whether behavioral, psychotic, or mood disturbance or anxiety ([facility])    Urinary tract  infection with hematuria, site unspecified    AMS (altered mental status)    DK (acute kidney injury) (HCC)    Gastrointestinal hemorrhage, unspecified gastrointestinal hemorrhage type    Anemia          PHYSICAL EXAM   /80 (BP Location: Left arm)   Pulse 79   Temp 98.6 °F (37 °C) (Axillary)   Resp 18   Wt 175 lb (79.4 kg)   SpO2 99%   BMI 32.01 kg/m²      GENERAL: in no apparent distress, elderly appearing  HEENT: normocephalic, mucous membranes moist.  EYES: no scleral icterus  NECK:non tender, supple  RESPIRATORY: clear to percussion and auscultation  CARDIOVASCULAR: reg rate     ABDOMEN: normal, active bowel sounds, no masses, HSM or tenderness, soft, nondistended, no G/R/R   RECTAL: in presence of RN (Anca). Firm brown stool in vault    EXTREMITIES: (+) le edema  NEURO:  Oriented to person only  BACK: no CVA tenderness  PSYCH: difficult to evaluate     ASSESSMENT AND PLAN:           1 abnl ct stomach  2 acute blood loss anemia     No overt bleeding, eliquis was 1/10. On asa/ppi as well.  Ct w/ gastric thickening     Hg after transfusion this morning still pending     Firm stool on MERCED this morning      agreeable to egd, did not want to attempt cscope/prep for now     Multiple options for how to proceed were discussed in detail with pt/family, they are agreeable to the following plan ...  -serial hg  -ppi drip for now  -await post transfusion labs  --clears now, npo mn  -would tentatively plan endoscopy tomorrow for over 24 hrs of eliquis has been held , can re-assess the timing if bleeding starts or clinically changes          1/12 IM    Chief Complaint:   Gi bleeding        Subjective:  No issues overnight  Plan for EGD this morning  Afebrile       Vital signs:  Temp:  [97.8 °F (36.6 °C)-98.6 °F (37 °C)] 97.9 °F (36.6 °C)  Pulse:  [74-90] 78  Resp:  [13-20] 13  BP: (126-145)/(59-76) 143/67  SpO2:  [92 %-100 %] 96 %     Physical Exam:    General: No acute distress.   HEENT:  EOMI, PERRLA,  OP clear  Respiratory: Clear to auscultation bilaterally. No wheezes. No rhonchi.  Cardiovascular: S1, S2. Regular rate and rhythm. No murmurs.  Abdomen: Soft, nontender, nondistended.  Positive bowel sounds. No rebound or guarding.  Extremities: No edema.  Neuro:  Grossly non focal, no motor deficits.       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     GI bleeding - likely upper  Hx of AVM  GERD  Barretts  -pt has hx of AVM sp ablation in 03/2023  -CT shows thickening of stomach  -no overt bleeding  -was on eliquis, asa - those on hold  -PPI drip  -serial hgb >> hgb 6.6 >> 6.5 >> sp 2 units prbc >> latest hgb 8.0  -AM labs pending  -CLD >> NPO  -plan for EGD >> TODAY  -GI consulted >> apprec recs >> dw Dr Meier     UTI  -UA appears infected  -urine cx pending  -starting ancef  -per daughter - UA was checked at facility and pt was supposed to have started an abx  -on outpt chart review cannot find that any abx was started, cannot find urine cx results     Hypokalemia  -replaced  -monitor     HTN  DL  CAD  -resume home meds  -hold eliquis     Hx of DVT sp IVC filter  -on eliquis  -holding in light of low hgb     DANNIE  -cpap at night     Alzheimers dementia  -resume home meds     Depression  Anxiety  -resume home meds           Quality:  DVT Prophylaxis: eliquis - on hold, scds  CODE status: full code  Goldstein: no  If COVID testing is negative, may discontinue isolation: yes      Plan of care discussed with  and daughter seperately, all questions answered.     DISPO:  Cont inpt care  EGD today         1/12 GI       Current complaints: no overt bleeding     Risk endoscopy and eval reviewed w/  today (at bedside)    Objective:     /74 (BP Location: Left arm)   Pulse 80   Temp 97.6 °F (36.4 °C) (Oral)   Resp 15   Wt 175 lb  (79.4 kg)   SpO2 93%   BMI 32.01 kg/m²   General appearance: NAD  Lungs: clear to auscultation bilaterally  Heart: reg rate    Abdomen: soft, non tender/non distended.     Extremities: no le edema  Neurologic: Grossly normal, oriented x 1     1 abnl ct stomach  2 acute blood loss anemia  3 hx of duodenal avm        No overt bleeding, eliquis was 1/10. On asa/ppi as well.  Ct w/ gastric thickening     Hg after transfusion has been stable      --egd shortly  --ppi          1/12 Operative report        ENDOSCOPY OPERATIVE REPORT     Patient Name:                  Isabella Addison  Medical Record #:           [id number]  YOB: 1940  Date of Procedure:          1/12/2025     Preoperative Diagnosis:  abnl CT of stomach.   Acute blood loss anemia.  History of duodenal AVM     Postoperative Diagnosis:  There was a small schatzki's ring above a small hiatal hernia.  Mild gastritis.  3 amvs (2-4 mm) in duodenum, no bleeding, s/p APC and clipping     Procedure Performed:Esophagogastroduodenoscopy with biopsy, clipping, and ablation of lesion                  Anesthesia Given: MAC              Endoscopist:                   Rylan Meier MD           Procedure:   After the patient was interviewed and the procedure again discussed and questions addressed, the patient was brought to the GI Lab and monitoring of the B/P, pulse, and pulse oximetry was performed. The patient was then placed in the left lateral decubitus position and sedated with divided doses of IV medication; continuous vital signs were monitored throughout the procedure.     A time-out procedure was performed, history and physical were reviewed, medical reconciliation was complete, informed consent was obtained.      The videogastroscope was intubated into the esophagus and advanced into the duodenum under directed vision without difficulty. Then withdrawn. A thorough exam was performed.     The patient tolerated the  procedure well.         Findings:        Egd done with and without cap     The esophagus mucosa had an overall normal appearance until distal esophagus. The Z-line occurred at the top of the gastric folds.       The gastric lumen was then entered and views of the gastric mucosa as well as retroverted views of the fundus had a mild gastritis, diffuse. No ulcers or avms.  Biopsies from the prepyloric area, antrum, body, and fundus were taken for histology and for H. Pylori..      A normal pylorus was passed and the duodenal bulb entered, the duodenal bulb and post-bulbar duodenum were obtained.  Three avms (2 mm, 4 mm, 4 mm) noted post bulbar duodenum, none were bleeding, all were ablated with APC and then 1 clip placed across each site.  No bleeding prior/after treatment noted     The procedure was tolerated well and upon completion and throughtout the vital signs were stable.        Specimens:  See above        Condition on discharge from procedure:  good        PLAN:     --may have other AVMs or other lesions that are deep than what can be evaluated on this exam or that may become present on anticoagulation     -- did not want colonoscopy      --given her age, he was interested in monitoring and treatment for anemia.  Would consider more frequent hemoglobin checks to assess for stability when on anticoagulation and possible ongoing/changing iron therapy as well.  Will defer to hospitalist      --would restart eliquis tonight     --okay for clears, adv as tolerated                   1/13 IM    Chief Complaint:   Gi bleeding        Subjective:  No issues overnight  Sp EGD yest >> mild gastritis, 3 AVMs in duodenum sp APC and clipping  Afebrile  Hgb 8.2 this am, stable for 36 hrs  Family at bedside  Daughter requesting ONC and URO assessment    Vital signs:  Temp:  [97.7 °F (36.5 °C)-98.7 °F (37.1 °C)] 98.5 °F (36.9 °C)  Pulse:  [] 85  Resp:  [15-20] 15  BP: (111-179)/(53-86) 155/66  SpO2:  [93 %-100 %]  100 %     Physical Exam:    General: No acute distress.   HEENT:  EOMI, PERRLA, OP clear  Respiratory: Clear to auscultation bilaterally. No wheezes. No rhonchi.  Cardiovascular: S1, S2. Regular rate and rhythm. No murmurs.  Abdomen: Soft, nontender, nondistended.  Positive bowel sounds. No rebound or guarding.  Extremities: No edema.  Neuro:  Grossly non focal, no motor deficits.            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     GI bleeding - likely upper  Hx of AVM  GERD  Barretts  -pt has hx of AVM sp ablation in 03/2023  -CT shows thickening of stomach  -no overt bleeding  -was on eliquis, asa - those on hold >> resume when OK per GI  -PPI drip  -serial hgb >> hgb 6.6 >> 6.5 >> sp 2 units prbc >> latest hgb 8.0  -AM labs pending  -CLD >> NPO  -plan for EGD >> TODAY  -GI consulted >> apprec recs >> shayla Meier  -Oncology consult requested     UTI  -UA appears infected  -urine cx > 100k ecoli, pan sens  -on IV ancef >> will transition to po in DC to complete 7 days  total of abx  -per daughter - UA was checked at facility and pt was supposed to have started an abx  -on outpt chart review cannot find that any abx was started, cannot find urine cx results  -Urology consult req >> shayla ARIAS     Hypokalemia  -replaced  -monitor     HTN  DL  CAD  -resume home meds  -hold eliquis     Hx of DVT sp IVC filter  -on eliquis  -holding in light of low hgb     DANNIE  -cpap at night     Alzheimers dementia  -resume home meds     Depression  Anxiety  -resume home meds           Quality:  DVT Prophylaxis: eliquis - on hold, scds  CODE status: full code  Goldstein: no  If COVID testing is negative, may discontinue isolation: yes      Plan of care discussed with  and daughter seperately, all questions answered.     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      Baseline Hb is around 8 g/dL. Upon admission patient found to have INR 1.2, Hb 6.5 g/dL, Cr 1.21. CT A/P with diffuse thickening of stomach. She received total 2 u PRBC, After which Hb has been consistently 8.0 - 8.2 g/dL.      GI service evaluated patient and performed EGD 1/12/25 which showed small schatzki's ring above a small hiatal hernia.  Mild gastritis.  3 amvs (2-4 mm) in duodenum, no bleeding, s/p APC and clipping. Dr. Meier (GI) spoke to  about procedure explaining she may have other AVM's other lesions deeper than what can be evaluated on this exam or that may become present on anticoagulation.  relayed that he did not want colonoscopy given her age; he was interested in monitoring and treatment for anemia. Hematology was consulted.      Per imaging review, US Venous doppler B/L Leg 4/3/23 with no evidence of DVT in either leg. IVC filter was placed 4/3/23 due to inability to anticoagulate at the time (contraindication) - given active GI bleed at the time.      Today at bedside both patient's daughter and  Tigre are present. As patient has Alzheimer's , her family is providing history. Apparently patient was recently placed on Eliquis 5 mg PO BID at her facility [facility], for bilateral LE DVT starting ~12/31/24. There is no record of this report. Of note, patient was continued on aspirin 81 mg daily at [facility] (which she has been taking for her hx of NSTEMI s/p SAMARIA 2022). Patient's ECOG is 4, dependent on others for assistance. She gets  up with assistance but spends > 50% of the day in bed, as she has become very deconditioned lately. She has HFrEF.      At bedside she currently denies dyspnea at rest, pain, or lower extremity swelling.      Physical Exam:                 Vital Signs: /84 (BP Location: Left arm)   Pulse 95   Temp 98.8 °F (37.1 °C) (Oral)   Resp 18   Wt 79.4 kg (175 lb)   SpO2 93%   BMI 32.01 kg/m²                 General: Patient is alert and oriented x 1-2 (baseline), not in acute distress.                           HEENT: EOMs intact. Anicteric sclera. Pink conjunctiva. Oropharynx is clear.                 Neck: No JVD. No palpable lymphadenopathy. Neck is supple.                Chest: Clear to auscultation.  No rales or wheezes.                Heart: Regular rate and rhythm.                 Abdomen: Soft, non tender with good bowel sounds. No hepatosplenomegaly.                Extremities: Non pitting BLE edema; mechanical compression in place on BLE legs                Neurological: Grossly intact.                 Lymphatics: There is no palpable lymphadenopathy throughout in the cervical,                                                       supraclavicular, axillary, or inguinal regions.                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      Acute on Chronic Anemia - likely secondary to blood loss given history of GI bleed, hx of bleeding duodenal AVM's (April '23), and recent anticoagulation + antiplatelet therapy     - Patient was admitted for acute blood loss anemia with  INR 1.2, Hb 6.5 g/dL, Cr 1.21. CT A/P with diffuse thickening of  stomach. She received total 2 u PRBC after which Hb went up to 8.2 g/dL     - GI service evaluated patient and performed EGD 1/12/25 which showed small schatzki's ring above a small hiatal hernia.  Mild gastritis.  3 amvs (2-4 mm) in duodenum, no bleeding, s/p APC and clipping. Dr. Meier (GI) spoke to  about procedure explaining she may have other AVM's other lesions deeper than what can be evaluated on this exam or that may become present on anticoagulation.  relayed that he did not want colonoscopy given her age; he was interested in monitoring and treatment for anemia. Hematology was consulted.      -Apparently patient was recently placed on Eliquis 5 mg PO BID at her facility [facility], for bilateral LE DVT starting ~12/31/24. There is no record of this report. Of note, patient was continued on aspirin 81 mg daily at [facility] (which she has been taking for her hx of NSTEMI s/p SAMARIA 2022).      - Patient reportedly on daily ferrous sulfate which was changed to 3x daily ferrous sulfate at [facility]?     Plan:  -Patient's HAS-BLED score is high (3 points - high risk for major bleeding). Given her major bleeding risk and known history of GI bleeding, her anticoagulation dose needs to be carefully selected. I would prefer that she discontinues aspirin 81 mg while she's on anticoagulation. I spoke to Dr. Hess today who agreed with plan -- [facility] is consulted and will see patient 1/14 morning to fully evaluate and document recommendations regarding aspirin use as well.   - US Venous Duplexes bilateral lower extremities ordered to assess current clot burden.   If patient has isolated distal DVT, if she has no risk factors for proximal extension, she would not warrant full dose anticoagulation.   If patient has proximal DVT or large distal DVT that would require full dose anticoagulation, I would first start with prophylactic lovenox 40 mg subcutaenous daily x 3-4 days (to see how she  tolerates it), then uptitrate to full dose (lovenox 1 mg/kg BID). This would help ensure that she tolerates anticoagulation without further signs of blood loss anemia  -  Regardless of her current lower extremity clot burden, I suggest daily prophylactic lovenox (40 mg sc every day) indefinitely, if patient is able to tolerate it without signs/symptoms of bleeding. This recommendation is based on the fact that she's chronically immobile with ECOG of 4. Her stasis is a known risk factor for developing DVT/PE.   I started lovenox 40 mg nightly 1/13/25  Test script for lovenox sent to [facility]. Please ensure it is covered by insurance. She should continue this at [facility].     - iron studies, B12, folate, soluble transferrin receptor ordered for tomorrow morning. Will follow up and see if she needs IV iron outpatient.   On discharge, can continue ferrous sulfate once daily for now (if tolerating without side effects)         MEDICATIONS ADMINISTERED IN LAST 1 DAY:  atorvastatin (Lipitor) tab 40 mg       Date Action Dose Route User    1/12/2025 2134 Given 40 mg Oral Steph Manzano RN          busPIRone (Buspar) tab 15 mg       Date Action Dose Route User    1/13/2025 0859 Given 15 mg Oral Selena Chambers RN    1/12/2025 2134 Given 15 mg Oral Steph Manzano RN          ceFAZolin (Ancef) 1 g in dextrose 5% 100mL IVPB-ADD       Date Action Dose Route User    1/13/2025 1751 New Bag 1 g Intravenous Selena Chambers RN    1/13/2025 0859 New Bag 1 g Intravenous Selena Chambers RN    1/13/2025 0045 New Bag 1 g Intravenous Steph Manzano RN          DULoxetine (Cymbalta) DR cap 60 mg       Date Action Dose Route User    1/13/2025 0859 Given 60 mg Oral Selena Chambers RN    1/12/2025 2134 Given 60 mg Oral Steph Manzano RN          enoxaparin (Lovenox) 40 MG/0.4ML SUBQ injection 40 mg       Date Action Dose Route User    1/13/2025 1851 Given 40 mg Subcutaneous (Left Lower Abdomen) Selena Chambers RN          ferrous  sulfate DR tab 325 mg       Date Action Dose Route User    1/13/2025 0859 Given 325 mg Oral Selena Chambers RN          memantine (Namenda) tab 10 mg       Date Action Dose Route User    1/13/2025 0859 Given 10 mg Oral Selena Chambers RN    1/12/2025 2134 Given 10 mg Oral Steph Manzano RN          pantoprazole (Protonix) 80 mg in sodium chloride 0.9% 100 mL infusion       Date Action Dose Route User    1/13/2025 0045 New Bag 8 mg/hr Intravenous Steph Manzano RN          potassium chloride (Klor-Con M10) tab 10 mEq       Date Action Dose Route User    1/13/2025 0900 Given 10 mEq Oral Selena Chambers RN          rivastigmine (Exelon) 13.3 MG/24HR patch 1 patch       Date Action Dose Route User    1/13/2025 0900 Patch Applied 1 patch Transdermal (Left Upper Arm) Selena Chambers RN          cyanocobalamin (Vitamin B12) tab 1,000 mcg       Date Action Dose Route User    1/13/2025 0859 Given 1,000 mcg Oral Selena Chambers RN          cholecalciferol (Vitamin D3) tab 4,000 Units       Date Action Dose Route User    1/13/2025 0859 Given 4,000 Units Oral Selena Chambers RN            Vitals (last day)       Date/Time Temp Pulse Resp BP SpO2 Weight O2 Device O2 Flow Rate (L/min) [facility]    01/13/25 1500 98.8 °F (37.1 °C) 95 18 137/84 93 % -- None (Room air) --     01/13/25 1211 98.7 °F (37.1 °C) 89 20 137/73 96 % -- None (Room air) --     01/13/25 0905 97.6 °F (36.4 °C) 82 15 147/73 94 % -- None (Room air) -- MB    01/13/25 0400 98.5 °F (36.9 °C) 85 15 155/66 100 % -- None (Room air) -- [facility]    01/13/25 0006 98.7 °F (37.1 °C) 83 16 132/57 93 % -- None (Room air) -- [facility]    01/12/25 2000 98.2 °F (36.8 °C) 84 16 115/53 93 % -- None (Room air) -- [facility]    01/12/25 1545 97.7 °F (36.5 °C) 79 16 144/71 95 % -- None (Room air) -- MB    01/12/25 1315 98 °F (36.7 °C) 77 15 179/86 98 % -- None (Room air) -- MB    01/12/25 1255 -- 74 18 144/74 100 % -- -- -- DC    01/12/25 1250 -- 71 18 142/72 100 % -- -- -- DC    01/12/25 1245 -- 78 16  143/69 100 % -- -- -- DC    01/12/25 1240 -- 70 16 142/74 100 % -- -- -- DC    01/12/25 1235 -- 70 18 138/66 100 % -- -- -- DC    01/12/25 1230 -- 78 20 133/69 100 % -- -- -- DC    01/12/25 1225 -- 76 18 121/64 100 % -- -- -- DC    01/12/25 1220 -- 83 16 118/59 98 % -- -- -- DC    01/12/25 1218 -- 102 18 111/68 98 % -- None (Room air) -- DC    01/12/25 0825 97.6 °F (36.4 °C) 80 15 146/74 93 % -- None (Room air) -- MB    01/12/25 0638 -- 78 -- -- 96 % -- -- -- CM    01/12/25 0300 97.9 °F (36.6 °C) 80 13 143/67 94 % -- None (Room air) 0 L/min CM         01/11/25 2330 97.8 °F (36.6 °C) 74 18 142/66 99 % -- None (Room air) 0 L/min CM   01/11/25 2025 97.9 °F (36.6 °C) 90 20 126/76 92 % -- None (Room air) 0 L/min CM   01/11/25 1520 98.6 °F (37 °C) 84 20 142/69 -- -- None (Room air) -- AY   01/11/25 1144 98.6 °F (37 °C) 76 20 145/59 100 % -- None (Room air) -- AY   01/11/25 0700 98.6 °F (37 °C) 79 18 134/80 99 % -- None (Room air) -- AY   01/11/25 0600 -- 75 -- 123/59 97 % -- -- -- AS   01/11/25 0500 -- 95 -- 126/67 92 % -- -- -- AS             Blood Transfusion Record       Product Unit Status Volume Start End            Transfuse RBC       24  [id number]  [id number] Stopped  01/11/25 0325 01/11/25 0645       24  [id number]  [id number] Completed 01/11/25 0021  01/10/25 2035 01/11/25 0020

## 2025-01-14 NOTE — PLAN OF CARE
Alert and oriented x1. VSS; on room air. Voiding via purewick. Tolerating diet. POC discussed with patient. Safety precautions in place. Call light within reach.     @[phone] this RN notified Rylan Vance MD (on-call hem/onc) for US Venous Doppler Bilat result for positive extensive bilateral DVTs. No new orders, okay with continuing lovenox (see orders) per hem/onc note.

## 2025-01-14 NOTE — PROGRESS NOTES
[facility]  Hematology/Oncology Progress Note    Isabella Addison Patient Status:  Inpatient    1940 MRN [id number]   Location [facility] 3SW-A Attending Primitivo Lawrence MD   Hosp Day # 3 PCP Jorge Lr MD     Reason for Consultation: anemia, GI bleed, family reques    Interval History  25 Patient seen and examined at bedside today. Is sleeping. Denies lower extremity pain. 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     Baseline Hb is around 8 g/dL. Upon admission patient found to have INR 1.2, Hb 6.5 g/dL, Cr 1.21. CT A/P with diffuse thickening of stomach. She received total 2 u PRBC, After which Hb has been consistently 8.0 - 8.2 g/dL.     GI service evaluated patient and performed EGD 25 which showed small schatzki's ring above a small hiatal hernia.  Mild gastritis.  3 amvs (2-4 mm) in duodenum, no bleeding, s/p APC and clipping. Dr. Meier (GI) spoke to  about procedure explaining she may have other AVM's other lesions deeper than what can be evaluated on this exam or that may become present on anticoagulation.  relayed that he did not want colonoscopy given her age; he was interested in monitoring and treatment for anemia. Hematology was consulted.     Per imaging review, US Venous doppler B/L Leg 4/3/23 with no evidence of DVT in either leg. IVC filter was placed 4/3/23 due to inability to anticoagulate at the time (contraindication) - given active GI bleed at the time.     Today at bedside both patient's daughter and  Tigre are present. As patient has Alzheimer's , her family is providing history. Apparently patient  was recently placed on Eliquis 5 mg PO BID at her facility [facility], for bilateral LE DVT starting ~12/31/24. There is no record of this report. Of note, patient was continued on aspirin 81 mg daily at [facility] (which she has been taking for her hx of NSTEMI s/p SAMARIA 2022). Patient's ECOG is 4, dependent on others for assistance. She gets up with assistance but spends > 50% of the day in bed, as she has become very deconditioned lately. She has HFrEF.     At bedside she currently denies dyspnea at rest, pain, or lower extremity swelling.     History:  Past Medical History:    ANXIETY    Anxiety    Arrhythmia    pt unaware    BACK PAIN    LS & C-SPINE DZ W/ PAIN    Chronic rhinitis    Depression    Encephalopathy acute    Esophageal reflux    GERD    W/ PHAN's    High blood pressure    High cholesterol    Hypercholesterolemia    HYPERLIPIDEMIA    Hypertension    Irritable bowel syndrome with constipation    Kidney disease    frequent infections; pt states frequent UTIs    Late onset Alzheimer's disease without behavioral disturbance (HCC)    Migraines    NSTEMI (non-ST elevated myocardial infarction) ([facility])    Ophthalmic migraine    DANNIE (obstructive sleep apnea)    AHI 11 RDI 13 REM AHI 0 Supine AHI 11 non-supine AHI 0 Sao2 Ye 83%     OSTEOPOROSIS    Other and unspecified hyperlipidemia    OTHER DISEASES    PSVT, pt unaware    OTHER DISEASES    Barrott's Esoghagus    Reflux    barretts    Sleep apnea    wears a mouth gaurd    UTI (lower urinary tract infection)    no UTI currently as of 5/5/16     Past Surgical History:   Procedure Laterality Date    Benign biopsy left  ?    stereotactic core biopsy    Benign biopsy right  ?    stereotactic core biopsy    Colonoscopy  11/01/2008    RECHECK 5 YRS    Colonoscopy N/A 07/19/2021    Procedure: COLONOSCOPY;  Surgeon: Manish Rodriguez MD;  Location: [facility] ENDOSCOPY    Colonoscopy,biopsy  01/08/2014    Procedure: ESOPHAGOGASTRODUODENOSCOPY, COLONOSCOPY, POSSIBLE BIOPSY,  POSSIBLE POLYPECTOMY [id number];  Surgeon: Manish Rodriguez MD;  Location: [facility]    Hysterectomy      Optx dstl radl x-artic fx/epiphysl sep Left 10/06/2014    Procedure: OPEN REDUCTION INTERNAL FIXATION ARM;  Surgeon: Augustina Banuelos MD;  Location: [facility]    Other surgical history      URETHRAL SUSPENSION    Other surgical history      STEROTACTIC BX's BOTH BREASTS    Other surgical history  05/01/2008    EGD = PHAN'S    Other surgical history N/A 05/17/2016    Procedure: ENDOSCOPIC ULTRASOUND (EUS);  Surgeon: Glenn Chou MD;  Location:  ENDOSCOPY    Patient documented not to have experienced any of the following events  01/08/2014    Procedure: ESOPHAGOGASTRODUODENOSCOPY, COLONOSCOPY, POSSIBLE BIOPSY, POSSIBLE POLYPECTOMY [id number];  Surgeon: Manish Rodriguez MD;  Location: [facility]    Patient documented not to have experienced any of the following events Left 10/06/2014    Procedure: OPEN REDUCTION INTERNAL FIXATION ARM;  Surgeon: Augustina Banuelos MD;  Location: [facility]    Patient with preoperative order for iv antibiotic surgical site infect Left 10/06/2014    Procedure: OPEN REDUCTION INTERNAL FIXATION ARM;  Surgeon: Augustina Banuelos MD;  Location: [facility]    Patient withough preoperative order for iv antibiotic surgical site infection prophylaxis.  01/08/2014    Procedure: ESOPHAGOGASTRODUODENOSCOPY, COLONOSCOPY, POSSIBLE BIOPSY, POSSIBLE POLYPECTOMY [id number];  Surgeon: Manish Rodriguez MD;  Location: [facility]    Upper gi endoscopy,biopsy  01/08/2014    Procedure: ESOPHAGOGASTRODUODENOSCOPY, COLONOSCOPY, POSSIBLE BIOPSY, POSSIBLE POLYPECTOMY [id number];  Surgeon: Manish Rodriguez MD;  Location: [facility]    Vena cava filter       Family History   Problem Relation Age of Onset    Heart Disorder Father     Other (Other) Mother         ENCEPHALITIS    Hypertension Brother     Diabetes Brother       reports that she quit  smoking about 46 years ago. Her smoking use included cigarettes. She started smoking about 61 years ago. She has a 15 pack-year smoking history. She has never used smokeless tobacco. She reports that she does not currently use alcohol. She reports that she does not use drugs.    Allergies:  Allergies[1]    Medications:    Current Facility-Administered Medications:     pantoprazole (Protonix) DR tab 40 mg, 40 mg, Oral, BID AC    enoxaparin (Lovenox) 80 MG/0.8ML SUBQ injection 80 mg, 80 mg, Subcutaneous, 2 times per day    ceFAZolin (Ancef) 1 g in dextrose 5% 100mL IVPB-ADD, 1 g, Intravenous, Q8H    lactated ringers infusion, , Intravenous, Continuous    acetaminophen (Tylenol Extra Strength) tab 500 mg, 500 mg, Oral, Q4H PRN    atorvastatin (Lipitor) tab 40 mg, 40 mg, Oral, Nightly    busPIRone (Buspar) tab 15 mg, 15 mg, Oral, BID    cholecalciferol (Vitamin D3) tab 4,000 Units, 4,000 Units, Oral, Daily    DULoxetine (Cymbalta) DR cap 60 mg, 60 mg, Oral, BID    ferrous sulfate DR tab 325 mg, 325 mg, Oral, Daily with breakfast    memantine (Namenda) tab 10 mg, 10 mg, Oral, BID    rivastigmine (Exelon) 13.3 MG/24HR patch 1 patch, 1 patch, Transdermal, Daily    cyanocobalamin (Vitamin B12) tab 1,000 mcg, 1,000 mcg, Oral, Daily    sodium chloride 0.9% infusion, , Intravenous, Continuous    acetaminophen (Tylenol Extra Strength) tab 500 mg, 500 mg, Oral, Q4H PRN    ondansetron (Zofran) 4 MG/2ML injection 4 mg, 4 mg, Intravenous, Q6H PRN    metoclopramide (Reglan) 5 mg/mL injection 5 mg, 5 mg, Intravenous, Q8H PRN    influenza virus trivalent high dose PF (Fluzone HD) 0.5 mL injection (ages >/= 65 years) 0.5 mL, 0.5 mL, Intramuscular, Prior to discharge    potassium chloride (Klor-Con M10) tab 10 mEq, 10 mEq, Oral, Daily    insulin aspart (NovoLOG) 100 Units/mL FlexPen 1-5 Units, 1-5 Units, Subcutaneous, TID AC and HS    sodium chloride 0.9% infusion, , Intravenous, Once    Review of Systems:  A 12-point review of systems  was done with pertinent positives and negatives per the HPI.    Physical Exam:    Vital Signs: /67 (BP Location: Left arm)   Pulse 67   Temp 98.6 °F (37 °C) (Oral)   Resp 20   Wt 79.4 kg (175 lb)   SpO2 100%   BMI 32.01 kg/m²    General: Patient is alert and oriented x 1-2 (baseline), not in acute distress.    HEENT: EOMs intact. Anicteric sclera. Pink conjunctiva. Oropharynx is clear.    Neck: No JVD. No palpable lymphadenopathy. Neck is supple.   Chest: Clear to auscultation.  No rales or wheezes.   Heart: Regular rate and rhythm.    Abdomen: Soft, non tender with good bowel sounds. No hepatosplenomegaly.   Extremities: Non pitting BLE edema; mechanical compression in place on BLE legs   Neurological: Grossly intact.    Lymphatics: There is no palpable lymphadenopathy throughout in the cervical,            supraclavicular, axillary, or inguinal regions.   Psych/Depression: Able to recite her name but has baseline Alzheimer's; unable to provide history    Laboratory Data:      Recent Results (from the past 24 hours)   POCT Glucose    Collection Time: 01/13/25 12:09 PM   Result Value Ref Range    POC Glucose 104 (H) 70 - 99 mg/dL   Hemoglobin    Collection Time: 01/13/25  4:44 PM   Result Value Ref Range    HGB 8.8 (L) 12.0 - 16.0 g/dL   Reticulocyte Count    Collection Time: 01/13/25  4:44 PM   Result Value Ref Range    Retic% 6.0 (H) 0.5 - 2.5 %    Retic Absolute 192.0 (H) 22.5 - 147.5 x10(3) uL    Retic IRF 0.252 0.100 - 0.300 Ratio    Reticulocyte Hemoglobin Equivalent 30.6 28.2 - 36.6 pg   POCT Glucose    Collection Time: 01/13/25  5:57 PM   Result Value Ref Range    POC Glucose 125 (H) 70 - 99 mg/dL   POCT Glucose    Collection Time: 01/14/25 12:28 AM   Result Value Ref Range    POC Glucose 123 (H) 70 - 99 mg/dL   Hemoglobin    Collection Time: 01/14/25  4:39 AM   Result Value Ref Range    HGB 8.5 (L) 12.0 - 16.0 g/dL   CBC With Differential With Platelet    Collection Time: 01/14/25  4:39 AM    Result Value Ref Range    WBC 3.5 (L) 4.0 - 11.0 x10(3) uL    RBC 3.07 (L) 3.80 - 5.30 x10(6)uL    HGB 8.5 (L) 12.0 - 16.0 g/dL    HCT 27.3 (L) 35.0 - 48.0 %    .0 150.0 - 450.0 10(3)uL    MCV 88.9 80.0 - 100.0 fL    MCH 27.7 26.0 - 34.0 pg    MCHC 31.1 31.0 - 37.0 g/dL    RDW 21.2 %    Neutrophil Absolute Prelim 1.99 1.50 - 7.70 x10 (3) uL    Neutrophil Absolute 1.99 1.50 - 7.70 x10(3) uL    Lymphocyte Absolute 0.83 (L) 1.00 - 4.00 x10(3) uL    Monocyte Absolute 0.37 0.10 - 1.00 x10(3) uL    Eosinophil Absolute 0.25 0.00 - 0.70 x10(3) uL    Basophil Absolute 0.02 0.00 - 0.20 x10(3) uL    Immature Granulocyte Absolute 0.02 0.00 - 1.00 x10(3) uL    Neutrophil % 57.1 %    Lymphocyte % 23.9 %    Monocyte % 10.6 %    Eosinophil % 7.2 %    Basophil % 0.6 %    Immature Granulocyte % 0.6 %   Vitamin B12    Collection Time: 01/14/25  4:39 AM   Result Value Ref Range    Vitamin B12 1,062 (H) 211 - 911 pg/mL   Folic Acid Serum (Folate)    Collection Time: 01/14/25  4:39 AM   Result Value Ref Range    Folate (Folic Acid) 13.6 >5.4 ng/mL   D-Dimer    Collection Time: 01/14/25  4:39 AM   Result Value Ref Range    D-Dimer 1.85 (H) <0.86 ug/mL FEU   Iron And Tibc    Collection Time: 01/14/25  4:54 AM   Result Value Ref Range    Iron 19 (L) 50 - 170 ug/dL    Transferrin 199 (L) 250 - 380 mg/dL    Total Iron Binding Capacity 266 250 - 425 ug/dL    % Saturation 7 (L) 15 - 50 %   POCT Glucose    Collection Time: 01/14/25  5:14 AM   Result Value Ref Range    POC Glucose 119 (H) 70 - 99 mg/dL       Imaging:  CT A/P 1/10/25  CONCLUSION:  Diffuse wall thickening of the stomach.  Recommend clinical correlation to exclude gastric infiltrative process or gastritis.   Large amount of stool in the right side of the colon and rectosigmoid colon.  No evidence of bowel obstruction.   Colonic diverticulosis.   Wall thickening of the urinary bladder is nonspecific.  Recommend clinical correlation to exclude cystitis.   2.9 x 2.3 cm cystic  structure in the right adnexa likely ovarian in origin.  This is most suggestive of an ovarian cystic remnant.  Recommend sonographic follow-up.     US BLE Venous Duplexes  FINDINGS:    SAPHENOFEMORAL JUNCTION:  No reflux.  THROMBI:  Extensive bilateral lower extremity DVT or noted.       Right lower extremity:  Partially occlusive thrombi identified in the right external iliac vein, saphenofemoral junction, common femoral vein and right profunda femoral vein.  Occlusive thrombus is identified throughout the right superficial femoral and  popliteal veins.     Left lower extremity:  Partially occlusive thrombus identified in the left saphenofemoral junction, left common femoral, left profunda femoral, left superficial femoral and distal left popliteal vein.  Occlusive thrombus is seen within the peripheral  aspect of the left superficial femoral and the central aspect of the left popliteal vein.  COMPRESSION:  Incomplete compressibility noted throughout the deep veins of both lower extremities.  OTHER:  Evaluation of the posterior tibial veins is limited by calf selling bilaterally.       Impression   CONCLUSION:    1. Extensive bilateral lower extremity DVT as detailed above.  The critical test results were called to the patient's nurseTejinder at 2125 hours on 1/13/2025.  There is appropriate read back.          Impression and Plan:    Patient Active Problem List   Diagnosis    Pure hypercholesterolemia    Other abnormal glucose    Esophageal reflux    Dysthymic disorder    Encephalopathy acute    Osteoporosis    Inguinal hernia    Edema    Vitamin D deficiency    Recurrent UTI    Dry eye syndrome    Essential hypertension    Paroxysmal supraventricular tachycardia (HCC)    Irritable bowel syndrome with constipation    Lumbar spinal stenosis    B12 deficiency    DANNIE (obstructive sleep apnea)    Sicca, unspecified type (HCC)    Thrombocytopenia (HCC)    Iron deficiency anemia, unspecified iron deficiency anemia  type    Huerta's esophagus determined by endoscopy    Late onset Alzheimer's disease without behavioral disturbance ([facility])    Close contact within last 14 days with patient with respiratory symptoms    Routine general medical examination at a health care facility    Chronic right-sided low back pain with right-sided sciatica    Prediabetes    Well controlled diabetes mellitus ([facility])    NSTEMI (non-ST elevated myocardial infarction) ([facility])    Bradyarrhythmia    Weakness generalized    Closed displaced intertrochanteric fracture of right femur, initial encounter ([facility])    Elevated troponin    Anemia, unspecified type    Syncope and collapse    Urinary tract infection without hematuria, site unspecified    Acute cystitis without hematuria    Diarrhea, unspecified type    Weakness    Melena    Syncope, near    Sedated due to medication    Dementia, unspecified dementia severity, unspecified dementia type, unspecified whether behavioral, psychotic, or mood disturbance or anxiety ([facility])    Urinary tract infection with hematuria, site unspecified    AMS (altered mental status)    DK (acute kidney injury) ([facility])    Gastrointestinal hemorrhage, unspecified gastrointestinal hemorrhage type    Anemia       Isabella Addison is a 84 year old female with PMHX of GERD, Huerta's esophagus, 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     Acute on Chronic Anemia - likely secondary to blood loss given history of GI bleed, hx of bleeding duodenal AVM's (April '23), and recent anticoagulation + antiplatelet therapy; Iron Deficiency Anemia    - Patient was admitted for acute blood loss anemia with  INR 1.2, Hb 6.5 g/dL, Cr 1.21. CT A/P with diffuse thickening of stomach. She received total 2 u PRBC after which Hb went up to 8.2 g/dL    - GI service  evaluated patient and performed EGD 1/12/25 which showed small schatzki's ring above a small hiatal hernia.  Mild gastritis.  3 amvs (2-4 mm) in duodenum, no bleeding, s/p APC and clipping. Dr. Meier (GI) spoke to  about procedure explaining she may have other AVM's other lesions deeper than what can be evaluated on this exam or that may become present on anticoagulation.  relayed that he did not want colonoscopy given her age; he was interested in monitoring and treatment for anemia. Hematology was consulted.     -Apparently patient was recently placed on Eliquis 5 mg PO BID at her facility [place], for bilateral LE DVT starting ~12/31/24. There is no record of this report. Of note, patient was continued on aspirin 81 mg daily at [place] (which she has been taking for her hx of NSTEMI s/p SAMARIA 2022).     - Patient reportedly on daily ferrous sulfate which was changed to 3x daily ferrous sulfate at [place]?    Plan:  -Patient's HAS-BLED score is high (3 points - high risk for major bleeding). Given her major bleeding risk and known history of GI bleeding, I would prefer that she discontinues aspirin 81 mg while she's on full dose therapeutic anticoagulation. I spoke to Dr. Hess today who agreed with plan -- [facility] is consulted and will see patient 1/14 morning to fully evaluate and document recommendations regarding aspirin use as well.   - iron studies 1/14/25: B12 1062, 13.5, Hb 8.3, plt 166, iron 19, TIBC 266, iron saturation 7%, ferritin 30. Patient is iron deficient. She will benefit from IV dextran (1 time dose of 1000 mg) given outpatient. I will order this when I see her in clinic. Given elderly age and history of HFrEF, will give the IV iron over 2 hours (infusion) in clinic.       2.    Extensive bilateral lower extremity DVT (Right external iliac vein, right saphenofemoral junction, common femoral vein, right profunda femoral vein, right superficial & popliteal veins,  Left saphenofemoral, left common femoral, left profunda femoral, left superficial, left distal popliteal vein) - diagnosed 1/13/25   - Please see US BLE Duplex 1/13/25 report above  - Patient has extensive bilateral lower extremity DVT's. Per history she has become very deconditioned and now bedbound at [facility], which is a new change in clinical condition for her. Though she has IVC filter, this is just to prevent thrombus from breaking off and traveling to lungs. She will still need therapeutic anticoagulation for 3 months (1/14/25 - 4/14/25) to help dissolve the extensive clots. These are likely provoked clots due to her immobility. She spends > 50% of the day in chair or bed now unfortunately (ECOG = 4). This is a known risk factor for VTE.  - baseline dimer 1.85 on 1/13/25. Will repeat in 4 months to assess clot burden    Plan:  Start lovenox 1 mg/kg (80 mg) subcutaneous, twice daily. Continue until 4/14/25. Of note, Eliquis isn't covered for patient  Test script for lovenox sent to [facility]. Please ensure it is covered by insurance. She should continue this at [facility].  Given her immobility and risk factor of stasis (for developing further DVT/PE), I recommend indefinite prophylaxis (lovenox 40 mg subcutaneous daily) from 4/15/25 onwards.   Okay to get out of bed and ambulate (for physical therapy)      She can follow up with me outpatient on 1/20/25 at 8:30 am (clinic information in discharge tab).    Will continue to follow patient while she is still admitted.       Xiomy Zuleta D.O.  [facility]           [1]   Allergies  Allergen Reactions    Ciprofloxacin UNKNOWN     Burning of espohagus and upset stomach form cipro    Penicillins HIVES and UNKNOWN    Radiology Contrast Iodinated Dyes RASH     Diffuse erythema post study  -- Occurred in 1980's?,    Amoxil UNKNOWN     Cant remember    Biaxin [Clarithromycin]      Cant remember    Cefuroxime Axetil       Cant remember    Doxycycline Calcium      Cant remember    Sulfa Antibiotics HIVES

## 2025-01-14 NOTE — CONGREGATE LIVING REVIEW
[facility] Authorization    The [facility] Review Committee has reviewed this case and the patient IS APPROVED for discharge to a facility for Short Term Skilled once the following procedure is followed:     - The physician discharge instructions (contained within the MAHESH note for SNF) must inlcude the below appropriate and approved COVID instructions to the facility    For questions regarding CLRC approval process, please contact the CM assigned to the case.  For questions regarding RN discharge workflow, please contact the unit Clinical Leader.

## 2025-01-14 NOTE — PLAN OF CARE
Post-procedure day 1. Monitoring hemoglobin with last result of 8.8. Alert and oriented x1. Bilateral bunny boots. Room air. Telemetry monitoring. Last bowel movement 1/10. Cardiology consult placed. Hematology consult placed. Urology consult placed. Post-void residual completed per orders with results of 147. Ultrasound bilateral doppler pending. Ancef every 8 hours for antibiotic coverage. Patient requested to not ambulate to chair with family notification given. Physical and occupational therapy in place per protocol. Family at bedside for support.

## 2025-01-14 NOTE — PROGRESS NOTES
AVS reviewed, IV dc'd per Selena, copy given to daughter per Selena RN, follow-up appts listed , will dc to [facility] [place] now via ambulance.

## 2025-01-14 NOTE — PROGRESS NOTES
Post-procedure day 2. Hgb at discharge 8.3. Alert and oriented x1. Room air. Telemetry monitoring. Bunny boots bilaterally. Lovenox for anticoagulation. Up with 2 assist and walker. General diet. Deemed appropriate for discharge to [facility]. Provided report to SAULO Alvarado. Provided discharge paperwork to transport team with a copy to family.

## 2025-01-14 NOTE — CM/SW NOTE
Received call from Augie confirming insurance approval for ADRIAN ([id number]) at [facility] effective today through 1/21.  Updated facility via [facility] and received confirmation that pt can be accepted for readmission today.      Updated RN who will contact MDs to confirm medical clearance for DC today.  Ambulance transport scheduled for 3pm.  PCS form completed and available for RN to print.  Transport can be canceled or time adjusted as needed. / to remain available for support and/or discharge planning.     [facility]  P:[phone]  F:[phone]    [facility]  [phone]    Clarisa Cordova [facility]  Discharge Planner  [phone]    Addendum:  Met with pt's  at bedside.  Letter confirming insurance approval given.  Pt's  agreeable with plan for DC at 3pm today.

## 2025-01-15 ENCOUNTER — TELEPHONE (OUTPATIENT)
Age: 85
End: 2025-01-15

## 2025-01-15 NOTE — PROGRESS NOTES
Provider Clarification    Additional information on the cause and effect relationship between bleeding and eliquis    Unable to determine if GI bleed is related to eliquis    This note is part of the patient's medical record.

## 2025-01-15 NOTE — PAYOR COMM NOTE
--------------  DISCHARGE REVIEW    Payor: [employer] MEDICARE ADV PPO  Subscriber #:  [id number]  Authorization Number: [id number]    Admit date: 1/11/25  Admit time:   1:37 AM  Discharge Date: 1/14/2025  3:21 PM     Admitting Physician: Kristy Briceno MD  Attending Physician:  No att. providers found  Primary Care Physician: Jorge Lr MD       Discharge Summary Notes    No notes of this type exist for this encounter.         REVIEWER COMMENTS

## 2025-01-15 NOTE — PROGRESS NOTES
Physician Clarification    Additional information related to the patient's condition    Acute blood loss anemia     This note is part of the patient's medical record.

## 2025-01-15 NOTE — TELEPHONE ENCOUNTER
Spoke with patient's daughter, Gracie, regarding patient's post hospital appointment on Monday 1/20 at 8:30am. Daughter inquiring why the patient needs to come into the office after Dr. Zuleta saw her while the patient was hospitalized. Informed her that the appointment is to follow up with the patient on how she is tolerating her blood thinners (Lovenox 80mg BID). Daughter questioning if the patient would be getting IV iron that day, since it was mentioned while the patient was in the hospital. Informed her that if IV iron is warranted base on Dr. Zuleta's assessment, it would not be able to be given the same day since it will need insurance authorization. Daughter stated understanding. Confirmed patient's appt.

## 2025-01-16 ENCOUNTER — APPOINTMENT (OUTPATIENT)
Age: 85
End: 2025-01-16
Attending: STUDENT IN AN ORGANIZED HEALTH CARE EDUCATION/TRAINING PROGRAM
Payer: MEDICARE

## 2025-01-17 LAB — SOL TRANSFERRIN RECEPTOR: 60.7 NMOL/L

## 2025-01-19 PROBLEM — D50.0 IRON DEFICIENCY ANEMIA DUE TO CHRONIC BLOOD LOSS: Status: ACTIVE | Noted: 2025-01-19

## 2025-01-20 ENCOUNTER — OFFICE VISIT (OUTPATIENT)
Age: 85
End: 2025-01-20
Attending: INTERNAL MEDICINE
Payer: MEDICARE

## 2025-01-20 ENCOUNTER — TELEPHONE (OUTPATIENT)
Age: 85
End: 2025-01-20

## 2025-01-20 VITALS
HEART RATE: 77 BPM | OXYGEN SATURATION: 97 % | TEMPERATURE: 97 F | DIASTOLIC BLOOD PRESSURE: 71 MMHG | SYSTOLIC BLOOD PRESSURE: 104 MMHG

## 2025-01-20 DIAGNOSIS — D50.0 IRON DEFICIENCY ANEMIA DUE TO CHRONIC BLOOD LOSS: Primary | ICD-10-CM

## 2025-01-20 NOTE — PROGRESS NOTES
Hematology/Oncology Initial Consultation Note    Patient Name: Isabella Addison  Medical Record Number: [id number]    YOB: 1940   Date of Consultation: 1/19/2025     Reason for Consultation/Chief Complaint:  bilateral extensive DVTs, iron deficiency anemia  Physician requesting consultation: Jorge Lr    Oncologic History:  4/3/23: Per chart review, US Venous doppler B/L Leg with no evidence of DVT in either leg. She did have evidence of RLL subacute pulmonary emboli. IVC filter was placed due to inability to anticoagulate (contraindication) - given active GI bleed at the time.   12/31/24: Patient was reported placed on Eliquis 5 mg PO BID at her facility [facility], for bilateral LE DVT.  There is no record of this report. Of note, patient was continued on aspirin 81 mg daily at [facility] (which she has been taking for her hx of NSTEMI s/p SAMARIA 2022).   1/10/25 - 1/14/25: Patient admitted to [facility] for acute on chronic anemia, found to have Hb 6.5 g/dL and dark stools. Upon admission patient found to have INR 1.2, Hb 6.5 g/dL, Cr 1.21. CT A/P with diffuse thickening of stomach. She received total 2 u PRBC, After which Hb has been consistently 8.0 - 8.2 g/dL.   1/12/25:  GI service evaluated patient and performed EGD 1/12/25 which showed small schatzki's ring above a small hiatal hernia.  Mild gastritis.  3 amvs (2-4 mm) in duodenum, no bleeding, s/p APC and clipping. Dr. Meier (GI) spoke to  about procedure explaining she may have other AVM's other lesions deeper than what can be evaluated on this exam or that may become present on anticoagulation.  relayed that he did not want colonoscopy given her age; he was interested in monitoring and treatment for anemia. Hematology was consulted for further recommendations.  1/13/25: I saw her in the hospital while she was admitted. Given high HAS-BLED score (3) and high risk for major bleeding, in conjunction with  cardiology service's recommendations we discontinued her daily aspirin 81 mg.   1/13/25: US BLE Venous Duplexes with extensive bilateral lower extremity DVT's (right and left common femoral veins all the way down to right and left popliteal veins). She was transitioned from prophylactic to therapeutic lovenox 80 mg/kg subcutaneous BID (1 mg/kg BID). PT worked with her to get her OOBTC and ambulating (I encourage mobility). She tolerated lovenox without any acute signs of bleeding and was discharged from hospital back to [facility].       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     Please see detailed oncologic history above. Patient's ECOG is 4, dependent on others for assistance. She gets up with assistance but spends > 50% of the day in bed, as she has become very deconditioned lately. She has HFrEF.     1/20/25  Today at bedside both patient's daughter and  Tigre (her POA) are present. As patient has Alzheimer's , her family is providing history. Patient is tired today, sleepy. Seen in wheelchair. She is not endorsing any pain. Per family, no recent report of melena, hematochezia, or rectal bleeding. She has been tolerating lovenox 80 mc sc BID without issues. No petechiae or easy bruising. Per daughter, her feet have both been a little bit more swollen - likely due to the heart failure. She is on bumex 1 mg BID, being managed by cardiology.       Past Medical History:  Past Medical History:    ANXIETY     Anxiety    Arrhythmia    pt unaware    BACK PAIN    LS & C-SPINE DZ W/ PAIN    Chronic rhinitis    Depression    Encephalopathy acute    Esophageal reflux    GERD    W/ PHAN's    High blood pressure    High cholesterol    Hypercholesterolemia    HYPERLIPIDEMIA    Hypertension    Irritable bowel syndrome with constipation    Kidney disease    frequent infections; pt states frequent UTIs    Late onset Alzheimer's disease without behavioral disturbance (HCC)    Migraines    NSTEMI (non-ST elevated myocardial infarction) (HCC)    Ophthalmic migraine    DANNIE (obstructive sleep apnea)    AHI 11 RDI 13 REM AHI 0 Supine AHI 11 non-supine AHI 0 Sao2 Ye 83%     OSTEOPOROSIS    Other and unspecified hyperlipidemia    OTHER DISEASES    PSVT, pt unaware    OTHER DISEASES    Barrott's Esoghagus    Reflux    barretts    Sleep apnea    wears a mouth gaurd    UTI (lower urinary tract infection)    no UTI currently as of 5/5/16       Past Surgical History:  Past Surgical History:   Procedure Laterality Date    Benign biopsy left  ?    stereotactic core biopsy    Benign biopsy right  ?    stereotactic core biopsy    Colonoscopy  11/01/2008    RECHECK 5 YRS    Colonoscopy N/A 07/19/2021    Procedure: COLONOSCOPY;  Surgeon: Manish Rodriguez MD;  Location: [facility] ENDOSCOPY    Colonoscopy,biopsy  01/08/2014    Procedure: ESOPHAGOGASTRODUODENOSCOPY, COLONOSCOPY, POSSIBLE BIOPSY, POSSIBLE POLYPECTOMY 23421,87687;  Surgeon: Manish Rodriguez MD;  Location: [facility] SURGICAL [facility]    Hysterectomy      Optx dstl radl x-artic fx/epiphysl sep Left 10/06/2014    Procedure: OPEN REDUCTION INTERNAL FIXATION ARM;  Surgeon: Augustina Banuelos MD;  Location: [facility]    Other surgical history      URETHRAL SUSPENSION    Other surgical history      STEROTACTIC BX's BOTH BREASTS    Other surgical history  05/01/2008    EGD = PHAN'S    Other surgical history N/A 05/17/2016    Procedure: ENDOSCOPIC ULTRASOUND (EUS);  Surgeon: Glenn Chou MD;   Location:  ENDOSCOPY    Patient documented not to have experienced any of the following events  2014    Procedure: ESOPHAGOGASTRODUODENOSCOPY, COLONOSCOPY, POSSIBLE BIOPSY, POSSIBLE POLYPECTOMY 31106,[id number];  Surgeon: Manish Rodriguez MD;  Location: [facility]    Patient documented not to have experienced any of the following events Left 10/06/2014    Procedure: OPEN REDUCTION INTERNAL FIXATION ARM;  Surgeon: Augustina Banuelos MD;  Location: [facility]    Patient with preoperative order for iv antibiotic surgical site infect Left 10/06/2014    Procedure: OPEN REDUCTION INTERNAL FIXATION ARM;  Surgeon: Augustina Banuelos MD;  Location: [facility]    Patient withough preoperative order for iv antibiotic surgical site infection prophylaxis.  2014    Procedure: ESOPHAGOGASTRODUODENOSCOPY, COLONOSCOPY, POSSIBLE BIOPSY, POSSIBLE POLYPECTOMY 69051,48600;  Surgeon: Manish Rodriguez MD;  Location: [facility]    Upper gi endoscopy,biopsy  2014    Procedure: ESOPHAGOGASTRODUODENOSCOPY, COLONOSCOPY, POSSIBLE BIOPSY, POSSIBLE POLYPECTOMY 21485,75741;  Surgeon: Manish Rodriguez MD;  Location: [facility]    Vena cava filter         Current Outpatient Medications:  Medications Ordered Prior to Encounter[1]    Allergies:   Allergies[2]    Family Medical History:  Family History   Problem Relation Age of Onset    Heart Disorder Father     Other (Other) Mother         ENCEPHALITIS    Hypertension Brother     Diabetes Brother        Social History:  Social History     Social History Narrative    Not on file     Social History     Socioeconomic History    Marital status:    Tobacco Use    Smoking status: Former     Current packs/day: 0.00     Average packs/day: 1 pack/day for 15.0 years (15.0 ttl pk-yrs)     Types: Cigarettes     Start date: 1963     Quit date: 1978     Years since quittin.6    Smokeless tobacco: Never   Vaping Use    Vaping status: Never Used    Substance and Sexual Activity    Alcohol use: Not Currently    Drug use: No     Social Drivers of Health     Financial Resource Strain: Low Risk  (10/26/2023)    Received from [facility] [employer] [employer], [facility]    Financial Resource Strain     In the past year, have you or any family members you live with been unable to get any of the following when it was really needed? Check all that apply.: None   Food Insecurity: Unknown (1/11/2025)    Food Insecurity     Food Insecurity: Patient unable to answer   Transportation Needs: Unknown (1/11/2025)    Transportation Needs     Lack of Transportation: Patient unable to answer   Social Connections: Low Risk  (10/27/2023)    Received from [facility] [employer], [facility]    Social Connections     How often do you see or talk to people that you care about and feel close to? (For example: talking to friends on the phone, visiting friends or family, going to Worship or club meetings): 5 or more times a week   Housing Stability: Unknown (1/11/2025)    Housing Stability     Housing Instability: Patient unable to answer       Review of Systems:  A 10-point ROS was done with pertinent positives and negatives per the HPI.     ECOG Performance Status: 4    Vital Signs:  Height: --  Weight: --  BSA (Calculated - sq m): --  Pulse: 77 (01/20 0833)  BP: 104/71 (01/20 0833)  Temp: 97.4 °F (36.3 °C) (01/20 0833)  Do Not Use - Resp Rate: --  SpO2: 97 % (01/20 0833)  Wt Readings from Last 6 Encounters:   01/11/25 79.4 kg (175 lb)   11/23/24 69.7 kg (153 lb 10.6 oz)   10/28/24 69.7 kg (153 lb 10.6 oz)   06/28/23 69.7 kg (153 lb 9.6 oz)   04/05/23 65.6 kg (144 lb 10 oz)   03/11/23 67.1 kg (148 lb)       Physical Examination:  General: Elderly female seen sitting up in wheelchair, no acute distress. Is sleepy this morning  Eyes: EOMI, bilateral conjunctiva normal. Sclera anicteric.  ENT: Oropharynx is clear. Mucous membranes moist.  Lymph Nodes: No cervical or  supraclavicular lymphadenopathy.  Respiratory: Lungs clear to auscultation bilaterally.  Cardiovascular: Regular rate and rhythm, no murmurs. BLE edema +1 pitting  GI: Soft, non-tender, non-distended with normoactive bowel sounds. No hepatosplenomegaly.  Heme: normal capillary refill. No ecchymosis, petechiae, or purpura.  Neurological: Alert and oriented. No apparent focal sensory or motor deficits  Skin: no rashes or lesions.  Psychiatric: AAO x 1-2 at baseline due to dementia    Data Review  Laboratory Studies:  Recent Labs   Lab 01/13/25  1644 01/14/25  0439 01/14/25  0744   WBC  --  3.5*  --    HGB 8.8* 8.5*  8.5* 8.3*   HCT  --  27.3*  --    PLT  --  166.0  --    MCV  --  88.9  --    RDW  --  21.2  --    NEPRELIM  --  1.99  --      Recent Labs   Lab 01/13/25  0514   K 3.6     No results for input(s): \"PT\", \"INR\", \"PTT\", \"FIB\" in the last 168 hours.    Radiographic Studies:    US BLE Venous Duplexes 1/13/25  FINDINGS:    SAPHENOFEMORAL JUNCTION:  No reflux.  THROMBI:  Extensive bilateral lower extremity DVT or noted.       Right lower extremity:  Partially occlusive thrombi identified in the right external iliac vein, saphenofemoral junction, common femoral vein and right profunda femoral vein.  Occlusive thrombus is identified throughout the right superficial femoral and  popliteal veins.     Left lower extremity:  Partially occlusive thrombus identified in the left saphenofemoral junction, left common femoral, left profunda femoral, left superficial femoral and distal left popliteal vein.  Occlusive thrombus is seen within the peripheral  aspect of the left superficial femoral and the central aspect of the left popliteal vein.  COMPRESSION:  Incomplete compressibility noted throughout the deep veins of both lower extremities.  OTHER:  Evaluation of the posterior tibial veins is limited by calf selling bilaterally.       Impression   CONCLUSION:    1. Extensive bilateral lower extremity DVT as detailed above.   The critical test results were called to the patient's nurseTejinder at 2125 hours on 1/13/2025.  There is appropriate read back.          US VENOUS DOPPLER LEG BILAT - DIAG IMG (CPT=93970)    Result Date: 1/13/2025  CONCLUSION:  1. Extensive bilateral lower extremity DVT as detailed above.  The critical test results were called to the patient's nurseTejinder at 2125 hours on 1/13/2025.  There is appropriate read back.   LOCATION:  [facility]   Dictated by (CST): Dilip Zapata MD on 1/13/2025 at 9:19 PM     Finalized by (CST): Dilip Zapata MD on 1/13/2025 at 9:26 PM       CT ABDOMEN+PELVIS(CPT=74176)    Result Date: 1/10/2025  CONCLUSION:  Diffuse wall thickening of the stomach.  Recommend clinical correlation to exclude gastric infiltrative process or gastritis.  Large amount of stool in the right side of the colon and rectosigmoid colon.  No evidence of bowel obstruction.  Colonic diverticulosis.  Wall thickening of the urinary bladder is nonspecific.  Recommend clinical correlation to exclude cystitis.  2.9 x 2.3 cm cystic structure in the right adnexa likely ovarian in origin.  This is most suggestive of an ovarian cystic remnant.  Recommend sonographic follow-up.   LOCATION:  [id number]   Dictated by (CST): Manuela Freeman MD on 1/10/2025 at 8:19 PM     Finalized by (CST): Manuela Freeman MD on 1/10/2025 at 8:25 PM       MRI BRAIN (CPT=70551)    Result Date: 11/27/2024  CONCLUSION:  1. No acute infarct, acute intracranial hemorrhage, or hydrocephalus. 2. Moderate to severe chronic small vessel ischemic disease with punctate chronic infarcts at the basal ganglia, thalami, and cerebellar hemispheres.   LOCATION:  [facility]   Dictated by ([facility]): Stromberg, LeRoy, MD on 11/27/2024 at 10:14 AM     Finalized by (CST): Stromberg, LeRoy, MD on 11/27/2024 at 10:20 AM       CT BRAIN OR HEAD (CPT=70450)    Result Date: 11/23/2024  PROCEDURE:  CT BRAIN OR HEAD (07298)  COMPARISON:  [facility] , CT, CT BRAIN OR HEAD (96090),  10/28/2024, 4:18 PM.  INDICATIONS:  ams  TECHNIQUE:  Noncontrast CT scanning is performed through the brain. Dose reduction techniques were used. Dose information is transmitted to the ACR (American College of Radiology) NRDR (National Radiology Data Registry) which includes the Dose Index Registry.  PATIENT STATED HISTORY: (As transcribed by Technologist)  Patient is here for AMS.    FINDINGS: No evidence of intracranial hemorrhage or extra-axial fluid collection. Lucencies in the deep periventricular white matter are likely sequelae of chronic small vessel ischemic disease. Prominence of the sulci is noted. No mass effect. Visualized portions of paranasal sinuses are unremarkable. Visualized portions of the mastoid air cells are unremarkable. Visualized portions of the orbits are unremarkable. IMPRESSION: Global parenchymal volume loss is noted.  Sequelae of chronic small vessel ischemic disease is noted. No evidence of intracranial hemorrhage or extra-axial fluid collection.  No significant change since prior exam.    LOCATION:  [facility]   Dictated by (CST): Brett Singer MD on 11/23/2024 at 3:33 PM     Finalized by (CST): Brett Singer MD on 11/23/2024 at 3:34 PM       XR CHEST AP PORTABLE  (CPT=71045)    Result Date: 11/23/2024  CONCLUSION:  Perihilar streaky opacity peribronchial cuffing can be seen with bronchitis.   LOCATION:  [facility]      Dictated by (CST): David French MD on 11/23/2024 at 2:45 PM     Finalized by (CST): David French MD on 11/23/2024 at 2:46 PM       XR CHEST AP PORTABLE  (CPT=71045)    Result Date: 10/28/2024  CONCLUSION:  No acute radiographic findings.   LOCATION:  [facility]      Dictated by (CST): Sheldon Quiñones MD on 10/28/2024 at 5:01 PM     Finalized by (CST): Sheldon Quiñones MD on 10/28/2024 at 5:02 PM       CT BRAIN OR HEAD (CPT=70450)    Result Date: 10/28/2024  CONCLUSION: 1. No acute intracranial findings 2. Cerebral atrophy with chronic microvascular ischemic changes.     LOCATION:  [place]   Dictated by (CST): Sheldon Quiñones MD on 10/28/2024 at 4:33 PM     Finalized by (CST): Sheldon Quiñones MD on 10/28/2024 at 4:36 PM          Assessment/Recommendations:  Isabella Addison is a 84 year old female with PMHX of GERD, Huerta's esophagus, history of duodenal AVM's (s/p ablation 3/2023), HTN, NSTEMI s/p SAMARIA 2022, HFrEF (EF 30-35%), HLD, DANNIE, Alzheimer's, hx of RLL subacute pulmonary emboli (2023) s/p IVC filter placement, extensive bilateral lower extremity DVT on therapeutic lovenox BID (1/13/25) and iron deficiency anemia (s/p 1 dose sodium ferric gluconate 125 mg on 12/2/24) -- who presents to office for evaluation and treatment of BLE DVTs and iron deficiency anemia.      Acute on Chronic Anemia - likely secondary to blood loss given history of GI bleed, hx of bleeding duodenal AVM's (April '23), and recent anticoagulation + antiplatelet therapy; Iron Deficiency Anemia     - Patient was admitted for acute blood loss anemia with  INR 1.2, Hb 6.5 g/dL, Cr 1.21. CT A/P with diffuse thickening of stomach. She received total 2 u PRBC after which Hb went up to 8.2 g/dL. Baseline Hb ~ 8.0 g/dL  - GI service evaluated patient and performed EGD 1/12/25 which showed small schatzki's ring above a small hiatal hernia.  Mild gastritis.  3 amvs (2-4 mm) in duodenum, no bleeding, s/p APC and clipping. Dr. Meier (GI) spoke to  about procedure explaining she may have other AVM's other lesions deeper than what can be evaluated on this exam or that may become present on anticoagulation.  relayed that he did not want colonoscopy given her age; he was interested in monitoring and treatment for anemia. Hematology was consulted.   -Apparently patient was recently placed on Eliquis 5 mg PO BID at her facility [place], for bilateral LE DVT starting ~12/31/24. There is no record of this report. Of note, patient was continued on aspirin 81 mg daily at [place] (which she has been  taking for her hx of NSTEMI s/p SAMARIA 2022).   - 1/13/25 in [facility]: Given high HAS-BLED score (3) and high risk for major bleeding, in conjunction with cardiology service's recommendations we discontinued her daily aspirin 81 mg.   - iron studies 1/14/25: B12 1062, 13.5, Hb 8.3, plt 166, iron 19, TIBC 266, iron saturation 7%, ferritin 30 ng/mL.    1/20/25  Today at bedside both patient's daughter and  Tigre (her POA) are present. As patient has Alzheimer's , her family is providing history. Patient is tired today, sleepy. Seen in wheelchair. She is not endorsing any pain. Per family, no recent report of melena, hematochezia, or rectal bleeding. She has been tolerating lovenox 80 mc sc BID without issues. No petechiae or easy bruising. Per daughter, her feet have both been a little bit more swollen - likely due to the heart failure. She is on bumex 1 mg BID, being managed by cardiology.     Plan:  Patient is symptomatic from severe iron deficiency anemia (30 ng/mL), as per H&P and assessment above.  Patient will benefit from IV iron, with a targeted goal of 50 ng/mL. Today, I discussed with the patient and her POA (, Tigre) her diagnosis of iron deficiency anemia, and the proposed treatment of IV iron. Benefits and risks were discussed in detail. Written information was also provided. After thorough discussion, patient's POA has consented to start IV Iron Dextran (1 dose, 1000 mg, IV over 2 hours -- with 25 mg test dose to be given prior to infusion).    Orders were placed for IV Iron Dextran. Patient will schedule appointment for IV iron infusion once insurance approval is obtained.    **OF NOTE: given patient's history of HFrEF (EF 30-35%), please administer IV iron over 2 HOURS in clinic.           2.    Extensive bilateral lower extremity DVT (Right external iliac vein, right saphenofemoral junction, common femoral vein, right profunda femoral vein, right superficial & popliteal veins, Left  saphenofemoral, left common femoral, left profunda femoral, left superficial, left distal popliteal vein) - diagnosed 1/13/25   - Please see US BLE Duplex 1/13/25 report above  - Patient has extensive bilateral lower extremity DVT's. Per history she has become very deconditioned and now bedbound at [facility], which is a new change in clinical condition for her. Though she has IVC filter, this is just to prevent thrombus from breaking off and traveling to lungs. She will still need therapeutic anticoagulation for 3 months (1/14/25 - 4/14/25) to help dissolve the extensive clots. These are likely provoked clots due to her immobility. She spends > 50% of the day in chair or bed now unfortunately (ECOG = 4). This is a known risk factor for VTE.  - baseline dimer 1.85 on 1/13/25. Will repeat in 4 months to assess clot burden  - Labs today with stable Hb 9.8 g/dL, plt 191, hematocrit 32. Hb improved from 8.5 g/dL     Plan:  Continue lovenox 1 mg/kg (80 mg) subcutaneous, twice daily. Continue until 4/14/25.   Given her immobility (ECOG of 4) and risk factor of stasis (at risk for developing further DVT/PE), I recommend indefinite prophylaxis (lovenox 40 mg subcutaneous daily) from 4/15/25 onwards.   She has a high HAS-BLED score of 3, deeming her high risk for major bleeding (especially given previous history of GI bleeds). We will have to monitor her closely while on anticoagulation. If ANY concern for bleeding, will have to hold anticoagulation and re-assess risk vs benefit of anticoagulation -- and have a detailed conversation with patient/POA.   Please have patient get out of bed and ambulate at facility (physical therapy). Mobility is encouraged.         Follow-Up: 4-6 weeks after IV iron infusion is completed for labs and visit with me. We will re-check iron studies at that time.        Xiomy Zuleta D.O.  [facility]      Note to patient: The 21 Century Cures Act makes medical  notes like these available to patients in the interest of transparency. However, be advised this is a medical document. It is intended as peer to peer communication. It is written in medical language and may contain abbreviations or verbiage that are unfamiliar. It may appear blunt or direct. Medical documents are intended to carry relevant information, facts as evident, and the clinical opinion of the practitioner.       In reviewing this note, please be advised that Dragon Voice Recognition software used to dictate the note may have made errors in recognizing some of the words or phrases.          [1]   Current Outpatient Medications on File Prior to Visit   Medication Sig Dispense Refill    enoxaparin 80 MG/0.8ML Injection Solution Prefilled Syringe Inject 0.8 mL (80 mg total) into the skin every 12 (twelve) hours. 100 mL 1    pantoprazole 40 MG Oral Tab EC Take 1 tablet (40 mg total) by mouth 2 (two) times daily before meals. 60 tablet 1    [] nitrofurantoin monohydrate macro 100 MG Oral Cap Take 1 capsule (100 mg total) by mouth 2 (two) times daily for 4 days. 8 capsule 0    bumetanide 1 MG Oral Tab Take 1 tablet (1 mg total) by mouth BID (Diuretic).      senna-docusate 8.6-50 MG Oral Tab Take 1 tablet by mouth in the morning and 1 tablet before bedtime.      busPIRone 15 MG Oral Tab Take 1 tablet (15 mg total) by mouth in the morning and 1 tablet (15 mg total) before bedtime.      acetaminophen 500 MG Oral Tab Take 1 tablet (500 mg total) by mouth every 4 (four) hours as needed for Pain.  0    sacubitril-valsartan 24-26 MG Oral Tab Take 1 tablet by mouth 2 (two) times daily. 120 tablet 5    rivastigmine 13.3 MG/24HR Transdermal Patch 24 Hr Place 1 patch onto the skin daily.      memantine (NAMENDA XR) 28 MG Oral Capsule SR 24 Hr Take 1 capsule (28 mg total) by mouth daily. 30 capsule 5    atorvastatin 40 MG Oral Tab Take 1 tablet (40 mg total) by mouth nightly. 90 tablet 3    ferrous sulfate 325 (65 FE)  MG Oral Tab EC Take 1 tablet (325 mg total) by mouth 3 (three) times daily with meals.      Vitamin B-12 1000 MCG Oral Tab Take 1 tablet (1,000 mcg total) by mouth daily.      DULoxetine HCl 60 MG Oral Cap DR Particles Take 1 capsule (60 mg total) by mouth 2 (two) times daily.      Cholecalciferol (VITAMIN D3) 2000 UNIT Oral Cap Take 2 capsules (4,000 Units total) by mouth daily. 2 capsules daily      CALCIUM 600 + D OR Take 2 tablets by mouth daily.       Current Facility-Administered Medications on File Prior to Visit   Medication Dose Route Frequency Provider Last Rate Last Admin    [COMPLETED] potassium chloride 40 mEq in 250mL sodium chloride 0.9% IVPB premix  40 mEq Intravenous Once Primitivo Lawrence MD 62.5 mL/hr at 25 1331 40 mEq at 25 1331    [] sodium chloride 0.9% infusion   Intravenous Continuous Goldy Bonilla MD        [COMPLETED] sodium chloride 0.9% infusion   Intravenous Once Primitivo Lawrence MD 10 mL/hr at 25 0315 New Bag at 25 0315    [COMPLETED] potassium chloride (Klor-Con) 20 MEQ oral powder 40 mEq  40 mEq Oral Once Viv Fortune MD   40 mEq at 25 1100    [COMPLETED] pantoprazole (Protonix) 40 mg in sodium chloride 0.9% PF 10 mL IV push  40 mg Intravenous Once Goldy Bonilla MD   40 mg at 01/10/25 2042   [2]   Allergies  Allergen Reactions    Ciprofloxacin UNKNOWN     Burning of espohagus and upset stomach form cipro    Penicillins HIVES and UNKNOWN    Radiology Contrast Iodinated Dyes RASH     Diffuse erythema post study  -- Occurred in ?,    Amoxil UNKNOWN     Cant remember    Biaxin [Clarithromycin]      Cant remember    Cefuroxime Axetil      Cant remember    Doxycycline Calcium      Cant remember    Sulfa Antibiotics HIVES

## 2025-01-20 NOTE — PATIENT INSTRUCTIONS
Hello,     You were seen today for your diagnosis of iron deficiency anemia. Your ferritin (storage of iron) is low and you need IV iron. I have ordered 1 dose of IV Iron Dextran (INFeD) for you. Please schedule this once insurance approves the IV iron (our team will call you to make the appointment).    Please schedule a follow up appointment to see me again 4-6 weeks after your infusion. Will repeat iron labs at this time.     Please DISCONTINUE ferrous sulfate tablets. I have taken this off your medication list. We can re-assess the need for iron pills at our next visit.    As discussed, try to move around (with assistance) as much as possible to get better blood flow in the legs.     It was a pleasure seeing you,  Dr. Zuleta               For triage nurse: [phone] Monday through Friday 7:30-5:00.  *Please note this is a new phone number*    After hours or weekends for emergent needs:  [phone].     To schedule diagnostic testing: Central [phone]    For Medical Records: [phone]

## 2025-01-21 ENCOUNTER — TELEPHONE (OUTPATIENT)
Dept: HEMATOLOGY/ONCOLOGY | Facility: HOSPITAL | Age: 85
End: 2025-01-21

## 2025-01-21 NOTE — TELEPHONE ENCOUNTER
Daughter leyda called after hours her mom is scheduled for iron infusion Thursday 1/23 she currently is on medication to treat for UTI and not sure if she can still do infusion. T/y vannesa

## 2025-01-23 ENCOUNTER — OFFICE VISIT (OUTPATIENT)
Age: 85
End: 2025-01-23
Attending: INTERNAL MEDICINE
Payer: MEDICARE

## 2025-01-23 ENCOUNTER — APPOINTMENT (OUTPATIENT)
Age: 85
End: 2025-01-23
Attending: INTERNAL MEDICINE
Payer: MEDICARE

## 2025-01-23 VITALS
OXYGEN SATURATION: 97 % | SYSTOLIC BLOOD PRESSURE: 131 MMHG | TEMPERATURE: 98 F | HEART RATE: 73 BPM | RESPIRATION RATE: 16 BRPM | DIASTOLIC BLOOD PRESSURE: 75 MMHG

## 2025-01-23 DIAGNOSIS — D50.0 IRON DEFICIENCY ANEMIA DUE TO CHRONIC BLOOD LOSS: Primary | ICD-10-CM

## 2025-01-23 NOTE — PROGRESS NOTES
Pt here for Infed . Pt denies any issues or concerns.      Ordering Provider: dr morocho  Order Exp: ongoing     Pt tolerated infusion without difficulty or complaint. Reviewed next apt date/time: 6 week follow up      Education Record  Learner:  Patient  Disease / Diagnosis: CALOS  Barriers / Limitations:  None  Method:  Brief focused  General Topics:  Plan of care reviewed  Outcome:  Shows understanding    Treatment given over 2 hours per md order.

## 2025-02-21 ENCOUNTER — TELEPHONE (OUTPATIENT)
Age: 85
End: 2025-02-21

## 2025-02-21 RX ORDER — ENOXAPARIN SODIUM 100 MG/ML
80 INJECTION SUBCUTANEOUS EVERY 12 HOURS SCHEDULED
Qty: 100 ML | Refills: 1 | Status: SHIPPED | OUTPATIENT
Start: 2025-02-21

## 2025-02-21 NOTE — TELEPHONE ENCOUNTER
Pt daughter Gracie is calling to request the orders for labs needed, and also do patient complete before upcoming appt or can she compete at time of appt.       Please contact Gracie.

## 2025-02-21 NOTE — TELEPHONE ENCOUNTER
Spoke with daughter Gracie, informed her that labs can be done the day of patient's appointment. She is requesting refill of Lovenox, prescription sent.

## 2025-02-24 ENCOUNTER — LAB REQUISITION (OUTPATIENT)
Dept: LAB | Facility: HOSPITAL | Age: 85
End: 2025-02-24
Payer: MEDICARE

## 2025-02-24 DIAGNOSIS — E11.9 TYPE 2 DIABETES MELLITUS WITHOUT COMPLICATIONS (HCC): ICD-10-CM

## 2025-02-24 DIAGNOSIS — I50.20 UNSPECIFIED SYSTOLIC (CONGESTIVE) HEART FAILURE (HCC): ICD-10-CM

## 2025-02-24 DIAGNOSIS — K31.819 ANGIODYSPLASIA OF STOMACH AND DUODENUM WITHOUT BLEEDING: ICD-10-CM

## 2025-02-24 LAB
ALBUMIN SERPL-MCNC: 4.8 G/DL (ref 3.2–4.8)
ALBUMIN/GLOB SERPL: 2.1 {RATIO} (ref 1–2)
ALP LIVER SERPL-CCNC: 66 U/L
ALT SERPL-CCNC: 24 U/L
ANION GAP SERPL CALC-SCNC: 8 MMOL/L (ref 0–18)
AST SERPL-CCNC: 19 U/L (ref ?–34)
BILIRUB SERPL-MCNC: 0.2 MG/DL (ref 0.2–1.1)
BUN BLD-MCNC: 21 MG/DL (ref 9–23)
CALCIUM BLD-MCNC: 10 MG/DL (ref 8.7–10.6)
CHLORIDE SERPL-SCNC: 105 MMOL/L (ref 98–112)
CO2 SERPL-SCNC: 31 MMOL/L (ref 21–32)
CREAT BLD-MCNC: 1.07 MG/DL
EGFRCR SERPLBLD CKD-EPI 2021: 50 ML/MIN/1.73M2 (ref 60–?)
GLOBULIN PLAS-MCNC: 2.3 G/DL (ref 2–3.5)
GLUCOSE BLD-MCNC: 108 MG/DL (ref 70–99)
MAGNESIUM SERPL-MCNC: 1.9 MG/DL (ref 1.6–2.6)
OSMOLALITY SERPL CALC.SUM OF ELEC: 302 MOSM/KG (ref 275–295)
POTASSIUM SERPL-SCNC: 3.6 MMOL/L (ref 3.5–5.1)
PROT SERPL-MCNC: 7.1 G/DL (ref 5.7–8.2)
SODIUM SERPL-SCNC: 144 MMOL/L (ref 136–145)

## 2025-02-24 PROCEDURE — 83735 ASSAY OF MAGNESIUM: CPT | Performed by: INTERNAL MEDICINE

## 2025-02-24 PROCEDURE — 80053 COMPREHEN METABOLIC PANEL: CPT | Performed by: INTERNAL MEDICINE

## 2025-02-26 ENCOUNTER — TELEPHONE (OUTPATIENT)
Age: 85
End: 2025-02-26

## 2025-02-26 NOTE — TELEPHONE ENCOUNTER
Patient's , Minh, calling regarding patient's Lovenox prescription. He states that their insurance will not cover the Lovenox medication.     I called [facility] to check the status of the prescription. Per pharmacy technician, the patient's insurance only covers 62 days worth of Lovenox, anything after the 62 days is not covered.    I relayed above information to Minh from the pharmacy. I informed him that I have sent over a Good RX coupon through [employer] to show the pharmacy - this would bring their payment down to $56 instead of $250. I encouraged him to call their insurance directly regarding further inquires about coverage. He stated understanding and is in agreement with plan. He thanked me for the call and assistance.

## 2025-02-27 ENCOUNTER — TELEPHONE (OUTPATIENT)
Age: 85
End: 2025-02-27

## 2025-02-27 NOTE — TELEPHONE ENCOUNTER
[facility] called regarding Enoxaparin. He needs clarification on prescription.  Please call back. Thank you.

## 2025-02-28 ENCOUNTER — TELEPHONE (OUTPATIENT)
Age: 85
End: 2025-02-28

## 2025-02-28 NOTE — TELEPHONE ENCOUNTER
Pt spouse is calling due to patient has black and blue discoloration on the left side of stomach where the sodium injections were given to her while in rehab. The spot is hard, swollen and hurts to touch. The family is now giving her the injections on the right and left but stay away from that area. Not sure what to give patient to ease her discomfort.    The site is about 2 1/2 high and 3 1/2 long.      Please contact patient

## 2025-03-03 ENCOUNTER — APPOINTMENT (OUTPATIENT)
Dept: CT IMAGING | Facility: HOSPITAL | Age: 85
End: 2025-03-03
Attending: EMERGENCY MEDICINE
Payer: MEDICARE

## 2025-03-03 ENCOUNTER — TELEPHONE (OUTPATIENT)
Age: 85
End: 2025-03-03

## 2025-03-03 ENCOUNTER — OFFICE VISIT (OUTPATIENT)
Age: 85
End: 2025-03-03
Attending: INTERNAL MEDICINE
Payer: MEDICARE

## 2025-03-03 ENCOUNTER — HOSPITAL ENCOUNTER (EMERGENCY)
Facility: HOSPITAL | Age: 85
Discharge: HOME | End: 2025-03-04
Attending: EMERGENCY MEDICINE
Payer: MEDICARE

## 2025-03-03 VITALS
SYSTOLIC BLOOD PRESSURE: 127 MMHG | DIASTOLIC BLOOD PRESSURE: 64 MMHG | WEIGHT: 168 LBS | RESPIRATION RATE: 20 BRPM | OXYGEN SATURATION: 99 % | HEIGHT: 62 IN | BODY MASS INDEX: 30.91 KG/M2 | TEMPERATURE: 99 F | HEART RATE: 79 BPM

## 2025-03-03 DIAGNOSIS — N30.00 ACUTE CYSTITIS WITHOUT HEMATURIA: ICD-10-CM

## 2025-03-03 DIAGNOSIS — S30.1XXA ABDOMINAL WALL HEMATOMA, INITIAL ENCOUNTER: Primary | ICD-10-CM

## 2025-03-03 DIAGNOSIS — D50.0 IRON DEFICIENCY ANEMIA DUE TO CHRONIC BLOOD LOSS: ICD-10-CM

## 2025-03-03 LAB
ALBUMIN SERPL-MCNC: 4.5 G/DL (ref 3.2–4.8)
ALBUMIN/GLOB SERPL: 1.5 {RATIO} (ref 1–2)
ALP LIVER SERPL-CCNC: 69 U/L
ALT SERPL-CCNC: 10 U/L
ANION GAP SERPL CALC-SCNC: 11 MMOL/L (ref 0–18)
AST SERPL-CCNC: 11 U/L (ref ?–34)
BASOPHILS # BLD AUTO: 0.02 X10(3) UL (ref 0–0.2)
BASOPHILS NFR BLD AUTO: 0.3 %
BILIRUB SERPL-MCNC: 0.3 MG/DL (ref 0.2–1.1)
BILIRUB UR QL STRIP.AUTO: NEGATIVE
BUN BLD-MCNC: 22 MG/DL (ref 9–23)
CALCIUM BLD-MCNC: 9.6 MG/DL (ref 8.7–10.6)
CHLORIDE SERPL-SCNC: 107 MMOL/L (ref 98–112)
CO2 SERPL-SCNC: 27 MMOL/L (ref 21–32)
COLOR UR AUTO: YELLOW
CREAT BLD-MCNC: 1.19 MG/DL
EGFRCR SERPLBLD CKD-EPI 2021: 44 ML/MIN/1.73M2 (ref 60–?)
EOSINOPHIL # BLD AUTO: 0.04 X10(3) UL (ref 0–0.7)
EOSINOPHIL NFR BLD AUTO: 0.5 %
ERYTHROCYTE [DISTWIDTH] IN BLOOD BY AUTOMATED COUNT: 16 %
GLOBULIN PLAS-MCNC: 3 G/DL (ref 2–3.5)
GLUCOSE BLD-MCNC: 142 MG/DL (ref 70–99)
GLUCOSE UR STRIP.AUTO-MCNC: NORMAL MG/DL
HCT VFR BLD AUTO: 33.6 %
HGB BLD-MCNC: 10.1 G/DL
IMM GRANULOCYTES # BLD AUTO: 0.02 X10(3) UL (ref 0–1)
IMM GRANULOCYTES NFR BLD: 0.3 %
KETONES UR STRIP.AUTO-MCNC: NEGATIVE MG/DL
LEUKOCYTE ESTERASE UR QL STRIP.AUTO: 500
LYMPHOCYTES # BLD AUTO: 0.59 X10(3) UL (ref 1–4)
LYMPHOCYTES NFR BLD AUTO: 8 %
MCH RBC QN AUTO: 27.2 PG (ref 26–34)
MCHC RBC AUTO-ENTMCNC: 30.1 G/DL (ref 31–37)
MCV RBC AUTO: 90.6 FL
MONOCYTES # BLD AUTO: 0.54 X10(3) UL (ref 0.1–1)
MONOCYTES NFR BLD AUTO: 7.3 %
NEUTROPHILS # BLD AUTO: 6.14 X10 (3) UL (ref 1.5–7.7)
NEUTROPHILS # BLD AUTO: 6.14 X10(3) UL (ref 1.5–7.7)
NEUTROPHILS NFR BLD AUTO: 83.6 %
OSMOLALITY SERPL CALC.SUM OF ELEC: 306 MOSM/KG (ref 275–295)
PH UR STRIP.AUTO: 6 [PH] (ref 5–8)
PLATELET # BLD AUTO: 197 10(3)UL (ref 150–450)
POTASSIUM SERPL-SCNC: 4 MMOL/L (ref 3.5–5.1)
PROT SERPL-MCNC: 7.5 G/DL (ref 5.7–8.2)
PROT UR STRIP.AUTO-MCNC: 100 MG/DL
RBC # BLD AUTO: 3.71 X10(6)UL
RBC #/AREA URNS AUTO: >10 /HPF
SODIUM SERPL-SCNC: 145 MMOL/L (ref 136–145)
SP GR UR STRIP.AUTO: 1.02 (ref 1–1.03)
UROBILINOGEN UR STRIP.AUTO-MCNC: NORMAL MG/DL
WBC # BLD AUTO: 7.4 X10(3) UL (ref 4–11)
WBC #/AREA URNS AUTO: >50 /HPF
WBC CLUMPS UR QL AUTO: PRESENT /HPF

## 2025-03-03 PROCEDURE — 99285 EMERGENCY DEPT VISIT HI MDM: CPT

## 2025-03-03 PROCEDURE — 85025 COMPLETE CBC W/AUTO DIFF WBC: CPT | Performed by: EMERGENCY MEDICINE

## 2025-03-03 PROCEDURE — 74176 CT ABD & PELVIS W/O CONTRAST: CPT | Performed by: EMERGENCY MEDICINE

## 2025-03-03 PROCEDURE — 87186 SC STD MICRODIL/AGAR DIL: CPT | Performed by: EMERGENCY MEDICINE

## 2025-03-03 PROCEDURE — 81001 URINALYSIS AUTO W/SCOPE: CPT | Performed by: EMERGENCY MEDICINE

## 2025-03-03 PROCEDURE — 36415 COLL VENOUS BLD VENIPUNCTURE: CPT

## 2025-03-03 PROCEDURE — 80053 COMPREHEN METABOLIC PANEL: CPT | Performed by: EMERGENCY MEDICINE

## 2025-03-03 PROCEDURE — 87088 URINE BACTERIA CULTURE: CPT | Performed by: EMERGENCY MEDICINE

## 2025-03-03 PROCEDURE — 99284 EMERGENCY DEPT VISIT MOD MDM: CPT

## 2025-03-03 PROCEDURE — 87086 URINE CULTURE/COLONY COUNT: CPT | Performed by: EMERGENCY MEDICINE

## 2025-03-03 RX ORDER — CEPHALEXIN 500 MG/1
500 CAPSULE ORAL ONCE
Status: COMPLETED | OUTPATIENT
Start: 2025-03-03 | End: 2025-03-03

## 2025-03-03 RX ORDER — METHENAMINE HIPPURATE 1000 MG/1
TABLET ORAL
COMMUNITY
Start: 2025-02-18

## 2025-03-03 RX ORDER — CEPHALEXIN 500 MG/1
500 CAPSULE ORAL 2 TIMES DAILY
Qty: 14 CAPSULE | Refills: 0 | Status: SHIPPED | OUTPATIENT
Start: 2025-03-03 | End: 2025-03-06

## 2025-03-03 NOTE — ED INITIAL ASSESSMENT (HPI)
Patient presents for evaluation of abdominal pain. She has been receiving lovenox injections into her abdomen twice daily and has pain/bruising/hardness to left lower quadrant. She also was recently treated for a urinary tract infection with nitrofurantoin and finished the course on Thursday. She stated she noticed burning with urination this morning. Denies known fever.

## 2025-03-04 NOTE — PROGRESS NOTES
Called Tigre and Gracie (patient's  and daughter) to discuss plan.    Patient is having abdominal pain from hematomas.     Plan:  - STOP lovenox injections at this time.  - Start Eliquis 5 mg PO, one tablet, q12 hours -- from today until April 14, 2025.   - Starting April 15, 2025, inject lovenox 40 mg subcutaneously ONCE daily.  - Repeat BLE US duplexes 3rd week of April 2025.  - Return to clinic to see my 4/29/25.    They verbalized understanding.     Xiomy Zuleta D.O.  [facility]

## 2025-03-04 NOTE — DISCHARGE INSTRUCTIONS
Dr Esparza office will call you tomorrow regarding continued use of Lovenox.  Okay to hold Lovenox tomorrow morning.    Start Keflex for bladder infection.  Urine culture results are pending.  They should be available on Wednesday.  We will call you if you need an antibiotic change

## 2025-03-04 NOTE — PATIENT INSTRUCTIONS
Plan:  - STOP lovenox injections at this time.  - Start Eliquis 5 mg PO, one tablet, q12 hours -- from today until April 14, 2025.   - Starting April 15, 2025, inject lovenox 40 mg subcutaneously ONCE daily.  - Repeat BLE US duplexes 3rd week of April 2025. To schedule, please call [phone]  - Return to clinic to see my 4/29/25.

## 2025-03-04 NOTE — ED PROVIDER NOTES
Patient Seen in: [facility] Emergency Department      History     Chief Complaint   Patient presents with    Abdomen/Flank Pain     sent from hemotology for abdmoninal pain and brusing, tenderness, and wants ct scan.      Stated Complaint: sent from hemotology for abdmoninal pain and brusing, tenderness, and wants ct *    Subjective:   HPI      85-year-old female presents for evaluation of abdominal pain.  Patient's family has been injecting her with twice daily Lovenox into the abdominal wall.  They have noticed some increasing pain over the past few days related to the injections.  They are concerned that the patient has a significant hematoma of the abdominal wall.  PCP sent her here today for a CT scan of the abdomen.    Objective:     Past Medical History:    ANXIETY    Anxiety    Arrhythmia    pt unaware    BACK PAIN    LS & C-SPINE DZ W/ PAIN    Chronic rhinitis    Depression    Encephalopathy acute    Esophageal reflux    GERD    W/ PHAN's    High blood pressure    High cholesterol    Hypercholesterolemia    HYPERLIPIDEMIA    Hypertension    Iron deficiency anemia due to chronic blood loss    Irritable bowel syndrome with constipation    Kidney disease    frequent infections; pt states frequent UTIs    Late onset Alzheimer's disease without behavioral disturbance (HCC)    Migraines    NSTEMI (non-ST elevated myocardial infarction) (HCC)    Ophthalmic migraine    DANNIE (obstructive sleep apnea)    AHI 11 RDI 13 REM AHI 0 Supine AHI 11 non-supine AHI 0 Sao2 Ye 83%     OSTEOPOROSIS    Other and unspecified hyperlipidemia    OTHER DISEASES    PSVT, pt unaware    OTHER DISEASES    Barrott's Esoghagus    Reflux    barretts    Sleep apnea    wears a mouth gaurd    UTI (lower urinary tract infection)    no UTI currently as of 5/5/16              Past Surgical History:   Procedure Laterality Date    Benign biopsy left  ?    stereotactic core biopsy    Benign biopsy right  ?    stereotactic core biopsy     Colonoscopy  11/01/2008    RECHECK 5 YRS    Colonoscopy N/A 07/19/2021    Procedure: COLONOSCOPY;  Surgeon: Manish Rodriguez MD;  Location: [facility] ENDOSCOPY    Colonoscopy,biopsy  01/08/2014    Procedure: ESOPHAGOGASTRODUODENOSCOPY, COLONOSCOPY, POSSIBLE BIOPSY, POSSIBLE POLYPECTOMY [id number];  Surgeon: Manish Rodriguez MD;  Location: [facility]    Hysterectomy      Optx dstl radl x-artic fx/epiphysl sep Left 10/06/2014    Procedure: OPEN REDUCTION INTERNAL FIXATION ARM;  Surgeon: Augustina Banuelos MD;  Location: [facility]    Other surgical history      URETHRAL SUSPENSION    Other surgical history      STEROTACTIC BX's BOTH BREASTS    Other surgical history  05/01/2008    EGD = PHAN'S    Other surgical history N/A 05/17/2016    Procedure: ENDOSCOPIC ULTRASOUND (EUS);  Surgeon: Glenn Chou MD;  Location:  ENDOSCOPY    Patient documented not to have experienced any of the following events  01/08/2014    Procedure: ESOPHAGOGASTRODUODENOSCOPY, COLONOSCOPY, POSSIBLE BIOPSY, POSSIBLE POLYPECTOMY [id number];  Surgeon: Manish Rodriguez MD;  Location: [facility]    Patient documented not to have experienced any of the following events Left 10/06/2014    Procedure: OPEN REDUCTION INTERNAL FIXATION ARM;  Surgeon: Augustina Banuelos MD;  Location: [facility]    Patient with preoperative order for iv antibiotic surgical site infect Left 10/06/2014    Procedure: OPEN REDUCTION INTERNAL FIXATION ARM;  Surgeon: Augustina Banuelos MD;  Location: [facility]    Patient withough preoperative order for iv antibiotic surgical site infection prophylaxis.  01/08/2014    Procedure: ESOPHAGOGASTRODUODENOSCOPY, COLONOSCOPY, POSSIBLE BIOPSY, POSSIBLE POLYPECTOMY [id number];  Surgeon: Manish Rodriguez MD;  Location: [facility]    Upper gi endoscopy,biopsy  01/08/2014    Procedure: ESOPHAGOGASTRODUODENOSCOPY, COLONOSCOPY, POSSIBLE BIOPSY, POSSIBLE POLYPECTOMY 30641,[id number];  Surgeon: Manish Rodriguez,  MD;  Location: [facility]    Vena cava filter                  Social History     Socioeconomic History    Marital status:    Tobacco Use    Smoking status: Former     Current packs/day: 0.00     Average packs/day: 1 pack/day for 15.0 years (15.0 ttl pk-yrs)     Types: Cigarettes     Start date: 1963     Quit date: 1978     Years since quittin.8    Smokeless tobacco: Never   Vaping Use    Vaping status: Never Used   Substance and Sexual Activity    Alcohol use: Not Currently    Drug use: No     Social Drivers of Health     Food Insecurity: Unknown (2025)    Food Insecurity     Food Insecurity: Patient unable to answer   Transportation Needs: Unknown (2025)    Transportation Needs     Lack of Transportation: Patient unable to answer   Housing Stability: Unknown (2025)    Housing Stability     Housing Instability: Patient unable to answer                  Physical Exam     ED Triage Vitals [25 1402]   /84   Pulse 96   Resp 18   Temp 98.6 °F (37 °C)   Temp src Temporal   SpO2 98 %   O2 Device None (Room air)       Current Vitals:   Vital Signs  BP: 127/64  Pulse: 79  Resp: 20  Temp: 98.6 °F (37 °C)  Temp src: Temporal    Oxygen Therapy  SpO2: 99 %  O2 Device: None (Room air)        Physical Exam     General: Alert, oriented, no apparent distress  HEENT:  Pupils equal reactive.  Extraocular motions intact.   Neck: Supple  Lungs: Clear to auscultation bilaterally.  Heart: Regular rate and rhythm.  Abdomen: Bruising in various colorations to the lower abdomen with a palpable abdominal wall hematoma most significant in the left lower quadrant  Skin: No rash.  No edema.  Neurologic: No focal neurologic deficits.  Normal speech pattern  Musculoskeletal: No tenderness or deformity noted.  Full range of motion throughout.      ED Course     Labs Reviewed   COMP METABOLIC PANEL (14) - Abnormal; Notable for the following components:       Result Value    Glucose 142  (*)     Creatinine 1.19 (*)     Calculated Osmolality 306 (*)     eGFR-Cr 44 (*)     All other components within normal limits   CBC WITH DIFFERENTIAL WITH PLATELET - Abnormal; Notable for the following components:    RBC 3.71 (*)     HGB 10.1 (*)     HCT 33.6 (*)     MCHC 30.1 (*)     Lymphocyte Absolute 0.59 (*)     All other components within normal limits   URINALYSIS WITH CULTURE REFLEX - Abnormal; Notable for the following components:    Clarity Urine Ex.Turbid (*)     Blood Urine 2+ (*)     Protein Urine 100 (*)     Nitrite Urine 1+ (*)     Leukocyte Esterase Urine 500 (*)     WBC Urine >50 (*)     RBC Urine >10 (*)     Squamous Epi. Cells Few (*)     WBC Clump Present (*)     All other components within normal limits   RAINBOW DRAW LAVENDER   RAINBOW DRAW LIGHT GREEN   RAINBOW DRAW BLUE   URINE CULTURE, ROUTINE                   MDM      Differential diagnosis includes anemia, abdominal wall hematoma, intra-abdominal bleeding    CBC shows a hemoglobin of 10.0 which is baseline for the patient    CT ABDOMEN+PELVIS(CPT=74176)    Result Date: 3/3/2025  PROCEDURE:  CT ABDOMEN+PELVIS (CPT=74176)  COMPARISON:  TALA VOGEL, CT ABDOMEN+PELVIS(CPT=74176), 1/10/2025, 8:13 PM.  INDICATIONS:  sent from hemotology for abdmoninal pain and brusing, tenderness, and wants ct scan.  TECHNIQUE:  Unenhanced multislice CT scanning was performed from the dome of the diaphragm to the pubic symphysis.  Dose reduction techniques were used. Dose information is transmitted to the ACR (American College of Radiology) NRDR (National Radiology Data Registry) which includes the Dose Index Registry.  PATIENT STATED HISTORY: (As transcribed by Technologist)  Patient presents with generalized abdominal pain.    FINDINGS:  LIVER:  No enlargement, atrophy, abnormal density, or significant focal lesion.  BILIARY:  No visible dilatation or calcification.  PANCREAS:  No lesion, fluid collection, ductal dilatation, or atrophy.  SPLEEN:  No  enlargement or focal lesion.  KIDNEYS:  No mass, obstruction, or calcification.  ADRENALS:  No mass or enlargement.  AORTA/VASCULAR:    Unremarkable as seen on non-contrast imaging. RETROPERITONEUM:  No mass or adenopathy.  BOWEL/MESENTERY:  There is moderate amount of stool distending the rectum with some minimal stranding surrounding the perirectal fat consistent with mild proctitis from fecal impaction within the rectum.  Sigmoid colon demonstrates diverticulosis without  ends of diverticulitis.  There is no abscess or free air.  No evidence of bowel obstruction.  Diffuse wall thickening is again noted which can be seen with gastritis or infiltrative process. ABDOMINAL WALL:  There are several injection hematomas noted within the abdominal wall with the largest being in the left anterior abdominal wall measuring up to 30 x 21 mm.  There is a diastasis of the midline anterior abdominal wall with a small fat containing umbilical hernia.  URINARY BLADDER:  No visible focal wall thickening, lesion, or calculus.  PELVIC NODES:  No adenopathy.  PELVIC ORGANS:  No visible mass.  Pelvic organs appropriate for patient age.  BONES:  No bony lesion or fracture.  Mild to moderate multilevel degenerative changes of the thoracic and lumbar spine. LUNG BASES:  No visible pulmonary or pleural disease.             CONCLUSION:   1.  There are several injection hematomas within the abdominal wall largest on the left.  2.  Stool within the rectum can be seen with fecal impaction and mild proctitis.   3. Diverticulosis of the sigmoid colon without diverticulitis.   LOCATION:  [facility]   Dictated by (CST): David French MD on 3/03/2025 at 10:19 PM     Finalized by (CST): David French MD on 3/03/2025 at 10:24 PM        Spoke with Dr Esparza.  Recommends holding Lovenox tomorrow and the clinic will call her for follow-up.  They will determine some other locations where she can inject her Lovenox.    Medical Decision Making  Amount and/or  Complexity of Data Reviewed  Independent Historian: caregiver     Details: Daughter and spouse at bedside    UA consistent with UTI.  Culture is pending.  Will start Keflex    Disposition and Plan     Clinical Impression:  1. Abdominal wall hematoma, initial encounter    2. Acute cystitis without hematuria         Disposition:  Discharge  3/3/2025 11:01 pm    Follow-up:  Diomedes Esparza MD  [address] [place] [address] [facility]  [facility] [address]  [phone]    Call            Medications Prescribed:  Current Discharge Medication List        START taking these medications    Details   cephALEXin 500 MG Oral Cap Take 1 capsule (500 mg total) by mouth 2 (two) times daily for 7 days.  Qty: 14 capsule, Refills: 0                 Supplementary Documentation:

## 2025-03-06 RX ORDER — LEVOFLOXACIN 250 MG/1
250 TABLET, FILM COATED ORAL DAILY
Qty: 3 TABLET | Refills: 0 | Status: SHIPPED | OUTPATIENT
Start: 2025-03-06 | End: 2025-03-09

## 2025-03-10 ENCOUNTER — TELEPHONE (OUTPATIENT)
Age: 85
End: 2025-03-10

## 2025-03-10 NOTE — TELEPHONE ENCOUNTER
Patient's daughter Gracie is calling and would like patient to have labs from Dr. Zuleta done at [facility] so patient's PCP has the results. Gracie would like to know from Dr. Zuleta's nurse what is best.    Informed Gracie that labs can be done at any of our outpatient labs, including at the hospital on Monday, 4/21/25, when patient has an scheduled appointment.     Gracie would like a call to inform what route to take.

## 2025-03-11 ENCOUNTER — OFFICE VISIT (OUTPATIENT)
Age: 85
End: 2025-03-11
Attending: INTERNAL MEDICINE
Payer: MEDICARE

## 2025-03-11 DIAGNOSIS — I82.4Z9 DEEP VEIN THROMBOSIS (DVT) OF DISTAL VEIN OF LOWER EXTREMITY, UNSPECIFIED CHRONICITY, UNSPECIFIED LATERALITY (HCC): Primary | ICD-10-CM

## 2025-03-11 NOTE — TELEPHONE ENCOUNTER
Returned Ghislaine's call, she stated she is going to have the patient get her blood work completed on 4/21/2025 when the patient comes to get her ultrasound completed. I informed her that I will fax the blood work results to the patient's PCP, Dr. Hess, since he is at [facility]. She is in agreement with plan and thanked me for the call.

## 2025-04-21 ENCOUNTER — HOSPITAL ENCOUNTER (OUTPATIENT)
Dept: ULTRASOUND IMAGING | Facility: HOSPITAL | Age: 85
Discharge: HOME | End: 2025-04-21
Attending: INTERNAL MEDICINE
Payer: MEDICARE

## 2025-04-21 ENCOUNTER — LAB ENCOUNTER (OUTPATIENT)
Dept: LAB | Facility: HOSPITAL | Age: 85
End: 2025-04-21
Attending: INTERNAL MEDICINE
Payer: MEDICARE

## 2025-04-21 DIAGNOSIS — D50.0 IRON DEFICIENCY ANEMIA DUE TO CHRONIC BLOOD LOSS: ICD-10-CM

## 2025-04-21 DIAGNOSIS — I82.4Z9 DEEP VEIN THROMBOSIS (DVT) OF DISTAL VEIN OF LOWER EXTREMITY, UNSPECIFIED CHRONICITY, UNSPECIFIED LATERALITY (HCC): ICD-10-CM

## 2025-04-21 LAB
ALBUMIN SERPL-MCNC: 4.4 G/DL (ref 3.2–4.8)
ALBUMIN/GLOB SERPL: 1.8 {RATIO} (ref 1–2)
ALP LIVER SERPL-CCNC: 87 U/L (ref 55–142)
ALT SERPL-CCNC: 11 U/L (ref 10–49)
ANION GAP SERPL CALC-SCNC: 7 MMOL/L (ref 0–18)
AST SERPL-CCNC: 15 U/L (ref ?–34)
BASOPHILS # BLD AUTO: 0.02 X10(3) UL (ref 0–0.2)
BASOPHILS NFR BLD AUTO: 0.4 %
BILIRUB SERPL-MCNC: 0.2 MG/DL (ref 0.2–1.1)
BUN BLD-MCNC: 24 MG/DL (ref 9–23)
CALCIUM BLD-MCNC: 9.8 MG/DL (ref 8.7–10.6)
CHLORIDE SERPL-SCNC: 106 MMOL/L (ref 98–112)
CO2 SERPL-SCNC: 29 MMOL/L (ref 21–32)
CREAT BLD-MCNC: 0.98 MG/DL (ref 0.55–1.02)
DEPRECATED HBV CORE AB SER IA-ACNC: 58 NG/ML (ref 50–306)
EGFRCR SERPLBLD CKD-EPI 2021: 56 ML/MIN/1.73M2 (ref 60–?)
EOSINOPHIL # BLD AUTO: 0.12 X10(3) UL (ref 0–0.7)
EOSINOPHIL NFR BLD AUTO: 2.3 %
ERYTHROCYTE [DISTWIDTH] IN BLOOD BY AUTOMATED COUNT: 16.2 %
FASTING STATUS PATIENT QL REPORTED: NO
GLOBULIN PLAS-MCNC: 2.4 G/DL (ref 2–3.5)
GLUCOSE BLD-MCNC: 112 MG/DL (ref 70–99)
HCT VFR BLD AUTO: 28.8 % (ref 35–48)
HGB BLD-MCNC: 8.9 G/DL (ref 12–16)
IMM GRANULOCYTES # BLD AUTO: 0.02 X10(3) UL (ref 0–1)
IMM GRANULOCYTES NFR BLD: 0.4 %
IRON SATN MFR SERPL: 17 % (ref 15–50)
IRON SERPL-MCNC: 50 UG/DL (ref 50–170)
LYMPHOCYTES # BLD AUTO: 0.68 X10(3) UL (ref 1–4)
LYMPHOCYTES NFR BLD AUTO: 13.2 %
MCH RBC QN AUTO: 27.7 PG (ref 26–34)
MCHC RBC AUTO-ENTMCNC: 30.9 G/DL (ref 31–37)
MCV RBC AUTO: 89.7 FL (ref 80–100)
MONOCYTES # BLD AUTO: 0.45 X10(3) UL (ref 0.1–1)
MONOCYTES NFR BLD AUTO: 8.8 %
NEUTROPHILS # BLD AUTO: 3.85 X10 (3) UL (ref 1.5–7.7)
NEUTROPHILS # BLD AUTO: 3.85 X10(3) UL (ref 1.5–7.7)
NEUTROPHILS NFR BLD AUTO: 74.9 %
OSMOLALITY SERPL CALC.SUM OF ELEC: 299 MOSM/KG (ref 275–295)
PLATELET # BLD AUTO: 197 10(3)UL (ref 150–450)
POTASSIUM SERPL-SCNC: 4.2 MMOL/L (ref 3.5–5.1)
PROT SERPL-MCNC: 6.8 G/DL (ref 5.7–8.2)
RBC # BLD AUTO: 3.21 X10(6)UL (ref 3.8–5.3)
SODIUM SERPL-SCNC: 142 MMOL/L (ref 136–145)
TOTAL IRON BINDING CAPACITY: 300 UG/DL (ref 250–425)
TRANSFERRIN SERPL-MCNC: 235 MG/DL (ref 250–380)
WBC # BLD AUTO: 5.1 X10(3) UL (ref 4–11)

## 2025-04-21 PROCEDURE — 83540 ASSAY OF IRON: CPT

## 2025-04-21 PROCEDURE — 83550 IRON BINDING TEST: CPT

## 2025-04-21 PROCEDURE — 85025 COMPLETE CBC W/AUTO DIFF WBC: CPT

## 2025-04-21 PROCEDURE — 93970 EXTREMITY STUDY: CPT | Performed by: INTERNAL MEDICINE

## 2025-04-21 PROCEDURE — 80053 COMPREHEN METABOLIC PANEL: CPT

## 2025-04-21 PROCEDURE — 36415 COLL VENOUS BLD VENIPUNCTURE: CPT

## 2025-04-21 PROCEDURE — 82728 ASSAY OF FERRITIN: CPT

## 2025-04-22 ENCOUNTER — TELEPHONE (OUTPATIENT)
Age: 85
End: 2025-04-22

## 2025-04-22 NOTE — TELEPHONE ENCOUNTER
Faxed most recent blood work to Dr. Wilson's office ([phone]) per daughter's request. Confirmation received.

## 2025-04-23 ENCOUNTER — TELEPHONE (OUTPATIENT)
Facility: LOCATION | Age: 85
End: 2025-04-23

## 2025-04-23 ENCOUNTER — TELEPHONE (OUTPATIENT)
Age: 85
End: 2025-04-23

## 2025-04-23 NOTE — TELEPHONE ENCOUNTER
The pt had labs done and Dr. Trent's office read the labs and they were advised the pt hemoglobin is low and they would like a call back to discuss results before the appt on 4/29     Gracie also reports the pt is easily short of breath and low stamina, pt is weak, they have noticed the symptoms for a week now

## 2025-04-23 NOTE — TELEPHONE ENCOUNTER
Toxicities: Apixaban    Isabella Bowman's daughter called to update Dr Zuleta that they received a call from Dr Rodriguez's office (Isabella's GI specialist office) called with concern about her Hgb on 4/21/2025.     Gracie reports she has noticed a decrease in Isabella's stamina. She is also short of breath when walking 40 feet. No complaints of chest pain, chest pressure or chest heaviness. No active bleeding.     Gracie also reports that her father forgot to decrease Isabella's apixaban dose from 5 mg to 2.5 mg starting on 4/15/2025. The error was not caught until 4/21/2025. They only have 1.5 days left of the apixaban. She wanted to know if Dr Zuleta could provide more free drug to get them to her appointment on 4/29/2025. Gracie is asking for a call back this afternoon if possible.

## 2025-04-23 NOTE — TELEPHONE ENCOUNTER
Alexa with Dr. Rodriguez. Manish office calling due to the recent labs showing patient hemoglobin dropping to 8.9 and wasn't sure if patient would be receiving an infusion or iron. She also wasn't sure whom is monitoring the levels. The daughter informed Dr. Rodriguez that patient has shown a decline recently.

## 2025-04-24 NOTE — TELEPHONE ENCOUNTER
I called Gracie and updated her that Dr Zuleta wants her to please hold the eliquis for now.Isabella has already completed 3 months of full dose of the blood thinner. Dr Zuleta prefers to be cautious now as her hemoglobin has dropped. When she comes for her office visit on 4/29/2025, Dr Zuleta will give her an additional dose of IV iron. Gracie verbalized understanding. No additional questions or concerns at this time.

## 2025-04-24 NOTE — TELEPHONE ENCOUNTER
Returned call to Dr. Rodriguez's office, informed them that patient has a follow up appointment with Dr. Zuleta on Tuesday 4/29, she will be receiving a dose of IV iron and holding her Eliquis at this time due to low hemoglobin. She stated understanding.

## 2025-04-29 ENCOUNTER — OFFICE VISIT (OUTPATIENT)
Age: 85
End: 2025-04-29
Attending: INTERNAL MEDICINE
Payer: MEDICARE

## 2025-04-29 VITALS
SYSTOLIC BLOOD PRESSURE: 120 MMHG | HEART RATE: 72 BPM | RESPIRATION RATE: 20 BRPM | OXYGEN SATURATION: 99 % | DIASTOLIC BLOOD PRESSURE: 82 MMHG | TEMPERATURE: 97 F

## 2025-04-29 VITALS — HEART RATE: 65 BPM | DIASTOLIC BLOOD PRESSURE: 65 MMHG | SYSTOLIC BLOOD PRESSURE: 135 MMHG | RESPIRATION RATE: 19 BRPM

## 2025-04-29 DIAGNOSIS — I82.4Z9 DEEP VEIN THROMBOSIS (DVT) OF DISTAL VEIN OF LOWER EXTREMITY, UNSPECIFIED CHRONICITY, UNSPECIFIED LATERALITY (HCC): ICD-10-CM

## 2025-04-29 DIAGNOSIS — D50.0 IRON DEFICIENCY ANEMIA DUE TO CHRONIC BLOOD LOSS: ICD-10-CM

## 2025-04-29 DIAGNOSIS — D50.0 IRON DEFICIENCY ANEMIA DUE TO CHRONIC BLOOD LOSS: Primary | ICD-10-CM

## 2025-04-29 DIAGNOSIS — I82.4Z9 DEEP VEIN THROMBOSIS (DVT) OF DISTAL VEIN OF LOWER EXTREMITY, UNSPECIFIED CHRONICITY, UNSPECIFIED LATERALITY (HCC): Primary | ICD-10-CM

## 2025-04-29 LAB
BASOPHILS # BLD AUTO: 0.02 X10(3) UL (ref 0–0.2)
BASOPHILS NFR BLD AUTO: 0.5 %
D DIMER PPP FEU-MCNC: 1.22 UG/ML FEU (ref ?–0.87)
EOSINOPHIL # BLD AUTO: 0.1 X10(3) UL (ref 0–0.7)
EOSINOPHIL NFR BLD AUTO: 2.4 %
ERYTHROCYTE [DISTWIDTH] IN BLOOD BY AUTOMATED COUNT: 15.9 %
HCT VFR BLD AUTO: 30.5 % (ref 35–48)
HGB BLD-MCNC: 9.8 G/DL (ref 12–16)
IMM GRANULOCYTES # BLD AUTO: 0.01 X10(3) UL (ref 0–1)
IMM GRANULOCYTES NFR BLD: 0.2 %
LYMPHOCYTES # BLD AUTO: 1 X10(3) UL (ref 1–4)
LYMPHOCYTES NFR BLD AUTO: 24 %
MCH RBC QN AUTO: 28.1 PG (ref 26–34)
MCHC RBC AUTO-ENTMCNC: 32.1 G/DL (ref 31–37)
MCV RBC AUTO: 87.4 FL (ref 80–100)
MONOCYTES # BLD AUTO: 0.47 X10(3) UL (ref 0.1–1)
MONOCYTES NFR BLD AUTO: 11.3 %
NEUTROPHILS # BLD AUTO: 2.56 X10 (3) UL (ref 1.5–7.7)
NEUTROPHILS # BLD AUTO: 2.56 X10(3) UL (ref 1.5–7.7)
NEUTROPHILS NFR BLD AUTO: 61.6 %
PLATELET # BLD AUTO: 201 10(3)UL (ref 150–450)
RBC # BLD AUTO: 3.49 X10(6)UL (ref 3.8–5.3)
WBC # BLD AUTO: 4.2 X10(3) UL (ref 4–11)

## 2025-04-29 RX ORDER — NITROFURANTOIN 25; 75 MG/1; MG/1
CAPSULE ORAL
COMMUNITY
Start: 2025-02-19

## 2025-04-29 RX ORDER — TRIAMCINOLONE ACETONIDE 1 MG/G
1 CREAM TOPICAL 2 TIMES DAILY
COMMUNITY
Start: 2025-03-03

## 2025-04-29 RX ORDER — SENNOSIDES A AND B 8.6 MG/1
8.6 TABLET, FILM COATED ORAL 2 TIMES DAILY
COMMUNITY

## 2025-04-29 RX ORDER — FUROSEMIDE 40 MG/1
TABLET ORAL
COMMUNITY
Start: 2024-12-24

## 2025-04-29 RX ORDER — MUPIROCIN 20 MG/G
OINTMENT TOPICAL
COMMUNITY
Start: 2025-03-03

## 2025-04-29 RX ORDER — POTASSIUM CHLORIDE 750 MG/1
TABLET, EXTENDED RELEASE ORAL
COMMUNITY
Start: 2025-03-07

## 2025-04-29 NOTE — PROGRESS NOTES
Hematology/Oncology Progress Note    Patient Name: Isabella Addison  Medical Record Number: [id number]    YOB: 1940   Date of Service: 4/29/25     Reason for Consultation/Chief Complaint:  bilateral extensive DVTs, iron deficiency anemia  Physician requesting consultation: Jorge Lr    Interval History  4/29/25  Patient presents for follow up of bilateral DVTs.   She is here with her  and daughter.  She has unfortunately had a few falls since last visit - appears to be related to imbalance.  ECOG is still 4. She is in a wheelchair today.       Oncologic History:  4/3/23: Per chart review, US Venous doppler B/L Leg with no evidence of DVT in either leg. She did have evidence of RLL subacute pulmonary emboli. IVC filter was placed due to inability to anticoagulate (contraindication) - given active GI bleed at the time.   12/31/24: Patient was reported placed on Eliquis 5 mg PO BID at her facility [facility], for bilateral LE DVT.  There is no record of this report. Of note, patient was continued on aspirin 81 mg daily at [facility] (which she has been taking for her hx of NSTEMI s/p SAMARIA 2022).   1/10/25 - 1/14/25: Patient admitted to [facility] for acute on chronic anemia, found to have Hb 6.5 g/dL and dark stools. Upon admission patient found to have INR 1.2, Hb 6.5 g/dL, Cr 1.21. CT A/P with diffuse thickening of stomach. She received total 2 u PRBC, After which Hb has been consistently 8.0 - 8.2 g/dL.   1/12/25:  GI service evaluated patient and performed EGD 1/12/25 which showed small schatzki's ring above a small hiatal hernia.  Mild gastritis.  3 amvs (2-4 mm) in duodenum, no bleeding, s/p APC and clipping. Dr. Meier (GI) spoke to  about procedure explaining she may have other AVM's other lesions deeper than what can be evaluated on this exam or that may become present on anticoagulation.  relayed that he did not want colonoscopy given her age; he was  interested in monitoring and treatment for anemia. Hematology was consulted for further recommendations.  1/13/25: I saw her in the hospital while she was admitted. Given high HAS-BLED score (3) and high risk for major bleeding, in conjunction with cardiology service's recommendations we discontinued her daily aspirin 81 mg.   1/13/25: US BLE Venous Duplexes with extensive bilateral lower extremity DVT's (right and left common femoral veins all the way down to right and left popliteal veins). She was transitioned from prophylactic to therapeutic lovenox 80 mg/kg subcutaneous BID (1 mg/kg BID). PT worked with her to get her OOBTC and ambulating (I encourage mobility). She tolerated lovenox without any acute signs of bleeding and was discharged from hospital back to [facility].   4/14/25: Completed 3 months of therapeutic anticoagulation (mix of lovenox and Eliquis).   4/21/25: Repeat BLE duplexes with continued extensive clot bilaterally, although some improvement is present.   RLE: partial clot in the proximal and mid superficial femoral vein, occlusive clot in the distal superficial femoral vein, occlusive clot in all the popliteal and the proximal and mid posterior tibial artery.  The posterior tibial vein on the prior exam were not visualized.  There has been resolution of clot from the common femoral vein, the external iliac vein, and the saphenofemoral junction.   LLE: partial clot in the common femoral vein, all of the superficial femoral vein, the distal popliteal vein, with occlusive clot in the proximal posterior tibial vein, and partial clot in the mid and distal posterior tibial vein.  The posterior tibial vein on the prior exam were not visualized.  There has been resolution of clot from the saphenofemoral junction and profundus femoris as well as from the proximal popliteal vein.       Review of Systems:  A 10-point ROS was done with pertinent positives and negatives per the HPI.     ECOG Performance  Status: 4    Vital Signs:  Height: --  Weight: --  BSA (Calculated - sq m): --  Pulse: --  BP: --  Temp: --  Do Not Use - Resp Rate: --  SpO2: --  Wt Readings from Last 6 Encounters:   03/03/25 76.2 kg (168 lb)   01/11/25 79.4 kg (175 lb)   11/23/24 69.7 kg (153 lb 10.6 oz)   10/28/24 69.7 kg (153 lb 10.6 oz)   06/28/23 69.7 kg (153 lb 9.6 oz)   04/05/23 65.6 kg (144 lb 10 oz)       Physical Examination:  General: Elderly female seen sitting up in wheelchair, no acute distress. Is sleepy but awake  Eyes: EOMI, bilateral conjunctiva normal. Sclera anicteric.  ENT: Oropharynx is clear. Mucous membranes moist.  Lymph Nodes: No cervical or supraclavicular lymphadenopathy.  Respiratory: Lungs clear to auscultation bilaterally.  Cardiovascular: Regular rate and rhythm, no murmurs. No pitting edema  GI: Soft, non-tender, non-distended with normoactive bowel sounds. No hepatosplenomegaly.  Heme: normal capillary refill. No ecchymosis, petechiae, or purpura.  Neurological: Alert and oriented. No apparent focal sensory or motor deficits  Skin: no rashes or lesions.  Psychiatric: AAO x 1-2 at baseline due to dementia    Data Review  Laboratory Studies:  No results for input(s): \"WBC\", \"HGB\", \"HCT\", \"PLT\", \"MCV\", \"RDW\", \"NEPRELIM\" in the last 168 hours.    No results for input(s): \"NA\", \"K\", \"CL\", \"CO2\", \"BUN\", \"CREATSERUM\", \"GFRAA\", \"GFRNAA\", \"GLU\", \"CA\", \"PHOS\", \"TP\", \"ALB\", \"ALKPHO\", \"AST\", \"ALT\", \"BILT\" in the last 168 hours.    Invalid input(s): \"MAG\"    No results for input(s): \"PT\", \"INR\", \"PTT\", \"FIB\" in the last 168 hours.    Radiographic Studies:    US BLE Venous Duplexes 1/13/25  FINDINGS:    SAPHENOFEMORAL JUNCTION:  No reflux.  THROMBI:  Extensive bilateral lower extremity DVT or noted.       Right lower extremity:  Partially occlusive thrombi identified in the right external iliac vein, saphenofemoral junction, common femoral vein and right profunda femoral vein.  Occlusive thrombus is identified throughout the  right superficial femoral and  popliteal veins.     Left lower extremity:  Partially occlusive thrombus identified in the left saphenofemoral junction, left common femoral, left profunda femoral, left superficial femoral and distal left popliteal vein.  Occlusive thrombus is seen within the peripheral  aspect of the left superficial femoral and the central aspect of the left popliteal vein.  COMPRESSION:  Incomplete compressibility noted throughout the deep veins of both lower extremities.  OTHER:  Evaluation of the posterior tibial veins is limited by calf selling bilaterally.       Impression   CONCLUSION:    1. Extensive bilateral lower extremity DVT as detailed above.  The critical test results were called to the patient's nurse, Tejinder at 2125 hours on 1/13/2025.  There is appropriate read back.          US VENOUS DOPPLER LEG BILAT - DIAG IMG (CPT=93970)  Result Date: 4/21/2025  CONCLUSION:  Although there is continued extensive clot present in the legs bilaterally, there has been some improvement compared to the prior from 1/13/2025 as described.  Continued clinical follow-up advised, consider Doppler follow-up as needed.   LOCATION:  [facility]   Dictated by (CST): Bob Clark MD on 4/21/2025 at 2:42 PM     Finalized by (CST): Bob Clark MD on 4/21/2025 at 2:45 PM       CT ABDOMEN+PELVIS(CPT=74176)  Result Date: 3/3/2025  CONCLUSION:   1.  There are several injection hematomas within the abdominal wall largest on the left.  2.  Stool within the rectum can be seen with fecal impaction and mild proctitis.   3. Diverticulosis of the sigmoid colon without diverticulitis.   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      Acute on Chronic Anemia - likely secondary to blood loss given history of GI bleed, hx of bleeding duodenal AVM's (April '23), and recent anticoagulation + antiplatelet therapy; Iron Deficiency Anemia     - Patient was admitted for acute blood loss anemia with  INR 1.2, Hb 6.5 g/dL, Cr 1.21. CT A/P with diffuse thickening of stomach. She received total 2 u PRBC after which Hb went up to 8.2 g/dL. Baseline Hb ~ 8.0 g/dL  - GI service evaluated patient and performed EGD 1/12/25 which showed small schatzki's ring above a small hiatal hernia.  Mild gastritis.  3 amvs (2-4 mm) in duodenum, no bleeding, s/p APC and clipping. Dr. Meier (GI) spoke to  about procedure explaining she may have other AVM's other lesions deeper than what can be evaluated on this exam or that may become present on anticoagulation.  relayed that he did not want colonoscopy given her age; he was interested in monitoring and treatment for anemia. Hematology was consulted.   -Apparently patient was recently placed on Eliquis 5 mg PO BID at her facility [facility], for bilateral LE DVT starting ~12/31/24. There is no record of this report. Of note, patient was continued on aspirin 81 mg daily at [facility] (which she has been taking for her hx of NSTEMI s/p SAMARIA 2022).   - 1/13/25 in [facility]: Given high HAS-BLED score (3) and high risk for major bleeding, in conjunction with cardiology service's recommendations we discontinued her daily aspirin 81 mg.   - iron studies 1/14/25: B12 1062, 13.5, Hb 8.3, plt 166, iron 19, TIBC 266, iron saturation 7%, ferritin 30  ng/mL.   1/23/25: received IV dextran 1000 mg over 2 hours.   4/29/25: received additional IV dextran 1000 mg       4/29/25  Hb dropped from 10.1 g/dL (3/3/25) to 8.9 g/dL. GI service performed wireless capsule endoscopy 2/11/25, which showed two small AVM's without active bleeding in second/third portion of duodenum. Suggested repeat EGD if significant anemia recurs and repeat CBC mid April 2025.     Iron studies 4/21/25 with iron 50, TIBC 300, iron saturation 17%, ferritin 58.     Plan:  Will give 1 additional dose IV dextran 1000 mg today.        2.    Extensive bilateral lower extremity DVT (Right external iliac vein, right saphenofemoral junction, common femoral vein, right profunda femoral vein, right superficial & popliteal veins, Left saphenofemoral, left common femoral, left profunda femoral, left superficial, left distal popliteal vein) - diagnosed 1/13/25   - Please see US BLE Duplex 1/13/25 report above  - Patient has extensive bilateral lower extremity DVT's. Per history she has become very deconditioned and now bedbound at [facility], which is a new change in clinical condition for her. Though she has IVC filter, this is just to prevent thrombus from breaking off and traveling to lungs. She will still need therapeutic anticoagulation for 3 months (1/14/25 - 4/14/25) to help dissolve the extensive clots. These are likely provoked clots due to her immobility. She spends > 50% of the day in chair or bed now unfortunately (ECOG = 4). This is a known risk factor for VTE.  - baseline dimer 1.85 on 1/13/25. Will repeat in 4 months to assess clot burden  - Labs today with Hb 9.8 g/dL, plt 191, hematocrit 32. Hb improved from 8.5 g/dL  - 4/14/25: Completed 3 months of therapeutic anticoagulation (mix of lovenox and Eliquis).   - 4/21/25: Repeat BLE duplexes with continued extensive clot bilaterally, although some improvement is present.   RLE: partial clot in the proximal and mid superficial femoral vein,  occlusive clot in the distal superficial femoral vein, occlusive clot in all the popliteal and the proximal and mid posterior tibial artery.  The posterior tibial vein on the prior exam were not visualized.  There has been resolution of clot from the common femoral vein, the external iliac vein, and the saphenofemoral junction.   LLE: partial clot in the common femoral vein, all of the superficial femoral vein, the distal popliteal vein, with occlusive clot in the proximal posterior tibial vein, and partial clot in the mid and distal posterior tibial vein.  The posterior tibial vein on the prior exam were not visualized.  There has been resolution of clot from the saphenofemoral junction and profundus femoris as well as from the proximal popliteal vein.      Plan:  Given her immobility (ECOG of 4) and risk factor of stasis (at risk for developing further DVT/PE), we initially discussed indefinite prophylaxis (lovenox 40 mg subcutaneous daily) from 4/15/25 onwards. However, her Hb has dropped 1 g over the last month and she is a very high fall risk. In addition, she has a high HAS-BLED score of 3, deeming her high risk for major bleeding (especially given previous history of GI bleeds). At this point in time, risks of anticoagulation outweigh the benefits. No further anticoagulation at this time. She does have an IVC filter in place that will hopefull prevent future PE.           Follow-Up: 3 months for repeat labs and visit with me.        Xiomy Zuleta D.O.  [facility]      Note to patient: The 21 Century Cures Act makes medical notes like these available to patients in the interest of transparency. However, be advised this is a medical document. It is intended as peer to peer communication. It is written in medical language and may contain abbreviations or verbiage that are unfamiliar. It may appear blunt or direct. Medical documents are intended to carry relevant information,  facts as evident, and the clinical opinion of the practitioner.       In reviewing this note, please be advised that Dragon Voice Recognition software used to dictate the note may have made errors in recognizing some of the words or phrases.

## 2025-04-29 NOTE — PROGRESS NOTES
Pt here for f/u regarding bilateral DVTs, iron deficiency anemia. Daughter and  have been holding Eliquis as instructed. No rectal bleeding, blood in stool/urine, or other bleeding while patient was on Eliquis. Daughter states patient has \"a lack of stamina\" that has been constant. She reports she has been trying to keep patient active as much as she can. She does report a couple of falls since the last time patient was seen in the office. Daughter also reports patient's left leg has persistently been more swollen than the right leg. Left leg is not red or hot to touch on presentation. Mild edema present to the left leg. Pt reports no pain today. Pt to receive IV iron today.       Education Record    Learner:  Patient, Spouse, and Family Member    Disease / Diagnosis: bilateral DVT, iron deficiency anemia    Barriers / Limitations:  None   Comments:    Method:  Discussion   Comments:    General Topics:  Medication, Pain, Side effects and symptom management, and Plan of care reviewed   Comments:    Outcome:  Observed demonstration and Shows understanding   Comments:

## 2025-04-29 NOTE — PROGRESS NOTES
Education Record    Learner:  Patient    Disease / Diagnosis:Iron deficiency anemia due to chronic blood loss      Barriers / Limitations:  None   Comments:    Method:  Brief focused   Comments:    General Topics:  Diet, Infection, Medication, Pain, Precautions, Procedure, Side effects and symptom management, Plan of care reviewed, and Fall risk and prevention   Comments:    Outcome:  Shows understanding   Comments:  Tolerated well. Given AVS .VSS

## 2025-07-11 ENCOUNTER — HOSPITAL ENCOUNTER (EMERGENCY)
Facility: HOSPITAL | Age: 85
Discharge: HOME | End: 2025-07-12
Attending: EMERGENCY MEDICINE
Payer: MEDICARE

## 2025-07-11 ENCOUNTER — APPOINTMENT (OUTPATIENT)
Dept: GENERAL RADIOLOGY | Facility: HOSPITAL | Age: 85
End: 2025-07-11
Payer: MEDICARE

## 2025-07-11 DIAGNOSIS — J20.9 ACUTE BRONCHITIS, UNSPECIFIED ORGANISM: Primary | ICD-10-CM

## 2025-07-11 DIAGNOSIS — Z86.59 HISTORY OF DEMENTIA: ICD-10-CM

## 2025-07-11 DIAGNOSIS — N39.0 ACUTE UTI: ICD-10-CM

## 2025-07-11 LAB
ALBUMIN SERPL-MCNC: 4.8 G/DL (ref 3.2–4.8)
ALBUMIN/GLOB SERPL: 1.7 {RATIO} (ref 1–2)
ALP LIVER SERPL-CCNC: 72 U/L (ref 55–142)
ALT SERPL-CCNC: 9 U/L (ref 10–49)
ANION GAP SERPL CALC-SCNC: 9 MMOL/L (ref 0–18)
AST SERPL-CCNC: 15 U/L (ref ?–34)
BASOPHILS # BLD AUTO: 0.02 X10(3) UL (ref 0–0.2)
BASOPHILS NFR BLD AUTO: 0.5 %
BILIRUB SERPL-MCNC: 0.3 MG/DL (ref 0.2–1.1)
BUN BLD-MCNC: 18 MG/DL (ref 9–23)
CALCIUM BLD-MCNC: 10.2 MG/DL (ref 8.7–10.6)
CHLORIDE SERPL-SCNC: 103 MMOL/L (ref 98–112)
CO2 SERPL-SCNC: 29 MMOL/L (ref 21–32)
CREAT BLD-MCNC: 1.45 MG/DL (ref 0.55–1.02)
EGFRCR SERPLBLD CKD-EPI 2021: 35 ML/MIN/1.73M2 (ref 60–?)
EOSINOPHIL # BLD AUTO: 0.09 X10(3) UL (ref 0–0.7)
EOSINOPHIL NFR BLD AUTO: 2.2 %
ERYTHROCYTE [DISTWIDTH] IN BLOOD BY AUTOMATED COUNT: 15.4 %
FLUAV + FLUBV RNA SPEC NAA+PROBE: NEGATIVE
FLUAV + FLUBV RNA SPEC NAA+PROBE: NEGATIVE
GLOBULIN PLAS-MCNC: 2.9 G/DL (ref 2–3.5)
GLUCOSE BLD-MCNC: 147 MG/DL (ref 70–99)
HCT VFR BLD AUTO: 35.7 % (ref 35–48)
HGB BLD-MCNC: 11.3 G/DL (ref 12–16)
IMM GRANULOCYTES # BLD AUTO: 0.01 X10(3) UL (ref 0–1)
IMM GRANULOCYTES NFR BLD: 0.2 %
LYMPHOCYTES # BLD AUTO: 0.78 X10(3) UL (ref 1–4)
LYMPHOCYTES NFR BLD AUTO: 18.8 %
MCH RBC QN AUTO: 26.5 PG (ref 26–34)
MCHC RBC AUTO-ENTMCNC: 31.7 G/DL (ref 31–37)
MCV RBC AUTO: 83.6 FL (ref 80–100)
MONOCYTES # BLD AUTO: 0.36 X10(3) UL (ref 0.1–1)
MONOCYTES NFR BLD AUTO: 8.7 %
NEUTROPHILS # BLD AUTO: 2.9 X10 (3) UL (ref 1.5–7.7)
NEUTROPHILS # BLD AUTO: 2.9 X10(3) UL (ref 1.5–7.7)
NEUTROPHILS NFR BLD AUTO: 69.6 %
OSMOLALITY SERPL CALC.SUM OF ELEC: 297 MOSM/KG (ref 275–295)
PLATELET # BLD AUTO: 156 10(3)UL (ref 150–450)
POTASSIUM SERPL-SCNC: 4.3 MMOL/L (ref 3.5–5.1)
PROT SERPL-MCNC: 7.7 G/DL (ref 5.7–8.2)
RBC # BLD AUTO: 4.27 X10(6)UL (ref 3.8–5.3)
RSV RNA SPEC NAA+PROBE: NEGATIVE
SARS-COV-2 RNA RESP QL NAA+PROBE: NOT DETECTED
SODIUM SERPL-SCNC: 141 MMOL/L (ref 136–145)
WBC # BLD AUTO: 4.2 X10(3) UL (ref 4–11)

## 2025-07-11 PROCEDURE — 87186 SC STD MICRODIL/AGAR DIL: CPT | Performed by: EMERGENCY MEDICINE

## 2025-07-11 PROCEDURE — 0241U SARS-COV-2/FLU A AND B/RSV BY PCR (GENEXPERT): CPT | Performed by: EMERGENCY MEDICINE

## 2025-07-11 PROCEDURE — 85025 COMPLETE CBC W/AUTO DIFF WBC: CPT | Performed by: EMERGENCY MEDICINE

## 2025-07-11 PROCEDURE — 87086 URINE CULTURE/COLONY COUNT: CPT | Performed by: EMERGENCY MEDICINE

## 2025-07-11 PROCEDURE — 80053 COMPREHEN METABOLIC PANEL: CPT

## 2025-07-11 PROCEDURE — 99284 EMERGENCY DEPT VISIT MOD MDM: CPT

## 2025-07-11 PROCEDURE — 71045 X-RAY EXAM CHEST 1 VIEW: CPT

## 2025-07-11 PROCEDURE — 85025 COMPLETE CBC W/AUTO DIFF WBC: CPT

## 2025-07-11 PROCEDURE — 94640 AIRWAY INHALATION TREATMENT: CPT

## 2025-07-11 PROCEDURE — 36415 COLL VENOUS BLD VENIPUNCTURE: CPT

## 2025-07-11 PROCEDURE — 87637 SARSCOV2&INF A&B&RSV AMP PRB: CPT | Performed by: EMERGENCY MEDICINE

## 2025-07-11 PROCEDURE — 87088 URINE BACTERIA CULTURE: CPT | Performed by: EMERGENCY MEDICINE

## 2025-07-11 PROCEDURE — 80053 COMPREHEN METABOLIC PANEL: CPT | Performed by: EMERGENCY MEDICINE

## 2025-07-11 PROCEDURE — 81001 URINALYSIS AUTO W/SCOPE: CPT | Performed by: EMERGENCY MEDICINE

## 2025-07-11 PROCEDURE — 99285 EMERGENCY DEPT VISIT HI MDM: CPT

## 2025-07-11 RX ORDER — ALBUTEROL SULFATE 0.83 MG/ML
2.5 SOLUTION RESPIRATORY (INHALATION) ONCE
Status: COMPLETED | OUTPATIENT
Start: 2025-07-11 | End: 2025-07-11

## 2025-07-11 RX ORDER — ALBUTEROL SULFATE 0.83 MG/ML
2.5 SOLUTION RESPIRATORY (INHALATION) EVERY 4 HOURS PRN
Qty: 30 EACH | Refills: 0 | Status: SHIPPED | OUTPATIENT
Start: 2025-07-11 | End: 2025-08-10

## 2025-07-12 VITALS
DIASTOLIC BLOOD PRESSURE: 63 MMHG | TEMPERATURE: 98 F | SYSTOLIC BLOOD PRESSURE: 127 MMHG | HEART RATE: 84 BPM | OXYGEN SATURATION: 94 % | WEIGHT: 167 LBS | HEIGHT: 63 IN | RESPIRATION RATE: 24 BRPM | BODY MASS INDEX: 29.59 KG/M2

## 2025-07-12 LAB
BILIRUB UR QL STRIP.AUTO: NEGATIVE
COLOR UR AUTO: YELLOW
GLUCOSE UR STRIP.AUTO-MCNC: NORMAL MG/DL
HYALINE CASTS #/AREA URNS AUTO: PRESENT /LPF
KETONES UR STRIP.AUTO-MCNC: NEGATIVE MG/DL
LEUKOCYTE ESTERASE UR QL STRIP.AUTO: 500
NITRITE UR QL STRIP.AUTO: NEGATIVE
PH UR STRIP.AUTO: 5.5 [PH] (ref 5–8)
RBC UR QL AUTO: NEGATIVE
SP GR UR STRIP.AUTO: 1.01 (ref 1–1.03)
UROBILINOGEN UR STRIP.AUTO-MCNC: NORMAL MG/DL
WBC #/AREA URNS AUTO: >50 /HPF

## 2025-07-12 RX ORDER — CEPHALEXIN 500 MG/1
500 CAPSULE ORAL 2 TIMES DAILY
Qty: 20 CAPSULE | Refills: 0 | Status: SHIPPED | OUTPATIENT
Start: 2025-07-12 | End: 2025-07-22

## 2025-07-12 RX ORDER — CEPHALEXIN 500 MG/1
500 CAPSULE ORAL ONCE
Status: COMPLETED | OUTPATIENT
Start: 2025-07-12 | End: 2025-07-12

## 2025-07-12 NOTE — ED PROVIDER NOTES
Patient Seen in: [facility] Emergency Department        History  Chief Complaint   Patient presents with    Cough/URI    Hypotension     Stated Complaint: Cough/URI/Eye infection, low blood pressure, AMS    Subjective:   Patient is a 85-year-old female presents emergency room for evaluation of cough.  Patient's family reports that she has had a cough for the last several days however primary doctor was diagnosed with a viral illness.  Daughter was very concerned about her cough has been checking her oxygen saturations at home and has been as low as 94%.  I reassured the patient's family she also checked her blood pressure and got readings in the 80s systolic however they are here it is 127/63 currently.  Patient is in no distress no accessory muscle use no increased work of breathing.  Patient has clear lungs exams however on occasional readily cough is noted.  Patient has a history of CHF and daughter was worried about fluid overload.  Patient has no peripheral edema on exam.  She denies any fevers.  She has been using the bathroom more frequently.  But has not complained of any chest pain or any urinary burning sensation.    The history is provided by the patient and a relative. The history is limited by the condition of the patient (History of dementia).                     Objective:     Past Medical History:    ANXIETY    Anxiety    Arrhythmia    pt unaware    BACK PAIN    LS & C-SPINE DZ W/ PAIN    Chronic rhinitis    Congestive heart disease (HCC)    Depression    Encephalopathy acute    Esophageal reflux    GERD    W/ PHAN's    High blood pressure    High cholesterol    Hypercholesterolemia    HYPERLIPIDEMIA    Hypertension    Iron deficiency anemia due to chronic blood loss    Irritable bowel syndrome with constipation    Kidney disease    frequent infections; pt states frequent UTIs    Late onset Alzheimer's disease without behavioral disturbance (HCC)    Migraines    NSTEMI (non-ST elevated  myocardial infarction) (HCC)    Ophthalmic migraine    DANNIE (obstructive sleep apnea)    AHI 11 RDI 13 REM AHI 0 Supine AHI 11 non-supine AHI 0 Sao2 Ye 83%     OSTEOPOROSIS    Other and unspecified hyperlipidemia    OTHER DISEASES    PSVT, pt unaware    OTHER DISEASES    Barrott's Esoghagus    Reflux    barretts    Sleep apnea    wears a mouth gaurd    UTI (lower urinary tract infection)    no UTI currently as of 5/5/16              Past Surgical History:   Procedure Laterality Date    Benign biopsy left  ?    stereotactic core biopsy    Benign biopsy right  ?    stereotactic core biopsy    Colonoscopy  11/01/2008    RECHECK 5 YRS    Colonoscopy N/A 07/19/2021    Procedure: COLONOSCOPY;  Surgeon: Manish Rodriguez MD;  Location: [facility] ENDOSCOPY    Colonoscopy,biopsy  01/08/2014    Procedure: ESOPHAGOGASTRODUODENOSCOPY, COLONOSCOPY, POSSIBLE BIOPSY, POSSIBLE POLYPECTOMY 25198,42788;  Surgeon: Manish Rodriguez MD;  Location: [facility]    Hysterectomy      Optx dstl radl x-artic fx/epiphysl sep Left 10/06/2014    Procedure: OPEN REDUCTION INTERNAL FIXATION ARM;  Surgeon: Augustina Banuelos MD;  Location: [facility]    Other surgical history      URETHRAL SUSPENSION    Other surgical history      STEROTACTIC BX's BOTH BREASTS    Other surgical history  05/01/2008    EGD = PHAN'S    Other surgical history N/A 05/17/2016    Procedure: ENDOSCOPIC ULTRASOUND (EUS);  Surgeon: Glenn Chou MD;  Location:  ENDOSCOPY    Patient documented not to have experienced any of the following events  01/08/2014    Procedure: ESOPHAGOGASTRODUODENOSCOPY, COLONOSCOPY, POSSIBLE BIOPSY, POSSIBLE POLYPECTOMY 65114,[id number];  Surgeon: Manish Rodriguez MD;  Location: [facility]    Patient documented not to have experienced any of the following events Left 10/06/2014    Procedure: OPEN REDUCTION INTERNAL FIXATION ARM;  Surgeon: Augustina Banuelos MD;  Location: [facility]    Patient with preoperative order for iv  antibiotic surgical site infect Left 10/06/2014    Procedure: OPEN REDUCTION INTERNAL FIXATION ARM;  Surgeon: Augustina Banuelos MD;  Location: [facility]    Patient withough preoperative order for iv antibiotic surgical site infection prophylaxis.  2014    Procedure: ESOPHAGOGASTRODUODENOSCOPY, COLONOSCOPY, POSSIBLE BIOPSY, POSSIBLE POLYPECTOMY 84278,80314;  Surgeon: Manish Rodriguez MD;  Location: [facility]    Upper gi endoscopy,biopsy  2014    Procedure: ESOPHAGOGASTRODUODENOSCOPY, COLONOSCOPY, POSSIBLE BIOPSY, POSSIBLE POLYPECTOMY 25078,72655;  Surgeon: Manish Rodriguez MD;  Location: [facility]    Vena cava filter                  Social History     Socioeconomic History    Marital status:    Tobacco Use    Smoking status: Former     Current packs/day: 0.00     Average packs/day: 1 pack/day for 15.0 years (15.0 ttl pk-yrs)     Types: Cigarettes     Start date: 1963     Quit date: 1978     Years since quittin.1    Smokeless tobacco: Never   Vaping Use    Vaping status: Never Used   Substance and Sexual Activity    Alcohol use: Not Currently    Drug use: No     Social Drivers of Health     Food Insecurity: Unknown (2025)    Food Insecurity     Food Insecurity: Patient unable to answer   Transportation Needs: Unknown (2025)    Transportation Needs     Lack of Transportation: Patient unable to answer   Housing Stability: Unknown (2025)    Housing Stability     Housing Instability: Patient unable to answer                                Physical Exam    ED Triage Vitals   BP 25 115/73   Pulse 25 78   Resp 25 20   Temp 25 97.8 °F (36.6 °C)   Temp src 25 Temporal   SpO2 25 94 %   O2 Device 25 None (Room air)       Current Vitals:   Vital Signs  BP: 127/63  Pulse: 75  Resp: 18  Temp: 97.8 °F (36.6 °C)  Temp src: Temporal  MAP (mmHg): 82    Oxygen Therapy  SpO2: 97 %  O2  Device: None (Room air)            Physical Exam  Vitals and nursing note reviewed.   Constitutional:       General: She is not in acute distress.     Appearance: Normal appearance. She is normal weight. She is not toxic-appearing.   HENT:      Head: Normocephalic and atraumatic.      Mouth/Throat:      Mouth: Mucous membranes are moist.   Eyes:      Extraocular Movements: Extraocular movements intact.      Pupils: Pupils are equal, round, and reactive to light.      Comments: Stye left eye   Cardiovascular:      Rate and Rhythm: Normal rate.   Pulmonary:      Effort: Pulmonary effort is normal. No respiratory distress.      Breath sounds: Normal breath sounds. No wheezing.   Abdominal:      General: Bowel sounds are normal. There is no distension.      Palpations: Abdomen is soft.      Tenderness: There is no abdominal tenderness.   Musculoskeletal:         General: No swelling or deformity. Normal range of motion.   Skin:     General: Skin is warm.      Capillary Refill: Capillary refill takes less than 2 seconds.   Neurological:      Mental Status: She is alert.      Sensory: No sensory deficit.      Comments: ANO to person only   Psychiatric:         Mood and Affect: Mood normal.         Behavior: Behavior normal.                 ED Course  Labs Reviewed   CBC WITH DIFFERENTIAL WITH PLATELET - Abnormal; Notable for the following components:       Result Value    HGB 11.3 (*)     Lymphocyte Absolute 0.78 (*)     All other components within normal limits   COMP METABOLIC PANEL (14) - Abnormal; Notable for the following components:    Glucose 147 (*)     Creatinine 1.45 (*)     Calculated Osmolality 297 (*)     eGFR-Cr 35 (*)     ALT 9 (*)     All other components within normal limits   URINALYSIS WITH CULTURE REFLEX - Abnormal; Notable for the following components:    Clarity Urine Turbid (*)     Protein Urine Trace (*)     Leukocyte Esterase Urine 500 (*)     WBC Urine >50 (*)     RBC Urine 3-5 (*)     Bacteria  Urine Rare (*)     Squamous Epi. Cells Few (*)     Hyaline Casts Present (*)     All other components within normal limits   SARS-COV-2/FLU A AND B/RSV BY PCR (GENEXPERT) - Normal    Narrative:     This test is intended for the qualitative detection and differentiation of SARS-CoV-2, influenza A, influenza B, and respiratory syncytial virus (RSV) viral RNA in nasopharyngeal or nares swabs from individuals suspected of respiratory viral infection consistent with COVID-19 by their healthcare provider. Signs and symptoms of respiratory viral infection due to SARS-CoV-2, influenza, and RSV can be similar.    Test performed using the Xpert Xpress SARS-CoV-2/FLU/RSV (real time RT-PCR)  assay on the GeneXpert instrument, [employer], [place] [address].   This test is being used under the Food and Drug Administration's Emergency Use Authorization.    The authorized Fact Sheet for Healthcare Providers for this assay is available upon request from the laboratory.   RAINBOW DRAW LAVENDER   RAINBOW DRAW LIGHT GREEN   RAINBOW DRAW BLUE   RAINBOW DRAW GOLD   URINE CULTURE, ROUTINE          XR CHEST AP PORTABLE  (CPT=71045)  Result Date: 7/11/2025  PROCEDURE: XR CHEST AP PORTABLE  (CPT=71045) INDICATIONS: Cough/URI/Eye infection, low blood pressure, AMS PATIENT STATED HISTORY: Patient to the emergency room for cough that started last weekend. Patient was seen at PCP on Tuesday but noted that cough was viral in nature. Daughter is concerned that cough has been ongoing since this visit and is concerned that it has turned to a pneumonia. COMPARISON: There are no comparisons for this exam. TECHNIQUE: AP portable single view FINDINGS: CARDIAC/ VASC: Heart size and pulmonary vascularity are normal. MEDIASTINUM/JOSE: No mass or enlarged adenopathy. LUNGS/PLEURA: There is mild peribronchial thickening suggesting bronchitis. There is no mass or pneumonia. There is stable scarring in the lingular segment of the TROY over serial exams. No  lymphadenopathy. No pleural effusion. BONES:  No suspect bone lesion or acute fracture. OTHER: Negative.     CONCLUSION: WARNING: THIS REPORT WAS APPROVED PREMATURELY AND IS NOT ACCURATE. PLEASE CONTACT US IMMEDIATELY. Electronically Verified and Signed by Attending Radiologist: Jorge Cortes MD 7/11/2025 9:33 PM Workstation: [id number]                      [facility]     Social -negative tobacco, negative  etoh, negative drugs  Family History-noncontributory  Past Medical History-anxiety, hyperlipidemia, dementia, osteoporosis, GERD, migraines, NSTEMI, high cholesterol, high blood pressure, dementia    Differential diagnosis before testing included UTI, viral syndrome, bronchitis, pneumonia,    Co-morbidities that add to the complexity of management include: History of dementia all history is taken from the daughter    Testing ordered during this visit included chest x-ray swabs for COVID flu RSV baseline labs urinalysis    Radiographic images  I personally reviewed the radiographs and my individual interpretation shows bronchitis  I also reviewed the official reports that showed XR CHEST AP PORTABLE  (CPT=71045)  Result Date: 7/11/2025  PROCEDURE: XR CHEST AP PORTABLE  (CPT=71045) INDICATIONS: Cough/URI/Eye infection, low blood pressure, AMS PATIENT STATED HISTORY: Patient to the emergency room for cough that started last weekend. Patient was seen at PCP on Tuesday but noted that cough was viral in nature. Daughter is concerned that cough has been ongoing since this visit and is concerned that it has turned to a pneumonia. COMPARISON: There are no comparisons for this exam. TECHNIQUE: AP portable single view FINDINGS: CARDIAC/ VASC: Heart size and pulmonary vascularity are normal. MEDIASTINUM/JOSE: No mass or enlarged adenopathy. LUNGS/PLEURA: There is mild peribronchial thickening suggesting bronchitis. There is no mass or pneumonia. There is stable scarring in the lingular segment of the TROY over serial exams. No  lymphadenopathy. No pleural effusion. BONES:  No suspect bone lesion or acute fracture. OTHER: Negative.     CONCLUSION: WARNING: THIS REPORT WAS APPROVED PREMATURELY AND IS NOT ACCURATE. PLEASE CONTACT US IMMEDIATELY. Electronically Verified and Signed by Attending Radiologist: Jorge Cortes MD 7/11/2025 9:33 PM Workstation: [id number]        External chart review showed review of Care Everywhere in [facility] system shows patient currently takes furosemide 40 mg daily for her CHF    History obtained by an independent source included from patient, family    Discussion of management with patient, family    Social determinants of health that affect care include patient has a history of dementia majority history is taken from the daughter      Medications Provided: Albuterol    Course of Events during Emergency Room Visit include 85-year-old female presents emergency room for evaluation of shortness of breath.  Patient is not having increased work of breathing she is satting 97% on room air with no respiratory distress.  She does have an occasional rattly cough but lung sounds are clear.  Chest x-ray shows a mild bronchitis.  Will get swabs for COVID and flu we will check a urinalysis as daughter is concerned for UTI as she has had increased confusion but patient has baseline dementia.  Patient will be given albuterol nebulizer for home as I do not think she would tolerate an inhaler.  Patient to follow-up with primary care physician      Patient's urinalysis shows UTI.  Patient was started on Keflex as confirmed with family she is tolerated in the past without any difficulty.    Disposition:        Discharge  I have discussed with the patient the results of test, differential diagnosis, treatment plan, warning signs and symptoms which should prompt immediate return.  They expressed understanding of these instructions and agrees to the following plan provided.  They were given written discharge instructions and agrees  to return for any concerns and voiced understanding and all questions were answered.           Medical Decision Making      Disposition and Plan     Clinical Impression:  1. Acute bronchitis, unspecified organism    2. Acute UTI    3. History of dementia         Disposition:  Discharge  7/12/2025 12:43 am    Follow-up:  Larry Wilson MD  [address] [facility] [address]  [phone]    Schedule an appointment as soon as possible for a visit            Medications Prescribed:  Current Discharge Medication List        START taking these medications    Details   cephALEXin 500 MG Oral Cap Take 1 capsule (500 mg total) by mouth 2 (two) times daily for 10 days.  Qty: 20 capsule, Refills: 0      albuterol (2.5 MG/3ML) 0.083% Inhalation Nebu Soln Take 3 mL (2.5 mg total) by nebulization every 4 (four) hours as needed for Wheezing or Shortness of Breath.  Qty: 30 each, Refills: 0                   Supplementary Documentation:

## 2025-07-12 NOTE — CM/SW NOTE
Chronic, stable  Monitor for changes     Received a call from the pt's Rn requesting for a neb machine to be issued to the pt. Paolam went to see pt but she was sound asleep so machine was given to the pt's daughter. Charlene requested the pt's Rn to call the resp therapist to give instruction on how to use the machine.

## 2025-07-12 NOTE — ED INITIAL ASSESSMENT (HPI)
Pt to the emergency room for cough that started last weekend. Pt had televisit with PCP and was instructed to go to the . Seen at pcp on Tuesday but noted that cough was viral in nature. Daughter is concerned that cough has been ongoing since this visit and is concerned that it has turned to a pneumonia.

## 2025-07-29 ENCOUNTER — OFFICE VISIT (OUTPATIENT)
Facility: LOCATION | Age: 85
End: 2025-07-29
Attending: INTERNAL MEDICINE
Payer: MEDICARE

## 2025-07-29 ENCOUNTER — NURSE ONLY (OUTPATIENT)
Facility: LOCATION | Age: 85
End: 2025-07-29
Attending: INTERNAL MEDICINE
Payer: MEDICARE

## 2025-07-29 VITALS
OXYGEN SATURATION: 97 % | HEART RATE: 64 BPM | RESPIRATION RATE: 16 BRPM | DIASTOLIC BLOOD PRESSURE: 76 MMHG | SYSTOLIC BLOOD PRESSURE: 116 MMHG | TEMPERATURE: 98 F

## 2025-07-29 DIAGNOSIS — D50.0 IRON DEFICIENCY ANEMIA DUE TO CHRONIC BLOOD LOSS: Primary | ICD-10-CM

## 2025-07-29 DIAGNOSIS — D50.0 IRON DEFICIENCY ANEMIA DUE TO CHRONIC BLOOD LOSS: ICD-10-CM

## 2025-07-29 LAB
ALBUMIN SERPL-MCNC: 4.4 G/DL (ref 3.2–4.8)
ALBUMIN/GLOB SERPL: 1.5 (ref 1–2)
ALP LIVER SERPL-CCNC: 72 U/L (ref 55–142)
ALT SERPL-CCNC: 13 U/L (ref 10–49)
ANION GAP SERPL CALC-SCNC: 9 MMOL/L (ref 0–18)
AST SERPL-CCNC: 18 U/L (ref ?–34)
BASOPHILS # BLD AUTO: 0.02 X10(3) UL (ref 0–0.2)
BASOPHILS NFR BLD AUTO: 0.5 %
BILIRUB SERPL-MCNC: 0.3 MG/DL (ref 0.2–1.1)
BUN BLD-MCNC: 25 MG/DL (ref 9–23)
CALCIUM BLD-MCNC: 9.7 MG/DL (ref 8.7–10.6)
CHLORIDE SERPL-SCNC: 104 MMOL/L (ref 98–112)
CO2 SERPL-SCNC: 28 MMOL/L (ref 21–32)
CREAT BLD-MCNC: 1.42 MG/DL (ref 0.55–1.02)
DEPRECATED HBV CORE AB SER IA-ACNC: 250 NG/ML (ref 50–306)
EGFRCR SERPLBLD CKD-EPI 2021: 36 ML/MIN/1.73M2 (ref 60–?)
EOSINOPHIL # BLD AUTO: 0.15 X10(3) UL (ref 0–0.7)
EOSINOPHIL NFR BLD AUTO: 3.5 %
ERYTHROCYTE [DISTWIDTH] IN BLOOD BY AUTOMATED COUNT: 15.5 %
GLOBULIN PLAS-MCNC: 2.9 G/DL (ref 2–3.5)
GLUCOSE BLD-MCNC: 181 MG/DL (ref 70–99)
HCT VFR BLD AUTO: 34.1 % (ref 35–48)
HGB BLD-MCNC: 11 G/DL (ref 12–16)
IMM GRANULOCYTES # BLD AUTO: 0.01 X10(3) UL (ref 0–1)
IMM GRANULOCYTES NFR BLD: 0.2 %
IRON SATN MFR SERPL: 20 % (ref 15–50)
IRON SERPL-MCNC: 52 UG/DL (ref 50–170)
LYMPHOCYTES # BLD AUTO: 1 X10(3) UL (ref 1–4)
LYMPHOCYTES NFR BLD AUTO: 23.5 %
MCH RBC QN AUTO: 27.2 PG (ref 26–34)
MCHC RBC AUTO-ENTMCNC: 32.3 G/DL (ref 31–37)
MCV RBC AUTO: 84.4 FL (ref 80–100)
MONOCYTES # BLD AUTO: 0.36 X10(3) UL (ref 0.1–1)
MONOCYTES NFR BLD AUTO: 8.5 %
NEUTROPHILS # BLD AUTO: 2.71 X10 (3) UL (ref 1.5–7.7)
NEUTROPHILS # BLD AUTO: 2.71 X10(3) UL (ref 1.5–7.7)
NEUTROPHILS NFR BLD AUTO: 63.8 %
OSMOLALITY SERPL CALC.SUM OF ELEC: 301 MOSM/KG (ref 275–295)
PLATELET # BLD AUTO: 171 10(3)UL (ref 150–450)
POTASSIUM SERPL-SCNC: 3.9 MMOL/L (ref 3.5–5.1)
PROT SERPL-MCNC: 7.3 G/DL (ref 5.7–8.2)
RBC # BLD AUTO: 4.04 X10(6)UL (ref 3.8–5.3)
SODIUM SERPL-SCNC: 141 MMOL/L (ref 136–145)
TOTAL IRON BINDING CAPACITY: 265 UG/DL (ref 250–425)
TRANSFERRIN SERPL-MCNC: 216 MG/DL (ref 250–380)
WBC # BLD AUTO: 4.3 X10(3) UL (ref 4–11)

## 2025-08-22 ENCOUNTER — APPOINTMENT (OUTPATIENT)
Dept: GENERAL RADIOLOGY | Facility: HOSPITAL | Age: 85
End: 2025-08-22
Attending: EMERGENCY MEDICINE

## 2025-08-22 ENCOUNTER — HOSPITAL ENCOUNTER (INPATIENT)
Facility: HOSPITAL | Age: 85
LOS: 6 days | Discharge: NURSING, REHAB OR LONG-TERM CARE | End: 2025-08-28
Attending: EMERGENCY MEDICINE | Admitting: INTERNAL MEDICINE

## 2025-08-22 DIAGNOSIS — A41.9 SEPSIS DUE TO URINARY TRACT INFECTION (HCC): Primary | ICD-10-CM

## 2025-08-22 DIAGNOSIS — E87.0 HYPERNATREMIA: ICD-10-CM

## 2025-08-22 DIAGNOSIS — R79.89 ELEVATED TROPONIN: ICD-10-CM

## 2025-08-22 DIAGNOSIS — N39.0 SEPSIS DUE TO URINARY TRACT INFECTION (HCC): Primary | ICD-10-CM

## 2025-08-22 LAB
ALBUMIN SERPL-MCNC: 4.2 G/DL (ref 3.2–4.8)
ALBUMIN/GLOB SERPL: 1.7 (ref 1–2)
ALP LIVER SERPL-CCNC: 58 U/L (ref 55–142)
ALT SERPL-CCNC: 11 U/L (ref 10–49)
ANION GAP SERPL CALC-SCNC: 14 MMOL/L (ref 0–18)
AST SERPL-CCNC: 15 U/L (ref ?–34)
ATRIAL RATE: 98 BPM
BASOPHILS # BLD AUTO: 0.01 X10(3) UL (ref 0–0.2)
BASOPHILS NFR BLD AUTO: 0.1 %
BILIRUB SERPL-MCNC: 0.5 MG/DL (ref 0.2–1.1)
BILIRUB UR QL STRIP.AUTO: NEGATIVE
BUN BLD-MCNC: 24 MG/DL (ref 9–23)
CALCIUM BLD-MCNC: 10.3 MG/DL (ref 8.7–10.6)
CHLORIDE SERPL-SCNC: 110 MMOL/L (ref 98–112)
CHOLEST SERPL-MCNC: 99 MG/DL (ref ?–200)
CO2 SERPL-SCNC: 24 MMOL/L (ref 21–32)
COLOR UR AUTO: YELLOW
CREAT BLD-MCNC: 1.4 MG/DL (ref 0.55–1.02)
EGFRCR SERPLBLD CKD-EPI 2021: 36 ML/MIN/1.73M2 (ref 60–?)
EOSINOPHIL # BLD AUTO: 0 X10(3) UL (ref 0–0.7)
EOSINOPHIL NFR BLD AUTO: 0 %
ERYTHROCYTE [DISTWIDTH] IN BLOOD BY AUTOMATED COUNT: 16.9 %
FLUAV + FLUBV RNA SPEC NAA+PROBE: NEGATIVE
FLUAV + FLUBV RNA SPEC NAA+PROBE: NEGATIVE
GLOBULIN PLAS-MCNC: 2.5 G/DL (ref 2–3.5)
GLUCOSE BLD-MCNC: 162 MG/DL (ref 70–99)
GLUCOSE BLD-MCNC: 162 MG/DL (ref 70–99)
GLUCOSE UR STRIP.AUTO-MCNC: NORMAL MG/DL
HCT VFR BLD AUTO: 29.7 % (ref 35–48)
HDLC SERPL-MCNC: 47 MG/DL (ref 40–59)
HGB BLD-MCNC: 9.1 G/DL (ref 12–16)
IMM GRANULOCYTES # BLD AUTO: 0.04 X10(3) UL (ref 0–1)
IMM GRANULOCYTES NFR BLD: 0.4 %
KETONES UR STRIP.AUTO-MCNC: NEGATIVE MG/DL
LACTATE SERPL-SCNC: 1.5 MMOL/L (ref 0.5–2)
LACTATE SERPL-SCNC: 3 MMOL/L (ref 0.5–2)
LDLC SERPL CALC-MCNC: 31 MG/DL (ref ?–100)
LEUKOCYTE ESTERASE UR QL STRIP.AUTO: 500
LYMPHOCYTES # BLD AUTO: 0.46 X10(3) UL (ref 1–4)
LYMPHOCYTES NFR BLD AUTO: 4.7 %
MCH RBC QN AUTO: 26.8 PG (ref 26–34)
MCHC RBC AUTO-ENTMCNC: 30.6 G/DL (ref 31–37)
MCV RBC AUTO: 87.6 FL (ref 80–100)
MONOCYTES # BLD AUTO: 0.48 X10(3) UL (ref 0.1–1)
MONOCYTES NFR BLD AUTO: 4.9 %
NEUTROPHILS # BLD AUTO: 8.81 X10 (3) UL (ref 1.5–7.7)
NEUTROPHILS # BLD AUTO: 8.81 X10(3) UL (ref 1.5–7.7)
NEUTROPHILS NFR BLD AUTO: 89.9 %
NITRITE UR QL STRIP.AUTO: NEGATIVE
NONHDLC SERPL-MCNC: 52 MG/DL (ref ?–130)
OSMOLALITY SERPL CALC.SUM OF ELEC: 314 MOSM/KG (ref 275–295)
P AXIS: 27 DEGREES
P-R INTERVAL: 184 MS
PH UR STRIP.AUTO: 6.5 (ref 5–8)
PLATELET # BLD AUTO: 161 10(3)UL (ref 150–450)
POTASSIUM SERPL-SCNC: 4 MMOL/L (ref 3.5–5.1)
PROT SERPL-MCNC: 6.7 G/DL (ref 5.7–8.2)
PROT UR STRIP.AUTO-MCNC: NEGATIVE MG/DL
Q-T INTERVAL: 530 MS
QRS DURATION: 80 MS
QTC CALCULATION (BEZET): 676 MS
R AXIS: 3 DEGREES
RBC # BLD AUTO: 3.39 X10(6)UL (ref 3.8–5.3)
RBC #/AREA URNS AUTO: >10 /HPF
RSV RNA SPEC NAA+PROBE: NEGATIVE
SARS-COV-2 RNA RESP QL NAA+PROBE: NOT DETECTED
SODIUM SERPL-SCNC: 148 MMOL/L (ref 136–145)
SP GR UR STRIP.AUTO: 1.01 (ref 1–1.03)
T AXIS: 89 DEGREES
TRIGL SERPL-MCNC: 113 MG/DL (ref 30–149)
TROPONIN I SERPL HS-MCNC: 195 NG/L (ref ?–34)
TROPONIN I SERPL HS-MCNC: 72 NG/L (ref ?–34)
UROBILINOGEN UR STRIP.AUTO-MCNC: NORMAL MG/DL
VENTRICULAR RATE: 98 BPM
VLDLC SERPL CALC-MCNC: 15 MG/DL (ref 0–30)
WBC # BLD AUTO: 9.8 X10(3) UL (ref 4–11)
WBC #/AREA URNS AUTO: >50 /HPF
WBC CLUMPS UR QL AUTO: PRESENT /HPF

## 2025-08-22 PROCEDURE — 71045 X-RAY EXAM CHEST 1 VIEW: CPT | Performed by: EMERGENCY MEDICINE

## 2025-08-22 RX ORDER — ATORVASTATIN CALCIUM 40 MG/1
40 TABLET, FILM COATED ORAL NIGHTLY
Status: DISCONTINUED | OUTPATIENT
Start: 2025-08-22 | End: 2025-08-28

## 2025-08-22 RX ORDER — ACETAMINOPHEN 500 MG
500 TABLET ORAL EVERY 4 HOURS PRN
Status: DISCONTINUED | OUTPATIENT
Start: 2025-08-22 | End: 2025-08-22

## 2025-08-22 RX ORDER — BUSPIRONE HYDROCHLORIDE 5 MG/1
15 TABLET ORAL 2 TIMES DAILY
Status: DISCONTINUED | OUTPATIENT
Start: 2025-08-22 | End: 2025-08-28

## 2025-08-22 RX ORDER — ACETAMINOPHEN 500 MG
500 TABLET ORAL EVERY 6 HOURS PRN
Status: DISCONTINUED | OUTPATIENT
Start: 2025-08-22 | End: 2025-08-28

## 2025-08-22 RX ORDER — DULOXETIN HYDROCHLORIDE 60 MG/1
60 CAPSULE, DELAYED RELEASE ORAL 2 TIMES DAILY
Status: DISCONTINUED | OUTPATIENT
Start: 2025-08-22 | End: 2025-08-28

## 2025-08-22 RX ORDER — ACETAMINOPHEN 500 MG
1000 TABLET ORAL EVERY 6 HOURS PRN
Status: DISCONTINUED | OUTPATIENT
Start: 2025-08-22 | End: 2025-08-28

## 2025-08-22 RX ORDER — MEMANTINE HYDROCHLORIDE 10 MG/1
10 TABLET ORAL 2 TIMES DAILY
Status: DISCONTINUED | OUTPATIENT
Start: 2025-08-22 | End: 2025-08-28

## 2025-08-22 RX ORDER — OLANZAPINE 2.5 MG/1
TABLET, FILM COATED ORAL NIGHTLY
COMMUNITY
Start: 2025-08-13

## 2025-08-22 RX ORDER — ACETAMINOPHEN 500 MG
TABLET ORAL EVERY 6 HOURS PRN
Status: DISCONTINUED | OUTPATIENT
Start: 2025-08-22 | End: 2025-08-22 | Stop reason: SDUPTHER

## 2025-08-22 RX ORDER — PANTOPRAZOLE SODIUM 40 MG/1
40 TABLET, DELAYED RELEASE ORAL
Status: DISCONTINUED | OUTPATIENT
Start: 2025-08-23 | End: 2025-08-28

## 2025-08-22 RX ORDER — OLANZAPINE 2.5 MG/1
1.25 TABLET, FILM COATED ORAL NIGHTLY PRN
Status: DISCONTINUED | OUTPATIENT
Start: 2025-08-22 | End: 2025-08-28

## 2025-08-22 RX ORDER — ONDANSETRON 2 MG/ML
4 INJECTION INTRAMUSCULAR; INTRAVENOUS EVERY 6 HOURS PRN
Status: DISCONTINUED | OUTPATIENT
Start: 2025-08-22 | End: 2025-08-22

## 2025-08-22 RX ORDER — HEPARIN SODIUM 5000 [USP'U]/ML
5000 INJECTION, SOLUTION INTRAVENOUS; SUBCUTANEOUS EVERY 8 HOURS SCHEDULED
Status: DISCONTINUED | OUTPATIENT
Start: 2025-08-22 | End: 2025-08-28

## 2025-08-22 RX ORDER — BUMETANIDE 1 MG/1
1 TABLET ORAL
Status: DISCONTINUED | OUTPATIENT
Start: 2025-08-23 | End: 2025-08-28

## 2025-08-22 RX ORDER — SACUBITRIL AND VALSARTAN 24; 26 MG/1; MG/1
1 TABLET ORAL 2 TIMES DAILY
Status: DISCONTINUED | OUTPATIENT
Start: 2025-08-22 | End: 2025-08-28

## 2025-08-22 RX ORDER — SODIUM CHLORIDE 9 MG/ML
INJECTION, SOLUTION INTRAVENOUS CONTINUOUS
Status: DISCONTINUED | OUTPATIENT
Start: 2025-08-22 | End: 2025-08-22

## 2025-08-22 RX ORDER — METOCLOPRAMIDE HYDROCHLORIDE 5 MG/ML
5 INJECTION INTRAMUSCULAR; INTRAVENOUS EVERY 8 HOURS PRN
Status: DISCONTINUED | OUTPATIENT
Start: 2025-08-22 | End: 2025-08-28

## 2025-08-22 RX ORDER — ALBUTEROL SULFATE 0.83 MG/ML
2.5 SOLUTION RESPIRATORY (INHALATION) EVERY 4 HOURS PRN
Status: DISCONTINUED | OUTPATIENT
Start: 2025-08-22 | End: 2025-08-28

## 2025-08-22 RX ORDER — OLANZAPINE 2.5 MG/1
2.5 TABLET, FILM COATED ORAL NIGHTLY
Status: DISCONTINUED | OUTPATIENT
Start: 2025-08-22 | End: 2025-08-28

## 2025-08-22 RX ORDER — RIVASTIGMINE 13.3 MG/24H
1 PATCH, EXTENDED RELEASE TRANSDERMAL NIGHTLY
Status: DISCONTINUED | OUTPATIENT
Start: 2025-08-22 | End: 2025-08-28

## 2025-08-23 ENCOUNTER — APPOINTMENT (OUTPATIENT)
Dept: CT IMAGING | Facility: HOSPITAL | Age: 85
End: 2025-08-23
Attending: INTERNAL MEDICINE

## 2025-08-23 ENCOUNTER — APPOINTMENT (OUTPATIENT)
Dept: CV DIAGNOSTICS | Facility: HOSPITAL | Age: 85
End: 2025-08-23
Attending: HOSPITALIST

## 2025-08-23 LAB
ALBUMIN SERPL-MCNC: 4.3 G/DL (ref 3.2–4.8)
ALBUMIN/GLOB SERPL: 1.7 (ref 1–2)
ALP LIVER SERPL-CCNC: 63 U/L (ref 55–142)
ALT SERPL-CCNC: 9 U/L (ref 10–49)
ANION GAP SERPL CALC-SCNC: 15 MMOL/L (ref 0–18)
AST SERPL-CCNC: 16 U/L (ref ?–34)
BASOPHILS # BLD AUTO: 0.02 X10(3) UL (ref 0–0.2)
BASOPHILS NFR BLD AUTO: 0.2 %
BILIRUB SERPL-MCNC: 0.6 MG/DL (ref 0.2–1.1)
BUN BLD-MCNC: 17 MG/DL (ref 9–23)
CALCIUM BLD-MCNC: 10 MG/DL (ref 8.7–10.6)
CHLORIDE SERPL-SCNC: 108 MMOL/L (ref 98–112)
CK SERPL-CCNC: 22 U/L (ref 34–145)
CO2 SERPL-SCNC: 24 MMOL/L (ref 21–32)
CREAT BLD-MCNC: 1.15 MG/DL (ref 0.55–1.02)
EGFRCR SERPLBLD CKD-EPI 2021: 46 ML/MIN/1.73M2 (ref 60–?)
EOSINOPHIL # BLD AUTO: 0.02 X10(3) UL (ref 0–0.7)
EOSINOPHIL NFR BLD AUTO: 0.2 %
ERYTHROCYTE [DISTWIDTH] IN BLOOD BY AUTOMATED COUNT: 17.1 %
GLOBULIN PLAS-MCNC: 2.6 G/DL (ref 2–3.5)
GLUCOSE BLD-MCNC: 147 MG/DL (ref 70–99)
HCT VFR BLD AUTO: 30.7 % (ref 35–48)
HGB BLD-MCNC: 9.4 G/DL (ref 12–16)
IMM GRANULOCYTES # BLD AUTO: 0.03 X10(3) UL (ref 0–1)
IMM GRANULOCYTES NFR BLD: 0.4 %
LYMPHOCYTES # BLD AUTO: 0.64 X10(3) UL (ref 1–4)
LYMPHOCYTES NFR BLD AUTO: 7.9 %
MAGNESIUM SERPL-MCNC: 1.9 MG/DL (ref 1.6–2.6)
MCH RBC QN AUTO: 27.2 PG (ref 26–34)
MCHC RBC AUTO-ENTMCNC: 30.6 G/DL (ref 31–37)
MCV RBC AUTO: 88.7 FL (ref 80–100)
MONOCYTES # BLD AUTO: 0.58 X10(3) UL (ref 0.1–1)
MONOCYTES NFR BLD AUTO: 7.1 %
NEUTROPHILS # BLD AUTO: 6.86 X10 (3) UL (ref 1.5–7.7)
NEUTROPHILS # BLD AUTO: 6.86 X10(3) UL (ref 1.5–7.7)
NEUTROPHILS NFR BLD AUTO: 84.2 %
OSMOLALITY SERPL CALC.SUM OF ELEC: 308 MOSM/KG (ref 275–295)
PLATELET # BLD AUTO: 133 10(3)UL (ref 150–450)
POTASSIUM SERPL-SCNC: 3.6 MMOL/L (ref 3.5–5.1)
POTASSIUM SERPL-SCNC: 3.8 MMOL/L (ref 3.5–5.1)
PROT SERPL-MCNC: 6.9 G/DL (ref 5.7–8.2)
RBC # BLD AUTO: 3.46 X10(6)UL (ref 3.8–5.3)
SODIUM SERPL-SCNC: 147 MMOL/L (ref 136–145)
TROPONIN I SERPL HS-MCNC: 147 NG/L (ref ?–34)
WBC # BLD AUTO: 8.2 X10(3) UL (ref 4–11)

## 2025-08-23 PROCEDURE — 70450 CT HEAD/BRAIN W/O DYE: CPT | Performed by: INTERNAL MEDICINE

## 2025-08-23 PROCEDURE — 93306 TTE W/DOPPLER COMPLETE: CPT | Performed by: HOSPITALIST

## 2025-08-23 PROCEDURE — 74176 CT ABD & PELVIS W/O CONTRAST: CPT | Performed by: INTERNAL MEDICINE

## 2025-08-23 RX ORDER — ASPIRIN 81 MG/1
81 TABLET ORAL DAILY
Status: DISCONTINUED | OUTPATIENT
Start: 2025-08-23 | End: 2025-08-28

## 2025-08-23 RX ORDER — POTASSIUM CHLORIDE 14.9 MG/ML
20 INJECTION INTRAVENOUS ONCE
Status: COMPLETED | OUTPATIENT
Start: 2025-08-23 | End: 2025-08-23

## 2025-08-23 RX ORDER — ACETAMINOPHEN 10 MG/ML
1000 INJECTION, SOLUTION INTRAVENOUS EVERY 6 HOURS PRN
Status: DISCONTINUED | OUTPATIENT
Start: 2025-08-23 | End: 2025-08-28

## 2025-08-23 RX ORDER — POTASSIUM CHLORIDE 1500 MG/1
40 TABLET, EXTENDED RELEASE ORAL EVERY 4 HOURS
Status: DISPENSED | OUTPATIENT
Start: 2025-08-23 | End: 2025-08-23

## 2025-08-24 LAB
ALBUMIN SERPL-MCNC: 3.8 G/DL (ref 3.2–4.8)
ALBUMIN/GLOB SERPL: 1.5 (ref 1–2)
ALP LIVER SERPL-CCNC: 55 U/L (ref 55–142)
ALT SERPL-CCNC: <7 U/L (ref 10–49)
AMMONIA PLAS-MCNC: 12 UMOL/L (ref 11–32)
ANION GAP SERPL CALC-SCNC: 12 MMOL/L (ref 0–18)
AST SERPL-CCNC: 13 U/L (ref ?–34)
BILIRUB SERPL-MCNC: 0.4 MG/DL (ref 0.2–1.1)
BUN BLD-MCNC: 20 MG/DL (ref 9–23)
CALCIUM BLD-MCNC: 9.6 MG/DL (ref 8.7–10.6)
CHLORIDE SERPL-SCNC: 111 MMOL/L (ref 98–112)
CO2 SERPL-SCNC: 24 MMOL/L (ref 21–32)
CREAT BLD-MCNC: 1.32 MG/DL (ref 0.55–1.02)
EGFRCR SERPLBLD CKD-EPI 2021: 39 ML/MIN/1.73M2 (ref 60–?)
ERYTHROCYTE [DISTWIDTH] IN BLOOD BY AUTOMATED COUNT: 16.9 %
GLOBULIN PLAS-MCNC: 2.5 G/DL (ref 2–3.5)
GLUCOSE BLD-MCNC: 139 MG/DL (ref 70–99)
HCT VFR BLD AUTO: 26.6 % (ref 35–48)
HGB BLD-MCNC: 8.3 G/DL (ref 12–16)
MAGNESIUM SERPL-MCNC: 1.9 MG/DL (ref 1.6–2.6)
MCH RBC QN AUTO: 27.2 PG (ref 26–34)
MCHC RBC AUTO-ENTMCNC: 31.2 G/DL (ref 31–37)
MCV RBC AUTO: 87.2 FL (ref 80–100)
OSMOLALITY SERPL CALC.SUM OF ELEC: 309 MOSM/KG (ref 275–295)
PLATELET # BLD AUTO: 111 10(3)UL (ref 150–450)
POTASSIUM SERPL-SCNC: 3.9 MMOL/L (ref 3.5–5.1)
POTASSIUM SERPL-SCNC: 3.9 MMOL/L (ref 3.5–5.1)
PROT SERPL-MCNC: 6.3 G/DL (ref 5.7–8.2)
RBC # BLD AUTO: 3.05 X10(6)UL (ref 3.8–5.3)
SODIUM SERPL-SCNC: 147 MMOL/L (ref 136–145)
TROPONIN I SERPL HS-MCNC: 103 NG/L (ref ?–34)
WBC # BLD AUTO: 5.8 X10(3) UL (ref 4–11)

## 2025-08-25 PROBLEM — Z51.5 PALLIATIVE CARE BY SPECIALIST: Status: ACTIVE | Noted: 2025-08-25

## 2025-08-25 PROBLEM — Z71.89 GOALS OF CARE, COUNSELING/DISCUSSION: Status: ACTIVE | Noted: 2025-08-25

## 2025-08-25 LAB
ANION GAP SERPL CALC-SCNC: 12 MMOL/L (ref 0–18)
BACTERIA BLD CULT: POSITIVE
BLACTX-M ISLT/SPM QL: NOT DETECTED
BUN BLD-MCNC: 17 MG/DL (ref 9–23)
CALCIUM BLD-MCNC: 9.5 MG/DL (ref 8.7–10.6)
CHLORIDE SERPL-SCNC: 110 MMOL/L (ref 98–112)
CO2 SERPL-SCNC: 25 MMOL/L (ref 21–32)
CREAT BLD-MCNC: 1.01 MG/DL (ref 0.55–1.02)
E COLI DNA BLD POS QL NAA+NON-PROBE: DETECTED
EGFRCR SERPLBLD CKD-EPI 2021: 54 ML/MIN/1.73M2 (ref 60–?)
ERYTHROCYTE [DISTWIDTH] IN BLOOD BY AUTOMATED COUNT: 16.1 %
GLUCOSE BLD-MCNC: 107 MG/DL (ref 70–99)
HCT VFR BLD AUTO: 29.4 % (ref 35–48)
HGB BLD-MCNC: 9.1 G/DL (ref 12–16)
MAGNESIUM SERPL-MCNC: 2.1 MG/DL (ref 1.6–2.6)
MCH RBC QN AUTO: 27.1 PG (ref 26–34)
MCHC RBC AUTO-ENTMCNC: 31 G/DL (ref 31–37)
MCV RBC AUTO: 87.5 FL (ref 80–100)
OSMOLALITY SERPL CALC.SUM OF ELEC: 306 MOSM/KG (ref 275–295)
PLATELET # BLD AUTO: 127 10(3)UL (ref 150–450)
POTASSIUM SERPL-SCNC: 4 MMOL/L (ref 3.5–5.1)
RBC # BLD AUTO: 3.36 X10(6)UL (ref 3.8–5.3)
SODIUM SERPL-SCNC: 147 MMOL/L (ref 136–145)
WBC # BLD AUTO: 4.2 X10(3) UL (ref 4–11)

## 2025-08-25 PROCEDURE — 99221 1ST HOSP IP/OBS SF/LOW 40: CPT | Performed by: NURSE PRACTITIONER

## 2025-08-25 PROCEDURE — 99497 ADVNCD CARE PLAN 30 MIN: CPT | Performed by: NURSE PRACTITIONER

## 2025-08-26 PROBLEM — Z71.89 ADVANCE CARE PLANNING: Status: ACTIVE | Noted: 2025-08-26

## 2025-08-26 LAB
ANION GAP SERPL CALC-SCNC: 12 MMOL/L (ref 0–18)
BUN BLD-MCNC: 18 MG/DL (ref 9–23)
CALCIUM BLD-MCNC: 9.5 MG/DL (ref 8.7–10.6)
CHLORIDE SERPL-SCNC: 106 MMOL/L (ref 98–112)
CO2 SERPL-SCNC: 25 MMOL/L (ref 21–32)
CREAT BLD-MCNC: 1.05 MG/DL (ref 0.55–1.02)
EGFRCR SERPLBLD CKD-EPI 2021: 51 ML/MIN/1.73M2 (ref 60–?)
ERYTHROCYTE [DISTWIDTH] IN BLOOD BY AUTOMATED COUNT: 16 %
GLUCOSE BLD-MCNC: 115 MG/DL (ref 70–99)
HCT VFR BLD AUTO: 29.1 % (ref 35–48)
HGB BLD-MCNC: 9 G/DL (ref 12–16)
MAGNESIUM SERPL-MCNC: 2 MG/DL (ref 1.6–2.6)
MCH RBC QN AUTO: 26.9 PG (ref 26–34)
MCHC RBC AUTO-ENTMCNC: 30.9 G/DL (ref 31–37)
MCV RBC AUTO: 87.1 FL (ref 80–100)
OSMOLALITY SERPL CALC.SUM OF ELEC: 299 MOSM/KG (ref 275–295)
PLATELET # BLD AUTO: 156 10(3)UL (ref 150–450)
POTASSIUM SERPL-SCNC: 3.8 MMOL/L (ref 3.5–5.1)
RBC # BLD AUTO: 3.34 X10(6)UL (ref 3.8–5.3)
SODIUM SERPL-SCNC: 143 MMOL/L (ref 136–145)
WBC # BLD AUTO: 4.2 X10(3) UL (ref 4–11)

## 2025-08-26 PROCEDURE — G0316 PROLNG IP/OBS E/M EA ADDL 15 MIN: HCPCS | Performed by: NURSE PRACTITIONER

## 2025-08-26 PROCEDURE — 99497 ADVNCD CARE PLAN 30 MIN: CPT | Performed by: NURSE PRACTITIONER

## 2025-08-26 PROCEDURE — 99233 SBSQ HOSP IP/OBS HIGH 50: CPT | Performed by: NURSE PRACTITIONER

## 2025-08-26 RX ORDER — POLYETHYLENE GLYCOL 3350 17 G/17G
17 POWDER, FOR SOLUTION ORAL DAILY PRN
Status: DISCONTINUED | OUTPATIENT
Start: 2025-08-26 | End: 2025-08-28

## 2025-08-26 RX ORDER — CEFPODOXIME PROXETIL 200 MG/1
400 TABLET, FILM COATED ORAL 2 TIMES DAILY
Qty: 24 TABLET | Refills: 0 | Status: SHIPPED | OUTPATIENT
Start: 2025-08-26 | End: 2025-09-01

## 2025-08-26 RX ORDER — DOCUSATE SODIUM 100 MG/1
100 CAPSULE, LIQUID FILLED ORAL 2 TIMES DAILY
Status: DISCONTINUED | OUTPATIENT
Start: 2025-08-26 | End: 2025-08-28

## 2025-08-26 RX ORDER — POTASSIUM CHLORIDE 1.5 G/1.58G
40 POWDER, FOR SOLUTION ORAL ONCE
Status: COMPLETED | OUTPATIENT
Start: 2025-08-26 | End: 2025-08-26

## 2025-08-26 RX ORDER — BISACODYL 10 MG
10 SUPPOSITORY, RECTAL RECTAL
Status: DISCONTINUED | OUTPATIENT
Start: 2025-08-26 | End: 2025-08-28

## 2025-08-27 ENCOUNTER — APPOINTMENT (OUTPATIENT)
Facility: HOSPITAL | Age: 85
End: 2025-08-27
Attending: HOSPITALIST

## 2025-08-27 LAB
ANION GAP SERPL CALC-SCNC: 11 MMOL/L (ref 0–18)
BUN BLD-MCNC: 11 MG/DL (ref 9–23)
CALCIUM BLD-MCNC: 9.6 MG/DL (ref 8.7–10.6)
CHLORIDE SERPL-SCNC: 107 MMOL/L (ref 98–112)
CO2 SERPL-SCNC: 25 MMOL/L (ref 21–32)
CREAT BLD-MCNC: 0.87 MG/DL (ref 0.55–1.02)
EGFRCR SERPLBLD CKD-EPI 2021: 64 ML/MIN/1.73M2 (ref 60–?)
ERYTHROCYTE [DISTWIDTH] IN BLOOD BY AUTOMATED COUNT: 15.9 %
GLUCOSE BLD-MCNC: 115 MG/DL (ref 70–99)
HCT VFR BLD AUTO: 28.6 % (ref 35–48)
HGB BLD-MCNC: 8.9 G/DL (ref 12–16)
MAGNESIUM SERPL-MCNC: 2 MG/DL (ref 1.6–2.6)
MCH RBC QN AUTO: 26.7 PG (ref 26–34)
MCHC RBC AUTO-ENTMCNC: 31.1 G/DL (ref 31–37)
MCV RBC AUTO: 85.9 FL (ref 80–100)
OSMOLALITY SERPL CALC.SUM OF ELEC: 296 MOSM/KG (ref 275–295)
PLATELET # BLD AUTO: 165 10(3)UL (ref 150–450)
POTASSIUM SERPL-SCNC: 3.9 MMOL/L (ref 3.5–5.1)
POTASSIUM SERPL-SCNC: 3.9 MMOL/L (ref 3.5–5.1)
RBC # BLD AUTO: 3.33 X10(6)UL (ref 3.8–5.3)
SODIUM SERPL-SCNC: 143 MMOL/L (ref 136–145)
WBC # BLD AUTO: 4.4 X10(3) UL (ref 4–11)

## 2025-08-27 RX ORDER — ASPIRIN 81 MG/1
81 TABLET ORAL DAILY
Qty: 30 TABLET | Refills: 0 | Status: SHIPPED | OUTPATIENT
Start: 2025-08-28

## 2025-08-27 RX ORDER — METOPROLOL TARTRATE 25 MG/1
12.5 TABLET, FILM COATED ORAL
Qty: 30 TABLET | Refills: 0 | Status: SHIPPED | OUTPATIENT
Start: 2025-08-27

## 2025-08-28 VITALS
DIASTOLIC BLOOD PRESSURE: 60 MMHG | HEART RATE: 70 BPM | TEMPERATURE: 98 F | OXYGEN SATURATION: 95 % | BODY MASS INDEX: 31.87 KG/M2 | HEIGHT: 63 IN | WEIGHT: 179.88 LBS | RESPIRATION RATE: 18 BRPM | SYSTOLIC BLOOD PRESSURE: 105 MMHG

## 2025-08-28 LAB
ANION GAP SERPL CALC-SCNC: 8 MMOL/L (ref 0–18)
BASOPHILS # BLD AUTO: 0.03 X10(3) UL (ref 0–0.2)
BASOPHILS NFR BLD AUTO: 0.7 %
BUN BLD-MCNC: 14 MG/DL (ref 9–23)
CALCIUM BLD-MCNC: 10 MG/DL (ref 8.7–10.6)
CHLORIDE SERPL-SCNC: 109 MMOL/L (ref 98–112)
CO2 SERPL-SCNC: 29 MMOL/L (ref 21–32)
CREAT BLD-MCNC: 0.98 MG/DL (ref 0.55–1.02)
EGFRCR SERPLBLD CKD-EPI 2021: 56 ML/MIN/1.73M2 (ref 60–?)
EOSINOPHIL # BLD AUTO: 0.21 X10(3) UL (ref 0–0.7)
EOSINOPHIL NFR BLD AUTO: 4.6 %
ERYTHROCYTE [DISTWIDTH] IN BLOOD BY AUTOMATED COUNT: 15.9 %
GLUCOSE BLD-MCNC: 109 MG/DL (ref 70–99)
HCT VFR BLD AUTO: 29.7 % (ref 35–48)
HGB BLD-MCNC: 8.9 G/DL (ref 12–16)
IMM GRANULOCYTES # BLD AUTO: 0.12 X10(3) UL (ref 0–1)
IMM GRANULOCYTES NFR BLD: 2.6 %
LYMPHOCYTES # BLD AUTO: 1.06 X10(3) UL (ref 1–4)
LYMPHOCYTES NFR BLD AUTO: 23.3 %
MCH RBC QN AUTO: 26.5 PG (ref 26–34)
MCHC RBC AUTO-ENTMCNC: 30 G/DL (ref 31–37)
MCV RBC AUTO: 88.4 FL (ref 80–100)
MONOCYTES # BLD AUTO: 0.48 X10(3) UL (ref 0.1–1)
MONOCYTES NFR BLD AUTO: 10.6 %
NEUTROPHILS # BLD AUTO: 2.64 X10 (3) UL (ref 1.5–7.7)
NEUTROPHILS # BLD AUTO: 2.64 X10(3) UL (ref 1.5–7.7)
NEUTROPHILS NFR BLD AUTO: 58.2 %
OSMOLALITY SERPL CALC.SUM OF ELEC: 303 MOSM/KG (ref 275–295)
PLATELET # BLD AUTO: 199 10(3)UL (ref 150–450)
POTASSIUM SERPL-SCNC: 4.3 MMOL/L (ref 3.5–5.1)
RBC # BLD AUTO: 3.36 X10(6)UL (ref 3.8–5.3)
SODIUM SERPL-SCNC: 146 MMOL/L (ref 136–145)
WBC # BLD AUTO: 4.5 X10(3) UL (ref 4–11)

## 2025-08-29 ENCOUNTER — MOBILE ENCOUNTER (OUTPATIENT)
Dept: FAMILY MEDICINE CLINIC | Facility: CLINIC | Age: 85
End: 2025-08-29

## 2025-08-29 DIAGNOSIS — F02.80 LATE ONSET ALZHEIMER'S DISEASE WITHOUT BEHAVIORAL DISTURBANCE (HCC): ICD-10-CM

## 2025-08-29 DIAGNOSIS — I10 ESSENTIAL HYPERTENSION: ICD-10-CM

## 2025-08-29 DIAGNOSIS — K21.9 GASTROESOPHAGEAL REFLUX DISEASE, UNSPECIFIED WHETHER ESOPHAGITIS PRESENT: ICD-10-CM

## 2025-08-29 DIAGNOSIS — D50.0 IRON DEFICIENCY ANEMIA DUE TO CHRONIC BLOOD LOSS: ICD-10-CM

## 2025-08-29 DIAGNOSIS — N17.9 AKI (ACUTE KIDNEY INJURY): ICD-10-CM

## 2025-08-29 DIAGNOSIS — N30.00 ACUTE CYSTITIS WITHOUT HEMATURIA: ICD-10-CM

## 2025-08-29 DIAGNOSIS — I25.10 CORONARY ARTERY DISEASE INVOLVING NATIVE HEART WITHOUT ANGINA PECTORIS, UNSPECIFIED VESSEL OR LESION TYPE: ICD-10-CM

## 2025-08-29 DIAGNOSIS — R53.1 WEAKNESS GENERALIZED: Primary | ICD-10-CM

## 2025-08-29 DIAGNOSIS — G30.1 LATE ONSET ALZHEIMER'S DISEASE WITHOUT BEHAVIORAL DISTURBANCE (HCC): ICD-10-CM

## 2025-08-29 DIAGNOSIS — I50.9 CHRONIC CONGESTIVE HEART FAILURE, UNSPECIFIED HEART FAILURE TYPE (HCC): ICD-10-CM

## (undated) DIAGNOSIS — K21.00 GASTROESOPHAGEAL REFLUX DISEASE WITH ESOPHAGITIS WITHOUT HEMORRHAGE: ICD-10-CM

## (undated) DIAGNOSIS — G30.1 LATE ONSET ALZHEIMER'S DISEASE WITHOUT BEHAVIORAL DISTURBANCE (HCC): ICD-10-CM

## (undated) DIAGNOSIS — F02.80 LATE ONSET ALZHEIMER'S DISEASE WITHOUT BEHAVIORAL DISTURBANCE (HCC): ICD-10-CM

## (undated) DIAGNOSIS — R53.1 WEAKNESS GENERALIZED: ICD-10-CM

## (undated) DIAGNOSIS — I21.4 NSTEMI (NON-ST ELEVATED MYOCARDIAL INFARCTION) (HCC): ICD-10-CM

## (undated) DIAGNOSIS — F34.1 DYSTHYMIC DISORDER: ICD-10-CM

## (undated) DIAGNOSIS — G47.33 OSA (OBSTRUCTIVE SLEEP APNEA): ICD-10-CM

## (undated) DIAGNOSIS — I10 ESSENTIAL HYPERTENSION: Primary | ICD-10-CM

## (undated) DIAGNOSIS — R10.30 LOWER ABDOMINAL PAIN: ICD-10-CM

## (undated) DIAGNOSIS — K58.1 IRRITABLE BOWEL SYNDROME WITH CONSTIPATION: ICD-10-CM

## (undated) DIAGNOSIS — M81.0 AGE-RELATED OSTEOPOROSIS WITHOUT CURRENT PATHOLOGICAL FRACTURE: ICD-10-CM

## (undated) DEVICE — REM POLYHESIVE ADULT PATIENT RETURN ELECTRODE: Brand: VALLEYLAB

## (undated) DEVICE — FILTERLINE NASAL ADULT O2/CO2

## (undated) DEVICE — 35 ML SYRINGE REGULAR TIP: Brand: MONOJECT

## (undated) DEVICE — SLEEVE KENDALL SCD EXPRESS MED

## (undated) DEVICE — COVER,TABLE,44X90,STERILE: Brand: MEDLINE

## (undated) DEVICE — DRESS WOUND AQUACEL 3.5INX4INL

## (undated) DEVICE — FIAPC® PROBE W/ FILTER 2200 A OD 2.3MM/6.9FR; L 2.2M/7.2FT: Brand: ERBE

## (undated) DEVICE — BITEBLOCK ENDOSCP 60FR MAXI STRP

## (undated) DEVICE — LIGHT HANDLE

## (undated) DEVICE — Device: Brand: DEFENDO AIR/WATER/SUCTION AND BIOPSY VALVE

## (undated) DEVICE — LINE MNTR ADLT SET O2 INTMD

## (undated) DEVICE — YANKAUER SUCTION INSTRUMENT NO CONTROL VENT, BULB TIP, CLEAR: Brand: YANKAUER

## (undated) DEVICE — BIT DRL GRN 145MM 4.2MM 3 FLT

## (undated) DEVICE — SOLUTION  .9 1000ML BTL

## (undated) DEVICE — 3M™ RED DOT™ MONITORING ELECTRODE WITH FOAM TAPE AND STICKY GEL, 50/BAG, 20/CASE, 72/PLT 2570: Brand: RED DOT™

## (undated) DEVICE — STANDARD HYPODERMIC NEEDLE,POLYPROPYLENE HUB: Brand: MONOJECT

## (undated) DEVICE — FIAPC® PROBE W/ FILTER 2200 C OD 2.3MM/6.9FR; L 2.2M/7.2FT: Brand: ERBE

## (undated) DEVICE — SUT VICRYL 0 CP-1 J267H

## (undated) DEVICE — CAP DST ATTCH 11.35X4MM SCP

## (undated) DEVICE — KIT CUSTOM ENDOPROCEDURE STERIS

## (undated) DEVICE — Device: Brand: CUSTOM PROCEDURE KIT

## (undated) DEVICE — V2 SPECIMEN COLLECTION MANIFOLD KIT: Brand: NEPTUNE

## (undated) DEVICE — CONMED SCOPE SAVER BITE BLOCK, 20X27 MM: Brand: SCOPE SAVER

## (undated) DEVICE — 3 ML SYRINGE LUER-LOCK TIP: Brand: MONOJECT

## (undated) DEVICE — GIJAW SINGLE-USE BIOPSY FORCEPS WITH NEEDLE: Brand: GIJAW

## (undated) DEVICE — PATIENT RETURN ELECTRODE, SINGLE-USE, CONTACT QUALITY MONITORING, ADULT, WITH 9FT CORD, FOR PATIENTS WEIGING OVER 33LBS. (15KG): Brand: MEGADYNE

## (undated) DEVICE — DRESS WOUND AQUACEL 3.5INX6INL

## (undated) DEVICE — FIAPC® PROBE W/ FILTER 2200 SC OD 2.3MM/6.9FR; L 2.2M/7.2FT: Brand: ERBE

## (undated) DEVICE — C-ARM: Brand: UNBRANDED

## (undated) DEVICE — 1200CC GUARDIAN II: Brand: GUARDIAN

## (undated) DEVICE — 6 ML SYRINGE LUER-LOCK TIP: Brand: MONOJECT

## (undated) DEVICE — ENDOSCOPY PACK UPPER: Brand: MEDLINE INDUSTRIES, INC.

## (undated) DEVICE — MEDI-VAC NON-CONDUCTIVE SUCTION TUBING 6MM X 1.8M (6FT.) L: Brand: CARDINAL HEALTH

## (undated) DEVICE — SUT MONOCRYL 2-0 SH Y417H

## (undated) DEVICE — 10FT COMBINED O2 DELIVERY/CO2 MONITORING. FILTER WITH MICROSTREAM TYPE LUER: Brand: DUAL ADULT NASAL CANNULA

## (undated) DEVICE — PADDING CAST COTTON STER 4

## (undated) DEVICE — KIT VLV 5 PC AIR H2O SUCT BX ENDOGATOR CONN

## (undated) DEVICE — ROD RM 950MM 2.5MM BALL TIP GW

## (undated) DEVICE — SUT MONOCRYL 0 CT-1 Y340H

## (undated) DEVICE — STERILE POLYISOPRENE POWDER-FREE SURGICAL GLOVES: Brand: PROTEXIS

## (undated) DEVICE — HIP PINNING CDS: Brand: MEDLINE INDUSTRIES, INC.

## (undated) DEVICE — SYRINGE 10ML LL TIP

## (undated) DEVICE — BANDAGE COMP PREMPRO 5YDX4IN

## (undated) DEVICE — GUIDEWIRE SYNT 3.2X400 357.399

## (undated) NOTE — LETTER
[address]  [address]  Authorization for Surgical Operation and Procedure     Date:___________                                                                                                         Time:__________  I hereby authorize Surgeon(s):  Rylan Meier MD, my physician and his/her assistants (if applicable), which may include medical students, residents, and/or fellows, to perform the following surgical operation/ procedure and administer such anesthesia as may be determined necessary by my physician:  Operation/Procedure name (s) Procedure(s):  ESOPHAGOGASTRODUODENOSCOPY (EGD) on Isabella Addison   2.   I recognize that during the surgical operation/procedure, unforeseen conditions may necessitate additional or different procedures than those listed above.  I, therefore, further authorize and request that the above-named surgeon, assistants, or designees perform such procedures as are, in their judgment, necessary and desirable.    3.   My surgeon/physician has discussed prior to my surgery the potential benefits, risks and side effects of this procedure; the likelihood of achieving goals; and potential problems that might occur during recuperation.  They also discussed reasonable alternatives to the procedure, including risks, benefits, and side effects related to the alternatives and risks related to not receiving this procedure.  I have had all my questions answered and I acknowledge that no guarantee has been made as to the result that may be obtained.    4.   Should the need arise during my operation/procedure, which includes change of level of care prior to discharge, I also consent to the administration of blood and/or blood products.  Further, I understand that despite careful testing and screening of blood or blood products by collecting agencies, I may still be subject to ill effects as a result of receiving a blood transfusion and/or blood  products.  The following are some, but not all, of the potential risks that can occur: fever and allergic reactions, hemolytic reactions, transmission of diseases such as Hepatitis, AIDS and Cytomegalovirus (CMV) and fluid overload.  In the event that I wish to have an autologous transfusion of my own blood, or a directed donor transfusion, I will discuss this with my physician.  Check only if Refusing Blood or Blood Products  I understand refusal of blood or blood products as deemed necessary by my physician may have serious consequences to my condition to include possible death. I hereby assume responsibility for my refusal and release the hospital, its personnel, and my physicians from any responsibility for the consequences of my refusal.          o  Refuse      5.   I authorize the use of any specimen, organs, tissues, body parts or foreign objects that may be removed from my body during the operation/procedure for diagnosis, research or teaching purposes and their subsequent disposal by hospital authorities.  I also authorize the release of specimen test results and/or written reports to my treating physician on the hospital medical staff or other referring or consulting physicians involved in my care, at the discretion of the Pathologist or my treating physician.    6.   I consent to the photographing or videotaping of the operations or procedures to be performed, including appropriate portions of my body for medical, scientific, or educational purposes, provided my identity is not revealed by the pictures or by descriptive texts accompanying them.  If the procedure has been photographed/videotaped, the surgeon will obtain the original picture, image, videotape or CD.  The hospital will not be responsible for storage, release or maintenance of the picture, image, tape or CD.    7.   I consent to the presence of a  or observers in the operating room as deemed necessary by my physician or  their designees.    8.   I recognize that in the event my procedure results in extended X-Ray/fluoroscopy time, I may develop a skin reaction.    9. If I have a Do Not Attempt Resuscitation (DNAR) order in place, that status will be suspended while in the operating room, procedural suite, and during the recovery period unless otherwise explicitly stated by me (or a person authorized to consent on my behalf). The surgeon or my attending physician will determine when the applicable recovery period ends for purposes of reinstating the DNAR order.  10. Patients having a sterilization procedure: I understand that if the procedure is successful the results will be permanent and it will therefore be impossible for me to inseminate, conceive, or bear children.  I also understand that the procedure is intended to result in sterility, although the result has not been guaranteed.   11. I acknowledge that my physician has explained sedation/analgesia administration to me including the risk and benefits I consent to the administration of sedation/analgesia as may be necessary or desirable in the judgment of my physician.    I CERTIFY THAT I HAVE READ AND FULLY UNDERSTAND THE ABOVE CONSENT TO OPERATION and/or OTHER PROCEDURE.    _________________________________________  __________________________________  Signature of Patient     Signature of Responsible Person         ___________________________________         Printed Name of Responsible Person           _________________________________                 Relationship to Patient  _________________________________________  ______________________________  Signature of Witness          Date  Time      Patient Name: Isabella CORRAL Addison     : 1940                 Printed: 2025     Medical Record #: [id number]                     Page 1 of [address] [place] [address]  [address]    Consent for  Anesthesia    I, Isabella Addison agree to be cared for by an anesthesiologist, who is specially trained to monitor me and give me medicine to put me to sleep or keep me comfortable during my procedure    I understand that my anesthesiologist is not an employee or agent of [facility] or [employer]. He or she works for [employer].    As the patient asking for anesthesia services, I agree to:  Allow the anesthesiologist (anesthesia doctor) to give me medicine and do additional procedures as necessary. Some examples are: Starting or using an “IV” to give me medicine, fluids or blood during my procedure, and having a breathing tube placed to help me breathe when I’m asleep (intubation). In the event that my heart stops working properly, I understand that my anesthesiologist will make every effort to sustain my life, unless otherwise directed by [facility] Do Not Resuscitate documents.  Tell my anesthesia doctor before my procedure:  If I am pregnant.  The last time that I ate or drank.  All of the medicines I take (including prescriptions, herbal supplements, and pills I can buy without a prescription (including street drugs/illegal medications). Failure to inform my anesthesiologist about these medicines may increase my risk of anesthetic complications.  If I am allergic to anything or have had a reaction to anesthesia before.  I understand how the anesthesia medicine will help me (benefits).  I understand that with any type of anesthesia medicine there are risks:  The most common risks are: nausea, vomiting, sore throat, muscle soreness, damage to my eyes, mouth, or teeth (from breathing tube placement).  Rare risks include: remembering what happened during my procedure, allergic reactions to medications, injury to my airway, heart, lungs, vision, nerves, or muscles and in extremely rare instances death.  My doctor has explained to me other choices available to me for my  care (alternatives).  Pregnant Patients (“epidural”):  I understand that the risks of having an epidural (medicine given into my back to help control pain during labor), include itching, low blood pressure, difficulty urinating, headache or slowing of the baby’s heart. Very rare risks include infection, bleeding, seizure, irregular heart rhythms and nerve injury.  Regional Anesthesia (“spinal”, “epidural”, & “nerve blocks”):  I understand that rare but potential complications include headache, bleeding, infection, seizure, irregular heart rhythms, and nerve injury.    I can change my mind about having anesthesia services at any time before I get the medicine.    _____________________________________________________________________________  Patient (or Representative) Signature/Relationship to Patient  Date   Time    _____________________________________________________________________________   Name (if used)    Language/Organization   Time    _____________________________________________________________________________  Anesthesiologist Signature     Date   Time  I have discussed the procedure and information above with the patient (or patient’s representative) and answered their questions. The patient or their representative has agreed to have anesthesia services.    _____________________________________________________________________________  Witness        Date   Time  I have verified that the signature is that of the patient or patient’s representative, and that it was signed before the procedure  Patient Name: Isabella Addison     : 1940                 Printed: 2025     Medical Record #: [id number]                     Page 2 of 2

## (undated) NOTE — IP AVS SNAPSHOT
[address]            (For Outpatient Use Only) Initial Admit Date: 2022   Inpt/Obs Admit Date: Inpt: 22 / Obs: 22   Discharge Date:    Hospital Acct:  [de-identified]   MRN: [de-identified]   CSN: [id number]   CEID: [id number]        ENCOUNTER  Patient Class: Inpatient Admitting Provider: Claude Angst, MD Unit: [address] [place] [address] Service: Cardiac Telemetry Attending Provider: Judy Sánchez MD   Bed: 2235-Y   Visit Type:   Referring Physician: No ref. provider found Billing Flag:    Admit Diagnosis: Weakness generalized [R53.1]      PATIENT  Legal Name:   Gabriel Noland   Legal Sex: Female  Gender ID:              Pref Name:    PCP:  Deangelo Soler MD Home: [phone]   Address:  [address] [place] [address]  APT * : 1940 (82 yrs) Mobile: [phone]; [phone]         City/State/Zip: [facility] [address] Marital:  Language: [address] [place] [address] Drive: [employer] SSN4: [id number] Rastafari: [address] Not L*     Race: White Ethnicity: Non  Or [address]   Name Relationship Legal Guardian? Home Phone Work Phone Mobile Phone   1. Minh Addison  2.  Stevan Frost Spouse  Sister    [phone]     GUARANTOR  Guarantor: Gabriel Noland : 1940 Home Phone: [phone]   Address: [address] [place] [address]    Sex: Female Work Phone:    City/State/Zip: [facility] [address]   Rel. to Patient: Self Guarantor ID: [id number]   GUARANTOR EMPLOYER   Employer:  Status: RETIRED     COVERAGE  PRIMARY INSURANCE   Payor: MEDICARE Plan: MEDICARE PART A&B   Group Number:  Insurance Type: INDEMNITY   Subscriber Name: Adam Meza : 1940   Subscriber ID: [id number] Pt Rel to Subscriber: Self   SECONDARY INSURANCE   Payor: [place] INDEMNITY Plan: [address] [place] [address]   Group Number: [id number] Insurance Type: INDEMNITY   Subscriber Name: Lorena Rosen : 1/10/1941   Subscriber ID: [id number] Pt Rel to Subscriber: SPOUSE   TERTIARY INSURANCE   Payor:  Plan:    Group Number:  Insurance Type:    Subscriber Name:  Subscriber :    Subscriber ID:  Pt Rel to Subscriber:    Hospital Account Financial Class: Medicare    May 3, 2022

## (undated) NOTE — LETTER
[address] [facility] FOR BLOOD OR BLOOD COMPONENTS      In the course of your treatment, it may become necessary to administer a transfusion of blood or blood components. This form provides basic information concerning this procedure and, if signed by you, authorizes its performance by qualified medical personnel. DESCRIPTION OF PROCEDURE:  Blood is introduced into one of your veins, commonly in the arm, using a sterilized disposable needle. The amount of blood transfused, and whether the transfusion will be of blood or blood components is a judgment the physician will make based on your particular needs. RISKS:  The transfusion is a common procedure of low risk. MINOR AND TEMPORARY REACTIONS ARE NOT UNCOMMON, including a slight bruise, swelling or local reaction in the area where the needle pierces your skin, or a non-serious reaction to the transfused material itself, including headache, fever or a mild skin reaction, such as rash. Serious reactions are possible, though very unlikely and include severe allergic reaction (shock)  and destruction (hemolysis) of transfused blood cells. Infectious diseases which are known to be transmitted by blood transfusion include CERTAIN TYPES OF VIRAL HEPATITIS, a viral infection of the liver, HUMAN IMMUNODEFICIENCY VIRUS (HIV-1,2) infection, a viral infection known to cause ACQUIRED IMMUNODEFICIENCY SYNDROME (AIDS) AS WELL AS CERTAIN OTHER BACTERIAL, VIRAL AND PARASITIC DISEASES. While a minimal risk of acquiring an infectious disease from transfused blood exists, in accordance with Federal and State law all due care has been taken in donor selection and testing to avoid transmission of disease. ALTERNATIVES:  If loss of blood poses serious threats in the course of your treatment, THERE IS NO EFFECTIVE ALTERNATIVE TO BLOOD TRANSFUSION.  However, if you have any further questions on this matter, your physician will fully explain the alternatives to you if it has not already been done. I,Isabella Addison, have read/had read to me the above. I understand the matters bearing on the decision whether or not to authorize a transfusion of blood or blood components. I have no questions which have not been answered to my full satisfaction.  I hereby consent to such transfusion as  my physician may deem necessary or advisable in the course of my treatment.        _______________   __________________________________________________  Date     Signature of Patient, Parent or Legal Guardian      ([facility])      __________________________________________  Witness to Signature (title or relationship to patient)    Patient Name: Yasmany Bonner     : 1940                 Printed: 2023     Medical Record #: [id number]                    Page 1 of 1

## (undated) NOTE — IP AVS SNAPSHOT
[address]            (For Outpatient Use Only) Initial Admit Date: 12/24/2019   Inpt/Obs Admit Date: Inpt: 12/27/19 / Obs: 12/24/19   Discharge Date:    Skip Ana Rosa:  [de-identified]   MRN: [de-identified]   CSN: [id number]   CEID: [id number] Group Number:  Insurance Type:    Subscriber Name:  Subscriber :    Subscriber ID:  Pt Rel to Subscriber:    Hospital Account Financial Class: Medicare    2019

## (undated) NOTE — LETTER
[facility]  Administration of Anesthesia  1. I, Janet Ruth, or _________________________________ acting on her behalf, (Patient) (Dependent/Representative) request to receive anesthesia for my pending procedure/operation/treatmen spinal bleeding, seizure, cardiac arrest and death. 7. AWARENESS: I understand that it is possible (but unlikely) to have explicit memory of events from the operating room while under general anesthesia.   8. ELECTROCONVULSIVE THERAPY PATIENTS: This consen signature below affirms that prior to the time of the procedure, I have explained to the patient and/or his/her guardian, the risks and benefits of undergoing anesthesia, as well as any reasonable alternatives.     __________________________________________

## (undated) NOTE — IP AVS SNAPSHOT
Patient Demographics     Address  [address] [place], [place] [address] Phone  [phone] [facility])  [phone] (Mobile) *Preferred* E-mail Address  [email or web address]      Emergency Contact(s)     Name Relation Home Work Mobile    Nadia Soo Parsons MD         digoxin 0.125 MG Tabs  Commonly known as:  LANOXIN  Next dose due:  250 mg given today 12/30  125 mg due 12/31  250 mg due 1/01  125 mg due 1/02    Etc.       TAKE 1 TABLET EVERY OTHER DAY ALTERNATING WITH 2 TABLETS EVERY OT Please  your prescriptions at the location directed by your doctor or nurse    Bring a paper prescription for each of these medications  Clindamycin HCl 300 MG Caps           [id number]-A - MAR ACTION REPORT  (last 24 hrs)    ** SITE UNKNOWN **     O Vitals  131/74 Filed at 12/30/2019 1230   Pulse  78 Filed at 12/30/2019 1230   Resp  16 Filed at 12/30/2019 1230   Temp  97.3 °F (36.3 °C) Filed at 12/30/2019 1230   SpO2  96 % Filed at 12/30/2019 1230      Patient's Most Recent Weight       Most Recent Va Blood — 12/30/19 0600            Testing Performed By     Lab - Abbreviation Name Director Address Valid Date Range    [facility] LAB Beckie Phelps  [address] [address] 02/03/16 1201 - Present            Microbi History of Present Illness: Patient is a 78year old female with PMH sig for anxiety, GERD, HTN who presented with weakness, lethargy. Unable to arouse at home. Over[RJ.1] last few days has had less energy, seemed weaker[RJ.2].  Has had cough[RJ.1] and lo Performed by Ligia Odell MD at [facility] ENDOSCOPY   • ESOPHAGOGASTRODUODENOSCOPY, COLONOSCOPY, POSSIBLE BIOPSY, POSSIBLE POLYPECTOMY 49332, [address] N/A 1/8/2014    Performed by Elena May MD at [facility] RIVASTIGMINE 13.3 MG/24HR Transdermal Patch 24 Hr, PLACE 1 PATCH ON THE SKIN DAILY, Disp: 90 patch, Rfl: 1  amLODIPine Besylate 5 MG Oral Tab, Take 1 tablet (5 mg total) by mouth once daily. , Disp: 90 tablet, Rfl: 3  atorvastatin 40 MG Oral Tab, TAKE 1 TAB (Oral)   Resp 18   Wt 137 lb 8 oz (62.4 kg)   SpO2 92%   BMI 23.60 kg/m²     Gen- NAD, appears stated age  HEENT- NCAT, anicteric sclera, MMM, OP clear[RJ.1]. Hoarse voice[RJ.2]  Lymph- no cervical LAD  CV- RRR no murmurs.  No EDUARDO  Lungs- CTAB, good respira periventricular white matter both cerebral hemispheres extending into the centrum semiovale and subcortical white matter. This is nonspecific although  compatible chronic microvascular ischemic changes.   Old lacunar infarction in the left basal ganglia an Available outpatient records reviewed--    ASSESSMENT / PLAN:[RJ.1]     1) lethargy/weakness- likely has had an acute insult on top of likely underlying dementia-like process. - thus far labs unrevealing.  Dig level, ammonia okay  - trop elevated (see bel Hosp Day #[SK.1] 0[SK.2] PCP[LINA Buck 8087, [address]]     Reason for consultation:  Elevated troponin    Impression:  1. Elevated troponin; clinical picture not compatible with ACS  2. Altered mental status/confusion  3.  RUL opacity; clinical pictu ondansetron HCl (ZOFRAN) injection 4 mg, 4 mg, Intravenous, Q4H PRN  influenza vaccine (PF)(FLUZONE HD) high dose for 65 yrs & older inj 0.5ml, 0.5 mL, Intramuscular, Prior to discharge        Past Medical History:   Diagnosis Date   • ANXIETY    • Anxiety • OPEN REDUCTION INTERNAL FIXATION ARM Left 10/6/2014    Performed by Oli Hooper MD at [facility]   • OTHER SURGICAL HISTORY      STEROTACTIC BX's BOTH BREASTS   • OTHER SURGICAL HISTORY  5/200 appreciated; PMI is non-displaced; there is no evidence of a sternal heave  Lungs: Clear to auscultation bilaterally; no accessory muscle use is noted  Abdomen: Soft, non-tender; bowel sounds are normoactive; no hepatosplenomegaly  Extremities: No clubbing General Medicine Discharge Summary     Patient ID:  Juvenal Esteban  78year old  1/19/1940    Admit date: 12/24/2019    Discharge date and time: 12/30/19    Attending Physician: DO CHASITY Hawley with volume loss.  Has dementia by history and findings and delirium seen in the hospital  -high risk of falls with her memory impairment, recent fall history as well as pna/generalized weakness.  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 However attention in this region on follow-up exams is also recommended.    Dictated by: Chemo Maradiaga MD on 12/25/2019 at 14:34     Approved by: Chemo Maradiaga MD on 12/25/2019 at 14:41          Ct Brain Or Head (77820)    Res Ania Rand MD on 12/24/2019 at 17:51     Approved by: Mar Steinberg MD on 12/24/2019 at 17:53          Mri Brain (cpt=70551)    Result Date: 12/26/2019  PROCEDURE:  MRI BRAIN (CPT=70551)  COMPARISON:  [facility] , CT, CT BRAIN OR HEAD (73943), 12/24/2019, 17:17. radiograph was obtained. COMPARISON:  None. INDICATIONS:  weak and lethargic today  PATIENT STATED HISTORY: (As transcribed by Technologist)   Weak and lethargic today.     FINDINGS:  There is a wedge-shaped airspace opacity within the inferior aspect of Take 1,000 mcg by mouth daily. DULoxetine HCl 60 MG Oral Cap DR Particles  Take 60 mg by mouth. 2 tabs QAM & 1 Q stewart     BIOTIN OR  Take  by mouth daily. LORazepam (ATIVAN) 0.5 MG Oral Tab  Take 0.25 mg by mouth daily.      Cholecalciferol (VITAMIN D3 Physician Order: IP Consult to Occupational Therapy  Reason for Therapy: ADL/IADL Dysfunction and Discharge Planning    History related to current admission:[MR.1] Pt is[MR.3 78year old[MR.3] female admitted on 12/24/2019 from home with c/o weakness, fev • LUMBAR EPIDURAL N/A 7/19/2018    Performed by Trisha Pozo MD at [address] [place] [address] N/A 12/28/2017    Performed by Trisha Pozo MD at [address] [place] [address] N/A 9/12/2017    Performed Current Vision: wears glasses only for reading[MR.2]    PERCEPTION[MR.1]  Left Right Discrimination:   intact[MR.2]    SENSATION[MR.1]  Light touch:  intact[MR.2]    Communication:[MR.1] Pt able to communicate needs and wants[MR.2]    Behavioral/Emotional/ transfer ([MR.2]ind, bed flat, no use of rails[MR.4]), sit to supine transfer ([MR.2]ind, bed flat, no use of rails[MR.4]), sit-stand transfer ([MR.2]CGA, min for RW management and mod cueing[MR.4]), functional mobility ([MR.2]in room, CGA for balance and social participation.      The patient is functioning below her previous functional level and would benefit from skilled OT[MR.1] during this hospital stay[MR.4] to address the above deficits, maximizing patient’s ability to return safely to her prior level MR.3 - Guanako Braswell, OT on 12/29/2019  1:10 PM  MR.4 - Guanako Michaelle, OT on 12/29/2019  1:18 PM  MR. 5 Sylvia Pierce, OT on 12/29/2019  3:03 PM                     Video Swallow Study Notes    No notes of this type exist for this encounter.      SLP Notes    No Description:  Patient's Short Term Goal: \"Feel better and go home\"    Interventions:   - Follow POC, take prescribed medications, meet with doctors  - See additional Care Plan goals for specific interventions

## (undated) NOTE — LETTER
[facility] [address], [address] [place] [address]  Consent for Procedure/Sedation    Date: 4/20/22    Time: 2200      1. I authorize the performance upon Vy Davidson the following:cardiac catheterization, left ventricular cineangiography, bilateral selective coronary angiography and/or right heart catheterization; possible percutaneous transluminal coronary angioplasty, coronary atherectomy, coronary stent, intracoronary thrombolytic therapy, antiplatelet therapy and/or intravascular ultrasound. 2. I authorize Dr. Melida Payne (and whomever is designated as the doctor's assistant), to perform the above-mentioned procedures. 3. If any unforeseen conditions arise during this procedure calling for additional procedures, operations, or medications (including anesthesia and blood transfusion), I further request and authorize the doctor to do whatever he/she deems advisable in my interest.  4. I consent to the taking and reproduction of any photographs in the course of this procedure for professional purposes. 5. I consent to the administration of such sedation as may be considered necessary, or advisable by the physician responsible for this service. 6. I have been informed by my doctor of the nature and purpose of this procedure and sedation, possible alternative methods of treatment, risk involved and possible complications. In the event your procedure results in extended X-Ray/fluoroscopy time, you may develop a skin reaction. 7. If I have a Do Not Attempt Resuscitation (DNAR) order in place, that status will be suspended while in the operating room, procedural suite, and during the recovery period unless otherwise explicitly stated by me (or a person authorized to consent on my behalf). The surgeon or my attending physician will determine when the applicable recovery period ends for purposes of reinstating the DNAR order.     Signature of Patient: ____________________________________________________    Signature of person authorized                                     Relationship to  to consent for patient: _________________________ patient: ___________________    Witness: _______________________________ Date: _____________________    Printed: 2022   9:45 PM  Patient Name: Stew Gilliland        : 1940       Medical Record #: [id number]

## (undated) NOTE — LETTER
[address] [facility]ONENTS      In the course of your treatment, it may become necessary to administer a transfusion of blood or blood components. This form provides basic information concerning this procedure and, if signed by you, authorizes its performance by qualified medical personnel. DESCRIPTION OF PROCEDURE:  Blood is introduced into one of your veins, commonly in the arm, using a sterilized disposable needle. The amount of blood transfused, and whether the transfusion will be of blood or blood components is a judgment the physician will make based on your particular needs. RISKS:  The transfusion is a common procedure of low risk. MINOR AND TEMPORARY REACTIONS ARE NOT UNCOMMON, including a slight bruise, swelling or local reaction in the area where the needle pierces your skin, or a non-serious reaction to the transfused material itself, including headache, fever or a mild skin reaction, such as rash. Serious reactions are possible, though very unlikely and include severe allergic reaction (shock)  and destruction (hemolysis) of transfused blood cells. Infectious diseases which are known to be transmitted by blood transfusion include CERTAIN TYPES OF VIRAL HEPATITIS, a viral infection of the liver, HUMAN IMMUNODEFICIENCY VIRUS (HIV-1,2) infection, a viral infection known to cause ACQUIRED IMMUNODEFICIENCY SYNDROME (AIDS) AS WELL AS CERTAIN OTHER BACTERIAL, VIRAL AND PARASITIC DISEASES. While a minimal risk of acquiring an infectious disease from transfused blood exists, in accordance with Federal and State law all due care has been taken in donor selection and testing to avoid transmission of disease. ALTERNATIVES:  If loss of blood poses serious threats in the course of your treatment, THERE IS NO EFFECTIVE ALTERNATIVE TO BLOOD TRANSFUSION.  However, if you have any further questions on this matter, your physician will fully explain the alternatives to you if it has not already been done. I,Isabella Addison, have read/had read to me the above. I understand the matters bearing on the decision whether or not to authorize a transfusion of blood or blood components. I have no questions which have not been answered to my full satisfaction.  I hereby consent to such transfusion as  my physician may deem necessary or advisable in the course of my treatment.        _______________   __________________________________________________  Date     Signature of Patient, Parent or Legal Guardian      ([facility])      __________________________________________  Witness to Signature (title or relationship to patient)    Patient Name: Alicia Nguyen     : 1940                 Printed: 2022     Medical Record #: [id number]                    Page 1 of 1

## (undated) NOTE — LETTER
[address], [place]  Authorization for Invasive Procedures  1.  I hereby authorize Dr. Jia Serrato , my physician and whomever may be designated as the doctor's assistant, to perform the following operation and/or procedure:  Esophag risks that can occur: fever and allergic reactions, hemolytic reactions, transmission of disease such as hepatitis, AIDS, cytomegalovirus (CMV), and flluid overload.  In the event that I wish to have autologous transfusions of my own blood, or a directed do of my physician.      Signature of Patient:  ________________________________________________ Date: _________Time: _________    Responsible person in case of minor or unconscious: _____________________________Relationship: ____________     Witness Signature